# Patient Record
Sex: MALE | Race: OTHER | URBAN - METROPOLITAN AREA
[De-identification: names, ages, dates, MRNs, and addresses within clinical notes are randomized per-mention and may not be internally consistent; named-entity substitution may affect disease eponyms.]

---

## 2020-09-05 ENCOUNTER — INPATIENT (INPATIENT)
Facility: HOSPITAL | Age: 60
LOS: 1 YEAR days | Discharge: NURSING, REHAB OR LONG-TERM CARE | End: 2021-12-31
Attending: HOSPITALIST | Admitting: HOSPITALIST
Payer: MEDICAID

## 2020-09-05 VITALS
HEART RATE: 71 BPM | OXYGEN SATURATION: 99 % | DIASTOLIC BLOOD PRESSURE: 84 MMHG | SYSTOLIC BLOOD PRESSURE: 179 MMHG | RESPIRATION RATE: 18 BRPM | TEMPERATURE: 98 F | WEIGHT: 164.91 LBS

## 2020-09-05 DIAGNOSIS — F29 UNSPECIFIED PSYCHOSIS NOT DUE TO A SUBSTANCE OR KNOWN PHYSIOLOGICAL CONDITION: ICD-10-CM

## 2020-09-05 LAB
ANION GAP SERPL CALC-SCNC: 13 MMOL/L — SIGNIFICANT CHANGE UP (ref 7–14)
APAP SERPL-MCNC: <5 UG/ML — LOW (ref 10–30)
APPEARANCE UR: CLEAR — SIGNIFICANT CHANGE UP
BASOPHILS # BLD AUTO: 0.01 K/UL — SIGNIFICANT CHANGE UP (ref 0–0.2)
BASOPHILS NFR BLD AUTO: 0.2 % — SIGNIFICANT CHANGE UP (ref 0–1)
BILIRUB UR-MCNC: NEGATIVE — SIGNIFICANT CHANGE UP
BUN SERPL-MCNC: 21 MG/DL — HIGH (ref 10–20)
CALCIUM SERPL-MCNC: 9.7 MG/DL — SIGNIFICANT CHANGE UP (ref 8.5–10.1)
CHLORIDE SERPL-SCNC: 106 MMOL/L — SIGNIFICANT CHANGE UP (ref 98–110)
CO2 SERPL-SCNC: 24 MMOL/L — SIGNIFICANT CHANGE UP (ref 17–32)
COLOR SPEC: SIGNIFICANT CHANGE UP
CREAT SERPL-MCNC: 0.8 MG/DL — SIGNIFICANT CHANGE UP (ref 0.7–1.5)
DIFF PNL FLD: NEGATIVE — SIGNIFICANT CHANGE UP
EOSINOPHIL # BLD AUTO: 0.06 K/UL — SIGNIFICANT CHANGE UP (ref 0–0.7)
EOSINOPHIL NFR BLD AUTO: 1.4 % — SIGNIFICANT CHANGE UP (ref 0–8)
ETHANOL SERPL-MCNC: <10 MG/DL — SIGNIFICANT CHANGE UP
GLUCOSE SERPL-MCNC: 74 MG/DL — SIGNIFICANT CHANGE UP (ref 70–99)
GLUCOSE UR QL: SIGNIFICANT CHANGE UP
HCT VFR BLD CALC: 40.7 % — LOW (ref 42–52)
HGB BLD-MCNC: 12.9 G/DL — LOW (ref 14–18)
IMM GRANULOCYTES NFR BLD AUTO: 0.5 % — HIGH (ref 0.1–0.3)
KETONES UR-MCNC: NEGATIVE — SIGNIFICANT CHANGE UP
LEUKOCYTE ESTERASE UR-ACNC: NEGATIVE — SIGNIFICANT CHANGE UP
LYMPHOCYTES # BLD AUTO: 0.87 K/UL — LOW (ref 1.2–3.4)
LYMPHOCYTES # BLD AUTO: 20.8 % — SIGNIFICANT CHANGE UP (ref 20.5–51.1)
MCHC RBC-ENTMCNC: 30.6 PG — SIGNIFICANT CHANGE UP (ref 27–31)
MCHC RBC-ENTMCNC: 31.7 G/DL — LOW (ref 32–37)
MCV RBC AUTO: 96.4 FL — HIGH (ref 80–94)
MONOCYTES # BLD AUTO: 0.45 K/UL — SIGNIFICANT CHANGE UP (ref 0.1–0.6)
MONOCYTES NFR BLD AUTO: 10.7 % — HIGH (ref 1.7–9.3)
NEUTROPHILS # BLD AUTO: 2.78 K/UL — SIGNIFICANT CHANGE UP (ref 1.4–6.5)
NEUTROPHILS NFR BLD AUTO: 66.4 % — SIGNIFICANT CHANGE UP (ref 42.2–75.2)
NITRITE UR-MCNC: NEGATIVE — SIGNIFICANT CHANGE UP
NRBC # BLD: 0 /100 WBCS — SIGNIFICANT CHANGE UP (ref 0–0)
PH UR: 7.5 — SIGNIFICANT CHANGE UP (ref 5–8)
PLATELET # BLD AUTO: 204 K/UL — SIGNIFICANT CHANGE UP (ref 130–400)
POTASSIUM SERPL-MCNC: 4.3 MMOL/L — SIGNIFICANT CHANGE UP (ref 3.5–5)
POTASSIUM SERPL-SCNC: 4.3 MMOL/L — SIGNIFICANT CHANGE UP (ref 3.5–5)
PROT UR-MCNC: NEGATIVE — SIGNIFICANT CHANGE UP
RBC # BLD: 4.22 M/UL — LOW (ref 4.7–6.1)
RBC # FLD: 13.1 % — SIGNIFICANT CHANGE UP (ref 11.5–14.5)
SALICYLATES SERPL-MCNC: <0.3 MG/DL — LOW (ref 4–30)
SODIUM SERPL-SCNC: 143 MMOL/L — SIGNIFICANT CHANGE UP (ref 135–146)
SP GR SPEC: 1.01 — SIGNIFICANT CHANGE UP (ref 1.01–1.02)
UROBILINOGEN FLD QL: SIGNIFICANT CHANGE UP
WBC # BLD: 4.19 K/UL — LOW (ref 4.8–10.8)
WBC # FLD AUTO: 4.19 K/UL — LOW (ref 4.8–10.8)

## 2020-09-05 PROCEDURE — 93010 ELECTROCARDIOGRAM REPORT: CPT

## 2020-09-05 PROCEDURE — 99285 EMERGENCY DEPT VISIT HI MDM: CPT

## 2020-09-05 PROCEDURE — 70450 CT HEAD/BRAIN W/O DYE: CPT | Mod: 26

## 2020-09-05 RX ORDER — HALOPERIDOL DECANOATE 100 MG/ML
2 INJECTION INTRAMUSCULAR EVERY 12 HOURS
Refills: 0 | Status: DISCONTINUED | OUTPATIENT
Start: 2020-09-05 | End: 2020-09-07

## 2020-09-05 RX ORDER — SODIUM CHLORIDE 9 MG/ML
1000 INJECTION, SOLUTION INTRAVENOUS
Refills: 0 | Status: DISCONTINUED | OUTPATIENT
Start: 2020-09-05 | End: 2020-09-07

## 2020-09-05 RX ORDER — ENOXAPARIN SODIUM 100 MG/ML
40 INJECTION SUBCUTANEOUS DAILY
Refills: 0 | Status: COMPLETED | OUTPATIENT
Start: 2020-09-05 | End: 2021-08-04

## 2020-09-05 RX ORDER — CHLORHEXIDINE GLUCONATE 213 G/1000ML
1 SOLUTION TOPICAL
Refills: 0 | Status: COMPLETED | OUTPATIENT
Start: 2020-09-05 | End: 2021-08-04

## 2020-09-05 RX ORDER — HALOPERIDOL DECANOATE 100 MG/ML
5 INJECTION INTRAMUSCULAR ONCE
Refills: 0 | Status: COMPLETED | OUTPATIENT
Start: 2020-09-05 | End: 2020-09-05

## 2020-09-05 RX ADMIN — HALOPERIDOL DECANOATE 5 MILLIGRAM(S): 100 INJECTION INTRAMUSCULAR at 05:18

## 2020-09-05 RX ADMIN — SODIUM CHLORIDE 60 MILLILITER(S): 9 INJECTION, SOLUTION INTRAVENOUS at 23:06

## 2020-09-05 RX ADMIN — Medication 2 MILLIGRAM(S): at 05:18

## 2020-09-05 NOTE — ED BEHAVIORAL HEALTH ASSESSMENT NOTE - DIFFERENTIAL
Psychotic syndrome (schizophrenia, schizoaffective disorder, bipolar disorder), substance induced mood disorder, psychosis secondary to a medical condition, neurocognitive disorder

## 2020-09-05 NOTE — ED BEHAVIORAL HEALTH ASSESSMENT NOTE - COMMENTS ON SUICIDE RISK/PROTECTIVE FACTORS:
Risk factors include clouded sensorium of unknown etiology, unknown history (psychiatric or medical), concerning presentation (brought in due to disorganized behavior), at this time pt is at acutely elevated risk, however requires further assessment prior to disposition planning. 1:1 required due to acutely elevated risk.

## 2020-09-05 NOTE — ED PROVIDER NOTE - PROGRESS NOTE DETAILS
pt combative, agitated, screaming at staff, will give sedation, labs + psych consult. fs: patient less agitated I will continue to monitor at this time no signs of trauma unlikely infectious cause without fever Per psych - patient still too somnolent s/p haldol/ativan for evaluation. FAVIOLA: Psych saw patient - patient more awake but not providing enough history at this time for them. They are going to re-evaluate in 1 hour. FAVIOLA: Psych saw patient - patient more awake but not providing enough history at this time for them. They are going to re-evaluate in 1 hour. Patient likely still sedated from initial chemical sedation. Per night staff patient was belligerent, no focal deficits. However will obtain CT head while awaiting further psych eval given prolonged sedation. FAVIOLA: Case endorsed to Dr. Andrew pending CT head, re-eval, likely admission to medicine with psych on consult.

## 2020-09-05 NOTE — ED PROVIDER NOTE - UNABLE TO OBTAIN
Urgent need for Intervention I am unable to obtain a comprehensive history, review of systems, past medical history due to constraints imposed by the urgency of the patient's clinical condition and/or mental status.

## 2020-09-05 NOTE — ED BEHAVIORAL HEALTH ASSESSMENT NOTE - RISK ASSESSMENT
Unable to determine Suicide Risk Risk factors include clouded sensorium of unknown etiology, unknown history (psychiatric or medical), concerning presentation (brought in due to disorganized behavior), at this time pt is at acutely elevated risk, however requires further assessment prior to disposition planning. 1:1 required due to acutely elevated risk.

## 2020-09-05 NOTE — ED PROVIDER NOTE - CLINICAL SUMMARY MEDICAL DECISION MAKING FREE TEXT BOX
patient improved we obtained labs monitored with no signs of trauma, we have consulted anna I reviewed the Resident’s or Physician Assistant’s note (as assigned above), and agree with the findings and plan except as documented in my note. Patient signed out to me by Dr. Shields pending head CT and disposition. Patient presented for agitation and psychiatric evaluation. Labs, psych evaluation, head ct performed in ED. Given ativan and haldol for agitation. Patient sleepy after chemical sedation but awake, airway intact and neurologically intact, unable to be assessed adequately by psych. Admitted to medicine for further evaluation and treatment.

## 2020-09-05 NOTE — ED BEHAVIORAL HEALTH ASSESSMENT NOTE - PSYCHIATRIC ISSUES AND PLAN (INCLUDE STANDING AND PRN MEDICATION)
If agitation remains an issue, recommend against benzodiazapine treatment, would recommend haldol 2 mg PO Q12H PRN for agitation, escalating to IM if threat to self or others and refusing PO. Appeared significantly sedated with Haldol 5mg/Ativan 2mg IM when given in ED.

## 2020-09-05 NOTE — ED BEHAVIORAL HEALTH ASSESSMENT NOTE - HPI (INCLUDE ILLNESS QUALITY, SEVERITY, DURATION, TIMING, CONTEXT, MODIFYING FACTORS, ASSOCIATED SIGNS AND SYMPTOMS)
Mr. Chris is a 60-year-old, black, male, with unknown social demographics, medical history, or psychiatric history who was reportedly brought in by ambulance "seen walking around screaming at people "The devil will breathe fire on you""; psychiatry consulted for mental health evaluation.     Per the primary team (emergency medicine), telepsychiatry consulted in the AM at patient presentation, however pt was not cooperative with interview. Reportedly, became agitated and required sedation with haloperidol 5 mg IM and Ativan 2mg IM, which was administered around 5AM. Psychiatry consultation service attempted to do assessment around noon however found him too sedate to interview.     On interview with undersigned Mr. Chris was oriented to being in a hospital, however guessed he was in [place] (unclear where he was picked up by EMS), and would not provide his name to the interview. He was oriented to the year. Interview was significantly limited by lack of engagement (after learning that undersigned was attempting to perform psychiatric eval, turned on side and stopped speaking." He appeared somewhat disorganized, responding without apparent reason (for example when asked what was happening at the time that EMS arrived to bring him to the hospital he responded "I wish..I wish..I wish"    Patient did request to speak to medical physician stating he had difficulty walking.

## 2020-09-05 NOTE — ED BEHAVIORAL HEALTH ASSESSMENT NOTE - NS ED BHA PLAN ADMIT TO MEDI SURG ADMIT TO
Michelle Benson MD   You 1 minute ago (3:29 PM)                 It should be diagnostic     Michelle Benson MD FACS             New order for 3D mammogram diagnostic of Lt breast was put in [facility]. [facility]

## 2020-09-05 NOTE — H&P ADULT - NSHPPHYSICALEXAM_GEN_ALL_CORE
GENERAL: NAD, lying in bed comfortably, became agitated when asked further questions, lethargic, on constant observation  HEAD:  Atraumatic, Normocephalic  EYES: EOMI, PERRLA, conjunctiva and sclera clear  ENT: Moist mucous membranes  NECK: Supple, No JVD  CHEST/LUNG: Clear to auscultation bilaterally; No rales, rhonchi, wheezing, or rubs.  HEART: Regular rate and rhythm; No murmurs, rubs, or gallops  ABDOMEN: Bowel sounds present; Soft, Nontender, Nondistended.   EXTREMITIES:  2+ Peripheral Pulses, brisk capillary refill. No clubbing, cyanosis, or edema  NERVOUS SYSTEM:  Alert & Oriented *1, speech clear. No deficits   MSK: FROM all 4 extremities, full and equal strength GENERAL: NAD, lying in bed comfortably, became agitated when asked further questions, lethargic, on constant observation  HEAD:  Atraumatic, Normocephalic  EYES: EOMI, PERRLA, conjunctiva and sclera clear  ENT: Moist mucous membranes  NECK: Supple, No JVD  CHEST/LUNG: Clear to auscultation bilaterally; No rales, rhonchi, wheezing, or rubs.  HEART: Regular rate and rhythm; No murmurs, rubs, or gallops  ABDOMEN: Bowel sounds present; Soft, Nontender, Nondistended.   EXTREMITIES:  2+ Peripheral Pulses, brisk capillary refill. No clubbing, cyanosis, or edema  NERVOUS SYSTEM:  Alert & Oriented *1, speech clear. No deficits, no meningeal signs   MSK: FROM all 4 extremities, full and equal strength

## 2020-09-05 NOTE — ED PROVIDER NOTE - ATTENDING CONTRIBUTION TO CARE
I was present for and supervised the key and critical aspects of the procedures performed during the care of the patient. Patient presents for evaluation of agitation found wandering the streets he is unable to provide history at this time. On exam patient is agitated no signs of trauma, given haldol and ativan and continue to monitor I will consult psych

## 2020-09-05 NOTE — H&P ADULT - ASSESSMENT
60-year-old, black, male, with unknown social demographics, medical history, or psychiatric history who was reportedly brought in by ambulance "seen walking around screaming at people "The devil will breathe fire on you"      #AMS  -CT head non remarkable  -alcohol levels within normal limit  - consult  -f/u urine tox screen  -f/u UA  -f/u blood culture  -no signs of infection  -IVF  -constant observation  -psychiatry following  -2 mg PO Q12H PRN for agitation, escalating to IM if threat to self or others and refusing PO, avoid benzodiazepines  -f/u a1c, lipid profile  -f/u HIV, hepatitis, TSH, b12, folate  -neurology consult for AMS      Activity as tolerated   Diet regular for now  DVT PPX Lovenox   GI ppx not indicated   Dispo unknown,  consult   Code full 60-year-old, black, male, with unknown social demographics, medical history, or psychiatric history who was reportedly brought in by ambulance "seen walking around screaming at people "The devil will breathe fire on you"      #AMS, psychotic disorder vs drug induced vs other medical conditions  -CT head non remarkable  -alcohol levels within normal limit  - consult  -f/u urine tox screen  -f/u UA  -f/u blood culture  -no signs of infection  -IVF  -constant observation  -psychiatry following  -2 mg PO Q12H PRN for agitation, escalating to IM if threat to self or others and refusing PO, avoid benzodiazepines  -f/u a1c, lipid profile  -f/u HIV, hepatitis, TSH, b12, folate  -neurology consult for AMS      Activity as tolerated   Diet regular for now  DVT PPX Lovenox   GI ppx not indicated   Dispo unknown,  consult   Code full 60-year-old, black, male, with unknown social demographics, medical history, or psychiatric history who was reportedly brought in by ambulance "seen walking around screaming at people "The devil will breathe fire on you"      #AMS, psychotic disorder vs drug induced vs other medical conditions  -CT head non remarkable  -alcohol levels within normal limit  -no signs of infection  -IVF  -f/u a1c, lipid profile  -f/u HIV, hepatitis, TSH, b12, folate  -f/u urine tox screen  -f/u UA  -f/u blood culture  -constant observation  -psychiatry following  -2 mg PO Q12H PRN for agitation, escalating to IM if threat to self or others and refusing PO, avoid benzodiazepines  -neurology consult for AMS  - consult      Activity as tolerated   Diet regular for now  DVT PPX Lovenox   GI ppx not indicated   Dispo unknown,  consult   Code full 60-year-old, black, male, with unknown social demographics, medical history, or psychiatric history who was reportedly brought in by ambulance "seen walking around screaming at people "The devil will breathe fire on you"      #AMS, psychotic disorder vs drug induced vs other medical conditions including (infectious etiology)  -CT head non remarkable  -alcohol levels within normal limit  -WBC 4k, no fever, no headache, no focal neurologic deficits, no signs of meningitis,  -IVF  -f/u a1c, lipid profile  -f/u HIV, hepatitis, TSH, b12, folate  -f/u urine tox screen  -f/u UA  -f/u blood culture  -constant observation  -psychiatry following  -2 mg PO Q12H PRN for agitation, escalating to IM if threat to self or others and refusing PO, avoid benzodiazepines  -neurology consult for AMS  - consult  -consider LP if not improvement of symptoms to rule out viral encephalitis      Activity as tolerated   Diet regular for now  DVT PPX Lovenox   GI ppx not indicated   Dispo unknown,  consult   Code full

## 2020-09-05 NOTE — H&P ADULT - ATTENDING COMMENTS
HPI:  60-year-old, black, male, with unknown social demographics, medical history, or psychiatric history who was reportedly brought in by ambulance "seen walking around screaming at people "The devil will breathe fire on you" as per the ed notes  History obtained from the charts as the patient was not cooperative on my interview, when asked if he have any complains he said no and when asked questions on ROS, he became agitated.       In the ED was given haldol 5 and ativan 2 IV, evaluate  by psychiatry and admitted for to medicine as per their recommendation for workup for AMS, CT head non remarkable (05 Sep 2020 22:29)    REVIEW OF SYSTEMS:    CONSTITUTIONAL: No weakness, fevers or chills  EYES/ENT: No visual changes;  No vertigo or throat pain   NECK: No pain or stiffness  RESPIRATORY: No cough, wheezing, hemoptysis; No shortness of breath  CARDIOVASCULAR: No chest pain or palpitations  GASTROINTESTINAL: No abdominal or epigastric pain. No nausea, vomiting, or hematemesis; No diarrhea or constipation. No melena or hematochezia.  GENITOURINARY: No dysuria, frequency or hematuria  NEUROLOGICAL: No numbness or weakness  SKIN: No itching, rashes    Physical Exam:    General: WN/WD NAD  Neurology: A&Ox3, nonfocal, follows commands  Eyes: PERRLA/ EOMI  ENT/Neck: Neck supple, trachea midline, No JVD  Respiratory: CTA B/L, No wheezing, rales, rhonchi  CV: Normal rate regular rhythm, S1S2, no murmurs, rubs or gallops  Abdominal: Soft, NT, ND +BS,   Extremities: No edema, + peripheral pulses  Skin: No Rashes, Hematoma, Ecchymosis  Incisions: n/a  Tubes: n/a    A/p  Acute psychosis ?? r/o underlying infectious vs. metabolic vs. pyschiatric etiology  -admit to medicine  -1:1 observation   -agree w/ UDS, HIV, TSH, B12, HPI:  60-year-old, black, male, with unknown social demographics, medical history, or psychiatric history who was reportedly brought in by ambulance "seen walking around screaming at people "The devil will breathe fire on you" as per the ed notes  History obtained from the charts as the patient was not cooperative on my interview, when asked if he have any complains he said no and when asked questions on ROS, he became agitated.       In the ED was given haldol 5 and ativan 2 IV, evaluate  by psychiatry and admitted for to medicine as per their recommendation for workup for AMS, CT head non remarkable (05 Sep 2020 22:29)    REVIEW OF SYSTEMS: see cc/HPI , unable to get full ROS, only grunting answered to following  CONSTITUTIONAL: No weakness, fevers or chills  EYES/ENT: No visual changes;  No vertigo or throat pain   NECK: No pain or stiffness  RESPIRATORY: No cough, No shortness of breath  CARDIOVASCULAR: No chest pain   GASTROINTESTINAL: No abdominal  pain. No nausea, vomiting- ate dinner w/o incident -witnessed by  staff     Physical Exam:  General: WN/WD NAD  Neurology: A&Ox3, nonfocal, follows commands  Eyes: PERRLA/ EOMI  ENT/Neck: Neck supple, trachea midline, No JVD  Respiratory: CTA B/L, No wheezing, rales, rhonchi  CV: Normal rate regular rhythm, S1S2, no murmurs, rubs or gallops  Abdominal: Soft, NT, ND +BS,   Extremities: No edema, + peripheral pulses  Skin: No Rashes, Hematoma, Ecchymosis, bilateral distal LE chronic stasis changes.  Incisions: n/a  Tubes: n/a    A/p  Acute psychosis ?? r/o underlying infectious vs. metabolic vs. psychiatric etiology  -admit to medicine  -1:1 observation   -agree w/ UDS, HIV, TSH, B12,ESR, Hep panel  -Psychiatry appreciated - Haldol and Ativan PRN     eval - appears to be home less    DVT prophylaxis HPI:  60-year-old, black, male, with unknown social demographics, medical history, or psychiatric history who was reportedly brought in by ambulance "seen walking around screaming at people "The devil will breathe fire on you" as per the ed notes  History obtained from the charts as the patient was not cooperative on my interview, when asked if he have any complains he said no and when asked questions on ROS, he became agitated.       In the ED was given haldol 5 and ativan 2 IV, evaluate  by psychiatry and admitted for to medicine as per their recommendation for workup for AMS, CT head non remarkable (05 Sep 2020 22:29)    REVIEW OF SYSTEMS: see cc/HPI , unable to get full ROS, only grunting answered to following  CONSTITUTIONAL: No weakness, fevers or chills  EYES/ENT: No visual changes;  No vertigo or throat pain   NECK: No pain or stiffness  RESPIRATORY: No cough, No shortness of breath  CARDIOVASCULAR: No chest pain   GASTROINTESTINAL: No abdominal  pain. No nausea, vomiting- ate dinner w/o incident -witnessed by  staff     Physical Exam:  General: WN/WD NAD  Neurology: A&Ox3, nonfocal, follows commands  Eyes: PERRLA/ EOMI  ENT/Neck: Neck supple, trachea midline, No JVD  Respiratory: CTA B/L, No wheezing, rales, rhonchi  CV: Normal rate regular rhythm, S1S2, no murmurs, rubs or gallops  Abdominal: Soft, NT, ND +BS,   Extremities: No edema, + peripheral pulses  Skin: No Rashes, Hematoma, Ecchymosis, bilateral distal LE chronic stasis changes.  Incisions: n/a  Tubes: n/a    A/p  Acute psychosis ?? r/o underlying infectious vs. metabolic vs. psychiatric vs. neurological etiology  -admit to medicine  -1:1 observation   -agree w/ UDS, HIV, TSH, B12,ESR, Hep panel  -Psychiatry appreciated - Haldol and Ativan PRN  -Neurology eval   -May need LP if no clear etiology exists but patient agitated/aggressive earlier     eval - appears to be home less    DVT prophylaxis

## 2020-09-05 NOTE — H&P ADULT - NSHPLABSRESULTS_GEN_ALL_CORE
12.9   4.19  )-----------( 204      ( 05 Sep 2020 06:00 )             40.7       09-05    143  |  106  |  21<H>  ----------------------------<  74  4.3   |  24  |  0.8    Ca    9.7      05 Sep 2020 06:00                              CAPILLARY BLOOD GLUCOSE      POCT Blood Glucose.: 122 mg/dL (05 Sep 2020 04:17)

## 2020-09-05 NOTE — ED BEHAVIORAL HEALTH ASSESSMENT NOTE - SUMMARY
Mr. Chris is a 60-year-old, black, male, with unknown social demographics, medical history, or psychiatric history who was reportedly brought in by ambulance "seen walking around screaming at people "The devil will breathe fire on you""; psychiatry consulted for mental health evaluation.     Although patients presentation may represent psychiatric decompinsation, more information is needed regarding his medical. psychiatric, and social history, current medical status to make decision regarding psychiatric disposition. He appeared disoriented to place and did not know the date, had difficulty sustaining attention throughout the interview, thus, delirium due to a medical condition could also be contributing to presentation.    ***Recommend inpatient medical admission for workup of altered mental status including but not limited to thyroid panel, b12/folate. hepatitis panel, neuro imaging, neurology consult, social work consult, urine drug screen, urinalysis, COVID-19 test.   ***If agitation remains an issue, recommend against benzodiazapine treatment, would recommend haldol 2 mg PO Q12H PRN for agitation, escalating to IM if threat to self or others and refusing PO. Appeared significantly sedated with Haldol 5mg/Ativan 2mg IM when given in ED.   ***Psychiatry C/L team to follow

## 2020-09-05 NOTE — ED PROVIDER NOTE - PHYSICAL EXAMINATION
CONSTITUTIONAL: Flat affect, not answering questions, aggressive towards staff  SKIN: warm, dry  HEAD: Normocephalic; atraumatic.  EYES: PERRL, EOMI, no conjunctival erythema  ENT: No nasal discharge; airway clear.  NECK: Supple; non tender.  CARD: S1, S2 normal; no murmurs, gallops, or rubs. Regular rate and rhythm.   RESP: No wheezes, rales or rhonchi.  ABD: soft ntnd  EXT: Normal ROM.  No clubbing, cyanosis or edema.   LYMPH: No acute cervical adenopathy.   NEURO: Alert, oriented, no focal deficits  PSYCH: agitated, aggressive towards staff

## 2020-09-05 NOTE — ED ADULT TRIAGE NOTE - CHIEF COMPLAINT QUOTE
Pt was brought by EMS for med eval of AMS. Was found by the police wondering around on the street. Pt refuses answering any questions, denies any pain except pain in RLE, doesn't provide any details.

## 2020-09-05 NOTE — H&P ADULT - HISTORY OF PRESENT ILLNESS
60-year-old, black, male, with unknown social demographics, medical history, or psychiatric history who was reportedly brought in by ambulance "seen walking around screaming at people "The devil will breathe fire on you"      In the ED was given haldol 5 and ativan 2 IV, evaluate  by psychiatry and admitted for to medicine as per their recommendation for workup for AMS, CT head non remarkable 60-year-old, black, male, with unknown social demographics, medical history, or psychiatric history who was reportedly brought in by ambulance "seen walking around screaming at people "The devil will breathe fire on you" as per the ed notes  History obtained from the charts as the patient was not cooperative on my interview, when asked if he have any complains he said no and when asked questions on ROS, he became agitated.       In the ED was given haldol 5 and ativan 2 IV, evaluate  by psychiatry and admitted for to medicine as per their recommendation for workup for AMS, CT head non remarkable

## 2020-09-06 LAB
SARS-COV-2 IGG SERPL QL IA: NEGATIVE — SIGNIFICANT CHANGE UP
SARS-COV-2 IGM SERPL IA-ACNC: 0.78 INDEX — SIGNIFICANT CHANGE UP
SARS-COV-2 RNA SPEC QL NAA+PROBE: SIGNIFICANT CHANGE UP

## 2020-09-06 PROCEDURE — 99222 1ST HOSP IP/OBS MODERATE 55: CPT

## 2020-09-06 PROCEDURE — 99221 1ST HOSP IP/OBS SF/LOW 40: CPT

## 2020-09-06 RX ORDER — AMLODIPINE BESYLATE 2.5 MG/1
2.5 TABLET ORAL ONCE
Refills: 0 | Status: COMPLETED | OUTPATIENT
Start: 2020-09-06 | End: 2020-09-06

## 2020-09-06 RX ADMIN — SODIUM CHLORIDE 60 MILLILITER(S): 9 INJECTION, SOLUTION INTRAVENOUS at 04:20

## 2020-09-06 RX ADMIN — CHLORHEXIDINE GLUCONATE 1 APPLICATION(S): 213 SOLUTION TOPICAL at 05:43

## 2020-09-06 NOTE — PROGRESS NOTE BEHAVIORAL HEALTH - SUMMARY
61yo M with unknown history is admitted for workup of AMS. Based on the interview today, patient presents with periodic confusion and psychotic symptoms, including perceptual disturbances and paranoid thinking. Differential at this point would include acute psychosis, SIPD, as well as delirium, and neurocognitive impairment. It appears that patient refused lab work. UDS results are currently pending, and psychiatry will follow up with primary medical team in the morning.    Recommendations:  - If agitation remains an issue, recommend against benzodiazapine treatment, would recommend haldol 2 mg PO Q12H PRN for agitation, escalating to IM if threat to self or others and refusing PO. Appeared significantly sedated with Haldol 5mg/Ativan 2mg IM when given in ED  - Neuro recs appreciated.  - Will follow up tomorrow.

## 2020-09-06 NOTE — PROGRESS NOTE ADULT - SUBJECTIVE AND OBJECTIVE BOX
LENGTH OF HOSPITAL STAY: 1d      CHIEF COMPLAINT:   Patient is a 60y old  Male who presents with a chief complaint of mental health marco (05 Sep 2020 22:29)      HISTORY OF PRESENTING ILLNESS:    HPI:  60-year-old, black, male, with unknown social demographics, medical history, or psychiatric history who was reportedly brought in by ambulance "seen walking around screaming at people "The devil will breathe fire on you" as per the ed notes  History obtained from the charts as the patient was not cooperative on my interview, when asked if he have any complains he said no and when asked questions on ROS, he became agitated.       In the ED was given haldol 5 and ativan 2 IV, evaluate  by psychiatry and admitted for to medicine as per their recommendation for workup for AMS, CT head non remarkable (05 Sep 2020 22:29)    PAST MEDICAL & SURGICAL HISTORY  PAST MEDICAL & SURGICAL HISTORY:  No pertinent past medical history: unknown  No significant past surgical history: unknown      ALLERGIES:  No Known Allergies    MEDICATIONS:  STANDING MEDICATIONS  chlorhexidine 4% Liquid 1 Application(s) Topical <User Schedule>  enoxaparin Injectable 40 milliGRAM(s) SubCutaneous daily  lactated ringers. 1000 milliLiter(s) IV Continuous <Continuous>    PRN MEDICATIONS  haloperidol     Tablet 2 milliGRAM(s) Oral every 12 hours PRN    VITALS:   T(F): 97.5  HR: 73  BP: 175/84  RR: 19  SpO2: 100%    PHYSICAL EXAM:  General: No acute distress.  Alert, oriented, interactive, nonfocal.    HEENT: Pupils equal, reactive to light symmetrically.    PULM: Clear to auscultation bilaterally, no significant sputum production.    CVS: Regular rate and rhythm, no murmurs, rubs, or gallops.    Abdomen: Soft, nondistended, nontender.    Pelvis:    Extremities: No edema, nontender.    SKIN: Warm and well perfused, no rashes noted.    PSYCH:     NEURO:    LABS:                        12.9   4.19  )-----------( 204      ( 05 Sep 2020 06:00 )             40.7     09-05    143  |  106  |  21<H>  ----------------------------<  74  4.3   |  24  |  0.8    Ca    9.7      05 Sep 2020 06:00        Urinalysis Basic - ( 05 Sep 2020 22:26 )    Color: Light Yellow / Appearance: Clear / S.014 / pH: x  Gluc: x / Ketone: Negative  / Bili: Negative / Urobili: <2 mg/dL   Blood: x / Protein: Negative / Nitrite: Negative   Leuk Esterase: Negative / RBC: x / WBC x   Sq Epi: x / Non Sq Epi: x / Bacteria: x        Assessment/Plan:  60-year-old, black, male, with unknown social demographics, medical history, or psychiatric history who was reportedly brought in by ambulance "seen walking around screaming at people "The devil will breathe fire on you".      #AMS ~ Acute psychosis: Psych vs Neurologic vs Metabolic vs Infectious disorder   - WBC 4k, no fever, no headache, no focal neurologic deficits, no signs of meningitis, Alcohol level wnl.  - CT head(): Non-remarkable  - Constant observation; Haldol 2 mg PO Q12H PRN for agitation, escalating to IM if threat to self or others and refusing PO, avoid benzodiazepines  - Consider LP if not improvement of symptoms to rule out viral encephalitis  - f/u a1c, lipid profile, HIV, hepatitis, TSH, B12, folate  - f/u urine tox screen, UA, Bcx  - f/u Psychiatry, Neurology,  c/s        Activity as tolerated   Diet regular for now  DVT PPX Lovenox   GI ppx not indicated   Dispo unknown,  consult   Code full LENGTH OF HOSPITAL STAY: 1d      CHIEF COMPLAINT:   Patient is a 60y old  Male who presents with a chief complaint of mental health marco (05 Sep 2020 22:29)      HISTORY OF PRESENTING ILLNESS:    HPI:  60-year-old, black, male, with unknown social demographics, medical history, or psychiatric history who was reportedly brought in by ambulance "seen walking around screaming at people "The devil will breathe fire on you" as per the ed notes  History obtained from the charts as the patient was not cooperative on my interview, when asked if he have any complains he said no and when asked questions on ROS, he became agitated.       In the ED was given haldol 5 and ativan 2 IV, evaluate  by psychiatry and admitted for to medicine as per their recommendation for workup for AMS, CT head non remarkable (05 Sep 2020 22:29)    PAST MEDICAL & SURGICAL HISTORY  PAST MEDICAL & SURGICAL HISTORY:  No pertinent past medical history: unknown  No significant past surgical history: unknown      ALLERGIES:  No Known Allergies    MEDICATIONS:  STANDING MEDICATIONS  chlorhexidine 4% Liquid 1 Application(s) Topical <User Schedule>  enoxaparin Injectable 40 milliGRAM(s) SubCutaneous daily  lactated ringers. 1000 milliLiter(s) IV Continuous <Continuous>    PRN MEDICATIONS  haloperidol     Tablet 2 milliGRAM(s) Oral every 12 hours PRN    VITALS:   T(F): 97.5  HR: 73  BP: 175/84  RR: 19  SpO2: 100%    PHYSICAL EXAM:  General: No acute distress.  Alert, oriented, interactive.    HEENT: Pupils equal, reactive to light symmetrically.    PULM: Clear to auscultation bilaterally, no significant sputum production.    CVS: Regular rate and rhythm, no murmurs, rubs, or gallops.    Abdomen: Soft, nondistended, nontender.    Extremities: No edema, nontender.    SKIN: Warm and well perfused, no rashes noted.    PSYCH: Appeared calm during interview, not complaining.    LABS:                        12.9   4.19  )-----------( 204      ( 05 Sep 2020 06:00 )             40.7     09-05    143  |  106  |  21<H>  ----------------------------<  74  4.3   |  24  |  0.8    Ca    9.7      05 Sep 2020 06:00        Urinalysis Basic - ( 05 Sep 2020 22:26 )    Color: Light Yellow / Appearance: Clear / S.014 / pH: x  Gluc: x / Ketone: Negative  / Bili: Negative / Urobili: <2 mg/dL   Blood: x / Protein: Negative / Nitrite: Negative   Leuk Esterase: Negative / RBC: x / WBC x   Sq Epi: x / Non Sq Epi: x / Bacteria: x        Assessment/Plan:  60-year-old, black, male, with unknown social demographics, medical history, or psychiatric history who was reportedly brought in by ambulance "seen walking around screaming at people "The devil will breathe fire on you".      #AMS ~ Acute psychosis: Psych vs Neurologic vs Metabolic vs Infectious disorder   - WBC 4k, no fever, no headache, no focal neurologic deficits, no signs of meningitis, Alcohol level wnl.  - CT head(): Non-remarkable  - Constant observation; Haldol 2 mg PO Q12H PRN for agitation, escalating to IM if threat to self or others and refusing PO, avoid benzodiazepines  - Consider LP if not improvement of symptoms to rule out viral encephalitis  - f/u a1c, lipid profile, HIV, hepatitis, TSH, B12, folate  - f/u urine tox screen, UA, Bcx  - f/u Psychiatry, Neurology,  c/s        Activity as tolerated   Diet regular for now  DVT PPX Lovenox   GI ppx not indicated   Dispo unknown,  consult   Code full

## 2020-09-06 NOTE — PROGRESS NOTE BEHAVIORAL HEALTH - NSBHCHARTREVIEWIMAGING_PSY_A_CORE FT
< from: CT Head No Cont (09.05.20 @ 19:17) >  EXAM:  CT BRAIN  PROCEDURE DATE:  09/05/2020      INTERPRETATION:  Clinical History / Reason for exam: Altered mental status  The study was performed without IV contrast.  No comparison  The cisterns and ventricles are normal with no shift of the midline structures.  No hemorrhage, infarct or mass formation is seen.  There is partial opacification of the right sphenoid sinus.  The skull appears intact.  Impression  No acute process seen.    BAYLEE LAWS M.D., ATTENDING RADIOLOGIST  This document has been electronically signed. Sep  5 2020  7:30PM      < end of copied text >

## 2020-09-06 NOTE — PROGRESS NOTE BEHAVIORAL HEALTH - OTHER
did not assess diosrganized, content difficult to assess minimally cooperative. difficult to assess with mask. some paranoia observed.

## 2020-09-06 NOTE — CONSULT NOTE ADULT - ASSESSMENT
# Altered mental status is mostly Verbal altercation  -Less likely neurological problem, more of mood disorder vs acute delirium  -Recommend getting baseline mental status confirmation and social support.   -Check Vitamin B12, folate, follow up with drug screen. Check HIV  -CT head negative for intracranial pathology  -Recommend Psych Follow up # Altered mental status is mostly Verbal altercation  -Less likely neurological problem, more of mood disorder vs acute delirium  -Recommend getting baseline mental status confirmation and social support.   -Check Vitamin B12, folate, RPR, TSH and follow up with drug screen. Check HIV  -CT head negative for intracranial pathology  -Recommend getting REEG and MRI of the head.   -Recommend Psych Follow up

## 2020-09-06 NOTE — CONSULT NOTE ADULT - SUBJECTIVE AND OBJECTIVE BOX
Neurology Consult    Patient is a 60y old  Male who presents with a chief complaint of mental health eval (06 Sep 2020 08:28)      HPI:  60-year-old, black, male, with unknown social demographics, medical history, or psychiatric history who was reportedly brought in by ambulance "seen walking around screaming at people "The devil will breathe fire on you" as per the ed notes  History obtained from the charts as the patient was not cooperative on my interview, when asked if he have any complains he said no and when asked questions on ROS, he became agitated.       In the ED was given haldol 5 and ativan 2 IV, evaluate  by psychiatry and admitted for to medicine as per their recommendation for workup for AMS, CT head non remarkable (05 Sep 2020 22:29)    Currently patient was in bed, denied any complains. He is not sure why he is in the hospital otherwise he was comparatively     PAST MEDICAL & SURGICAL HISTORY:  No pertinent past medical history: unknown  No significant past surgical history: unknown      FAMILY HISTORY:  No pertinent family history in first degree relatives: unknown      Social History: (-) x 3    Allergies    No Known Allergies    Intolerances        MEDICATIONS  (STANDING):  chlorhexidine 4% Liquid 1 Application(s) Topical <User Schedule>  enoxaparin Injectable 40 milliGRAM(s) SubCutaneous daily  lactated ringers. 1000 milliLiter(s) (60 mL/Hr) IV Continuous <Continuous>    MEDICATIONS  (PRN):  haloperidol     Tablet 2 milliGRAM(s) Oral every 12 hours PRN agitaion      Review of systems:    Constitutional: as per HPI  Eyes: No eye pain or discharge  ENMT:  No difficulty hearing; No sinus or throat pain  Neck: No pain or stiffness  Respiratory: No cough, wheezing, chills or hemoptysis  Cardiovascular: No chest pain, palpitations, shortness of breath, dyspnea on exertion  Gastrointestinal: No abdominal pain, nausea, vomiting or hematemesis; No diarrhea or constipation.   Genitourinary: No dysuria, frequency, hematuria or incontinence  Neurological: As per HPI  Skin: No rashes or lesions   Endocrine: No heat or cold intolerance; No hair loss  Musculoskeletal: No joint pain or swelling  Psychiatric: No depression, anxiety, mood swings  Heme/Lymph: No easy bruising or bleeding gums    Vital Signs Last 24 Hrs  T(C): 36.4 (06 Sep 2020 06:28), Max: 36.6 (05 Sep 2020 15:45)  T(F): 97.5 (06 Sep 2020 06:28), Max: 97.9 (05 Sep 2020 15:45)  HR: 73 (06 Sep 2020 06:28) (68 - 80)  BP: 175/84 (06 Sep 2020 06:28) (145/70 - 175/84)  BP(mean): --  RR: 19 (06 Sep 2020 06:28) (18 - 20)  SpO2: 100% (06 Sep 2020 07:27) (99% - 100%)    Examination:  General:  Appearance is consistent with chronologic age.  No abnormal facies.  Gross skin survey within normal limits.    Cognitive/Language:  The patient is oriented to person, place, time and date.  Recent and remote memory intact.  Fund of knowledge is intact and normal.  Language with normal repetition, comprehension and naming.  Nondysarthric.    Eyes: intact VA, VFF.  EOMI w/o nystagmus, skew or reported double vision.  PERRL.  No ptosis/weakness of eyelid closure.    Face:  Facial sensation normal V1 - 3, no facial asymmetry.    Ears/Nose/Throat:  Hearing grossly intact b/l.  Palate elevates midline.  Tongue and uvula midline.   Motor examination:   Normal tone, bulk and range of motion.  No tenderness, twitching, tremors or involuntary movements.  Formal Muscle Strength Testing: (MRC grade R/L) 5/5 UE; 5/5 LE.  No observable drift.  Reflexes:   2+ b/l pectoralis, biceps, triceps, brachioradialis, patella and Achilles.  Plantar response downgoing b/l.  Jaw jerk, Greg, clonus absent.  Sensory examination:   Intact to light touch and pinprick, pain, temperature and proprioception and vibration in all extremities.  Cerebellum:   FTN/HKS intact with normal LATASHA in all limbs.  No dysmetria or dysdiadokinesia.  Gait narrow based and normal.    Respiratory:  no audible wheezing or inspiratory stridor.  no use of accessory muscles.   Cardiac: pulse palpable, no audible bruits  Abdomen: supple, no guarding, no TTP    Labs:   CBC Full  -  ( 05 Sep 2020 06:00 )  WBC Count : 4.19 K/uL  RBC Count : 4.22 M/uL  Hemoglobin : 12.9 g/dL  Hematocrit : 40.7 %  Platelet Count - Automated : 204 K/uL  Mean Cell Volume : 96.4 fL  Mean Cell Hemoglobin : 30.6 pg  Mean Cell Hemoglobin Concentration : 31.7 g/dL  Auto Neutrophil # : 2.78 K/uL  Auto Lymphocyte # : 0.87 K/uL  Auto Monocyte # : 0.45 K/uL  Auto Eosinophil # : 0.06 K/uL  Auto Basophil # : 0.01 K/uL  Auto Neutrophil % : 66.4 %  Auto Lymphocyte % : 20.8 %  Auto Monocyte % : 10.7 %  Auto Eosinophil % : 1.4 %  Auto Basophil % : 0.2 %        143  |  106  |  21<H>  ----------------------------<  74  4.3   |  24  |  0.8    Ca    9.7      05 Sep 2020 06:00          Urinalysis Basic - ( 05 Sep 2020 22:26 )    Color: Light Yellow / Appearance: Clear / S.014 / pH: x  Gluc: x / Ketone: Negative  / Bili: Negative / Urobili: <2 mg/dL   Blood: x / Protein: Negative / Nitrite: Negative   Leuk Esterase: Negative / RBC: x / WBC x   Sq Epi: x / Non Sq Epi: x / Bacteria: x          Neuroimaging:  NCHCT: CT Head No Cont:   EXAM:  CT BRAIN            PROCEDURE DATE:  2020            INTERPRETATION:  Clinical History / Reason for exam: Altered mental status  The study was performed without IV contrast.  No comparison  The cisterns and ventricles are normal with no shift of the midline structures.  No hemorrhage, infarct or mass formation is seen.  There is partial opacification of the right sphenoid sinus.  The skull appears intact.  Impression  No acute process seen.                BAYLEE LAWS M.D., ATTENDING RADIOLOGIST  This document has been electronically signed. Sep  5 2020  7:30PM             (20 @ 19:17)      20 @ 11:10 Neurology Consult    Patient is a 60y old  Male who presents with a chief complaint of mental health eval (06 Sep 2020 08:28)      HPI:  60-year-old, black, male, with unknown social demographics, medical history, or psychiatric history who was reportedly brought in by ambulance "seen walking around screaming at people "The devil will breathe fire on you" as per the ed notes  History obtained from the charts as the patient was not cooperative on my interview, when asked if he have any complains he said no and when asked questions on ROS, he became agitated.       In the ED was given haldol 5 and ativan 2 IV, evaluate  by psychiatry and admitted for to medicine as per their recommendation for workup for AMS, CT head non remarkable (05 Sep 2020 22:29)    Currently patient was in bed, denied any complains. He is not sure why he is in the hospital otherwise he was comparatively calmer.     PAST MEDICAL & SURGICAL HISTORY:  No pertinent past medical history: unknown  No significant past surgical history: unknown      FAMILY HISTORY:  No pertinent family history in first degree relatives: unknown      Social History: (-) x 3    Allergies    No Known Allergies    Intolerances        MEDICATIONS  (STANDING):  chlorhexidine 4% Liquid 1 Application(s) Topical <User Schedule>  enoxaparin Injectable 40 milliGRAM(s) SubCutaneous daily  lactated ringers. 1000 milliLiter(s) (60 mL/Hr) IV Continuous <Continuous>    MEDICATIONS  (PRN):  haloperidol     Tablet 2 milliGRAM(s) Oral every 12 hours PRN agitaion      Review of systems:    Constitutional: as per HPI  Eyes: No eye pain or discharge  ENMT:  No difficulty hearing; No sinus or throat pain  Neck: No pain or stiffness  Respiratory: No cough, wheezing, chills or hemoptysis  Cardiovascular: No chest pain, palpitations, shortness of breath, dyspnea on exertion  Gastrointestinal: No abdominal pain, nausea, vomiting or hematemesis; No diarrhea or constipation.   Genitourinary: No dysuria, frequency, hematuria or incontinence  Neurological: As per HPI  Skin: No rashes or lesions   Endocrine: No heat or cold intolerance; No hair loss  Musculoskeletal: No joint pain or swelling  Psychiatric: No depression, anxiety, mood swings  Heme/Lymph: No easy bruising or bleeding gums    Vital Signs Last 24 Hrs  T(C): 36.4 (06 Sep 2020 06:28), Max: 36.6 (05 Sep 2020 15:45)  T(F): 97.5 (06 Sep 2020 06:28), Max: 97.9 (05 Sep 2020 15:45)  HR: 73 (06 Sep 2020 06:28) (68 - 80)  BP: 175/84 (06 Sep 2020 06:28) (145/70 - 175/84)  BP(mean): --  RR: 19 (06 Sep 2020 06:28) (18 - 20)  SpO2: 100% (06 Sep 2020 07:27) (99% - 100%)    Examination:  General:  Appearance is consistent with chronologic age.  No abnormal facies. Chronic skin papules at the back.  Cognitive/Language:  The patient is oriented to person, place, time and date.  Eyes: intact VA, VFF.  EOMI w/o nystagmus, skew or reported double vision.  PERRL.  No ptosis/weakness of eyelid closure.    Face:  Facial sensation normal V1 - 3, no facial asymmetry.    Ears/Nose/Throat:  Hearing grossly intact b/l.  Motor examination:   Normal tone, bulk and range of motion.  Formal Muscle Strength Testing: (MRC grade R/L) 5/5 UE; 5/5 LE.  No observable drift  Respiratory:  no audible wheezing or inspiratory stridor.  no use of accessory muscles.   Cardiac: pulse palpable, no audible bruits  Abdomen: supple, no guarding, no TTP    Labs:   CBC Full  -  ( 05 Sep 2020 06:00 )  WBC Count : 4.19 K/uL  RBC Count : 4.22 M/uL  Hemoglobin : 12.9 g/dL  Hematocrit : 40.7 %  Platelet Count - Automated : 204 K/uL  Mean Cell Volume : 96.4 fL  Mean Cell Hemoglobin : 30.6 pg  Mean Cell Hemoglobin Concentration : 31.7 g/dL  Auto Neutrophil # : 2.78 K/uL  Auto Lymphocyte # : 0.87 K/uL  Auto Monocyte # : 0.45 K/uL  Auto Eosinophil # : 0.06 K/uL  Auto Basophil # : 0.01 K/uL  Auto Neutrophil % : 66.4 %  Auto Lymphocyte % : 20.8 %  Auto Monocyte % : 10.7 %  Auto Eosinophil % : 1.4 %  Auto Basophil % : 0.2 %        143  |  106  |  21<H>  ----------------------------<  74  4.3   |  24  |  0.8    Ca    9.7      05 Sep 2020 06:00          Urinalysis Basic - ( 05 Sep 2020 22:26 )    Color: Light Yellow / Appearance: Clear / S.014 / pH: x  Gluc: x / Ketone: Negative  / Bili: Negative / Urobili: <2 mg/dL   Blood: x / Protein: Negative / Nitrite: Negative   Leuk Esterase: Negative / RBC: x / WBC x   Sq Epi: x / Non Sq Epi: x / Bacteria: x          Neuroimaging:  NCHCT: CT Head No Cont:   EXAM:  CT BRAIN            PROCEDURE DATE:  2020                BAYLEE LAWS M.D., ATTENDING RADIOLOGIST  This document has been electronically signed. Sep  5 2020  7:30PM             (20 @ 19:17)      20 @ 11:10      < from: CT Head No Cont (20 @ 19:17) >  The study was performed without IV contrast.  No comparison  The cisterns and ventricles are normal with no shift of the midline structures.  No hemorrhage, infarct or mass formation is seen.  There is partial opacification of the right sphenoid sinus.  The skull appears intact.  Impression  No acute process seen.    < end of copied text >

## 2020-09-06 NOTE — PROGRESS NOTE BEHAVIORAL HEALTH - NSBHFUPINTERVALHXFT_PSY_A_CORE
Mr. Chris is a 61yo [place] male, with unknown social demographics, medical history, or psychiatric history who was reportedly brought in by ambulance "seen walking around screaming at people 'The devil will breathe fire on you.'" Patient was admitted for medical workup of AMS. Psychiatry was consulted for mental health evaluation.      Upon introduction to the patient, he responded, “I do not need a psychiatric doctor. I need a medical doctor.” With much encouragement by PCA on 1:1, patient begrudgingly agreed to interview. He is alert, oriented to location (hospital), but unaware of the date/time (2020 or 2021, wintertime). When asked about domicile type, he says he lives with people but cannot specify further. 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 As for reason for admission, he believes he was admitted for lower extremity swelling. 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 Patient continues to be irritable throughout interview, refusing to answer many questions and denying any psychiatric history. He states his mood is “okay,” denying any depressed mood, SI, or HI. He denies any AVH, or substance use as well.    Collateral was obtained from formerly [facility], who speaks same [place] dialect as patient. She reports that patient has been not making sense at times when he speaks to her. He also starts cursing at people who aren’t there at times as well. Nurse also got sign-out that patient was possibly having VH but could not elaborate.

## 2020-09-07 LAB
ALBUMIN SERPL ELPH-MCNC: 3.4 G/DL — LOW (ref 3.5–5.2)
ALP SERPL-CCNC: 58 U/L — SIGNIFICANT CHANGE UP (ref 30–115)
ALT FLD-CCNC: 16 U/L — SIGNIFICANT CHANGE UP (ref 0–41)
AMPHET UR-MCNC: NEGATIVE — SIGNIFICANT CHANGE UP
ANION GAP SERPL CALC-SCNC: 7 MMOL/L — SIGNIFICANT CHANGE UP (ref 7–14)
AST SERPL-CCNC: 24 U/L — SIGNIFICANT CHANGE UP (ref 0–41)
BARBITURATES UR SCN-MCNC: NEGATIVE — SIGNIFICANT CHANGE UP
BASOPHILS # BLD AUTO: 0.01 K/UL — SIGNIFICANT CHANGE UP (ref 0–0.2)
BASOPHILS NFR BLD AUTO: 0.3 % — SIGNIFICANT CHANGE UP (ref 0–1)
BENZODIAZ UR-MCNC: NEGATIVE — SIGNIFICANT CHANGE UP
BILIRUB SERPL-MCNC: 0.9 MG/DL — SIGNIFICANT CHANGE UP (ref 0.2–1.2)
BUN SERPL-MCNC: 7 MG/DL — LOW (ref 10–20)
CALCIUM SERPL-MCNC: 9.5 MG/DL — SIGNIFICANT CHANGE UP (ref 8.5–10.1)
CHLORIDE SERPL-SCNC: 105 MMOL/L — SIGNIFICANT CHANGE UP (ref 98–110)
CO2 SERPL-SCNC: 27 MMOL/L — SIGNIFICANT CHANGE UP (ref 17–32)
COCAINE METAB.OTHER UR-MCNC: NEGATIVE — SIGNIFICANT CHANGE UP
CREAT SERPL-MCNC: 0.7 MG/DL — SIGNIFICANT CHANGE UP (ref 0.7–1.5)
EOSINOPHIL # BLD AUTO: 0.09 K/UL — SIGNIFICANT CHANGE UP (ref 0–0.7)
EOSINOPHIL NFR BLD AUTO: 2.4 % — SIGNIFICANT CHANGE UP (ref 0–8)
GLUCOSE SERPL-MCNC: 84 MG/DL — SIGNIFICANT CHANGE UP (ref 70–99)
HCT VFR BLD CALC: 37.9 % — LOW (ref 42–52)
HGB BLD-MCNC: 12.4 G/DL — LOW (ref 14–18)
IMM GRANULOCYTES NFR BLD AUTO: 0.3 % — SIGNIFICANT CHANGE UP (ref 0.1–0.3)
LYMPHOCYTES # BLD AUTO: 0.75 K/UL — LOW (ref 1.2–3.4)
LYMPHOCYTES # BLD AUTO: 20 % — LOW (ref 20.5–51.1)
MAGNESIUM SERPL-MCNC: 1.8 MG/DL — SIGNIFICANT CHANGE UP (ref 1.8–2.4)
MCHC RBC-ENTMCNC: 30.5 PG — SIGNIFICANT CHANGE UP (ref 27–31)
MCHC RBC-ENTMCNC: 32.7 G/DL — SIGNIFICANT CHANGE UP (ref 32–37)
MCV RBC AUTO: 93.3 FL — SIGNIFICANT CHANGE UP (ref 80–94)
METHADONE UR-MCNC: NEGATIVE — SIGNIFICANT CHANGE UP
MONOCYTES # BLD AUTO: 0.4 K/UL — SIGNIFICANT CHANGE UP (ref 0.1–0.6)
MONOCYTES NFR BLD AUTO: 10.7 % — HIGH (ref 1.7–9.3)
NEUTROPHILS # BLD AUTO: 2.49 K/UL — SIGNIFICANT CHANGE UP (ref 1.4–6.5)
NEUTROPHILS NFR BLD AUTO: 66.3 % — SIGNIFICANT CHANGE UP (ref 42.2–75.2)
NRBC # BLD: 0 /100 WBCS — SIGNIFICANT CHANGE UP (ref 0–0)
OPIATES UR-MCNC: NEGATIVE — SIGNIFICANT CHANGE UP
PCP SPEC-MCNC: SIGNIFICANT CHANGE UP
PLATELET # BLD AUTO: 246 K/UL — SIGNIFICANT CHANGE UP (ref 130–400)
POTASSIUM SERPL-MCNC: 3.6 MMOL/L — SIGNIFICANT CHANGE UP (ref 3.5–5)
POTASSIUM SERPL-SCNC: 3.6 MMOL/L — SIGNIFICANT CHANGE UP (ref 3.5–5)
PROPOXYPHENE QUALITATIVE URINE RESULT: NEGATIVE — SIGNIFICANT CHANGE UP
PROT SERPL-MCNC: 6 G/DL — SIGNIFICANT CHANGE UP (ref 6–8)
RBC # BLD: 4.06 M/UL — LOW (ref 4.7–6.1)
RBC # FLD: 12.4 % — SIGNIFICANT CHANGE UP (ref 11.5–14.5)
SODIUM SERPL-SCNC: 139 MMOL/L — SIGNIFICANT CHANGE UP (ref 135–146)
WBC # BLD: 3.75 K/UL — LOW (ref 4.8–10.8)
WBC # FLD AUTO: 3.75 K/UL — LOW (ref 4.8–10.8)

## 2020-09-07 PROCEDURE — 99232 SBSQ HOSP IP/OBS MODERATE 35: CPT

## 2020-09-07 RX ORDER — HALOPERIDOL DECANOATE 100 MG/ML
0.5 INJECTION INTRAMUSCULAR EVERY 8 HOURS
Refills: 0 | Status: DISCONTINUED | OUTPATIENT
Start: 2020-09-07 | End: 2020-12-22

## 2020-09-07 RX ORDER — HALOPERIDOL DECANOATE 100 MG/ML
2 INJECTION INTRAMUSCULAR EVERY 12 HOURS
Refills: 0 | Status: DISCONTINUED | OUTPATIENT
Start: 2020-09-07 | End: 2020-09-11

## 2020-09-07 RX ADMIN — CHLORHEXIDINE GLUCONATE 1 APPLICATION(S): 213 SOLUTION TOPICAL at 05:05

## 2020-09-07 NOTE — PROGRESS NOTE ADULT - ASSESSMENT
60-year-old, black, male, with unknown social demographics, medical history, or psychiatric history who was reportedly brought in by ambulance "seen walking around screaming at people "The devil will breathe fire on you" admitted for altered mental status.      #AMS, psychotic disorder vs drug induced vs other medical conditions including (infectious etiology)  -CT head non remarkable  -alcohol levels within normal limit  - no fever, no headache, no focal neurologic deficits, no signs of meningitis,  -f/u a1c, lipid profile  -f/u HIV, hepatitis, TSH, b12, folate  -f/u blood culture  -constant observation  -IVF discontinued   - Urinalysis negative till date  -psychiatry following  -started on Haldol  2 mg PO Q12H standing for agitation and 0.5 mg prn; considered switching to second generation antipsychotics   -neurology following and recommend MRI and EEG;    However patient is a poor historian and unable to acknowledge if patient has any contraindications to MRI  -consider LP if not improvement of symptoms to rule out viral encephalitis      Activity as tolerated   Diet regular for now  DVT PPX Lovenox   GI ppx not indicated   Dispo unknown,  consult   Code full      #Progress Note Handoff  Pending (specify):  Clinical  improvement/ stability  Family discussion: Not available.   Disposition: /Unknown at this time.

## 2020-09-07 NOTE — PROGRESS NOTE ADULT - SUBJECTIVE AND OBJECTIVE BOX
PARVIZ TORRES  60y  Male      Patient is a 60y old  Male who presents with a altered mental status .       INTERVAL HPI/OVERNIGHT EVENTS: The patient was seen and examined at bedside.   Resting in bed. No distress. Remains confused.       ******************************* REVIEW OF SYSTEMS:**********************************************    All other review of systems negative    *********************** VITALS ******************************************    T(F): 98.3 (20 @ 04:58)  HR: 77 (20 04:58) (68 - 77)  BP: 134/63 (20 @ 04:58) (134/63 - 148/76)  RR: 18 (20 @ 04:58) (18 - 18)  SpO2: 95% (20 @ 19:30) (95% - 100%)    20 @ 07:01  -  20 @ 07:00  --------------------------------------------------------  IN: 0 mL / OUT: 1180 mL / NET: -1180 mL            20 @ 07:01  -  20 @ 07:00  --------------------------------------------------------  IN: 0 mL / OUT: 1180 mL / NET: -1180 mL        ******************************** PHYSICAL EXAM:**************************************************  GENERAL: NAD    PSYCH: no agitation, baseline mentation ??   HEENT:     NERVOUS SYSTEM:  Alert & Oriented X2  PULMONARY: SARAH, CTA    CARDIOVASCULAR: S1S2 RRR    GI: Soft, NT, ND; BS present.    EXTREMITIES:  2+ Peripheral Pulses, No clubbing, cyanosis, or edema    LYMPH: No lymphadenopathy noted    SKIN: No rashes or lesions      **************************** LABS *******************************************************                          12.4   3.75  )-----------( 246      ( 07 Sep 2020 07:55 )             37.9     -    139  |  105  |  7<L>  ----------------------------<  84  3.6   |  27  |  0.7    Ca    9.5      07 Sep 2020 07:55  Mg     1.8         TPro  6.0  /  Alb  3.4<L>  /  TBili  0.9  /  DBili  x   /  AST  24  /  ALT  16  /  AlkPhos  58        Urinalysis Basic - ( 05 Sep 2020 22:26 )    Color: Light Yellow / Appearance: Clear / S.014 / pH: x  Gluc: x / Ketone: Negative  / Bili: Negative / Urobili: <2 mg/dL   Blood: x / Protein: Negative / Nitrite: Negative   Leuk Esterase: Negative / RBC: x / WBC x   Sq Epi: x / Non Sq Epi: x / Bacteria: x        Lactate Trend        CAPILLARY BLOOD GLUCOSE              **************************Active Medications *******************************************  No Known Allergies      chlorhexidine 4% Liquid 1 Application(s) Topical <User Schedule>  enoxaparin Injectable 40 milliGRAM(s) SubCutaneous daily  haloperidol     Tablet 2 milliGRAM(s) Oral every 12 hours  haloperidol     Tablet 0.5 milliGRAM(s) Oral every 8 hours PRN      ***************************************************  RADIOLOGY & ADDITIONAL TESTS:    Imaging Personally Reviewed:  [ ] YES  [ ] NO    HEALTH ISSUES - PROBLEM Dx:  Psychosis, unspecified psychosis type

## 2020-09-07 NOTE — PROGRESS NOTE ADULT - SUBJECTIVE AND OBJECTIVE BOX
SUBJECTIVE:    Patient is a 60y old Male who presents with a chief complaint of mental health marco (06 Sep 2020 11:10)    Currently admitted to medicine with the primary diagnosis of Altered mental status     Today is hospital day 2d. This morning he is resting comfortably in bed and reports no new issues or overnight events.     INTERVAL EVENTS:     PAST MEDICAL & SURGICAL HISTORY  No pertinent past medical history: unknown  No significant past surgical history: unknown      ALLERGIES:  No Known Allergies    MEDICATIONS:  STANDING MEDICATIONS  chlorhexidine 4% Liquid 1 Application(s) Topical <User Schedule>  enoxaparin Injectable 40 milliGRAM(s) SubCutaneous daily  lactated ringers. 1000 milliLiter(s) IV Continuous <Continuous>    PRN MEDICATIONS  haloperidol     Tablet 2 milliGRAM(s) Oral every 12 hours PRN    VITALS:   T(F): 98.3  HR: 77  BP: 134/63  RR: 18  SpO2: 95%    LABS:                        12.4   3.75  )-----------( 246      ( 07 Sep 2020 07:55 )             37.9             Urinalysis Basic - ( 05 Sep 2020 22:26 )    Color: Light Yellow / Appearance: Clear / S.014 / pH: x  Gluc: x / Ketone: Negative  / Bili: Negative / Urobili: <2 mg/dL   Blood: x / Protein: Negative / Nitrite: Negative   Leuk Esterase: Negative / RBC: x / WBC x   Sq Epi: x / Non Sq Epi: x / Bacteria: x        RADIOLOGY:      PHYSICAL EXAM:  GEN: No acute distress  PULM/CHEST: Clear to auscultation bilaterally, no rales, rhonchi or wheezes   CVS: Regular rate and rhythm, S1-S2, no murmurs  ABD: Soft, non-tender, non-distended, +BS  EXT: No edema  NEURO: AAOx3    Miller Catheter: SUBJECTIVE:    Patient is a 60y old Male who presents with a chief complaint of mental health marco (06 Sep 2020 11:10)    Currently admitted to medicine with the primary diagnosis of Altered mental status     Today is hospital day 2d. This morning he is resting comfortably in bed and is still confused and uncooperative on examination     PAST MEDICAL & SURGICAL HISTORY  No pertinent past medical history: unknown  No significant past surgical history: unknown      ALLERGIES:  No Known Allergies    MEDICATIONS:  STANDING MEDICATIONS  chlorhexidine 4% Liquid 1 Application(s) Topical <User Schedule>  enoxaparin Injectable 40 milliGRAM(s) SubCutaneous daily  lactated ringers. 1000 milliLiter(s) IV Continuous <Continuous>    PRN MEDICATIONS  haloperidol     Tablet 2 milliGRAM(s) Oral every 12 hours PRN    VITALS:   T(F): 98.3  HR: 77  BP: 134/63  RR: 18  SpO2: 95%    LABS:                        12.4   3.75  )-----------( 246      ( 07 Sep 2020 07:55 )             37.9             Urinalysis Basic - ( 05 Sep 2020 22:26 )    Color: Light Yellow / Appearance: Clear / S.014 / pH: x  Gluc: x / Ketone: Negative  / Bili: Negative / Urobili: <2 mg/dL   Blood: x / Protein: Negative / Nitrite: Negative   Leuk Esterase: Negative / RBC: x / WBC x   Sq Epi: x / Non Sq Epi: x / Bacteria: x        RADIOLOGY:      Physical Exam:   Physical Exam: GENERAL: NAD, lying in bed comfortably, became agitated when asked further questions, lethargic, on constant observation  HEAD:  Atraumatic, Normocephalic  EYES: EOMI, PERRLA, conjunctiva and sclera clear  ENT: Moist mucous membranes  NECK: Supple, No JVD  CHEST/LUNG: Clear to auscultation bilaterally; No rales, rhonchi, wheezing, or rubs.  HEART: Regular rate and rhythm; No murmurs, rubs, or gallops  ABDOMEN: Bowel sounds present; Soft, Nontender, Nondistended.   EXTREMITIES:  2+ Peripheral Pulses, brisk capillary refill. No clubbing, cyanosis, or edema  NERVOUS SYSTEM:  Alert & Oriented *1, speech clear. No deficits, no meningeal signs  MSK: FROM all 4 extremities, full and equal strength SUBJECTIVE:    Patient is a 60y old Male who presents with a chief complaint of mental health marco (06 Sep 2020 11:10)    Currently admitted to medicine with the primary diagnosis of Altered mental status     Today is hospital day 2d. This morning he is resting comfortably in bed and is still confused and uncooperative on examination.    PAST MEDICAL & SURGICAL HISTORY  No pertinent past medical history: unknown  No significant past surgical history: unknown      ALLERGIES:  No Known Allergies    MEDICATIONS:  STANDING MEDICATIONS  chlorhexidine 4% Liquid 1 Application(s) Topical <User Schedule>  enoxaparin Injectable 40 milliGRAM(s) SubCutaneous daily  lactated ringers. 1000 milliLiter(s) IV Continuous <Continuous>    PRN MEDICATIONS  haloperidol     Tablet 2 milliGRAM(s) Oral every 12 hours PRN    VITALS:   T(F): 98.3  HR: 77  BP: 134/63  RR: 18  SpO2: 95%    LABS:                        12.4   3.75  )-----------( 246      ( 07 Sep 2020 07:55 )             37.9             Urinalysis Basic - ( 05 Sep 2020 22:26 )    Color: Light Yellow / Appearance: Clear / S.014 / pH: x  Gluc: x / Ketone: Negative  / Bili: Negative / Urobili: <2 mg/dL   Blood: x / Protein: Negative / Nitrite: Negative   Leuk Esterase: Negative / RBC: x / WBC x   Sq Epi: x / Non Sq Epi: x / Bacteria: x        RADIOLOGY:      Physical Exam:   Physical Exam: GENERAL: NAD, lying in bed comfortably, became agitated when asked further questions, lethargic, on constant observation  HEAD:  Atraumatic, Normocephalic  EYES: EOMI, PERRLA, conjunctiva and sclera clear  ENT: Moist mucous membranes  NECK: Supple, No JVD  CHEST/LUNG: Clear to auscultation bilaterally; No rales, rhonchi, wheezing, or rubs.  HEART: Regular rate and rhythm; No murmurs, rubs, or gallops  ABDOMEN: Bowel sounds present; Soft, Nontender, Nondistended.   EXTREMITIES:  2+ Peripheral Pulses, brisk capillary refill. No clubbing, cyanosis, or edema  NERVOUS SYSTEM:  Alert & Oriented *1, speech clear. No deficits, no meningeal signs  MSK: FROM all 4 extremities, full and equal strength

## 2020-09-07 NOTE — PROGRESS NOTE ADULT - ASSESSMENT
60-year-old, black, male, with unknown social demographics, medical history, or psychiatric history who was reportedly brought in by ambulance "seen walking around screaming at people "The devil will breathe fire on you"      #AMS, psychotic disorder vs drug induced vs other medical conditions including (infectious etiology)  -CT head non remarkable  -alcohol levels within normal limit  -WBC 4k, no fever, no headache, no focal neurologic deficits, no signs of meningitis,  -IVF  -f/u a1c, lipid profile  -f/u HIV, hepatitis, TSH, b12, folate  -f/u urine tox screen  -f/u UA  -f/u blood culture  -constant observation  -psychiatry following  -2 mg PO Q12H PRN for agitation, escalating to IM if threat to self or others and refusing PO, avoid benzodiazepines  -neurology consult for AMS  - consult  -consider LP if not improvement of symptoms to rule out viral encephalitis      Activity as tolerated   Diet regular for now  DVT PPX Lovenox   GI ppx not indicated   Dispo unknown,  consult   Code full 60-year-old, black, male, with unknown social demographics, medical history, or psychiatric history who was reportedly brought in by ambulance "seen walking around screaming at people "The devil will breathe fire on you" admitted for altered mental status.      #AMS, psychotic disorder vs drug induced vs other medical conditions including (infectious etiology)  -CT head non remarkable  -alcohol levels within normal limit  -WBC 4k, no fever, no headache, no focal neurologic deficits, no signs of meningitis,  -f/u a1c, lipid profile  -f/u HIV, hepatitis, TSH, b12, folate  -f/u blood culture  -constant observation  -IVF discontinued   - Urinalysis negative till date  -psychiatry following  -started on 2 mg PO Q12H standing for agitation and 0.5 mg prn; considered switching to second generation antipsychotics   -neurology following and recommend MRI and EEG; However patient is a poor historian and unable to acknowledge is patient has any contraindications to MRI1  -consider LP if not improvement of symptoms to rule out viral encephalitis      Activity as tolerated   Diet regular for now  DVT PPX Lovenox   GI ppx not indicated   Dispo unknown,  consult   Code full

## 2020-09-07 NOTE — CHART NOTE - NSCHARTNOTEFT_GEN_A_CORE
Chart was reviewed this morning, and case discussed with medical team. Given that patient had refused previously requested labs for delirium workup and behaviorally there are no acute concerns, I will not follow up with the patient today. Instead, I have recommended to the primary team that labs (TSH, HIV, Vit B12, Folate) be reordered and psychiatry CL team will follow up tomorrow.

## 2020-09-08 DIAGNOSIS — R41.9 UNSPECIFIED SYMPTOMS AND SIGNS INVOLVING COGNITIVE FUNCTIONS AND AWARENESS: ICD-10-CM

## 2020-09-08 PROCEDURE — 99232 SBSQ HOSP IP/OBS MODERATE 35: CPT

## 2020-09-08 PROCEDURE — 99232 SBSQ HOSP IP/OBS MODERATE 35: CPT | Mod: GC

## 2020-09-08 RX ORDER — POLYETHYLENE GLYCOL 3350 17 G/17G
17 POWDER, FOR SOLUTION ORAL
Refills: 0 | Status: ACTIVE | OUTPATIENT
Start: 2020-09-08 | End: 2021-08-07

## 2020-09-08 NOTE — PROGRESS NOTE ADULT - ASSESSMENT
60-year-old, black, male, with unknown social demographics, medical history, or psychiatric history who was reportedly brought in by ambulance "seen walking around screaming at people "The devil will breathe fire on you" admitted for altered mental status.      # AMS probably  behavioral / delusional  disorder      Doubt drug induced vs other medical conditions including (infectious etiology)  -CT head non remarkable  -alcohol levels within normal limit  - no fever, no headache, no focal neurologic deficits, no signs of meningitis,  -f/u HIV, hepatitis, TSH, b12, folate >> refusing  blood draw  by patient.   Refusing meds as well.   -constant observation  - Urinalysis negative till date  - F/U psychiatry   -started on Haldol  2 mg PO Q12H standing for agitation and 0.5 mg prn; considered switching to second generation antipsychotics   -neurology following and recommend MRI and EEG;    However patient is a poor historian and unable to acknowledge if patient has any contraindications to MRI      Activity as tolerated   Diet regular for now  DVT PPX Lovenox   GI ppx not indicated   Dispo unknown,  consult   Code full      #Progress Note Handoff  Pending (specify):  Psychiatry /   Family discussion: None available   Disposition: Unknown at this time   #Progress Note Handoff  Pending (specify):  Clinical  improvement/ stability  Family discussion: Not available.   Disposition: /Unknown at this time.

## 2020-09-08 NOTE — PROGRESS NOTE BEHAVIORAL HEALTH - NSBHFUPINTERVALHXFT_PSY_A_CORE
Chart reviewed, pt seen and examined at bedside. No acute overnight events or acute agitation as per medicine team. However, pt uncooperative, intermittently refusing blood draws, refused PO haldol. No PRN required.     Upon approach, pt laying calmly on bed. Pt pleasant, cooperative, intermittently smiling w/ CL team, however pt poor historian. Unable to provide prior psychiatric and medical hx. A&O to only name, place and situation, when asked about date states "I don't have a calender." Pt reports being domiciled in a shelter, states he was BIB police after he was found wandering on the streets, states "he lost his way" and when police thought he was "homeless they tied me up and brought me here." When asked if he's seen a psychiatrist pt states "I don't know maybe."  He states he moved to [place] but cannot recall specific timeline, thinks ?1972, but then states [place] was president at the time. Pt  states he was living in a shelter, but does not remember the address. States he was  to a women in [place] and has 2 children, but states that they still reside in [place], and has not seen them in a while. When asked about why he has been refusing blood word, pt expresses concerns that needles may be contaminated. Pt however denies AVH. No SI/HI endorsed.

## 2020-09-08 NOTE — PROGRESS NOTE ADULT - ASSESSMENT
60-year-old, black, male, with unknown social demographics, medical history, or psychiatric history who was reportedly brought in by ambulance "seen walking around screaming at people "The devil will breathe fire on you" admitted for altered mental status.      #AMS, psychotic disorder vs drug induced vs other medical conditions including (infectious etiology)  -CT head non remarkable  -alcohol levels within normal limit  -WBC 4k, no fever, no headache, no focal neurologic deficits, no signs of meningitis,  -f/u a1c, lipid profile  -f/u HIV, hepatitis, TSH, b12, folate  -f/u blood culture  -constant observation  -IVF discontinued   - Urinalysis negative till date  -psychiatry following  -started on 2 mg PO Q12H standing for agitation and 0.5 mg prn; considered switching to second generation antipsychotics   -neurology following and recommend MRI and EEG; However patient is a poor historian and unable to acknowledge is patient has any contraindications to MRI1  -consider LP if not improvement of symptoms to rule out viral encephalitis      Activity as tolerated   Diet regular for now  DVT PPX Lovenox   GI ppx not indicated   Dispo unknown,  consult   Code full 60-year-old, black, male, with unknown social demographics, medical history, or psychiatric history who was reportedly brought in by ambulance "seen walking around screaming at people "The devil will breathe fire on you" admitted for altered mental status.      #AMS, psychotic disorder vs drug induced vs other medical conditions including (infectious etiology)  -CT head non remarkable  -alcohol levels within normal limit  -WBC 4k, no fever, no headache, no focal neurologic deficits, no signs of meningitis,  -f/u a1c, lipid profile  -f/u HIV, hepatitis, TSH, b12, folate  -f/u blood culture  -constant observation  - Urinalysis negative till date  -started on 2 mg PO Q12H standing for agitation and 0.5 mg prn; considered switching to second generation antipsychotics   -neurology following and recommend MRI and EEG; However patient is a poor historian and unable to acknowledge is patient has any contraindications to MRI1  -consider LP if not improvement of symptoms to rule out viral encephalitis  - -psychiatry following- yesterday recommended to repeat lab work however patient refused any blood draws, vital checks and   - Following up with psychiatry to possibly have the patient transferred to inpatient [facility] psychiatry unit; waiting for a call back      Activity as tolerated   Diet regular for now  DVT PPX Lovenox   GI ppx not indicated   Dispo unknown,  consult   Code full 60-year-old, black, male, with unknown social demographics, medical history, or psychiatric history who was reportedly brought in by ambulance "seen walking around screaming at people "The devil will breathe fire on you" admitted for altered mental status.      #AMS, psychotic disorder vs drug induced vs other medical conditions including (infectious etiology)  -CT head non remarkable  -alcohol levels within normal limit  -WBC 4k, no fever, no headache, no focal neurologic deficits, no signs of meningitis,  -f/u a1c, lipid profile  -f/u HIV, hepatitis, TSH, b12, folate  -f/u blood culture  -constant observation  - Urinalysis negative till date  -started on 2 mg PO Q12H standing for agitation and 0.5 mg prn; considered switching to second generation antipsychotics   -neurology following and recommend MRI and EEG; However patient is a poor historian and unable to acknowledge is patient has any contraindications to MRI1  -consider LP if not improvement of symptoms to rule out viral encephalitis  - -psychiatry following- yesterday recommended to repeat lab work however patient refused any blood draws, vital checks and all medications  - Following up with psychiatry to possibly have the patient transferred to inpatient [facility] psychiatry unit; waiting for a call back      Activity as tolerated   Diet regular for now  DVT PPX Lovenox   GI ppx not indicated   Dispo unknown,  consult   Code full

## 2020-09-08 NOTE — PROGRESS NOTE ADULT - SUBJECTIVE AND OBJECTIVE BOX
SUBJECTIVE:    Patient is a 60y old Male who presents with a chief complaint of mental health marco (07 Sep 2020 12:45)    Currently admitted to medicine with the primary diagnosis of Altered mental status     Today is hospital day 3d. This morning he is resting comfortably in bed; still confused and agitated Patient denied lab draw and medications all of yesterday and today morning.        PAST MEDICAL & SURGICAL HISTORY  No pertinent past medical history: unknown  No significant past surgical history: unknown      ALLERGIES:  No Known Allergies    MEDICATIONS:  STANDING MEDICATIONS  chlorhexidine 4% Liquid 1 Application(s) Topical <User Schedule>  enoxaparin Injectable 40 milliGRAM(s) SubCutaneous daily  haloperidol     Tablet 2 milliGRAM(s) Oral every 12 hours    PRN MEDICATIONS  haloperidol     Tablet 0.5 milliGRAM(s) Oral every 8 hours PRN    VITALS:   T(F): 98.2  HR: 76  BP: 175/89  RR: 18  SpO2: 98%    LABS:                        12.4   3.75  )-----------( 246      ( 07 Sep 2020 07:55 )             37.9     09-07    139  |  105  |  7<L>  ----------------------------<  84  3.6   |  27  |  0.7    Ca    9.5      07 Sep 2020 07:55  Mg     1.8     09-07    TPro  6.0  /  Alb  3.4<L>  /  TBili  0.9  /  DBili  x   /  AST  24  /  ALT  16  /  AlkPhos  58  09-07                  RADIOLOGY:      Physical Exam: GENERAL: NAD, lying in bed comfortably, became agitated when asked further questions, lethargic, on constant observation  HEAD:  Atraumatic, Normocephalic  EYES: EOMI, PERRLA, conjunctiva and sclera clear  ENT: Moist mucous membranes  NECK: Supple, No JVD  CHEST/LUNG: Clear to auscultation bilaterally; No rales, rhonchi, wheezing, or rubs.  HEART: Regular rate and rhythm; No murmurs, rubs, or gallops  ABDOMEN: Bowel sounds present; Soft, Nontender, Nondistended.   EXTREMITIES:  2+ Peripheral Pulses, brisk capillary refill. No clubbing, cyanosis, or edema  NERVOUS SYSTEM:  Alert & Oriented *1, speech clear. No deficits, no meningeal signs  MSK: FROM all 4 extremities, full and equal strength	      Miller Catheter:

## 2020-09-08 NOTE — PROGRESS NOTE ADULT - SUBJECTIVE AND OBJECTIVE BOX
PARVIZ TORRES  60y  Male      Patient is a 60y old  Male who presents with an altered mental status.      INTERVAL HPI/OVERNIGHT EVENTS: The patient was seen and examined at bedside.  Resting in bed. Asking for a walking cane. Otherwise no complaints.       ******************************* REVIEW OF SYSTEMS:**********************************************    All other review of systems negative    *********************** VITALS ******************************************    T(F): 98.2 (09-07-20 @ 13:39)  HR: 76 (09-07-20 @ 20:46) (72 - 76)  BP: 175/89 (09-07-20 @ 20:46) (138/73 - 175/89)  RR: 18 (09-07-20 @ 19:35) (18 - 18)  SpO2: 98% (09-07-20 @ 19:35) (98% - 98%)    09-07-20 @ 07:01  -  09-08-20 @ 07:00  --------------------------------------------------------  IN: 0 mL / OUT: 250 mL / NET: -250 mL            09-07-20 @ 07:01  -  09-08-20 @ 07:00  --------------------------------------------------------  IN: 0 mL / OUT: 250 mL / NET: -250 mL        ******************************** PHYSICAL EXAM:**************************************************  GENERAL: NAD    PSYCH: no agitation, ?  baseline mentation  HEENT:     NERVOUS SYSTEM:  Alert & Oriented X2-3,    PULMONARY: SARAH, CTA    CARDIOVASCULAR: S1S2 RRR    GI: Soft, NT, ND; BS present.    EXTREMITIES:  2+ Peripheral Pulses, No clubbing, cyanosis, or edema    LYMPH: No lymphadenopathy noted    SKIN: No rashes or lesions      **************************** LABS *******************************************************                          12.4   3.75  )-----------( 246      ( 07 Sep 2020 07:55 )             37.9     09-07    139  |  105  |  7<L>  ----------------------------<  84  3.6   |  27  |  0.7    Ca    9.5      07 Sep 2020 07:55  Mg     1.8     09-07    TPro  6.0  /  Alb  3.4<L>  /  TBili  0.9  /  DBili  x   /  AST  24  /  ALT  16  /  AlkPhos  58  09-07          Lactate Trend        CAPILLARY BLOOD GLUCOSE              **************************Active Medications *******************************************  No Known Allergies      chlorhexidine 4% Liquid 1 Application(s) Topical <User Schedule>  enoxaparin Injectable 40 milliGRAM(s) SubCutaneous daily  haloperidol     Tablet 2 milliGRAM(s) Oral every 12 hours  haloperidol     Tablet 0.5 milliGRAM(s) Oral every 8 hours PRN  polyethylene glycol 3350 17 Gram(s) Oral two times a day PRN      ***************************************************  RADIOLOGY & ADDITIONAL TESTS:    Imaging Personally Reviewed:  [ ] YES  [ ] NO    HEALTH ISSUES - PROBLEM Dx:  Psychosis, unspecified psychosis type

## 2020-09-08 NOTE — PROGRESS NOTE BEHAVIORAL HEALTH - CASE SUMMARY
Mr Chris is a 60 year old man with no documented history of a psychiatric illness who was admitted for altered mental status. Psychiatry consult was called for a mental health evaluation. Patient appears to have some neurocognitive impairment rather than a primary psychiatric illness. he does not appear to be acutely psychotic, depressed or manic and does not have current  suicidal ideations, intent or plan. At this time, patient is not considered an imminent danger to himself or others and does not need inpatient psychiatric hospitalization. patient will benefit from possible a neurology consult to evaluate from Dementia  and a social work evaluation for possibly a higher level of care. If patient is not agitated, there is no acute indication for standing low dose haldol however , the low dose  haldol as needed for agitation can be continued for now.

## 2020-09-08 NOTE — PROGRESS NOTE BEHAVIORAL HEALTH - SUMMARY
Mr. Chris is a 60-year-old, black, male, with unknown social demographics, medical history, or psychiatric history who was reportedly admitted to medicine for workup of AMS.     Upon evaluation, pt continues to remain to be a poor historian, however calm, cooperative, no acute agitation or behavioral disturbances noted on exam or reported by primary medical team. Although pt endorses some paranoia about medical staff and blood draws, he does not appears acutely psychotic, he is linear and does not appear to be responding to internal stimuli or internally preoccupied. Pt presentation appears to be secondary to underlying neurocognitive impairment rather than a primary psychiatric illness. Pt does not meet criteria nor will he benefit from IPP admission as he is not a danger to self/others. At this time, we recommend to continue dementia/delirium workup. Additionally, would defer to  for possible placement and higher level of care. Mr. Chris is a 60-year-old, black, male, with unknown social demographics, medical history, or psychiatric history who was reportedly admitted to medicine for workup of AMS.     Upon evaluation, pt continues be a poor historian, however is calm, cooperative, no acute agitation or behavioral disturbances noted on exam or reported by primary medical team. Although pt endorses some paranoia about medical staff and blood draws, he does not appears acutely psychotic, he is linear, no disorganized behavior noted and does not appear to be responding to internal stimuli or internally preoccupied. Pt presentation appears to be secondary to underlying neurocognitive impairment rather than a primary psychiatric illness. Pt does not meet criteria nor will he benefit from IPP admission as he is not a danger to self/others. At this time, we recommend to continue dementia/delirium workup. Additionally, would defer to social work for possible placement and higher level of care. Standing haldol is not indicated at this time, may continue PRN haldol for acute agitation not amenable to verbal redirection.

## 2020-09-09 PROCEDURE — 99233 SBSQ HOSP IP/OBS HIGH 50: CPT

## 2020-09-09 NOTE — PROGRESS NOTE ADULT - SUBJECTIVE AND OBJECTIVE BOX
SUBJECTIVE:    Patient is a 60y old Male who presents with a chief complaint of mental health marco (08 Sep 2020 12:35)    Currently admitted to medicine with the primary diagnosis of Altered mental status     Today is hospital day 4d. This morning he is resting comfortably in bed, patient refusing morning labs, vitals and medications.     PAST MEDICAL & SURGICAL HISTORY  No pertinent past medical history: unknown  No significant past surgical history: unknown      ALLERGIES:  No Known Allergies    MEDICATIONS:  STANDING MEDICATIONS  chlorhexidine 4% Liquid 1 Application(s) Topical <User Schedule>  enoxaparin Injectable 40 milliGRAM(s) SubCutaneous daily  haloperidol     Tablet 2 milliGRAM(s) Oral every 12 hours    PRN MEDICATIONS  haloperidol     Tablet 0.5 milliGRAM(s) Oral every 8 hours PRN  polyethylene glycol 3350 17 Gram(s) Oral two times a day PRN    VITALS:   T(F): 97.6  HR: 69  BP: 140/84  RR: 18  SpO2: --        RADIOLOGY:      Physical Exam: GENERAL: NAD, lying in bed comfortably, became agitated when asked further questions, lethargic, on constant observation  HEAD:  Atraumatic, Normocephalic  EYES: EOMI, PERRLA, conjunctiva and sclera clear  ENT: Moist mucous membranes  NECK: Supple, No JVD  CHEST/LUNG: Clear to auscultation bilaterally; No rales, rhonchi, wheezing, or rubs.  HEART: Regular rate and rhythm; No murmurs, rubs, or gallops  ABDOMEN: Bowel sounds present; Soft, Nontender, Nondistended.   EXTREMITIES:  2+ Peripheral Pulses, brisk capillary refill. No clubbing, cyanosis, or edema  NERVOUS SYSTEM:  Alert & Oriented *1, speech clear. No deficits, no meningeal signs  MSK: FROM all 4 extremities, full and equal strength

## 2020-09-09 NOTE — PROGRESS NOTE ADULT - ASSESSMENT
60-year-old, black, male, with unknown social demographics, medical history, or psychiatric history who was reportedly brought in by ambulance "seen walking around screaming at people "The devil will breathe fire on you" admitted for altered mental status.      #AMS, psychotic disorder vs drug induced vs other medical conditions including (infectious etiology)  -CT head non remarkable  -alcohol levels within normal limit  -WBC 4k, no fever, no headache, no focal neurologic deficits, no signs of meningitis,  -f/u a1c, lipid profile  -f/u HIV, hepatitis, TSH, b12, folate  -f/u blood culture  -constant observation  - Urinalysis negative till date  -started on 2 mg PO Q12H standing for agitation and 0.5 mg prn; considered switching to second generation antipsychotics   -neurology following and recommend MRI and EEG; However patient is a poor historian and unable to acknowledge is patient has any contraindications to MRI1  -consider LP if not improvement of symptoms to rule out viral encephalitis  - -psychiatry following- yesterday recommended to repeat lab work however patient refused any blood draws, vital checks and all medications  - Following up with psychiatry to possibly have the patient transferred to inpatient [facility] psychiatry unit; waiting for a call back      Activity as tolerated   Diet regular for now  DVT PPX Lovenox   GI ppx not indicated   Dispo unknown,  consult   Code full 60-year-old, black, male, with unknown social demographics, medical history, or psychiatric history who was reportedly brought in by ambulance "seen walking around screaming at people "The devil will breathe fire on you" admitted for altered mental status.      #AMS, psychotic disorder vs drug induced vs other medical conditions including (infectious etiology)  -CT head non remarkable  -alcohol levels within normal limit  -WBC 4k, no fever, no headache, no focal neurologic deficits, no signs of meningitis,  -f/u a1c, lipid profile  -f/u HIV, hepatitis, TSH, b12, folate  -f/u blood culture  -constant observation  - Urinalysis negative till date  -started on 2 mg PO Q12H standing for agitation and 0.5 mg prn; considered switching to second generation antipsychotics   -neurology following and recommend MRI and EEG; However patient is a poor historian and unable to acknowledge is patient has any contraindications to MRI1  -consider LP if not improvement of symptoms to rule out viral encephalitis  -psychiatry following- yesterday recommended to repeat lab work however patient refused any blood draws, vital checks and all medications  - Following up with psychiatry to possibly have the patient transferred to inpatient [facility] psychiatry unit; waiting for a call back  - As of psych visit yesterday reports patient is not acutely psychotic, depressed or manic and does not have current suicidal ideations, intent or plan. At this time, patient is not considered an imminent danger to himself or others and does not need inpatient psychiatric hospitalization. patient will benefit from possible a neurology consult to evaluate from Dementia  and a social work evaluation for possibly a higher level of care. If patient is not agitated, there is no acute indication for standing low dose haldol however. Suggested patient does not need to be inpatient psych unit.    Activity as tolerated   Diet regular for now  DVT PPX Lovenox   GI ppx not indicated   Dispo unknown,  consult   Code full

## 2020-09-10 PROCEDURE — 99233 SBSQ HOSP IP/OBS HIGH 50: CPT

## 2020-09-10 NOTE — PHYSICAL THERAPY INITIAL EVALUATION ADULT - GENERAL OBSERVATIONS, REHAB EVAL
14:50-15:00. Pt declined pt despite max encouragement and pt education on importance of PT and OOB activity. Pt stated " leave me alone, get out of my room please". Dr. Reddy notified, attempted to talk to pt however pt declined. Will f/u with PT as appropriate.

## 2020-09-10 NOTE — PROGRESS NOTE ADULT - ASSESSMENT
PARVIZ TORRES 60y Male  MRN#: [id number]   CODE STATUS:________    SUBJECTIVE  Patient is a 60y old Male who presents with a chief complaint of mental health marco (09 Sep 2020 08:28)  Currently admitted to medicine with the primary diagnosis of Altered mental status  Today is hospital day 5d, and this morning he is resting comfortably and reports no overnight events. Patient is refusing labs and is screaming at staff. Has behavioral issues      OBJECTIVE  PAST MEDICAL & SURGICAL HISTORY  No pertinent past medical history: unknown  No significant past surgical history: unknown    ALLERGIES:  No Known Allergies    MEDICATIONS:  STANDING MEDICATIONS  chlorhexidine 4% Liquid 1 Application(s) Topical <User Schedule>  enoxaparin Injectable 40 milliGRAM(s) SubCutaneous daily  haloperidol     Tablet 2 milliGRAM(s) Oral every 12 hours    PRN MEDICATIONS  haloperidol     Tablet 0.5 milliGRAM(s) Oral every 8 hours PRN  polyethylene glycol 3350 17 Gram(s) Oral two times a day PRN      VITAL SIGNS: Last 24 Hours  T(C): 36.5 (09 Sep 2020 21:01), Max: 36.5 (09 Sep 2020 21:01)  T(F): 97.7 (09 Sep 2020 21:01), Max: 97.7 (09 Sep 2020 21:01)  HR: 70 (09 Sep 2020 21:01) (65 - 70)  BP: 151/67 (09 Sep 2020 21:01) (122/71 - 151/67)  BP(mean): --  RR: 18 (09 Sep 2020 21:01) (18 - 18)  SpO2: --    LABS:                        RADIOLOGY:  < from: EEG (09.09.20 @ 13:00) >  Impression  -  Abnormal due to the presence of:  bilateral frontal rhythmic delta activity c/w toxic-metabolic or structural pathology.    < end of copied text >    PHYSICAL EXAM:    GENERAL: NAD, lying in bed comfortably, became agitated when asked further questions, lethargic, on constant observation  HEENT:  Atraumatic, Normocephalic. EOMI,  PULMONARY: Clear to auscultation bilaterally; No wheeze  CARDIOVASCULAR: Regular rate and rhythm; No murmurs  GASTROINTESTINAL: Soft, Nontender, Nondistended  MUSCULOSKELETAL:   No clubbing, cyanosis, or edema  SKIN: No rashes or lesions      ASSESSMENT & PLAN  60-year-old, black, male, with unknown social demographics, medical history, or psychiatric history who was reportedly brought in by ambulance "seen walking around screaming at people "The devil will breathe fire on you" admitted for altered mental status.    #AMS, psychotic disorder vs drug induced vs other medical conditions including (infectious etiology)  -CT head non remarkable  -alcohol levels within normal limit  -WBC 4k, no fever, no headache, no focal neurologic deficits, no signs of meningitis,  -tried to send labs but patient refuses lab draws (a1c, lipid profile, HIV, hepatitis, TSH, b12, folate, blood culture)  -constant observation  -Urinalysis negative till date  -started on 2 mg PO Q12H standing for agitation and 0.5 mg prn; considered switching to second generation antipsychotics   -neurology following and recommend MRI and EEG; However patient is a poor historian and unable to acknowledge is patient has any contraindications to MRI1  -consider LP if not improvement of symptoms to rule out viral encephalitis  -psychiatry following- yesterday recommended to repeat lab work however patient refused any blood draws, vital checks and all medications  -Following up with psychiatry to possibly have the patient transferred to inpatient [facility] psychiatry unit- patient is not eligible for inpatient psych  - As of psych visit yesterday reports patient is not acutely psychotic, depressed or manic and does not have current suicidal ideations, intent or plan. At this time, patient is not considered an imminent danger to himself or others and does not need inpatient psychiatric hospitalization. patient will benefit from possible a neurology consult to evaluate from Dementia  and a social work evaluation for possibly a higher level of care. If patient is not agitated, there is no acute indication for standing low dose haldol however. Suggested patient does not need to be inpatient psych unit.  - EEG showed b/l frontal rhythmic activity c/w toxic-metabolic or structural pathology  - F/u neuro recs    Activity as tolerated   Diet regular for now  DVT PPX Lovenox   GI ppx not indicated   Dispo unknown,  consult   Code full

## 2020-09-11 PROCEDURE — 99233 SBSQ HOSP IP/OBS HIGH 50: CPT

## 2020-09-11 RX ORDER — HALOPERIDOL DECANOATE 100 MG/ML
5 INJECTION INTRAMUSCULAR ONCE
Refills: 0 | Status: COMPLETED | OUTPATIENT
Start: 2020-09-11 | End: 2020-09-11

## 2020-09-11 RX ORDER — DONEPEZIL HYDROCHLORIDE 10 MG/1
10 TABLET, FILM COATED ORAL AT BEDTIME
Refills: 0 | Status: ACTIVE | OUTPATIENT
Start: 2020-09-11 | End: 2021-08-10

## 2020-09-11 RX ADMIN — HALOPERIDOL DECANOATE 2 MILLIGRAM(S): 100 INJECTION INTRAMUSCULAR at 05:46

## 2020-09-11 RX ADMIN — HALOPERIDOL DECANOATE 5 MILLIGRAM(S): 100 INJECTION INTRAMUSCULAR at 00:53

## 2020-09-11 NOTE — DIETITIAN INITIAL EVALUATION ADULT. - OTHER INFO
Pt adm for mental health eval. AMS, psychotic disorder vs drug induced vs other medical conditions. Per psych eval, patient is not acutely psychotic, depressed or manic. Pending transfer to homeless shelter.

## 2020-09-11 NOTE — PROGRESS NOTE ADULT - ASSESSMENT
PARVIZ TORRES 60y Male  MRN#: [id number]   CODE STATUS:________    SUBJECTIVE  Patient is a 60y old Male who presents with a chief complaint of mental health marco (10 Sep 2020 08:59)  Currently admitted to medicine with the primary diagnosis of Altered mental status  Today is hospital day 6d, and this morning he is resting comfortably. Patient overnight was agitated and received one dose of haldol      OBJECTIVE  PAST MEDICAL & SURGICAL HISTORY  No pertinent past medical history: unknown  No significant past surgical history: unknown    ALLERGIES:  No Known Allergies    MEDICATIONS:  STANDING MEDICATIONS  chlorhexidine 4% Liquid 1 Application(s) Topical <User Schedule>  enoxaparin Injectable 40 milliGRAM(s) SubCutaneous daily  haloperidol     Tablet 2 milliGRAM(s) Oral every 12 hours    PRN MEDICATIONS  haloperidol     Tablet 0.5 milliGRAM(s) Oral every 8 hours PRN  polyethylene glycol 3350 17 Gram(s) Oral two times a day PRN      VITAL SIGNS: Last 24 Hours  T(C): 35.8 (11 Sep 2020 05:53), Max: 35.8 (11 Sep 2020 05:53)  T(F): 96.5 (11 Sep 2020 05:53), Max: 96.5 (11 Sep 2020 05:53)  HR: 74 (11 Sep 2020 06:03) (69 - 74)  BP: 136/74 (11 Sep 2020 06:03) (136/74 - 161/82)  BP(mean): --  RR: 18 (11 Sep 2020 06:03) (18 - 18)  SpO2: --    LABS:                        RADIOLOGY:    PHYSICAL EXAM:  GENERAL: NAD, lying in bed comfortably, became agitated when asked further questions, lethargic, on constant observation  HEENT:  Atraumatic, Normocephalic. EOMI,  PULMONARY: Clear to auscultation bilaterally; No wheeze  CARDIOVASCULAR: Regular rate and rhythm; No murmurs  GASTROINTESTINAL: Soft, Nontender, Nondistended  MUSCULOSKELETAL:   No clubbing, cyanosis, or edema  SKIN: No rashes or lesions      ASSESSMENT & PLAN  60-year-old, black, male, with unknown social demographics, medical history, or psychiatric history who was reportedly brought in by ambulance "seen walking around screaming at people "The devil will breathe fire on you" admitted for altered mental status.    #AMS, psychotic disorder vs drug induced vs other medical conditions including (infectious etiology)  -CT head non remarkable  -alcohol levels within normal limit  -WBC 4k, no fever, no headache, no focal neurologic deficits, no signs of meningitis,  -tried to send labs but patient refuses lab draws (a1c, lipid profile, HIV, hepatitis, TSH, b12, folate, blood culture)  -constant observation  -Urinalysis negative till date  -started on 2 mg PO Q12H standing for agitation and 0.5 mg prn; considered switching to second generation antipsychotics   -neurology following and recommend MRI and EEG; However patient is a poor historian and unable to acknowledge is patient has any contraindications to MRI1  -consider LP if not improvement of symptoms to rule out viral encephalitis  -psychiatry following- yesterday recommended to repeat lab work however patient refused any blood draws, vital checks and all medications  -Following up with psychiatry to possibly have the patient transferred to inpatient [facility] psychiatry unit- patient is not eligible for inpatient psych  - As of psych visit yesterday reports patient is not acutely psychotic, depressed or manic and does not have current suicidal ideations, intent or plan. At this time, patient is not considered an imminent danger to himself or others and does not need inpatient psychiatric hospitalization. patient will benefit from possible a neurology consult to evaluate from Dementia  and a social work evaluation for possibly a higher level of care. If patient is not agitated, there is no acute indication for standing low dose haldol however. Suggested patient does not need to be inpatient psych unit.  - EEG showed b/l frontal rhythmic activity c/w toxic-metabolic or structural pathology  - F/u neuro recs    Activity as tolerated   Diet regular for now  DVT PPX Lovenox   GI ppx not indicated   Dispo will go to homeless shelter in [place]--pending confirmation with CM  Code full

## 2020-09-11 NOTE — DIETITIAN INITIAL EVALUATION ADULT. - FACTORS AFF FOOD INTAKE
Observed pt eating 50% of lunch tray. Pt says he is eating ok. Documented po intake varies %. Declined to speak w/ RD further. Observed no chewing/swallowing issues. NKFA per EMR. No emergency contacts to call for nutrition hx. LBM 9/11 after pt c/o of constipation a few days ago.

## 2020-09-11 NOTE — PHYSICAL THERAPY INITIAL EVALUATION ADULT - IMPAIRMENTS FOUND, PT EVAL
Send letter to pt. Urine test negative for infection, glucose or blood. Start Macrobid and Pyridium as directed. Danae Dias PA-C   muscle strength/ROM/gait, locomotion, and balance/joint integrity and mobility/gross motor

## 2020-09-11 NOTE — DIETITIAN INITIAL EVALUATION ADULT. - PHYSICAL APPEARANCE
BMi 19.9 IBW: 75.5 kg No previous adm to assess weight hx. refusing skin assessment per RN. Unable to complete NFPE. No edema/well nourished

## 2020-09-11 NOTE — PHYSICAL THERAPY INITIAL EVALUATION ADULT - CRITERIA FOR SKILLED THERAPEUTIC INTERVENTIONS
impairments found/rehab potential/functional limitations in following categories/therapy frequency/predicted duration of therapy intervention/risk reduction/prevention

## 2020-09-11 NOTE — DIETITIAN INITIAL EVALUATION ADULT. - ENERGY NEEDS
calorie: 1728 - 1872 kcals/day (MSJ x 1.2 - 1.3 AF)  protein:  63 - 76 gms/day (1.0 - 1.2 gm/kg)  fluid: 1ml/kcal or per LIP

## 2020-09-12 PROCEDURE — 99232 SBSQ HOSP IP/OBS MODERATE 35: CPT

## 2020-09-12 NOTE — PROGRESS NOTE ADULT - ASSESSMENT
PARVIZ TORRES 60y Male  MRN#: [id number]   CODE STATUS:________    SUBJECTIVE  Patient is a 60y old Male who presents with a chief complaint of mental health marco (11 Sep 2020 10:49)  Currently admitted to medicine with the primary diagnosis of Altered mental status    Today is hospital day 7d, and this morning he is resting comfortably and reports no_ overnight events.       OBJECTIVE  PAST MEDICAL & SURGICAL HISTORY  No pertinent past medical history  unknown    No significant past surgical history  unknown      ALLERGIES:  No Known Allergies    MEDICATIONS:  STANDING MEDICATIONS  chlorhexidine 4% Liquid 1 Application(s) Topical <User Schedule>  donepezil 10 milliGRAM(s) Oral at bedtime  enoxaparin Injectable 40 milliGRAM(s) SubCutaneous daily    PRN MEDICATIONS  haloperidol     Tablet 0.5 milliGRAM(s) Oral every 8 hours PRN  polyethylene glycol 3350 17 Gram(s) Oral two times a day PRN      VITAL SIGNS: Last 24 Hours  T(C): 35.9 (11 Sep 2020 12:24), Max: 35.9 (11 Sep 2020 12:24)  T(F): 96.6 (11 Sep 2020 12:24), Max: 96.6 (11 Sep 2020 12:24)  HR: 65 (11 Sep 2020 12:24) (65 - 65)  BP: 132/74 (11 Sep 2020 12:24) (132/74 - 132/74)  BP(mean): --  RR: 18 (11 Sep 2020 12:24) (18 - 18)  SpO2: --    LABS:                        RADIOLOGY:    PHYSICAL EXAM:  GENERAL: NAD, lying in bed comfortably, became agitated when asked further questions, lethargic, on constant observation  HEENT:  Atraumatic, Normocephalic. EOMI,  PULMONARY: Clear to auscultation bilaterally; No wheeze  CARDIOVASCULAR: Regular rate and rhythm; No murmurs  GASTROINTESTINAL: Soft, Nontender, Nondistended  MUSCULOSKELETAL:   No clubbing, cyanosis, or edema  SKIN: No rashes or lesions    ASSESSMENT & PLAN  60-year-old, black, male, with unknown social demographics, medical history, or psychiatric history who was reportedly brought in by ambulance "seen walking around screaming at people "The devil will breathe fire on you" admitted for altered mental status.    #AMS, psychotic disorder vs drug induced vs other medical conditions including (infectious etiology)  -CT head non remarkable  -alcohol levels within normal limit  -WBC 4k, no fever, no headache, no focal neurologic deficits, no signs of meningitis,  -tried to send labs but patient refuses lab draws (a1c, lipid profile, HIV, hepatitis, TSH, b12, folate, blood culture)  -constant observation  -Urinalysis negative till date  -started on 2 mg PO Q12H standing for agitation and 0.5 mg prn; considered switching to second generation antipsychotics   -neurology following and recommend MRI and EEG; However patient is a poor historian and unable to acknowledge is patient has any contraindications to MRI1  -consider LP if not improvement of symptoms to rule out viral encephalitis  -psychiatry following- yesterday recommended to repeat lab work however patient refused any blood draws, vital checks and all medications  -Following up with psychiatry to possibly have the patient transferred to inpatient [facility] psychiatry unit- patient is not eligible for inpatient psych  - As of psych visit yesterday reports patient is not acutely psychotic, depressed or manic and does not have current suicidal ideations, intent or plan. At this time, patient is not considered an imminent danger to himself or others and does not need inpatient psychiatric hospitalization. patient will benefit from possible a neurology consult to evaluate from Dementia  and a social work evaluation for possibly a higher level of care. If patient is not agitated, there is no acute indication for standing low dose haldol however. Suggested patient does not need to be inpatient psych unit.  - EEG showed b/l frontal rhythmic activity c/w toxic-metabolic or structural pathology  - neuro recommended aricept 10 mg daily  - Patient refuses all meds and is uncooperative  - Unable to go to shelter as he is one person assist and fall risk per PT    Activity as tolerated   Diet regular for now  DVT PPX Lovenox   GI ppx not indicated   Dispo placement issue  Code full

## 2020-09-13 PROCEDURE — 73620 X-RAY EXAM OF FOOT: CPT | Mod: 26,50

## 2020-09-13 PROCEDURE — 99232 SBSQ HOSP IP/OBS MODERATE 35: CPT

## 2020-09-13 NOTE — PROGRESS NOTE ADULT - SUBJECTIVE AND OBJECTIVE BOX
S: continues to talk to himself.    All other pertinent ROS negative.      09-12-20 @ 07:01  -  09-13-20 @ 07:00  --------------------------------------------------------  IN: 0 mL / OUT: 650 mL / NET: -650 mL      Vital Signs Last 24 Hrs  T(C): 36.4 (13 Sep 2020 12:00), Max: 36.4 (13 Sep 2020 12:00)  T(F): 97.6 (13 Sep 2020 12:00), Max: 97.6 (13 Sep 2020 12:00)  HR: 78 (13 Sep 2020 12:00) (72 - 87)  BP: 176/98 (13 Sep 2020 12:00) (176/98 - 215/103)  BP(mean): --  RR: 18 (13 Sep 2020 12:00) (18 - 20)  SpO2: --  PHYSICAL EXAM:    Constitutional:continues to talk to himself. O x 1-2  HEENT: PERR, EOMI, Normal Hearing, MMM  Neck: Soft and supple, No LAD, No JVD  Respiratory: Breath sounds are clear bilaterally, No wheezing, rales or rhonchi  Cardiovascular: S1 and S2, regular rate and rhythm, no Murmurs, gallops or rubs  Gastrointestinal: Bowel Sounds present, soft, nontender, nondistended, no guarding, no rebound  Extremities: No peripheral edema. b/l foot stiffness and ttp.       MEDICATIONS:  MEDICATIONS  (STANDING):  chlorhexidine 4% Liquid 1 Application(s) Topical <User Schedule>  donepezil 10 milliGRAM(s) Oral at bedtime  enoxaparin Injectable 40 milliGRAM(s) SubCutaneous daily      LABS: All Labs Reviewed:                Blood Culture:     Radiology: reviewed

## 2020-09-13 NOTE — PROGRESS NOTE ADULT - ASSESSMENT
?71-year-old, black, male, with unknown social demographics, medical history, or psychiatric history who was reportedly brought in by ambulance "seen walking around screaming at people "The devil will breathe fire on you" admitted for altered mental status.      #AMS:   Upon detailed interview patient is Ox3.   Exhibits some paranoia  But not acutely psychotic. Denies SI/HI  Per Psych no need for IPP.     Probable diagnosis =   #DEMENTIA WITH BEHAVIORAL DISTURBANCES OR DEMENTIA WITH LEWY BODIES:  Patient states year of birth around 1949 but doesn't clearly remember. From appearance looks like he is in his 70s.   Reviewed EEG with Neuro.   Ok to start here Donepezil 10mg QD since outpatient f/u may be tricky and delayed with this patient.   Still will advise MAP Neuro f/u upon discharge.   Check with Neuro if we can also start anti-psychotics (IM Depot?) - since now his psychotic symptoms interfering with his care significantly (refusing all meds). But caution: Chances of NMS higher in DLB patients.     Apparently used to live in a [place] shelter.   Now walking not very stable. Needing Min assist.   May try for STR but patient has no insurance. Will d/w CM.    ruled out metabolic vs drug induced vs infectious etiologies  -CT head non remarkable  -alcohol levels within normal limit    -f/u HIV (), RPR, TSH, b12, folate -refused labs    OK to use haldol 0.5 mg prn for agitation.     -refused MRI.   EEG - b/l frontal rhythmic slowing. discussed with neuro.    #B/l Foot pain:  check b/l feet Xrays to r/o fracture    Activity as tolerated . PT eval   Diet regular for now  DVT PPX Lovenox   GI ppx not indicated   Code full .    Dispo: May need STR but no insurance. Homeless. CM,  consult .

## 2020-09-14 PROCEDURE — 99232 SBSQ HOSP IP/OBS MODERATE 35: CPT | Mod: GC

## 2020-09-14 PROCEDURE — 99232 SBSQ HOSP IP/OBS MODERATE 35: CPT

## 2020-09-14 RX ADMIN — CHLORHEXIDINE GLUCONATE 1 APPLICATION(S): 213 SOLUTION TOPICAL at 05:17

## 2020-09-14 NOTE — PROGRESS NOTE ADULT - SUBJECTIVE AND OBJECTIVE BOX
LENGTH OF HOSPITAL STAY: 9d      CHIEF COMPLAINT:   Patient is a 60y old  Male who presents with a chief complaint of mental health marco (13 Sep 2020 19:40)      OVER Past 24hrs:  The patient was seen and examined at bedside there were    HISTORY OF PRESENTING ILLNESS:    HPI:  60-year-old, black, male, with unknown social demographics, medical history, or psychiatric history who was reportedly brought in by ambulance "seen walking around screaming at people "The devil will breathe fire on you" as per the ed notes  History obtained from the charts as the patient was not cooperative on my interview, when asked if he have any complains he said no and when asked questions on ROS, he became agitated.       In the ED was given haldol 5 and ativan 2 IV, evaluate  by psychiatry and admitted for to medicine as per their recommendation for workup for AMS, CT head non remarkable (05 Sep 2020 22:29)    PAST MEDICAL & SURGICAL HISTORY  PAST MEDICAL & SURGICAL HISTORY:  No pertinent past medical history  unknown    No significant past surgical history  unknown          REVIEW OF SYSTEMS  CONSTITUTIONAL: No fever, weight loss, or fatigue  EYES: No eye pain, visual disturbances, or discharge  ENMT:  No difficulty hearing, tinnitus, vertigo; No sinus or throat pain  NECK: No pain or stiffness  RESPIRATORY: No cough, wheezing, chills or hemoptysis; No shortness of breath  CARDIOVASCULAR: No chest pain, palpitations, dizziness, or leg swelling  GASTROINTESTINAL: No abdominal or epigastric pain. No nausea, vomiting, or hematemesis; No diarrhea or constipation. No melena or hematochezia.  GENITOURINARY: No dysuria, frequency, hematuria, or incontinence  NEUROLOGICAL: No headaches, memory loss, loss of strength, numbness, or tremors  SKIN: No itching, burning, rashes, or lesions   LYMPH NODES: No enlarged glands  ENDOCRINE: No heat or cold intolerance; No hair loss  MUSCULOSKELETAL: No joint pain or swelling; No muscle, back, or extremity pain  PSYCHIATRIC: No depression, anxiety, mood swings, or difficulty sleeping  HEME/LYMPH: No easy bruising, or bleeding gums  ALLERY AND IMMUNOLOGIC: No hives or eczema    ALLERGIES:  No Known Allergies    MEDICATIONS:  STANDING MEDICATIONS  chlorhexidine 4% Liquid 1 Application(s) Topical <User Schedule>  donepezil 10 milliGRAM(s) Oral at bedtime  enoxaparin Injectable 40 milliGRAM(s) SubCutaneous daily    PRN MEDICATIONS  haloperidol     Tablet 0.5 milliGRAM(s) Oral every 8 hours PRN  polyethylene glycol 3350 17 Gram(s) Oral two times a day PRN    VITALS:   T(F): 97.6  HR: 78  BP: 137/97  RR: 18  SpO2: 98%    PHYSICAL EXAM:  General: No acute distress.  Alert, oriented, interactive, nonfocal.    HEENT: Pupils equal, reactive to light symmetrically.    PULM: Clear to auscultation bilaterally, no significant sputum production.    CVS: Regular rate and rhythm, no murmurs, rubs, or gallops.    GI: Soft, nondistended, nontender, no masses.    MSK: No edema, nontender.    SKIN: Warm and well perfused, no rashes noted.    PSYCH:     NEURO:    LABS:                        RADIOLOGY:

## 2020-09-14 NOTE — PROGRESS NOTE ADULT - ASSESSMENT
Impression:  Presented with Altered mental status is mostly Verbal altercation. Now patient pleasant and thinking more rationally.    -Can get EEG now that he is more calm.    -Check Vitamin B12, folate, RPR, TSH and follow up with drug screen. Check HIV      Alejo Ayala NP.   [id number]

## 2020-09-14 NOTE — PROGRESS NOTE ADULT - SUBJECTIVE AND OBJECTIVE BOX
Neurology Progress Note    Interval History:    60-year-old, black, male, with unknown social demographics, medical history, or psychiatric history who was reportedly brought in by ambulance "seen walking around screaming at people "The devil will breathe fire on you" as per the ed notes  History obtained from the charts as the patient was not cooperative on my interview, when asked if he have any complains he said no and when asked questions on ROS, he became agitated.       In the ED was given haldol 5 and ativan 2 IV, evaluate  by psychiatry and admitted for to medicine as per their recommendation for workup for AMS, CT head non remarkable (05 Sep 2020 22:29)        Vital Signs Last 24 Hrs  T(C): 36.1 (14 Sep 2020 12:25), Max: 36.5 (13 Sep 2020 20:30)  T(F): 97 (14 Sep 2020 12:25), Max: 97.7 (13 Sep 2020 20:30)  HR: 73 (14 Sep 2020 12:25) (71 - 78)  BP: 162/77 (14 Sep 2020 12:25) (137/97 - 162/77)  BP(mean): --  RR: 18 (14 Sep 2020 06:01) (16 - 18)  SpO2: 98% (14 Sep 2020 06:01) (98% - 98%)    Neurological Exam:   Mental status: Awake, alert and oriented  to self place and time.  Recent and remote memory intact.  Naming, repetition and comprehension intact.  Attention/concentration intact.  No dysarthria, no aphasia.  Fund of knowledge appropriate.    Cranial nerves: Pupils equally round and reactive to light, visual fields full, no nystagmus, extraocular muscles intact, V1 through V3 intact bilaterally and symmetric, face symmetric, hearing intact to finger rub, palate elevation symmetric, tongue was midline.  Motor:  MRC grading 5/5 b/l UE/LE.   strength 5/5.  Normal tone and bulk.  No abnormal movements.      MEDICATIONS  (STANDING):  chlorhexidine 4% Liquid 1 Application(s) Topical <User Schedule>  donepezil 10 milliGRAM(s) Oral at bedtime  enoxaparin Injectable 40 milliGRAM(s) SubCutaneous daily  Sensation: Intact to light touch, proprioception, and pinprick.   Coordination: No dysmetria on finger-to-nose and heel-to-shin.  No dysdiadokinesia.  Gait: deferred          Labs:

## 2020-09-15 PROCEDURE — 99232 SBSQ HOSP IP/OBS MODERATE 35: CPT

## 2020-09-15 RX ORDER — AMLODIPINE BESYLATE 2.5 MG/1
10 TABLET ORAL ONCE
Refills: 0 | Status: COMPLETED | OUTPATIENT
Start: 2020-09-15 | End: 2020-09-15

## 2020-09-15 RX ORDER — HYDRALAZINE HCL 50 MG
10 TABLET ORAL ONCE
Refills: 0 | Status: DISCONTINUED | OUTPATIENT
Start: 2020-09-15 | End: 2020-09-15

## 2020-09-15 NOTE — PROGRESS NOTE ADULT - ASSESSMENT
Neurocognitive disorder severe    Pt presentation appears to be secondary to underlying neurocognitive impairment rather than a primary psychiatric illness.  Pt does not meet criteria nor will he benefit from IPP admission as he is not a danger to self/others.   Additionally, would defer to social work for possible placement and higher level of care.   Standing haldol is not indicated at this time, may continue PRN haldol for acute agitation not amenable to verbal redirection.

## 2020-09-15 NOTE — PROGRESS NOTE ADULT - SUBJECTIVE AND OBJECTIVE BOX
PARVIZ TORRES  60y Male    CHIEF COMPLAINT:    Patient is a 60y old  Male who presents with a chief complaint of mental health marco (15 Sep 2020 10:29)      INTERVAL HPI/OVERNIGHT EVENTS:    Patient seen and examined. No acute events overnight. Currently Calm    ROS: All other systems are negative.    Vital Signs:    T(F): 97.4 (09-15-20 @ 06:00), Max: 98.8 (09-14-20 @ 16:35)  HR: 69 (09-15-20 @ 06:00) (69 - 91)  BP: 197/85 (09-15-20 @ 06:00) (148/76 - 197/85)  RR: 18 (09-15-20 @ 06:00) (16 - 18)    PHYSICAL EXAM:    GENERAL:  NAD  SKIN: No rashes or lesions  HEENT: Atraumatic. Normocephalic.   NECK: Supple.  PULMONARY: CTA B/L. No wheezing.   CVS: Normal S1, S2. Rate and Rhythm are regular. No murmurs.  ABDOMEN/GI: Soft, Nontender, Nondistended; BS present  MSK:  No edema B/L LE. No clubbing or cyanosis  NEUROLOGIC: moves all extremities  PSYCH: Alert & oriented x 2    LABS: pt refused    RADIOLOGY & ADDITIONAL TESTS:    Medications:  Standing  chlorhexidine 4% Liquid 1 Application(s) Topical <User Schedule>  donepezil 10 milliGRAM(s) Oral at bedtime  enoxaparin Injectable 40 milliGRAM(s) SubCutaneous daily    PRN Meds  haloperidol     Tablet 0.5 milliGRAM(s) Oral every 8 hours PRN  polyethylene glycol 3350 17 Gram(s) Oral two times a day PRN

## 2020-09-15 NOTE — PROGRESS NOTE ADULT - SUBJECTIVE AND OBJECTIVE BOX
Reviewed and referred to chart;      Summary (include case differential, formulation and patient response to therapy): Mr. Chris is a 60-year-old, black, male, with unknown social demographics, medical history, or psychiatric history who was reportedly admitted to medicine for workup of AMS.     Upon evaluation, pt continues be a poor historian, however is calm, cooperative, no acute agitation or behavioral disturbances noted on exam or reported by primary medical team. Although pt endorses some paranoia he does not appears acutely psychotic, he is linear, no disorganized behavior noted and does not appear to be responding to internal stimuli or internally preoccupied.

## 2020-09-15 NOTE — PROGRESS NOTE ADULT - ASSESSMENT
?71-year-old, black, male, with unknown social demographics, medical history, or psychiatric history who was reportedly brought in by ambulance "seen walking around screaming at people "The devil will breathe fire on you" admitted for altered mental status.    Suspected Neurocognitive disorder  Possible Lewy body dementia  patient was initially hospitalized at the [place] site after EMS found him wandering the streets  He was transferred to [facility] for placement  patient refusing labs  CTH neg, EEG with b/l frontal slowing  currently calm, cooperative and paranoid at times  cannot provide any contacts/family # to obtain further history  Discussed with Dr Santana: no need for IPP  no need for standing antipsychotic meds, can use Haldol PRN only if agitated    HTN  patient refusing meds  can start Amlodipine 5mg if BP remains elevated    #Progress Note Handoff  Pending (specify):   Placement, difficult dispo   Disposition:  unclear at this time    Diane Peace MD  s. [phone]

## 2020-09-16 PROCEDURE — 99232 SBSQ HOSP IP/OBS MODERATE 35: CPT

## 2020-09-16 RX ORDER — AMLODIPINE BESYLATE 2.5 MG/1
5 TABLET ORAL DAILY
Refills: 0 | Status: DISCONTINUED | OUTPATIENT
Start: 2020-09-16 | End: 2020-09-26

## 2020-09-16 RX ORDER — LEVETIRACETAM 250 MG/1
750 TABLET, FILM COATED ORAL
Refills: 0 | Status: DISCONTINUED | OUTPATIENT
Start: 2020-09-16 | End: 2021-06-28

## 2020-09-16 RX ORDER — DIVALPROEX SODIUM 500 MG/1
500 TABLET, DELAYED RELEASE ORAL
Refills: 0 | Status: COMPLETED | OUTPATIENT
Start: 2020-09-16 | End: 2021-08-15

## 2020-09-16 RX ORDER — OLANZAPINE 15 MG/1
5 TABLET, FILM COATED ORAL DAILY
Refills: 0 | Status: ACTIVE | OUTPATIENT
Start: 2020-09-16 | End: 2021-08-15

## 2020-09-16 NOTE — PROGRESS NOTE ADULT - ASSESSMENT
60M no PMHx here with altered mental status, presumed due to dementia.    #Altered mental status, presumed due to dementia  cth neg  reeg noted  no collateral history, pt uncooperative with interview  PT  donepezil 10  f/u neuro, psych  #Elevated bp, suspect chronic HTN  start norvasc 5  #DVT ppx  lovenox    #Progress Note Handoff:  Pending (specify):  Consults_________, Tests________, Test Results_______, Other___PT______  Family discussion:d/w pt at bedside re: treatment plan, primary dx  Disposition: Home___/SNF___/Other________/Unknown at this time_____x___

## 2020-09-16 NOTE — PROGRESS NOTE ADULT - SUBJECTIVE AND OBJECTIVE BOX
INTERVAL HPI/OVERNIGHT EVENTS:    SUBJECTIVE: Patient seen and examined at bedside.     no cp, sob, abd pain, fever  no abd pain, nausea, vomiting, diarrhea    OBJECTIVE:    VITAL SIGNS:  Vital Signs Last 24 Hrs  T(C): 36.2 (15 Sep 2020 21:11), Max: 36.4 (15 Sep 2020 14:17)  T(F): 97.2 (15 Sep 2020 21:11), Max: 97.5 (15 Sep 2020 14:17)  HR: 71 (15 Sep 2020 21:11) (69 - 71)  BP: 174/89 (15 Sep 2020 21:11) (161/89 - 174/89)  BP(mean): --  RR: 18 (15 Sep 2020 21:11) (18 - 18)  SpO2: --      PHYSICAL EXAM:    General: NAD  HEENT: NC/AT; PERRL, clear conjunctiva  Neck: supple  Respiratory: CTA b/l  Cardiovascular: +S1/S2; RRR  Abdomen: soft, NT/ND; +BS x4  Extremities: WWP, 2+ peripheral pulses b/l; no LE edema  Skin: normal color and turgor; no rash  Neurological:    MEDICATIONS:  MEDICATIONS  (STANDING):  chlorhexidine 4% Liquid 1 Application(s) Topical <User Schedule>  donepezil 10 milliGRAM(s) Oral at bedtime  enoxaparin Injectable 40 milliGRAM(s) SubCutaneous daily    MEDICATIONS  (PRN):  haloperidol     Tablet 0.5 milliGRAM(s) Oral every 8 hours PRN agitation  polyethylene glycol 3350 17 Gram(s) Oral two times a day PRN Constipation      ALLERGIES:  Allergies    No Known Allergies    Intolerances        LABS:              Creatinine Trend: 0.7<--, 0.8<--        hs Troponin:              CSF:                      EKG:   MICROBIOLOGY:    IMAGING:      Labs, imaging, EKG personally reviewed    RADIOLOGY & ADDITIONAL TESTS: Reviewed.

## 2020-09-17 ENCOUNTER — TRANSCRIPTION ENCOUNTER (OUTPATIENT)
Age: 60
End: 2020-09-17

## 2020-09-17 PROCEDURE — 99232 SBSQ HOSP IP/OBS MODERATE 35: CPT

## 2020-09-17 RX ORDER — LEVETIRACETAM 250 MG/1
1 TABLET, FILM COATED ORAL
Qty: 0 | Refills: 0 | DISCHARGE
Start: 2020-09-17

## 2020-09-17 RX ORDER — HALOPERIDOL DECANOATE 100 MG/ML
5 INJECTION INTRAMUSCULAR ONCE
Refills: 0 | Status: DISCONTINUED | OUTPATIENT
Start: 2020-09-17 | End: 2020-09-17

## 2020-09-17 RX ORDER — OLANZAPINE 15 MG/1
1.5 TABLET, FILM COATED ORAL
Refills: 0 | DISCHARGE
Start: 2020-09-17

## 2020-09-17 RX ORDER — DIVALPROEX SODIUM 500 MG/1
0.5 TABLET, DELAYED RELEASE ORAL
Refills: 0 | DISCHARGE
Start: 2020-09-17

## 2020-09-17 RX ORDER — HALOPERIDOL DECANOATE 100 MG/ML
5 INJECTION INTRAMUSCULAR ONCE
Refills: 0 | Status: COMPLETED | OUTPATIENT
Start: 2020-09-17 | End: 2020-09-17

## 2020-09-17 RX ORDER — AMLODIPINE BESYLATE 2.5 MG/1
1 TABLET ORAL
Qty: 0 | Refills: 0 | DISCHARGE
Start: 2020-09-17

## 2020-09-17 RX ADMIN — HALOPERIDOL DECANOATE 5 MILLIGRAM(S): 100 INJECTION INTRAMUSCULAR at 14:40

## 2020-09-17 NOTE — PROGRESS NOTE ADULT - SUBJECTIVE AND OBJECTIVE BOX
Reviewed and referred to recent notes, discussed with hospitalist.    we learnt that patient has h/o schizophrenia and recently on zypresxa and was in [facility]. he is observed to be actively psychotic, talking to self , agitated, paranoid towards staff this morning. he refusalt to provide any personal informations appears to be due to paranoia more than cognitve impairment.     alert oriented to self, agitated, paranoid with active hallucination. not cooperative for assessment.

## 2020-09-17 NOTE — DISCHARGE NOTE NURSING/CASE MANAGEMENT/SOCIAL WORK - PATIENT PORTAL LINK FT
You can access the FollowMyHealth Patient Portal offered by [facility] by registering at the following website: [email or web address]. By joining [facility]’s FollowMyHealth portal, you will also be able to view your health information using other applications (apps) compatible with our system.

## 2020-09-17 NOTE — CHART NOTE - NSCHARTNOTEFT_GEN_A_CORE
Patient is now very agitated, accusatory (wants food but not ours), irrational and screaming loudly. In addition refuses all interventions such as vital signs. Except for possible risk of falling no apparent danger to others. Haldol ordered prn (but oral) to be given (attempted)

## 2020-09-17 NOTE — PROGRESS NOTE ADULT - ASSESSMENT
60M PMHx seizure disorder, schizophrenia, HTN here with altered mental status, suspected acute psychosis.    #Altered mental status, suspect acute psychosis in setting of schizophrenia  pt having conversations with imaginary people + +  d/w psych  plan to d/c to [facility] neg  reeg noted  no collateral history, pt uncooperative with interview  PT  donepezil 10  f/u neuro, psych  #Elevated bp, suspect chronic HTN  start norvasc 5  #DVT ppx  lovenox    #Progress Note Handoff:  Pending (specify):  Consults_________, Tests________, Test Results_______, Other___PT______  Family discussion:d/w pt at bedside re: treatment plan, primary dx  Disposition: Home___/SNF___/Other________/Unknown at this time_____x___       60M PMHx seizure disorder, schizophrenia, HTN here with altered mental status, suspected acute psychosis.    #Altered mental status, suspect acute psychosis in setting of schizophrenia  pt having conversations with imaginary people +VH +AH  d/w psych  plan to d/c to ipp  collateral hx obtain via old medical records, placed in chart  zyprexa 5  #HTN  norvasc 5  #Seizure disorder  depakote 500 bid  keppra 750 bid  #DVT ppx  lovenox

## 2020-09-17 NOTE — DISCHARGE NOTE PROVIDER - CARE PROVIDER_API CALL
Lloyd Santana  PSYCHIATRY  [address] [place] [id number]  Phone: [phone]  Fax: [phone]  Follow Up Time:    Lloyd Santana)  Psychiatry  [address] [place] [address]  Phone: [phone]  Fax: [phone]  Follow Up Time:     Shyam Amaro)  Neurology  [address] [place] [address]  Phone: [phone]  Fax: [phone]  Follow Up Time: 1-3 days

## 2020-09-17 NOTE — DISCHARGE NOTE PROVIDER - NSDCCPCAREPLAN_GEN_ALL_CORE_FT
PRINCIPAL DISCHARGE DIAGNOSIS  Diagnosis: Altered mental status  Assessment and Plan of Treatment: PLEASE FOLLOW UP WITH YOUR PRIMARY CARE DOCTOR, PSYCHIATRIST IN ONE WEEK.

## 2020-09-17 NOTE — DISCHARGE NOTE PROVIDER - HOSPITAL COURSE
60M no PMHx here with altered mental status. He was found shouting "the devil will breathe fire on you" to bystanders, brought into ed. Seen by neuro, underwent cth which was unremarkable. Had reeg which demonstrated BL frontal delta activity c/w metabolic encephalopathy. He was seen by psych; found to have h/o schizophrenia; appeared to be in acute psychosis. Notably, he has h/o seizure medications, but refusing medications currently. Decision made to transfer to [facility] for further management.    41 minutes spent on discharge planning. 60 year old Male with no PMHx here with altered mental status. Patient was admitted after shouting "the devil will breathe fire on you" to bystanders. Patient was brought into ed. Seen by neuro, underwent CT head which was unremarkable. Had routine EEG which demonstrated Bilateral frontal delta activity c/w metabolic encephalopathy. He was seen by psych; found to have h/o schizophrenia; appeared to be in acute psychosis. Notably, he has h/o seizure medications, but refusing medications. Patient is on Divalproex 500mg bid, Keppra 1000 bid, olanzapine 5 mg at HS; Zyprexa PRN. With current meds Patient's mood had been calm and he is not a danger to self or others. Patient to be discharged to [facility].

## 2020-09-17 NOTE — CHART NOTE - NSCHARTNOTEFT_GEN_A_CORE
Registered Dietitian Follow-Up (limited reassessment)    Chart reviewed, as per MD pt w/ chronic schizophrenia with acute exacerbation. Discharge planning, ? transfer pt to ipp for further stabilization.  Current diet: Regular, PO intake varies mostly % trays. As per MD pt is actively psychotic and agitated. Labs/med noted. No new weights. GI - last documenter BM 9/14. No skin breakdown.    No active nutrition diagnosis identified at this time - RD will continue to monitor.     Please continue regular diet.     Pt is not at risk, will reassess in 7 days.

## 2020-09-17 NOTE — DISCHARGE NOTE PROVIDER - PROVIDER TOKENS
PROVIDER:[TOKEN:[[id number]]] PROVIDER:[TOKEN:[[id number]]],PROVIDER:[TOKEN:[[id number]],FOLLOWUP:[1-3 days]]

## 2020-09-17 NOTE — DISCHARGE NOTE PROVIDER - NSDCMRMEDTOKEN_GEN_ALL_CORE_FT
amLODIPine 5 mg oral tablet: 1 tab(s) orally once a day  divalproex sodium 500 mg oral delayed release tablet: 1 tab(s) orally 2 times a day  levETIRAcetam 750 mg oral tablet: 1 tab(s) orally 2 times a day  OLANZapine 5 mg oral tablet: 1 tab(s) orally once a day   amLODIPine 5 mg oral tablet: 1 tab(s) orally once a day  cloNIDine 0.1 mg oral tablet: 1 tab(s) orally every 8 hours, As needed, htn  divalproex sodium 500 mg oral delayed release tablet: 1 tab(s) orally 2 times a day  levETIRAcetam 750 mg oral tablet: 1 tab(s) orally 2 times a day  OLANZapine 5 mg oral tablet: 1 tab(s) orally once a day

## 2020-09-17 NOTE — PROGRESS NOTE ADULT - SUBJECTIVE AND OBJECTIVE BOX
INTERVAL HPI/OVERNIGHT EVENTS:    SUBJECTIVE: Patient seen and examined at bedside.     unable to obtain    OBJECTIVE:    VITAL SIGNS:  Vital Signs Last 24 Hrs  T(C): 35.9 (17 Sep 2020 05:20), Max: 35.9 (17 Sep 2020 05:20)  T(F): 96.7 (17 Sep 2020 05:20), Max: 96.7 (17 Sep 2020 05:20)  HR: 73 (17 Sep 2020 05:20) (73 - 91)  BP: 189/86 (17 Sep 2020 05:20) (144/89 - 189/86)  BP(mean): --  RR: 18 (17 Sep 2020 05:20) (18 - 18)  SpO2: --      PHYSICAL EXAM:    General: pt agitated, refused exam  HEENT:   Neck:   Respiratory:   Cardiovascular:   Abdomen:   Extremities:  Skin:  Neurological:    MEDICATIONS:  MEDICATIONS  (STANDING):  amLODIPine   Tablet 5 milliGRAM(s) Oral daily  chlorhexidine 4% Liquid 1 Application(s) Topical <User Schedule>  diVALproex  milliGRAM(s) Oral two times a day  enoxaparin Injectable 40 milliGRAM(s) SubCutaneous daily  levETIRAcetam 750 milliGRAM(s) Oral two times a day  OLANZapine 5 milliGRAM(s) Oral daily    MEDICATIONS  (PRN):  haloperidol     Tablet 0.5 milliGRAM(s) Oral every 8 hours PRN agitation  polyethylene glycol 3350 17 Gram(s) Oral two times a day PRN Constipation      ALLERGIES:  Allergies    No Known Allergies    Intolerances        LABS:              Creatinine Trend: 0.7<--, 0.8<--        hs Troponin:              CSF:                      EKG:   MICROBIOLOGY:    IMAGING:      Labs, imaging, EKG personally reviewed    RADIOLOGY & ADDITIONAL TESTS: Reviewed.

## 2020-09-17 NOTE — CHART NOTE - NSCHARTNOTEFT_GEN_A_CORE
Pt was seen and evaluated. Chart reviewed.      Mr. Chris is a 60-year-old male, presumably was living in a shelter, w  medical history of seizure d/o, and unknown psychiatric history who was reportedly admitted to medicine for workup of AMS.    Pt is AAO to self, place but not time. Pt was minimally cooperative with interview, allowing the writer to only ask him "3 questions." Pt was unable to provide information as to what brought him to the hospital, stating the police took him to the hospital. Unable to provide past psychiatric history, stating "go look it up." Pt denied A/V hallucinations, stating he only heard my voice. Pt denied suicidal/homicidal ideation, intent or plan. Pt did not look internally preoccupied. However, overall is a poor historian. Did not know much about his medical history. At this time, pt stated that he already answered the questions and did not want to engage in the interview any longer.     Impression:  Pt presentation appears to be secondary to underlying neurocognitive impairment rather than a primary psychiatric illness.    Would recommend to check thyroid panel, HIV, Vit B12, Folate, RPR.   Continue Zyprexa 5 mg qd  For agitation can give Haldol 5 mg q6h prn, Ativan 1 mg q6h prn PO or IM if pt refuses PO and is a danger to self/others/property. Please repeat EKG to check that Qtc <500ms.

## 2020-09-17 NOTE — PROGRESS NOTE ADULT - ASSESSMENT
chronic schizophrenia with acute exacerbation    transfer pt to ipp for further stabilization.   zyprexa 5 mg po daily.

## 2020-09-17 NOTE — CHART NOTE - NSCHARTNOTEFT_GEN_A_CORE
Patient refused oral Haldol (told it was to help him calm down) stating "I'm already calm". In fact he is now laying down in bed and no longer screaming

## 2020-09-18 PROCEDURE — 99232 SBSQ HOSP IP/OBS MODERATE 35: CPT

## 2020-09-18 RX ADMIN — AMLODIPINE BESYLATE 5 MILLIGRAM(S): 2.5 TABLET ORAL at 08:41

## 2020-09-18 RX ADMIN — LEVETIRACETAM 750 MILLIGRAM(S): 250 TABLET, FILM COATED ORAL at 08:41

## 2020-09-18 RX ADMIN — DIVALPROEX SODIUM 500 MILLIGRAM(S): 500 TABLET, DELAYED RELEASE ORAL at 08:41

## 2020-09-18 RX ADMIN — OLANZAPINE 5 MILLIGRAM(S): 15 TABLET, FILM COATED ORAL at 12:00

## 2020-09-18 NOTE — PROGRESS NOTE ADULT - ASSESSMENT
60M PMHx seizure disorder, schizophrenia, HTN here with altered mental status, suspected acute psychosis.    #Altered mental status, toxic metabolic encephalopathy; unclear etiology  in setting of h/o schizophrenia  currently a+o x3, but appears confused at times  refusing blood draws, medications  f/u psych  zyprexa 5  #HTN  norvasc 5  #Seizure disorder  depakote 500 bid  keppra 750 bid  #DVT ppx  lovenox    #Progress Note Handoff:  Pending (specify):  Consults_________, Tests________, Test Results_______, Other___f/u psych______  Family discussion:d/w pt at bedside re: treatment plan, primary dx  Disposition: Home___/SNF___/Other________/Unknown at this time____x____

## 2020-09-18 NOTE — PROGRESS NOTE ADULT - SUBJECTIVE AND OBJECTIVE BOX
INTERVAL HPI/OVERNIGHT EVENTS:    SUBJECTIVE: Patient seen and examined at bedside.     no cp, sob, abd pain, fever  no sob, orthopnea, pnd, cough    OBJECTIVE:    VITAL SIGNS:  Vital Signs Last 24 Hrs  T(C): 36.2 (18 Sep 2020 05:07), Max: 36.8 (17 Sep 2020 14:09)  T(F): 97.2 (18 Sep 2020 05:07), Max: 98.3 (17 Sep 2020 14:09)  HR: 69 (18 Sep 2020 05:07) (69 - 93)  BP: 178/87 (18 Sep 2020 05:07) (136/79 - 178/87)  BP(mean): --  RR: 18 (18 Sep 2020 05:07) (18 - 18)  SpO2: --      PHYSICAL EXAM:    General: NAD  HEENT: NC/AT; PERRL, clear conjunctiva  Neck: supple  Respiratory: CTA b/l  Cardiovascular: +S1/S2; RRR  Abdomen: soft, NT/ND; +BS x4  Extremities: WWP, 2+ peripheral pulses b/l; no LE edema  Skin: normal color and turgor; no rash  Neurological:    MEDICATIONS:  MEDICATIONS  (STANDING):  amLODIPine   Tablet 5 milliGRAM(s) Oral daily  chlorhexidine 4% Liquid 1 Application(s) Topical <User Schedule>  diVALproex  milliGRAM(s) Oral two times a day  enoxaparin Injectable 40 milliGRAM(s) SubCutaneous daily  levETIRAcetam 750 milliGRAM(s) Oral two times a day  OLANZapine 5 milliGRAM(s) Oral daily    MEDICATIONS  (PRN):  haloperidol     Tablet 0.5 milliGRAM(s) Oral every 8 hours PRN agitation  polyethylene glycol 3350 17 Gram(s) Oral two times a day PRN Constipation      ALLERGIES:  Allergies    No Known Allergies    Intolerances        LABS:              Creatinine Trend: 0.7<--, 0.8<--        hs Troponin:              CSF:                      EKG:   MICROBIOLOGY:    IMAGING:      Labs, imaging, EKG personally reviewed    RADIOLOGY & ADDITIONAL TESTS: Reviewed.

## 2020-09-19 PROCEDURE — 99232 SBSQ HOSP IP/OBS MODERATE 35: CPT

## 2020-09-19 RX ORDER — HALOPERIDOL DECANOATE 100 MG/ML
5 INJECTION INTRAMUSCULAR ONCE
Refills: 0 | Status: COMPLETED | OUTPATIENT
Start: 2020-09-19 | End: 2020-09-19

## 2020-09-19 RX ADMIN — AMLODIPINE BESYLATE 5 MILLIGRAM(S): 2.5 TABLET ORAL at 05:21

## 2020-09-19 RX ADMIN — DIVALPROEX SODIUM 500 MILLIGRAM(S): 500 TABLET, DELAYED RELEASE ORAL at 05:21

## 2020-09-19 RX ADMIN — HALOPERIDOL DECANOATE 0.5 MILLIGRAM(S): 100 INJECTION INTRAMUSCULAR at 02:02

## 2020-09-19 RX ADMIN — OLANZAPINE 5 MILLIGRAM(S): 15 TABLET, FILM COATED ORAL at 13:05

## 2020-09-19 RX ADMIN — LEVETIRACETAM 750 MILLIGRAM(S): 250 TABLET, FILM COATED ORAL at 05:21

## 2020-09-19 NOTE — CHART NOTE - NSCHARTNOTEFT_GEN_A_CORE
Psychiatry Chart-Event Note    Current provider attempted to follow up on patient today. He reports "I'm not talking to you. Get out of here. I will only talk to Milton." He is mumbling and talking to himself. He screams at provider to get out of the room and not to dare ask him any foolish questions.    Unable to assess patient as he refused to engage/cooperate for an interview.

## 2020-09-19 NOTE — PROGRESS NOTE ADULT - ASSESSMENT
60M PMHx seizure disorder, schizophrenia, HTN here with altered mental status, suspected acute psychosis.    #Altered mental status, toxic metabolic encephalopathy, suspect psychosis  in setting of h/o schizophrenia  +AH, VH; having conversations alone  calm but refusing blood draws, medications  f/u psych  zyprexa 5  #HTN  norvasc 5  #Seizure disorder  depakote 500 bid  keppra 750 bid  #DVT ppx  lovenox    #Progress Note Handoff:  Pending (specify):  Consults_________, Tests________, Test Results_______, Other___f/u psych______  Family discussion:d/w pt at bedside re: treatment plan, primary dx  Disposition: Home___/SNF___/Other________/Unknown at this time____x____

## 2020-09-20 PROCEDURE — 99232 SBSQ HOSP IP/OBS MODERATE 35: CPT

## 2020-09-20 RX ORDER — HALOPERIDOL DECANOATE 100 MG/ML
5 INJECTION INTRAMUSCULAR ONCE
Refills: 0 | Status: COMPLETED | OUTPATIENT
Start: 2020-09-20 | End: 2020-09-20

## 2020-09-20 RX ADMIN — HALOPERIDOL DECANOATE 5 MILLIGRAM(S): 100 INJECTION INTRAMUSCULAR at 04:00

## 2020-09-20 NOTE — PROGRESS NOTE ADULT - SUBJECTIVE AND OBJECTIVE BOX
INTERVAL HPI/OVERNIGHT EVENTS:    SUBJECTIVE: Patient seen and examined at bedside.     unable to obtain ros    OBJECTIVE:    VITAL SIGNS:  Vital Signs Last 24 Hrs  T(C): 35.9 (20 Sep 2020 05:34), Max: 35.9 (20 Sep 2020 05:34)  T(F): 96.7 (20 Sep 2020 05:34), Max: 96.7 (20 Sep 2020 05:34)  HR: 69 (20 Sep 2020 05:34) (69 - 70)  BP: 178/89 (20 Sep 2020 05:34) (160/97 - 178/89)  BP(mean): --  RR: 18 (20 Sep 2020 05:34) (18 - 18)  SpO2: --      PHYSICAL EXAM:    General: pt refused exam  Neurological:    MEDICATIONS:  MEDICATIONS  (STANDING):  amLODIPine   Tablet 5 milliGRAM(s) Oral daily  chlorhexidine 4% Liquid 1 Application(s) Topical <User Schedule>  diVALproex  milliGRAM(s) Oral two times a day  enoxaparin Injectable 40 milliGRAM(s) SubCutaneous daily  haloperidol    Injectable 5 milliGRAM(s) IntraMuscular once  levETIRAcetam 750 milliGRAM(s) Oral two times a day  OLANZapine 5 milliGRAM(s) Oral daily    MEDICATIONS  (PRN):  haloperidol     Tablet 0.5 milliGRAM(s) Oral every 8 hours PRN agitation  polyethylene glycol 3350 17 Gram(s) Oral two times a day PRN Constipation      ALLERGIES:  Allergies    No Known Allergies    Intolerances        LABS:              Creatinine Trend: 0.7<--, 0.8<--        hs Troponin:              CSF:                      EKG:   MICROBIOLOGY:    IMAGING:      Labs, imaging, EKG personally reviewed    RADIOLOGY & ADDITIONAL TESTS: Reviewed.

## 2020-09-21 PROCEDURE — 99232 SBSQ HOSP IP/OBS MODERATE 35: CPT

## 2020-09-21 NOTE — PROGRESS NOTE ADULT - SUBJECTIVE AND OBJECTIVE BOX
INTERVAL HPI/OVERNIGHT EVENTS:    SUBJECTIVE: Patient seen and examined at bedside.     unable to obtain ros    OBJECTIVE:    VITAL SIGNS:  Vital Signs Last 24 Hrs  T(C): --  T(F): --  HR: --  BP: --  BP(mean): --  RR: --  SpO2: --      PHYSICAL EXAM:    General: pt refused exam  Neurological:    MEDICATIONS:  MEDICATIONS  (STANDING):  amLODIPine   Tablet 5 milliGRAM(s) Oral daily  chlorhexidine 4% Liquid 1 Application(s) Topical <User Schedule>  diVALproex  milliGRAM(s) Oral two times a day  enoxaparin Injectable 40 milliGRAM(s) SubCutaneous daily  haloperidol    Injectable 5 milliGRAM(s) IntraMuscular once  levETIRAcetam 750 milliGRAM(s) Oral two times a day  OLANZapine 5 milliGRAM(s) Oral daily    MEDICATIONS  (PRN):  haloperidol     Tablet 0.5 milliGRAM(s) Oral every 8 hours PRN agitation  polyethylene glycol 3350 17 Gram(s) Oral two times a day PRN Constipation      ALLERGIES:  Allergies    No Known Allergies    Intolerances        LABS:              Creatinine Trend: 0.7<--, 0.8<--        hs Troponin:              CSF:                      EKG:   MICROBIOLOGY:    IMAGING:      Labs, imaging, EKG personally reviewed    RADIOLOGY & ADDITIONAL TESTS: Reviewed.

## 2020-09-22 PROCEDURE — 99232 SBSQ HOSP IP/OBS MODERATE 35: CPT

## 2020-09-22 RX ORDER — AMLODIPINE BESYLATE 2.5 MG/1
5 TABLET ORAL ONCE
Refills: 0 | Status: COMPLETED | OUTPATIENT
Start: 2020-09-22 | End: 2020-09-22

## 2020-09-22 RX ADMIN — HALOPERIDOL DECANOATE 0.5 MILLIGRAM(S): 100 INJECTION INTRAMUSCULAR at 08:31

## 2020-09-22 NOTE — PROGRESS NOTE ADULT - ASSESSMENT
60M PMHx seizure disorder, schizophrenia, HTN here with altered mental status, suspected acute psychosis.    #Altered mental status, toxic metabolic encephalopathy, suspect psychosis  in setting of h/o schizophrenia  a+o x2 today  +, ; having conversations alone  calm but refusing blood draws, medications  f/u psych  zyprexa 5  #HTN  norvasc 5  #Seizure disorder  depakote 500 bid  keppra 750 bid  #DVT ppx  lovenox    #Progress Note Handoff:  Pending (specify):  Consults_________, Tests________, Test Results_______, Other___f/u psych______  Family discussion:d/w pt at bedside re: treatment plan, primary dx  Disposition: Home___/SNF___/Other________/Unknown at this time____x____

## 2020-09-22 NOTE — PROGRESS NOTE ADULT - SUBJECTIVE AND OBJECTIVE BOX
INTERVAL HPI/OVERNIGHT EVENTS:    SUBJECTIVE: Patient seen and examined at bedside.     no cp, sob, abd pain, fever  no syncope, lightheadedness, ha, dizziness    OBJECTIVE:    VITAL SIGNS:  Vital Signs Last 24 Hrs  T(C): 36.5 (21 Sep 2020 13:54), Max: 36.5 (21 Sep 2020 13:54)  T(F): 97.7 (21 Sep 2020 13:54), Max: 97.7 (21 Sep 2020 13:54)  HR: 77 (21 Sep 2020 13:54) (77 - 77)  BP: 145/68 (21 Sep 2020 13:54) (145/68 - 145/68)  BP(mean): --  RR: --  SpO2: --      PHYSICAL EXAM:    General: NAD  HEENT: NC/AT; PERRL, clear conjunctiva  Neck: supple  Respiratory: CTA b/l  Cardiovascular: +S1/S2; RRR  Abdomen: soft, NT/ND; +BS x4  Extremities: WWP, 2+ peripheral pulses b/l; no LE edema  Skin: normal color and turgor; no rash  Neurological:    MEDICATIONS:  MEDICATIONS  (STANDING):  amLODIPine   Tablet 5 milliGRAM(s) Oral daily  chlorhexidine 4% Liquid 1 Application(s) Topical <User Schedule>  diVALproex  milliGRAM(s) Oral two times a day  enoxaparin Injectable 40 milliGRAM(s) SubCutaneous daily  levETIRAcetam 750 milliGRAM(s) Oral two times a day  OLANZapine 5 milliGRAM(s) Oral daily    MEDICATIONS  (PRN):  haloperidol     Tablet 0.5 milliGRAM(s) Oral every 8 hours PRN agitation  polyethylene glycol 3350 17 Gram(s) Oral two times a day PRN Constipation      ALLERGIES:  Allergies    No Known Allergies    Intolerances        LABS:              Creatinine Trend: 0.7<--, 0.8<--        hs Troponin:              CSF:                      EKG:   MICROBIOLOGY:    IMAGING:      Labs, imaging, EKG personally reviewed    RADIOLOGY & ADDITIONAL TESTS: Reviewed.

## 2020-09-23 PROCEDURE — 99232 SBSQ HOSP IP/OBS MODERATE 35: CPT

## 2020-09-23 NOTE — PROGRESS NOTE ADULT - ASSESSMENT
60M PMHx seizure disorder, schizophrenia, HTN here with altered mental status, suspected acute psychosis.    #Altered mental status, toxic metabolic encephalopathy, suspect psychosis  in setting of h/o schizophrenia  a+o x2   +AH, VH; having conversations alone  calm but refusing blood draws, medications  f/u psych  zyprexa 5  #HTN  norvasc 5  #Seizure disorder  depakote 500 bid  keppra 750 bid  #DVT ppx  lovenox    #Progress Note Handoff  Pending (specify):  placement  Family discussion:  plan of care was discussed with patient  in details.  all questions were answered.  seems to understand, and in agreement  Disposition:  unknown

## 2020-09-23 NOTE — PROGRESS NOTE ADULT - SUBJECTIVE AND OBJECTIVE BOX
CC.  mental health marco  HPI.  Patient appears to be agitated at time    unable to obtain ROS/HX    Vital Signs Last 24 Hrs  T(C): 36 (22 Sep 2020 13:29), Max: 36 (22 Sep 2020 13:29)  T(F): 96.8 (22 Sep 2020 13:29), Max: 96.8 (22 Sep 2020 13:29)  HR: 78 (22 Sep 2020 15:57) (77 - 78)  BP: 171/90 (22 Sep 2020 15:57) (150/102 - 171/90)  BP(mean): --  RR: 18 (22 Sep 2020 13:29) (18 - 18)  SpO2: --         PHYSICAL EXAM:    General: NAD   Neck: supple  Respiratory: CTA b/l  Cardiovascular: +S1/S2; RRR  Abdomen: soft, NT/ND; +BS x4  Extremities: Moving all extremities.  No edema  Skin: normal color and turgor; no rash  Neurological: alert moving all extremities    Home Medications:  amLODIPine 5 mg oral tablet: 1 tab(s) orally once a day (17 Sep 2020 10:00)  divalproex sodium 500 mg oral delayed release tablet: 1 tab(s) orally 2 times a day (17 Sep 2020 10:00)  levETIRAcetam 750 mg oral tablet: 1 tab(s) orally 2 times a day (17 Sep 2020 10:00)  OLANZapine 5 mg oral tablet: 1 tab(s) orally once a day (17 Sep 2020 10:00)

## 2020-09-23 NOTE — CHART NOTE - NSCHARTNOTEFT_GEN_A_CORE
Registered Dietitian Follow-Up     Patient Profile Reviewed                           Yes [x]   No []     Nutrition History Previously Obtained        Yes []  No [x]       Pertinent Subjective Information: Attempted to obtain nutrition hx from pt, but pt declined to speak with RD at this time. Declined nutrition focused physical assessment. No family at bedside. No family contact information available at this time. NKFA per chart.     Pertinent Medical Interventions: AMS, toxic metabolic encephalopathy, suspect psychosis noted. Noted refusing blood draws, medications.      Diet order: Regular diet.     Anthropometrics:  - Ht. 177.8 cm.  - Wt. 62.5 kg. (9/14) vs. 63 kg (9/6); no significant changes observed at this time. No new wt at this time.  - BMI 19.8 (using latest wt 62.5 kg)  - IBW 75.4 kg.     Pertinent Lab Data: 9/7: RBC-4.06, H/H-12.4/37.9, BUN-7; no new nutritionally pertinent lab data at this time     Pertinent Meds: enoxaparin, amlodipine, miralax     Physical Findings:  - Appearance: confused at this time  - GI function: last BM noted 9/14. LIP notified. Unable to clarify with pt at this time if they had a more recent BM. Receiving miralax at this time.  - Tubes: no feeding tubes noted  - Oral/Mouth cavity: no chewing/swallowing difficulty reported.  - Skin: noted WDL     Nutrition Requirements  Weight Used: 63 kg per 9/11 RD assessment     Estimated Energy Needs    Continue [x]  Adjust []  1728 - 1872 kcals/day (MSJ x 1.2 - 1.3 AF)        Estimated Protein Needs    Continue [x]  Adjust []  63 - 76 gms/day (1.0 - 1.2 gm/kg)        Estimated Fluid Needs        Continue [x]  Adjust []  1ml/kcal or per LIP     Nutrient Intake: Per RN, pt with variable po intake following previous RD assessment, with po intake ranging from 15% to 100%. Average po intake is 50%. Appears to be r/t non-compliance with meals at times. No preferences reported at this time.     Nutrition Diagnostic #1  Problem: Inadequate oral intake  Etiology: suspected to be r/t AMS  Statement: pt consuming average 50% of meals at this time     Nutrition Intervention: Meals & Snacks, Medical Food Supplements, Coordination of Care      Goal/Expected Outcome: Pt to demonstrate tolerance to diet order, with at least 75% po intake maintained over next 3 days.     Indicator/Monitoring: Energy intake, glucose profile, renal profile, nutrition focused physical findings, body composition.    Recommendation: Continue providing regular diet as tolerated. Order Ensure Enlive q24hrs. Review with LIP on unit.

## 2020-09-24 PROCEDURE — 99232 SBSQ HOSP IP/OBS MODERATE 35: CPT

## 2020-09-24 NOTE — PROGRESS NOTE ADULT - SUBJECTIVE AND OBJECTIVE BOX
CC.  mental health marco  HPI.  Patient appears to be clam, and cooperative today  Offers no new complaints    unable to obtain ROS/HX         Vital Signs Last 24 Hrs  T(C): 35.6 (23 Sep 2020 14:24), Max: 35.6 (23 Sep 2020 14:24)  T(F): 96 (23 Sep 2020 14:24), Max: 96 (23 Sep 2020 14:24)  HR: 93 (23 Sep 2020 14:24) (93 - 93)  BP: 134/75 (23 Sep 2020 14:24) (134/75 - 134/75)  BP(mean): --  RR: 18 (23 Sep 2020 14:24) (18 - 18)  SpO2: --    PHYSICAL EXAM:    General: NAD   Neck: supple  Respiratory: CTA b/l  Cardiovascular: +S1/S2; RRR  Abdomen: soft, NT/ND; +BS x4  Extremities: Moving all extremities.  No edema  Skin: normal color and turgor; no rash  Neurological: alert moving all extremities    Home Medications:  amLODIPine 5 mg oral tablet: 1 tab(s) orally once a day (17 Sep 2020 10:00)  divalproex sodium 500 mg oral delayed release tablet: 1 tab(s) orally 2 times a day (17 Sep 2020 10:00)  levETIRAcetam 750 mg oral tablet: 1 tab(s) orally 2 times a day (17 Sep 2020 10:00)  OLANZapine 5 mg oral tablet: 1 tab(s) orally once a day (17 Sep 2020 10:00)

## 2020-09-25 PROCEDURE — 99232 SBSQ HOSP IP/OBS MODERATE 35: CPT

## 2020-09-25 RX ORDER — HALOPERIDOL DECANOATE 100 MG/ML
5 INJECTION INTRAMUSCULAR ONCE
Refills: 0 | Status: DISCONTINUED | OUTPATIENT
Start: 2020-09-25 | End: 2020-09-25

## 2020-09-25 RX ORDER — HALOPERIDOL DECANOATE 100 MG/ML
5 INJECTION INTRAMUSCULAR ONCE
Refills: 0 | Status: COMPLETED | OUTPATIENT
Start: 2020-09-25 | End: 2020-09-25

## 2020-09-25 RX ADMIN — HALOPERIDOL DECANOATE 5 MILLIGRAM(S): 100 INJECTION INTRAMUSCULAR at 02:42

## 2020-09-25 NOTE — PROGRESS NOTE ADULT - SUBJECTIVE AND OBJECTIVE BOX
CC.  mental health marco  HPI.  Patient appears to be clam eating breakfast  Offers no new complaints    unable to obtain ROS/HX       Vital Signs Last 24 Hrs  T(C): 35.8 (24 Sep 2020 21:00), Max: 35.9 (24 Sep 2020 14:29)  T(F): 96.5 (24 Sep 2020 21:00), Max: 96.6 (24 Sep 2020 14:29)  HR: 69 (24 Sep 2020 21:00) (69 - 70)  BP: 138/72 (24 Sep 2020 21:00) (138/72 - 161/85)  BP(mean): --  RR: 18 (24 Sep 2020 21:00) (18 - 18)  SpO2: --      PHYSICAL EXAM:    General: NAD   Neck: supple  Respiratory: CTA b/l  Cardiovascular: +S1/S2; RRR  Abdomen: soft, NT/ND; +BS x4  Extremities: Moving all extremities.  No edema  Skin: normal color and turgor; no rash  Neurological: alert moving all extremities    Home Medications:  amLODIPine 5 mg oral tablet: 1 tab(s) orally once a day (17 Sep 2020 10:00)  divalproex sodium 500 mg oral delayed release tablet: 1 tab(s) orally 2 times a day (17 Sep 2020 10:00)  levETIRAcetam 750 mg oral tablet: 1 tab(s) orally 2 times a day (17 Sep 2020 10:00)  OLANZapine 5 mg oral tablet: 1 tab(s) orally once a day (17 Sep 2020 10:00)

## 2020-09-26 PROCEDURE — 99233 SBSQ HOSP IP/OBS HIGH 50: CPT

## 2020-09-26 RX ORDER — AMLODIPINE BESYLATE 2.5 MG/1
10 TABLET ORAL DAILY
Refills: 0 | Status: COMPLETED | OUTPATIENT
Start: 2020-09-27 | End: 2021-08-26

## 2020-09-26 RX ORDER — AMLODIPINE BESYLATE 2.5 MG/1
2.5 TABLET ORAL ONCE
Refills: 0 | Status: COMPLETED | OUTPATIENT
Start: 2020-09-26 | End: 2020-09-26

## 2020-09-26 RX ADMIN — AMLODIPINE BESYLATE 2.5 MILLIGRAM(S): 2.5 TABLET ORAL at 14:05

## 2020-09-26 RX ADMIN — AMLODIPINE BESYLATE 5 MILLIGRAM(S): 2.5 TABLET ORAL at 10:03

## 2020-09-26 NOTE — PROGRESS NOTE ADULT - SUBJECTIVE AND OBJECTIVE BOX
-CC.  mental health marco  HPI.  Patient appears to be clam eating breakfast  Offers no new complaints    unable to obtain ROS/HX    Vital Signs Last 24 Hrs  T(C): 36.3 (26 Sep 2020 05:33), Max: 36.5 (25 Sep 2020 13:47)  T(F): 97.4 (26 Sep 2020 05:33), Max: 97.7 (25 Sep 2020 13:47)  HR: 68 (26 Sep 2020 05:33) (68 - 79)  BP: 200/88 (26 Sep 2020 05:33) (168/75 - 200/88)  BP(mean): --  RR: 18 (26 Sep 2020 05:33) (18 - 18)  SpO2: -          PHYSICAL EXAM:    General: NAD   Neck: supple  Respiratory: CTA b/l  Cardiovascular: +S1/S2; RRR  Abdomen: soft, NT/ND; +BS x4  Extremities: Moving all extremities.  No edema  Skin: normal color and turgor; no rash  Neurological: alert moving all extremities    Home Medications:  amLODIPine 5 mg oral tablet: 1 tab(s) orally once a day (17 Sep 2020 10:00)  divalproex sodium 500 mg oral delayed release tablet: 1 tab(s) orally 2 times a day (17 Sep 2020 10:00)  levETIRAcetam 750 mg oral tablet: 1 tab(s) orally 2 times a day (17 Sep 2020 10:00)  OLANZapine 5 mg oral tablet: 1 tab(s) orally once a day (17 Sep 2020 10:00)

## 2020-09-26 NOTE — CHART NOTE - NSCHARTNOTEFT_GEN_A_CORE
Registered Dietitian Follow-Up     Patient Profile Reviewed                           Yes [x]   No []     Nutrition History Previously Obtained        Yes []  No [x]       Pertinent Subjective Information: Attempted to obtain nutrition hx from pt, but pt declined to speak with RD at this time. Declined nutrition focused physical assessment. No family at bedside. No family contact information available at this time. NKFA per chart.     Pertinent Medical Interventions: Admitted for mental healthy eval. AMS, toxic metabolic encephalopathy, suspect psychosis noted. Reportedly continues to refuse blood draws, medications.      Diet order: Regular diet.     Anthropometrics:  - Ht. 177.8 cm.  - Wt. 62.5 kg. (9/14) vs. 63 kg (9/6); no significant changes observed at this time. No new wt at this time.  - BMI 19.8 (using latest wt 62.5 kg)  - IBW 75.4 kg.     Pertinent Lab Data: 9/7: RBC-4.06, H/H-12.4/37.9, BUN-7; no new nutritionally pertinent lab data at this time     Pertinent Meds: enoxaparin, miralax     Physical Findings:  - Appearance: confused at this time  - GI function: last BM noted 9/14. LIP notified. Unable to clarify with pt at this time if they had a more recent BM. Receiving miralax at this time.  - Tubes: no feeding tubes noted  - Oral/Mouth cavity: no chewing/swallowing difficulty reported.  - Skin: noted WDL     Nutrition Requirements  Weight Used: 63 kg per 9/11 RD assessment     Estimated Energy Needs    Continue [x]  Adjust []  1728 - 1872 kcals/day (MSJ x 1.2 - 1.3 AF)        Estimated Protein Needs    Continue [x]  Adjust []  63 - 76 gms/day (1.0 - 1.2 gm/kg)        Estimated Fluid Needs        Continue [x]  Adjust []  1ml/kcal or per LIP     Nutrient Intake: Per RN, pt with variable po intake improved at this time, consuming % of most meals at this time. Given h/o poor po intake & po non-compliance, will maintain pt at risk to monitor.     Previous Nutrition Diagnosis: Inadequate oral intake     Nutrition Intervention: Meals & Snacks, Medical Food Supplements     Goal/Expected Outcome: Pt to demonstrate tolerance to diet order, with at least 75% po intake maintained over next 3 days.     Indicator/Monitoring: Energy intake, glucose profile, renal profile, nutrition focused physical findings, body composition.    Recommendation: Continue current diet order as tolerated. If po intake declines (<75% of meals consumed), activate pending diet order awaiting verification by attending, which adds Ensure Enlive q24hrs. Monitor for BM - no noted BM since 9/14. Review with LIP on unit.

## 2020-09-27 PROCEDURE — 99232 SBSQ HOSP IP/OBS MODERATE 35: CPT

## 2020-09-27 RX ADMIN — AMLODIPINE BESYLATE 10 MILLIGRAM(S): 2.5 TABLET ORAL at 09:31

## 2020-09-27 RX ADMIN — OLANZAPINE 5 MILLIGRAM(S): 15 TABLET, FILM COATED ORAL at 09:31

## 2020-09-27 NOTE — PROGRESS NOTE ADULT - SUBJECTIVE AND OBJECTIVE BOX
CC.  mental health marco  HPI.  Patient appears to be clam eating breakfast  Offers no new complaints    unable to obtain ROS/HX      Vital Signs Last 24 Hrs  T(C): 35.7 (27 Sep 2020 06:37), Max: 36.4 (26 Sep 2020 20:48)  T(F): 96.3 (27 Sep 2020 06:37), Max: 97.6 (26 Sep 2020 20:48)  HR: 74 (27 Sep 2020 06:37) (61 - 77)  BP: 134/97 (27 Sep 2020 06:37) (134/97 - 181/84)  BP(mean): --  RR: 18 (27 Sep 2020 06:37) (18 - 18)  SpO2: --      PHYSICAL EXAM:    General: NAD   Neck: supple  Respiratory: CTA b/l  Cardiovascular: +S1/S2; RRR  Abdomen: soft, NT/ND; +BS x4  Extremities: Moving all extremities.  No edema  Skin: normal color and turgor; no rash  Neurological: alert moving all extremities       MEDICATIONS  (STANDING):  amLODIPine   Tablet 10 milliGRAM(s) Oral daily  chlorhexidine 4% Liquid 1 Application(s) Topical <User Schedule>  diVALproex  milliGRAM(s) Oral two times a day  enoxaparin Injectable 40 milliGRAM(s) SubCutaneous daily  levETIRAcetam 750 milliGRAM(s) Oral two times a day  OLANZapine 5 milliGRAM(s) Oral daily    MEDICATIONS  (PRN):  haloperidol     Tablet 0.5 milliGRAM(s) Oral every 8 hours PRN agitation  polyethylene glycol 3350 17 Gram(s) Oral two times a day PRN Constipation      Imaging Personally Reviewed:     [x ] YES  [ ] No medical contraindication for discharge    Consultant(s) Notes Reviewed:  [x ] YES  [ ] NO    Care Discussed with Consultants/Other Providers [x ] YES  [ ] NO

## 2020-09-27 NOTE — PROGRESS NOTE ADULT - ASSESSMENT
60M PMHx seizure disorder, schizophrenia, HTN here with altered mental status, suspected acute psychosis.    #Altered mental status, toxic metabolic encephalopathy, suspect psychosis  in setting of h/o schizophrenia  a+o x2   +AH, VH; having conversations alone  calm but refusing blood draws, medications  f/u psych  zyprexa 5  #HTN  BP elevated  Will increase the norvasc dose   -monitor BP  #Seizure disorder  depakote 500 bid  keppra 750 bid  #DVT ppx  lovenox    #Progress Note Handoff  Pending (specify):  placement  Family discussion:  plan of care was discussed with patient  in details.  all questions were answered.  seems to understand, and in agreement  Disposition:  unknown

## 2020-09-27 NOTE — CONSULT NOTE ADULT - SUBJECTIVE AND OBJECTIVE BOX
Summary (include case differential, formulation and patient response to therapy): Mr. Chris is a 60-year-old, black, male, with unknown social demographics, medical history, or psychiatric history who was reportedly admitted to medicine for workup of AMS.     Upon evaluation, pt continues be a poor historian, however is calm, cooperative, no acute agitation or behavioral disturbances noted on exam ,  as per staff , pt  eloped  form the unit and found  in car park.      Patient  was lying in bed , with poor eye contact.  initially , pt  was reluctant  to  talk ,  but with encouragement , patient  engaged in conversation . 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    During interview , he kept saying that  he  cant remember things  and has memory problem  for many years.  He states that  he  never  , and  has no children.  He mentioned the name of his friend  " Lisseth Gonzales"  He   states that  he was living in a shelter in [place]  for at least  2 years and  has some  ?remote  history of psych treatment.  He  has  some  intact  remote memory  of 1972 when he came to [place].  His recent and immediate  memory  is grossly impaired .  He  was able to recall  3 president  "[place] "  he states that  name of the hospital is  [facility].  He  denies  a/v hallucinations.   no overt delusions.  states  that  somebody   disturbed his spirit last night .  He states that  he is [employer] and does not believe in wudu  .  He is alert  AOx2.  memory and cognition  grossly impaired.   he describes his mood " oK : , affetc blunted.     Assessment and Plan:   · Assessment	  Neurocognitive disorder severe    Pt presentation appears to be secondary to underlying neurocognitive impairment rather than a primary psychiatric illness.  Pt does not meet criteria nor will he benefit from IPP admission as he is not a danger to self/others.   Additionally, would defer to social work for possible placement and higher level of care.   Standing haldol is not indicated at this time, may continue PRN haldol for acute agitation not amenable to verbal redirection.  continue  Olanzapine 5 mg po  daily and  Depakote 500 mg po BID for  mood lability

## 2020-09-28 PROCEDURE — 99232 SBSQ HOSP IP/OBS MODERATE 35: CPT

## 2020-09-28 RX ORDER — HALOPERIDOL DECANOATE 100 MG/ML
5 INJECTION INTRAMUSCULAR ONCE
Refills: 0 | Status: COMPLETED | OUTPATIENT
Start: 2020-09-28 | End: 2020-09-28

## 2020-09-28 RX ADMIN — HALOPERIDOL DECANOATE 5 MILLIGRAM(S): 100 INJECTION INTRAMUSCULAR at 06:00

## 2020-09-28 NOTE — PROGRESS NOTE ADULT - SUBJECTIVE AND OBJECTIVE BOX
CC.  mental health [facility]co  HPI.  Patient appears to be clam.  received haldol overnight for agitation  Offers no new complaints    unable to obtain ROS/HX      Vital Signs Last 24 Hrs  T(C): 36.1 (27 Sep 2020 21:09), Max: 36.1 (27 Sep 2020 21:09)  T(F): 97 (27 Sep 2020 21:09), Max: 97 (27 Sep 2020 21:09)  HR: 73 (27 Sep 2020 21:09) (67 - 73)  BP: 138/74 (27 Sep 2020 21:09) (118/68 - 138/74)  BP(mean): --  RR: 16 (27 Sep 2020 21:09) (16 - 16)  SpO2: --      PHYSICAL EXAM:    General: NAD   Neck: supple  Respiratory: CTA b/l  Cardiovascular: +S1/S2; RRR  Abdomen: soft, NT/ND; +BS x4  Extremities: Moving all extremities.  No edema  Skin: normal color and turgor; no rash  Neurological: alert moving all extremities       MEDICATIONS  (STANDING):  amLODIPine   Tablet 10 milliGRAM(s) Oral daily  chlorhexidine 4% Liquid 1 Application(s) Topical <User Schedule>  diVALproex  milliGRAM(s) Oral two times a day  enoxaparin Injectable 40 milliGRAM(s) SubCutaneous daily  levETIRAcetam 750 milliGRAM(s) Oral two times a day  OLANZapine 5 milliGRAM(s) Oral daily    MEDICATIONS  (PRN):  haloperidol     Tablet 0.5 milliGRAM(s) Oral every 8 hours PRN agitation  polyethylene glycol 3350 17 Gram(s) Oral two times a day PRN Constipation      Imaging Personally Reviewed:     [x ] YES  [ ] No medical contraindication for discharge    Consultant(s) Notes Reviewed:  [x ] YES  [ ] NO    Care Discussed with Consultants/Other Providers [x ] YES  [ ] NO

## 2020-09-28 NOTE — PROGRESS NOTE ADULT - ASSESSMENT
60M PMHx seizure disorder, schizophrenia, HTN here with altered mental status, suspected acute psychosis.    #Altered mental status, toxic metabolic encephalopathy, suspect psychosis  in setting of h/o schizophrenia  a+o x2   +AH, VH; having conversations alone  calm but refusing blood draws, medications  sitter consult  f/u psych  zyprexa 5  #HTN  BP stable  continue with current norvasc dose   -monitor BP  #Seizure disorder  depakote 500 bid  keppra 750 bid  #DVT ppx  lovenox    #Progress Note Handoff  Pending (specify):  placement  Family discussion:  plan of care was discussed with patient  in details.  all questions were answered.  seems to understand, and in agreement  Disposition:  unknown

## 2020-09-29 PROCEDURE — 99232 SBSQ HOSP IP/OBS MODERATE 35: CPT

## 2020-09-29 NOTE — PHYSICAL THERAPY INITIAL EVALUATION ADULT - DISCHARGE DISPOSITION, PT EVAL
no skilled PT needs/Patient performed transfers and gait with and without rolling walker with supervision by PT.  Does not require skilled PT interventions at this time. Rolling walker at RN station, for use with staff as needed. SIRISHA Barragan and  Eboni serna. Please re-consult PT if needed / if change in status.
rehabilitation facility

## 2020-09-29 NOTE — PHYSICAL THERAPY INITIAL EVALUATION ADULT - GENERAL OBSERVATIONS, REHAB EVAL
13:05 - 13:30. PT re-consulted by Dr. Pedersen. Chart reviewed. Patient available to be seen for physical therapy, confirmed with nurse. Patient encountered already seated at edge of bed, PCA at bedside. Denies pain at rest. Agreeable for PT now.

## 2020-09-29 NOTE — PHYSICAL THERAPY INITIAL EVALUATION ADULT - LEVEL OF INDEPENDENCE: STAND/SIT, REHAB EVAL
Did not occur this session. Patient left ambulating in hallway with rolling walker and PCA, as requested, SIRISHA Barragan aware
contact guard

## 2020-09-29 NOTE — PROGRESS NOTE BEHAVIORAL HEALTH - NSBHFUPINTERVALHXFT_PSY_A_CORE
Patient is a 60-year-old, black, male, with unknown social demographics, medical history, or psychiatric history who was reportedly admitted to medicine for workup of AMS. Previously seen by psychiatry consult team for mental health evaluation and diagnosed with Neurocognitive disorder. Psychiatry reconsulted as pt has been irritable and has intermittent agitation.        Today on evaluation, he is calm and cooperative in answering questions. He is alert and is oriented to person and place but not to time or situation. He reports that he does forget somethings but because of his age. When asked why he does not want to take medications, he replies (in reference to his seizure medications), "I used to have a problem but I do not any more." He denies feeling irritable. He also denies any problems with his mood. he denies SI, HI, anxiety. He denies experiencing AH,VH, paranoid thoughts. He denies any acute concerns.             As per staff report, patient was agitated and walked out of his room earlier, and he could not be deescalated. he eventually calmed down but security had been called. As per notes, pt also responding to internal stimuli and talking to himself.

## 2020-09-29 NOTE — PHYSICAL THERAPY INITIAL EVALUATION ADULT - BED MOBILITY LIMITATIONS, REHAB EVAL
Did not occur this session. Patient encountered already seated at edge of bed
decreased ability to use arms for pushing/pulling/decreased ability to use legs for bridging/pushing/impaired ability to control trunk for mobility

## 2020-09-29 NOTE — PHYSICAL THERAPY INITIAL EVALUATION ADULT - MANUAL MUSCLE TESTING RESULTS, REHAB EVAL
B UE's grossly WFL. B LE's grossly WFL except for B ankle limited DF with complaints of pain
based upon clinical judgement, appears to be approx 4 to 4+/5 throughout

## 2020-09-29 NOTE — PROGRESS NOTE BEHAVIORAL HEALTH - SUMMARY
Patient is a 60-year-old, black, male, with unknown social demographics, medical history, or psychiatric history who was reportedly admitted to medicine for workup of AMS. Previously seen by psychiatry consult team for mental health evaluation and diagnosed with Neurocognitive disorder. Psychiatry reconsulted as pt has been irritable and has intermittent agitation.          Currently on evaluation pt is calm and cooperative. His behavioral changes appear to be secondary to neurocognitive disorder than a primary psychotic disorder. He does not meet criteria for IPP admission.           Will recommend to continue Zyprexa 5 mg QHS for AH, mood lability s/t neurocognitive disorder. Considering his age and possibility of LBD, will recommend switching from Haldol to Zyprexa 5 mg IM for agitation, aggression not responding verbal and behavioral interventions. Typical antipsychotics are more likely to produce EPS in the elderly and with neurocognitive disorder.

## 2020-09-29 NOTE — PROGRESS NOTE ADULT - SUBJECTIVE AND OBJECTIVE BOX
CC.  mental health marco  HPI.  Patient appears to be clam.     Offers no new complaints    unable to obtain ROS/HX      Vital Signs Last 24 Hrs  T(C): 36.1 (28 Sep 2020 13:00), Max: 36.1 (28 Sep 2020 13:00)  T(F): 96.9 (28 Sep 2020 13:00), Max: 96.9 (28 Sep 2020 13:00)  HR: 78 (28 Sep 2020 13:00) (78 - 78)  BP: 194/89 (28 Sep 2020 13:00) (194/89 - 194/89)  BP(mean): --  RR: 16 (28 Sep 2020 13:00) (16 - 16)  SpO2: --    PHYSICAL EXAM:    General: NAD   Neck: supple  Respiratory: CTA b/l  Cardiovascular: +S1/S2; RRR  Abdomen: soft, NT/ND; +BS x4  Extremities: Moving all extremities.  No edema  Skin: normal color and turgor; no rash  Neurological: alert moving all extremities       MEDICATIONS  (STANDING):  amLODIPine   Tablet 10 milliGRAM(s) Oral daily  chlorhexidine 4% Liquid 1 Application(s) Topical <User Schedule>  diVALproex  milliGRAM(s) Oral two times a day  enoxaparin Injectable 40 milliGRAM(s) SubCutaneous daily  levETIRAcetam 750 milliGRAM(s) Oral two times a day  OLANZapine 5 milliGRAM(s) Oral daily    MEDICATIONS  (PRN):  haloperidol     Tablet 0.5 milliGRAM(s) Oral every 8 hours PRN agitation  polyethylene glycol 3350 17 Gram(s) Oral two times a day PRN Constipation      Imaging Personally Reviewed:     [x ] YES  [ ] No medical contraindication for discharge    Consultant(s) Notes Reviewed:  [x ] YES  [ ] NO    Care Discussed with Consultants/Other Providers [x ] YES  [ ] NO

## 2020-09-30 PROCEDURE — 99232 SBSQ HOSP IP/OBS MODERATE 35: CPT

## 2020-09-30 NOTE — CHART NOTE - NSCHARTNOTEFT_GEN_A_CORE
Registered Dietitian Follow-Up     Patient Profile Reviewed                           Yes [x]   No []     Nutrition History Previously Obtained        Yes []  No [x]       Pertinent Subjective Information:     Pertinent Medical Interventions:  Altered mental status, toxic metabolic encephalopathy, suspect psychosis  pending placement      Diet order: Regular      Anthropometrics:  - Ht. 177.8 cm.  - Wt. 62.5 kg. (9/14) vs. 63 kg (9/6); no significant changes observed at this time. No new wt at this time.  - BMI 19.8 (using latest wt 62.5 kg)  - IBW 75.4 kg.     Pertinent Lab Data: No pertinent labs since (9/7)      Pertinent Meds: lovenox, miralax      Physical Findings:  - Appearance: Confused and disoriented. No edema noted per RN flow sheets  - GI function: LBM (9/14)  - Tubes: N/A  - Oral/Mouth cavity: No chewing/swallowing difficulties reported by staff   - Skin: WDL      Nutrition Requirements  Weight Used:  63 kg per 9/11 RD assessment     Estimated Energy Needs    Continue [x]  Adjust []  1728 - 1872 kcals/day (MSJ x 1.2 - 1.3 AF)        Estimated Protein Needs    Continue [x]  Adjust []  63 - 76 gms/day (1.0 - 1.2 gm/kg)        Estimated Fluid Needs        Continue [x]  Adjust []  1ml/kcal or per LIP     Nutrient Intake: consistently >75% since last RD f/u         [] Previous Nutrition Diagnosis: Inadequate oral intake            [] Ongoing          [x] Resolved       Nutrition Intervention: meals and snacks      Goal/Expected Outcome: Pt to continue to meet greater than 75% of estimated needs within 7 days      Indicator/Monitoring: energy intake, body comp, NFPF     Recommendations:   1. Continue current diet order as tolerated   2. Activate pending diet order to supplement pt with Ensure Enlive once daily Registered Dietitian Follow-Up     Patient Profile Reviewed                           Yes [x]   No []     Nutrition History Previously Obtained        Yes []  No [x]       Pertinent Subjective Information: Unable to obtain nutrition hx as f/u assessment completed remotely, pt has no emergency contacts listed in EMR. Per RN flow sheets, pt has consistently eating >75% of his meals x 4 days.      Pertinent Medical Interventions:  Altered mental status, toxic metabolic encephalopathy, suspect psychosis  Pt has a 1:1   pending placement      Diet order: Regular      Anthropometrics:  - Ht. 177.8 cm.  - Wt. 62.5 kg. (9/14) vs. 63 kg (9/6); no significant changes observed at this time. No new wt at this time.  - BMI 19.8 (using latest wt 62.5 kg)  - IBW 75.4 kg.     Pertinent Lab Data: No pertinent labs since (9/7)      Pertinent Meds: lovenox, miralax      Physical Findings:  - Appearance: Confused and disoriented. No edema noted per RN flow sheets  - GI function: LBM (9/14) PCA and 1:1 sit report no BM today and are unsure of pt's last BM   - Tubes: N/A  - Oral/Mouth cavity: No chewing/swallowing difficulties reported by staff   - Skin: WDL      Nutrition Requirements  Weight Used:  63 kg per 9/11 RD assessment     Estimated Energy Needs    Continue [x]  Adjust []  1728 - 1872 kcals/day (MSJ x 1.2 - 1.3 AF)        Estimated Protein Needs    Continue [x]  Adjust []  63 - 76 gms/day (1.0 - 1.2 gm/kg)        Estimated Fluid Needs        Continue [x]  Adjust []  1ml/kcal or per LIP     Nutrient Intake: consistently >75% since last RD f/u         [] Previous Nutrition Diagnosis: Inadequate oral intake            [] Ongoing          [x] Resolved       Nutrition Intervention: meals and snacks, medical food supplements      Goal/Expected Outcome: Pt to continue to meet greater than 75% of estimated needs within 7 days      Indicator/Monitoring: energy intake, body comp, NFPF     Recommendations:   1. Continue current diet order as tolerated   2. Activate pending diet order to supplement pt with Ensure Enlive once daily  3. Consider more aggressive bowel regimen if pt does not have BM in 3 days Registered Dietitian Follow-Up     Patient Profile Reviewed                           Yes [x]   No []     Nutrition History Previously Obtained        Yes []  No [x]       Pertinent Subjective Information: Unable to obtain nutrition hx as f/u assessment completed remotely, pt has no emergency contacts listed in EMR. Per RN flow sheets, pt has consistently eating >75% of his meals x 4 days.      Pertinent Medical Interventions:  Altered mental status, toxic metabolic encephalopathy, suspect psychosis  Pt has a 1:1   pending placement      Diet order: Regular      Anthropometrics:  - Ht. 177.8 cm.  - Wt. 62.5 kg. (9/14) vs. 63 kg (9/6); no significant changes observed at this time. No new wt at this time.  - BMI 19.8 (using latest wt 62.5 kg)  - IBW 75.4 kg.     Pertinent Lab Data: No pertinent labs since (9/7)      Pertinent Meds: lovenox, miralax      Physical Findings:  - Appearance: Confused and disoriented. No edema noted per RN flow sheets  - GI function: LBM (9/14) PCA and 1:1 sit report no BM today and are unsure of pt's last BM. LIP made aware   - Tubes: N/A  - Oral/Mouth cavity: No chewing/swallowing difficulties reported by staff   - Skin: WDL      Nutrition Requirements  Weight Used:  63 kg per 9/11 RD assessment     Estimated Energy Needs    Continue [x]  Adjust []  1728 - 1872 kcals/day (MSJ x 1.2 - 1.3 AF)        Estimated Protein Needs    Continue [x]  Adjust []  63 - 76 gms/day (1.0 - 1.2 gm/kg)        Estimated Fluid Needs        Continue [x]  Adjust []  1ml/kcal or per LIP     Nutrient Intake: consistently >75% since last RD f/u         [] Previous Nutrition Diagnosis: Inadequate oral intake            [] Ongoing          [x] Resolved       Nutrition Intervention: meals and snacks, medical food supplements      Goal/Expected Outcome: Pt to continue to meet greater than 75% of estimated needs within 7 days      Indicator/Monitoring: energy intake, body comp, NFPF     Recommendations:   1. Continue current diet order as tolerated   2. Activate pending diet order to supplement pt with Ensure Enlive once daily  3. Consider more aggressive bowel regimen if pt does not have BM in 3 days  Recommendations discussed with PA x [phone]

## 2020-09-30 NOTE — PROGRESS NOTE ADULT - SUBJECTIVE AND OBJECTIVE BOX
INTERVAL HPI/OVERNIGHT EVENTS:    SUBJECTIVE: Patient seen and examined at bedside.     no cp, sob, abd pain, fever  no ha, syncope, dizziness, lightheadedness    OBJECTIVE:    VITAL SIGNS:  Vital Signs Last 24 Hrs  T(C): 36.1 (30 Sep 2020 05:49), Max: 36.3 (29 Sep 2020 21:26)  T(F): 96.9 (30 Sep 2020 05:49), Max: 97.3 (29 Sep 2020 21:26)  HR: 77 (30 Sep 2020 05:49) (69 - 77)  BP: 144/93 (30 Sep 2020 05:49) (144/93 - 183/91)  BP(mean): --  RR: 16 (30 Sep 2020 05:49) (16 - 16)  SpO2: --      PHYSICAL EXAM:    General: NAD  HEENT: NC/AT; PERRL, clear conjunctiva  Neck: supple  Respiratory: CTA b/l  Cardiovascular: +S1/S2; RRR  Abdomen: soft, NT/ND; +BS x4  Extremities: WWP, 2+ peripheral pulses b/l; no LE edema  Skin: normal color and turgor; no rash  Neurological:    MEDICATIONS:  MEDICATIONS  (STANDING):  amLODIPine   Tablet 10 milliGRAM(s) Oral daily  chlorhexidine 4% Liquid 1 Application(s) Topical <User Schedule>  diVALproex  milliGRAM(s) Oral two times a day  enoxaparin Injectable 40 milliGRAM(s) SubCutaneous daily  levETIRAcetam 750 milliGRAM(s) Oral two times a day  OLANZapine 5 milliGRAM(s) Oral daily    MEDICATIONS  (PRN):  haloperidol     Tablet 0.5 milliGRAM(s) Oral every 8 hours PRN agitation  polyethylene glycol 3350 17 Gram(s) Oral two times a day PRN Constipation      ALLERGIES:  Allergies    No Known Allergies    Intolerances        LABS:              Creatinine Trend: 0.7<--, 0.8<--        hs Troponin:              CSF:                      EKG:   MICROBIOLOGY:    IMAGING:      Labs, imaging, EKG personally reviewed    RADIOLOGY & ADDITIONAL TESTS: Reviewed.

## 2020-10-01 PROCEDURE — 99232 SBSQ HOSP IP/OBS MODERATE 35: CPT

## 2020-10-01 NOTE — PROGRESS NOTE ADULT - SUBJECTIVE AND OBJECTIVE BOX
INTERVAL HPI/OVERNIGHT EVENTS:    SUBJECTIVE: Patient seen and examined at bedside.     no cp, sob, abd pain, fever  no sob, orthopnea, pnd, cough    OBJECTIVE:    VITAL SIGNS:  Vital Signs Last 24 Hrs  T(C): 35.9 (01 Oct 2020 06:00), Max: 35.9 (30 Sep 2020 14:16)  T(F): 96.7 (01 Oct 2020 06:00), Max: 96.7 (30 Sep 2020 14:16)  HR: 71 (01 Oct 2020 06:00) (71 - 83)  BP: 158/103 (01 Oct 2020 06:00) (137/87 - 182/86)  BP(mean): --  RR: 18 (01 Oct 2020 06:00) (16 - 18)  SpO2: --      PHYSICAL EXAM:    General: NAD  HEENT: NC/AT; PERRL, clear conjunctiva  Neck: supple  Respiratory: CTA b/l  Cardiovascular: +S1/S2; RRR  Abdomen: soft, NT/ND; +BS x4  Extremities: WWP, 2+ peripheral pulses b/l; no LE edema  Skin: normal color and turgor; no rash  Neurological:    MEDICATIONS:  MEDICATIONS  (STANDING):  amLODIPine   Tablet 10 milliGRAM(s) Oral daily  chlorhexidine 4% Liquid 1 Application(s) Topical <User Schedule>  diVALproex  milliGRAM(s) Oral two times a day  enoxaparin Injectable 40 milliGRAM(s) SubCutaneous daily  levETIRAcetam 750 milliGRAM(s) Oral two times a day  OLANZapine 5 milliGRAM(s) Oral daily    MEDICATIONS  (PRN):  haloperidol     Tablet 0.5 milliGRAM(s) Oral every 8 hours PRN agitation  polyethylene glycol 3350 17 Gram(s) Oral two times a day PRN Constipation      ALLERGIES:  Allergies    No Known Allergies    Intolerances        LABS:              Creatinine Trend: 0.7<--, 0.8<--        hs Troponin:              CSF:                      EKG:   MICROBIOLOGY:    IMAGING:      Labs, imaging, EKG personally reviewed    RADIOLOGY & ADDITIONAL TESTS: Reviewed.

## 2020-10-01 NOTE — PROGRESS NOTE ADULT - ASSESSMENT
60M PMHx seizure disorder, schizophrenia, HTN here with altered mental status, suspected acute psychosis.    #Altered mental status, toxic metabolic encephalopathy, suspect psychosis  in setting of h/o schizophrenia  calm but refusing blood draws, medications  f/u psych  zyprexa 5  #HTN  norvasc 5  #Seizure disorder  depakote 500 bid  keppra 750 bid  #DVT ppx  lovenox    #Progress Note Handoff:  Pending (specify):  Consults_________, Tests________, Test Results_______, Other___f/u psych______  Family discussion:d/w pt at bedside re: treatment plan, primary dx  Disposition: Home___/SNF___/Other________/Unknown at this time____x____

## 2020-10-02 PROCEDURE — 99231 SBSQ HOSP IP/OBS SF/LOW 25: CPT

## 2020-10-02 NOTE — PROGRESS NOTE ADULT - SUBJECTIVE AND OBJECTIVE BOX
INTERVAL HPI/OVERNIGHT EVENTS:    SUBJECTIVE: Patient seen and examined at bedside.     no cp, sob, abd pain, fever  no syncope, lightheadedness, dizziness, ha    OBJECTIVE:    VITAL SIGNS:  Vital Signs Last 24 Hrs  T(C): 35.8 (02 Oct 2020 05:24), Max: 35.8 (02 Oct 2020 05:24)  T(F): 96.5 (02 Oct 2020 05:24), Max: 96.5 (02 Oct 2020 05:24)  HR: 63 (02 Oct 2020 05:24) (63 - 72)  BP: 181/97 (02 Oct 2020 05:24) (137/82 - 181/97)  BP(mean): --  RR: 18 (02 Oct 2020 05:24) (16 - 18)  SpO2: --      PHYSICAL EXAM:    General: NAD  HEENT: NC/AT; PERRL, clear conjunctiva  Neck: supple  Respiratory: CTA b/l  Cardiovascular: +S1/S2; RRR  Abdomen: soft, NT/ND; +BS x4  Extremities: WWP, 2+ peripheral pulses b/l; no LE edema  Skin: normal color and turgor; no rash  Neurological:    MEDICATIONS:  MEDICATIONS  (STANDING):  amLODIPine   Tablet 10 milliGRAM(s) Oral daily  chlorhexidine 4% Liquid 1 Application(s) Topical <User Schedule>  diVALproex  milliGRAM(s) Oral two times a day  enoxaparin Injectable 40 milliGRAM(s) SubCutaneous daily  levETIRAcetam 750 milliGRAM(s) Oral two times a day  OLANZapine 5 milliGRAM(s) Oral daily    MEDICATIONS  (PRN):  haloperidol     Tablet 0.5 milliGRAM(s) Oral every 8 hours PRN agitation  polyethylene glycol 3350 17 Gram(s) Oral two times a day PRN Constipation      ALLERGIES:  Allergies    No Known Allergies    Intolerances        LABS:              Creatinine Trend: 0.7<--, 0.8<--        hs Troponin:              CSF:                      EKG:   MICROBIOLOGY:    IMAGING:      Labs, imaging, EKG personally reviewed    RADIOLOGY & ADDITIONAL TESTS: Reviewed.

## 2020-10-03 PROCEDURE — 99231 SBSQ HOSP IP/OBS SF/LOW 25: CPT

## 2020-10-03 NOTE — PROGRESS NOTE ADULT - SUBJECTIVE AND OBJECTIVE BOX
INTERVAL HPI/OVERNIGHT EVENTS:    SUBJECTIVE: Patient seen and examined at bedside.     no cp, sob, abd pain, fever  no ha, syncope, lightheadedness, dizziness    OBJECTIVE:    VITAL SIGNS:  Vital Signs Last 24 Hrs  T(C): 36.2 (02 Oct 2020 20:22), Max: 36.2 (02 Oct 2020 20:22)  T(F): 97.1 (02 Oct 2020 20:22), Max: 97.1 (02 Oct 2020 20:22)  HR: 82 (02 Oct 2020 20:22) (82 - 82)  BP: 139/85 (02 Oct 2020 20:22) (139/85 - 139/85)  BP(mean): --  RR: --  SpO2: --      PHYSICAL EXAM:    General: NAD  HEENT: NC/AT; PERRL, clear conjunctiva  Neck: supple  Respiratory: CTA b/l  Cardiovascular: +S1/S2; RRR  Abdomen: soft, NT/ND; +BS x4  Extremities: WWP, 2+ peripheral pulses b/l; no LE edema  Skin: normal color and turgor; no rash  Neurological:    MEDICATIONS:  MEDICATIONS  (STANDING):  amLODIPine   Tablet 10 milliGRAM(s) Oral daily  chlorhexidine 4% Liquid 1 Application(s) Topical <User Schedule>  diVALproex  milliGRAM(s) Oral two times a day  enoxaparin Injectable 40 milliGRAM(s) SubCutaneous daily  levETIRAcetam 750 milliGRAM(s) Oral two times a day  OLANZapine 5 milliGRAM(s) Oral daily    MEDICATIONS  (PRN):  haloperidol     Tablet 0.5 milliGRAM(s) Oral every 8 hours PRN agitation  polyethylene glycol 3350 17 Gram(s) Oral two times a day PRN Constipation      ALLERGIES:  Allergies    No Known Allergies    Intolerances        LABS:              Creatinine Trend: 0.7<--, 0.8<--        hs Troponin:              CSF:                      EKG:   MICROBIOLOGY:    IMAGING:      Labs, imaging, EKG personally reviewed    RADIOLOGY & ADDITIONAL TESTS: Reviewed.

## 2020-10-04 PROCEDURE — 99231 SBSQ HOSP IP/OBS SF/LOW 25: CPT

## 2020-10-04 RX ORDER — GABAPENTIN 400 MG/1
100 CAPSULE ORAL THREE TIMES A DAY
Refills: 0 | Status: COMPLETED | OUTPATIENT
Start: 2020-10-04 | End: 2021-09-02

## 2020-10-04 RX ORDER — GABAPENTIN 400 MG/1
300 CAPSULE ORAL THREE TIMES A DAY
Refills: 0 | Status: DISCONTINUED | OUTPATIENT
Start: 2020-10-04 | End: 2020-10-04

## 2020-10-04 NOTE — PROGRESS NOTE ADULT - SUBJECTIVE AND OBJECTIVE BOX
INTERVAL HPI/OVERNIGHT EVENTS:    SUBJECTIVE: Patient seen and examined at bedside.     no cp, sob, abd pain, fever  no syncope, ha, lightheadedness, dizziness    OBJECTIVE:    VITAL SIGNS:  Vital Signs Last 24 Hrs  T(C): 36.2 (04 Oct 2020 05:12), Max: 36.4 (03 Oct 2020 13:34)  T(F): 97.2 (04 Oct 2020 05:12), Max: 97.5 (03 Oct 2020 13:34)  HR: 71 (04 Oct 2020 06:41) (69 - 78)  BP: 167/80 (04 Oct 2020 06:41) (156/89 - 197/91)  BP(mean): --  RR: 18 (04 Oct 2020 06:41) (18 - 18)  SpO2: --      PHYSICAL EXAM:    General: NAD  HEENT: NC/AT; PERRL, clear conjunctiva  Neck: supple  Respiratory: CTA b/l  Cardiovascular: +S1/S2; RRR  Abdomen: soft, NT/ND; +BS x4  Extremities: WWP, 2+ peripheral pulses b/l; no LE edema  Skin: normal color and turgor; no rash  Neurological:    MEDICATIONS:  MEDICATIONS  (STANDING):  amLODIPine   Tablet 10 milliGRAM(s) Oral daily  chlorhexidine 4% Liquid 1 Application(s) Topical <User Schedule>  diVALproex  milliGRAM(s) Oral two times a day  enoxaparin Injectable 40 milliGRAM(s) SubCutaneous daily  gabapentin 300 milliGRAM(s) Oral three times a day  levETIRAcetam 750 milliGRAM(s) Oral two times a day  OLANZapine 5 milliGRAM(s) Oral daily    MEDICATIONS  (PRN):  haloperidol     Tablet 0.5 milliGRAM(s) Oral every 8 hours PRN agitation  polyethylene glycol 3350 17 Gram(s) Oral two times a day PRN Constipation      ALLERGIES:  Allergies    No Known Allergies    Intolerances        LABS:              Creatinine Trend: 0.7<--, 0.8<--        hs Troponin:              CSF:                      EKG:   MICROBIOLOGY:    IMAGING:      Labs, imaging, EKG personally reviewed    RADIOLOGY & ADDITIONAL TESTS: Reviewed.

## 2020-10-05 PROCEDURE — 99232 SBSQ HOSP IP/OBS MODERATE 35: CPT

## 2020-10-05 RX ORDER — SENNA PLUS 8.6 MG/1
2 TABLET ORAL AT BEDTIME
Refills: 0 | Status: COMPLETED | OUTPATIENT
Start: 2020-10-05 | End: 2021-09-03

## 2020-10-05 NOTE — PROGRESS NOTE ADULT - SUBJECTIVE AND OBJECTIVE BOX
INTERVAL HPI/OVERNIGHT EVENTS:    SUBJECTIVE: Patient seen and examined at bedside.     no cp, sob, abd pain, fever  no syncope, lightheadedness, dizziness, ha  OBJECTIVE:    VITAL SIGNS:  Vital Signs Last 24 Hrs  T(C): 36.3 (05 Oct 2020 05:41), Max: 36.3 (05 Oct 2020 05:41)  T(F): 97.3 (05 Oct 2020 05:41), Max: 97.3 (05 Oct 2020 05:41)  HR: 77 (05 Oct 2020 05:41) (77 - 82)  BP: 137/71 (05 Oct 2020 05:41) (137/71 - 146/79)  BP(mean): --  RR: 18 (05 Oct 2020 05:41) (18 - 18)  SpO2: --      PHYSICAL EXAM:    General: NAD  HEENT: NC/AT; PERRL, clear conjunctiva  Neck: supple  Respiratory: CTA b/l  Cardiovascular: +S1/S2; RRR  Abdomen: soft, NT/ND; +BS x4  Extremities: WWP, 2+ peripheral pulses b/l; no LE edema  Skin: normal color and turgor; no rash  Neurological:    MEDICATIONS:  MEDICATIONS  (STANDING):  amLODIPine   Tablet 10 milliGRAM(s) Oral daily  chlorhexidine 4% Liquid 1 Application(s) Topical <User Schedule>  diVALproex  milliGRAM(s) Oral two times a day  enoxaparin Injectable 40 milliGRAM(s) SubCutaneous daily  gabapentin 100 milliGRAM(s) Oral three times a day  levETIRAcetam 750 milliGRAM(s) Oral two times a day  OLANZapine 5 milliGRAM(s) Oral daily  senna 2 Tablet(s) Oral at bedtime    MEDICATIONS  (PRN):  haloperidol     Tablet 0.5 milliGRAM(s) Oral every 8 hours PRN agitation  polyethylene glycol 3350 17 Gram(s) Oral two times a day PRN Constipation      ALLERGIES:  Allergies    No Known Allergies    Intolerances        LABS:              Creatinine Trend: 0.7<--        hs Troponin:              CSF:                      EKG:   MICROBIOLOGY:    IMAGING:      Labs, imaging, EKG personally reviewed    RADIOLOGY & ADDITIONAL TESTS: Reviewed.

## 2020-10-06 PROCEDURE — 99232 SBSQ HOSP IP/OBS MODERATE 35: CPT

## 2020-10-06 NOTE — PROGRESS NOTE ADULT - SUBJECTIVE AND OBJECTIVE BOX
INTERVAL HPI/OVERNIGHT EVENTS:    SUBJECTIVE: Patient seen and examined at bedside.     no cp, sob, abd pain, fever  no ha, syncope, lightheadedness, dizziness    OBJECTIVE:    VITAL SIGNS:  Vital Signs Last 24 Hrs  T(C): 36.3 (06 Oct 2020 06:50), Max: 36.3 (06 Oct 2020 06:50)  T(F): 97.4 (06 Oct 2020 06:50), Max: 97.4 (06 Oct 2020 06:50)  HR: 73 (06 Oct 2020 06:50) (67 - 85)  BP: 176/84 (06 Oct 2020 06:50) (136/93 - 176/84)  BP(mean): --  RR: 18 (06 Oct 2020 06:50) (18 - 18)  SpO2: 100% (05 Oct 2020 20:20) (100% - 100%)      PHYSICAL EXAM:    General: NAD  HEENT: NC/AT; PERRL, clear conjunctiva  Neck: supple  Respiratory: CTA b/l  Cardiovascular: +S1/S2; RRR  Abdomen: soft, NT/ND; +BS x4  Extremities: WWP, 2+ peripheral pulses b/l; no LE edema  Skin: normal color and turgor; no rash  Neurological:    MEDICATIONS:  MEDICATIONS  (STANDING):  amLODIPine   Tablet 10 milliGRAM(s) Oral daily  chlorhexidine 4% Liquid 1 Application(s) Topical <User Schedule>  diVALproex  milliGRAM(s) Oral two times a day  enoxaparin Injectable 40 milliGRAM(s) SubCutaneous daily  gabapentin 100 milliGRAM(s) Oral three times a day  levETIRAcetam 750 milliGRAM(s) Oral two times a day  OLANZapine 5 milliGRAM(s) Oral daily  senna 2 Tablet(s) Oral at bedtime    MEDICATIONS  (PRN):  haloperidol     Tablet 0.5 milliGRAM(s) Oral every 8 hours PRN agitation  polyethylene glycol 3350 17 Gram(s) Oral two times a day PRN Constipation      ALLERGIES:  Allergies    No Known Allergies    Intolerances        LABS:              Creatinine Trend: 0.7<--        hs Troponin:              CSF:                      EKG:   MICROBIOLOGY:    IMAGING:      Labs, imaging, EKG personally reviewed    RADIOLOGY & ADDITIONAL TESTS: Reviewed.

## 2020-10-06 NOTE — CHART NOTE - NSCHARTNOTEFT_GEN_A_CORE
Registered Dietitian Limited Follow-Up Registered Dietitian Limited Follow-Up    Admitted with mental health eval. Altered mental status, toxic metabolic encephalopathy, suspect psychosis/dementia in setting of h/o schizophrenia per progress notes. Noted calm but refusing blood draws, medications. HTN - ongoing management. Receives Regular diet + Ensure Enlive q24hrs. Consumes % of most meals following previous RD assessment, indicating a good appetite. Latest wt 62.5 kg (9/14) vs. 63 kg (9/6) - no new wt at this time. No significant wt changes suspected at this time. Meds reviewed. No new lab data at this time. No N/V/D/C reported at this time. No chewing/swallowing difficulty reported. Skin: No pressure ulcers noted. Appears to be tolerating current nutrition regimen - no new nutrition intervention at this time. Remains not at nutrition risk, will review in 7 days.

## 2020-10-07 PROCEDURE — 99231 SBSQ HOSP IP/OBS SF/LOW 25: CPT

## 2020-10-07 NOTE — PROGRESS NOTE ADULT - ASSESSMENT
60M PMHx seizure disorder, schizophrenia, HTN here with altered mental status, suspected acute psychosis.    #Altered mental status, toxic metabolic encephalopathy, suspect psychosis/dementia  in setting of h/o schizophrenia  calm but refusing blood draws, medications  f/u psych  zyprexa 5    #HTN  continue with current medications and monitor BP    #Seizure disorder  depakote 500 bid  keppra 750 bid    #DVT ppx  lovenox    #Progress Note Handoff  Pending (specify):  placement  Family discussion:  plan of care was discussed with patient in details.  all questions were answered.  seems to understand, and in agreement  Disposition:  unknown

## 2020-10-07 NOTE — PROGRESS NOTE ADULT - SUBJECTIVE AND OBJECTIVE BOX
CC.  mental health evaluation    HPI.  Patient Appears to be comfortable,  Offers no complaints    Constitutional: No fever, fatigue or weight loss.  Skin: No rash.  Eyes: No recent vision problems or eye pain.  ENT: No congestion, ear pain, or sore throat.  Endocrine: No thyroid problems.  Cardiovascular: No chest pain or palpation.  Respiratory: No cough, shortness of breath, congestion, or wheezing.  Gastrointestinal: No abdominal pain, nausea, vomiting, or diarrhea.  Genitourinary: No dysuria.  Musculoskeletal: No joint swelling.  Neurologic: No headache.      Vital Signs Last 24 Hrs  T(C): 36.4 (10-07-20 @ 05:59), Max: 36.4 (10-07-20 @ 05:59)  T(F): 97.5 (10-07-20 @ 05:59), Max: 97.5 (10-07-20 @ 05:59)  HR: 67 (10-07-20 @ 05:59) (63 - 67)  BP: 164/87 (10-07-20 @ 05:59) (115/76 - 164/87)  BP(mean): --  RR: 18 (10-07-20 @ 05:59) (18 - 18)  SpO2: --        PHYSICAL EXAM-  GENERAL: NAD, chronic ill appearing male  HEAD:  Atraumatic, Normocephalic  EYES: EOMI, PERRLA, conjunctiva and sclera clear  NECK: Supple, No JVD, Normal thyroid  NERVOUS SYSTEM:  Alert & Oriented X1 moving all extremities  CHEST/LUNG: Clear to percussion bilaterally; No rales, rhonchi, wheezing, or rubs  HEART: Regular rate and rhythm; No murmurs, rubs, or gallops  ABDOMEN: Soft, Nontender, Nondistended; Bowel sounds present  EXTREMITIES:  No clubbing, cyanosis, or edema  SKIN: No rashes or lesions                                    MEDICATIONS  (STANDING):  amLODIPine   Tablet 10 milliGRAM(s) Oral daily  chlorhexidine 4% Liquid 1 Application(s) Topical <User Schedule>  diVALproex  milliGRAM(s) Oral two times a day  enoxaparin Injectable 40 milliGRAM(s) SubCutaneous daily  gabapentin 100 milliGRAM(s) Oral three times a day  levETIRAcetam 750 milliGRAM(s) Oral two times a day  OLANZapine 5 milliGRAM(s) Oral daily  senna 2 Tablet(s) Oral at bedtime    MEDICATIONS  (PRN):  haloperidol     Tablet 0.5 milliGRAM(s) Oral every 8 hours PRN agitation  polyethylene glycol 3350 17 Gram(s) Oral two times a day PRN Constipation          Imaging Personally Reviewed:     [x ] YES  [ ] NO    Consultant(s) Notes Reviewed:  [x ] YES  [ ] NO    Care Discussed with Consultants/Other Providers [x ] YES  [ ] NO

## 2020-10-08 PROCEDURE — 99231 SBSQ HOSP IP/OBS SF/LOW 25: CPT

## 2020-10-08 NOTE — PROGRESS NOTE ADULT - SUBJECTIVE AND OBJECTIVE BOX
CC.  mental health evaluation    HPI.  Patient Appears to be comfortable,  Offers no complaints    Constitutional: No fever, fatigue or weight loss.  Skin: No rash.  Eyes: No recent vision problems or eye pain.  ENT: No congestion, ear pain, or sore throat.  Endocrine: No thyroid problems.  Cardiovascular: No chest pain or palpation.  Respiratory: No cough, shortness of breath, congestion, or wheezing.  Gastrointestinal: No abdominal pain, nausea, vomiting, or diarrhea.  Genitourinary: No dysuria.  Musculoskeletal: No joint swelling.  Neurologic: No headache.           Vital Signs Last 24 Hrs  T(C): 35.7 (07 Oct 2020 13:40), Max: 35.7 (07 Oct 2020 13:40)  T(F): 96.3 (07 Oct 2020 13:40), Max: 96.3 (07 Oct 2020 13:40)  HR: 69 (07 Oct 2020 13:40) (69 - 69)  BP: 168/81 (07 Oct 2020 13:40) (168/81 - 168/81)  BP(mean): --  RR: 18 (07 Oct 2020 13:40) (18 - 18)  SpO2: --    PHYSICAL EXAM-  GENERAL: NAD, chronic ill appearing male  HEAD:  Atraumatic, Normocephalic  EYES: EOMI, PERRLA, conjunctiva and sclera clear  NECK: Supple, No JVD, Normal thyroid  NERVOUS SYSTEM:  Alert & Oriented X1 moving all extremities  CHEST/LUNG: Clear to percussion bilaterally; No rales, rhonchi, wheezing, or rubs  HEART: Regular rate and rhythm; No murmurs, rubs, or gallops  ABDOMEN: Soft, Nontender, Nondistended; Bowel sounds present  EXTREMITIES:  No clubbing, cyanosis, or edema  SKIN: No rashes or lesions                                    MEDICATIONS  (STANDING):  amLODIPine   Tablet 10 milliGRAM(s) Oral daily  chlorhexidine 4% Liquid 1 Application(s) Topical <User Schedule>  diVALproex  milliGRAM(s) Oral two times a day  enoxaparin Injectable 40 milliGRAM(s) SubCutaneous daily  gabapentin 100 milliGRAM(s) Oral three times a day  levETIRAcetam 750 milliGRAM(s) Oral two times a day  OLANZapine 5 milliGRAM(s) Oral daily  senna 2 Tablet(s) Oral at bedtime    MEDICATIONS  (PRN):  haloperidol     Tablet 0.5 milliGRAM(s) Oral every 8 hours PRN agitation  polyethylene glycol 3350 17 Gram(s) Oral two times a day PRN Constipation          Imaging Personally Reviewed:     [x ] YES  [ ] NO    Consultant(s) Notes Reviewed:  [x ] YES  [ ] NO    Care Discussed with Consultants/Other Providers [x ] YES  [ ] NO

## 2020-10-09 PROCEDURE — 99231 SBSQ HOSP IP/OBS SF/LOW 25: CPT

## 2020-10-09 NOTE — PROGRESS NOTE ADULT - SUBJECTIVE AND OBJECTIVE BOX
CC.  mental health evaluation    HPI.  Patient Appears to be comfortable,  Offers no complaints    Constitutional: No fever, fatigue or weight loss.  Skin: No rash.  Eyes: No recent vision problems or eye pain.  ENT: No congestion, ear pain, or sore throat.  Endocrine: No thyroid problems.  Cardiovascular: No chest pain or palpation.  Respiratory: No cough, shortness of breath, congestion, or wheezing.  Gastrointestinal: No abdominal pain, nausea, vomiting, or diarrhea.  Genitourinary: No dysuria.  Musculoskeletal: No joint swelling.  Neurologic: No headache.           Vital Signs Last 24 Hrs  T(C): --  T(F): --  HR: --  BP: --  BP(mean): --  RR: --  SpO2: --    PHYSICAL EXAM-  GENERAL: NAD, chronic ill appearing male  HEAD:  Atraumatic, Normocephalic  EYES: EOMI, PERRLA, conjunctiva and sclera clear  NECK: Supple, No JVD, Normal thyroid  NERVOUS SYSTEM:  Alert & Oriented X1 moving all extremities  CHEST/LUNG: Clear to percussion bilaterally; No rales, rhonchi, wheezing, or rubs  HEART: Regular rate and rhythm; No murmurs, rubs, or gallops  ABDOMEN: Soft, Nontender, Nondistended; Bowel sounds present  EXTREMITIES:  No clubbing, cyanosis, or edema  SKIN: No rashes or lesions                                    MEDICATIONS  (STANDING):  amLODIPine   Tablet 10 milliGRAM(s) Oral daily  chlorhexidine 4% Liquid 1 Application(s) Topical <User Schedule>  diVALproex  milliGRAM(s) Oral two times a day  enoxaparin Injectable 40 milliGRAM(s) SubCutaneous daily  gabapentin 100 milliGRAM(s) Oral three times a day  levETIRAcetam 750 milliGRAM(s) Oral two times a day  OLANZapine 5 milliGRAM(s) Oral daily  senna 2 Tablet(s) Oral at bedtime    MEDICATIONS  (PRN):  haloperidol     Tablet 0.5 milliGRAM(s) Oral every 8 hours PRN agitation  polyethylene glycol 3350 17 Gram(s) Oral two times a day PRN Constipation          Imaging Personally Reviewed:     [x ] YES  [ ] NO    Consultant(s) Notes Reviewed:  [x ] YES  [ ] NO    Care Discussed with Consultants/Other Providers [x ] YES  [ ] NO

## 2020-10-10 PROCEDURE — 99231 SBSQ HOSP IP/OBS SF/LOW 25: CPT

## 2020-10-10 NOTE — PROGRESS NOTE ADULT - SUBJECTIVE AND OBJECTIVE BOX
CC.  mental health evaluation    HPI.  Patient Appears to be comfortable,  Offers no complaints    Constitutional: No fever, fatigue or weight loss.  Skin: No rash.  Eyes: No recent vision problems or eye pain.  ENT: No congestion, ear pain, or sore throat.  Endocrine: No thyroid problems.  Cardiovascular: No chest pain or palpation.  Respiratory: No cough, shortness of breath, congestion, or wheezing.  Gastrointestinal: No abdominal pain, nausea, vomiting, or diarrhea.  Genitourinary: No dysuria.  Musculoskeletal: No joint swelling.  Neurologic: No headache.           Vital Signs Last 24 Hrs  T(C): 35.9 (09 Oct 2020 20:58), Max: 35.9 (09 Oct 2020 20:58)  T(F): 96.6 (09 Oct 2020 20:58), Max: 96.6 (09 Oct 2020 20:58)  HR: 64 (09 Oct 2020 20:58) (64 - 64)  BP: 140/74 (09 Oct 2020 20:58) (140/74 - 140/74)  BP(mean): --  RR: 18 (09 Oct 2020 20:58) (18 - 18)  SpO2: --    PHYSICAL EXAM-  GENERAL: NAD, chronic ill appearing male  HEAD:  Atraumatic, Normocephalic  EYES: EOMI, PERRLA, conjunctiva and sclera clear  NECK: Supple, No JVD, Normal thyroid  NERVOUS SYSTEM:  Alert & Oriented X1 moving all extremities  CHEST/LUNG: Clear to percussion bilaterally; No rales, rhonchi, wheezing, or rubs  HEART: Regular rate and rhythm; No murmurs, rubs, or gallops  ABDOMEN: Soft, Nontender, Nondistended; Bowel sounds present  EXTREMITIES:  No clubbing, cyanosis, or edema  SKIN: No rashes or lesions                                    MEDICATIONS  (STANDING):  amLODIPine   Tablet 10 milliGRAM(s) Oral daily  chlorhexidine 4% Liquid 1 Application(s) Topical <User Schedule>  diVALproex  milliGRAM(s) Oral two times a day  enoxaparin Injectable 40 milliGRAM(s) SubCutaneous daily  gabapentin 100 milliGRAM(s) Oral three times a day  levETIRAcetam 750 milliGRAM(s) Oral two times a day  OLANZapine 5 milliGRAM(s) Oral daily  senna 2 Tablet(s) Oral at bedtime    MEDICATIONS  (PRN):  haloperidol     Tablet 0.5 milliGRAM(s) Oral every 8 hours PRN agitation  polyethylene glycol 3350 17 Gram(s) Oral two times a day PRN Constipation          Imaging Personally Reviewed:     [x ] YES  [ ] NO    Consultant(s) Notes Reviewed:  [x ] YES  [ ] NO    Care Discussed with Consultants/Other Providers [x ] YES  [ ] NO

## 2020-10-11 PROCEDURE — 99231 SBSQ HOSP IP/OBS SF/LOW 25: CPT

## 2020-10-11 NOTE — PROGRESS NOTE ADULT - SUBJECTIVE AND OBJECTIVE BOX
CC.  mental health evaluation    HPI.  Patient Appears to be comfortable,  Offers no complaints    Constitutional: No fever, fatigue or weight loss.  Skin: No rash.  Eyes: No recent vision problems or eye pain.  ENT: No congestion, ear pain, or sore throat.  Endocrine: No thyroid problems.  Cardiovascular: No chest pain or palpation.  Respiratory: No cough, shortness of breath, congestion, or wheezing.  Gastrointestinal: No abdominal pain, nausea, vomiting, or diarrhea.  Genitourinary: No dysuria.  Musculoskeletal: No joint swelling.  Neurologic: No headache.           Vital Signs Last 24 Hrs  T(C): 36.3 (11 Oct 2020 06:55), Max: 36.3 (11 Oct 2020 06:55)  T(F): 97.3 (11 Oct 2020 06:55), Max: 97.3 (11 Oct 2020 06:55)  HR: 70 (11 Oct 2020 06:55) (67 - 70)  BP: 164/99 (11 Oct 2020 06:55) (164/99 - 186/87)  BP(mean): --  RR: 18 (11 Oct 2020 06:55) (18 - 18)  SpO2: --    PHYSICAL EXAM-  GENERAL: NAD, chronic ill appearing male  HEAD:  Atraumatic, Normocephalic  EYES: EOMI, PERRLA, conjunctiva and sclera clear  NECK: Supple, No JVD, Normal thyroid  NERVOUS SYSTEM:  Alert & Oriented X1 moving all extremities  CHEST/LUNG: Clear to percussion bilaterally; No rales, rhonchi, wheezing, or rubs  HEART: Regular rate and rhythm; No murmurs, rubs, or gallops  ABDOMEN: Soft, Nontender, Nondistended; Bowel sounds present  EXTREMITIES:  No clubbing, cyanosis, or edema  SKIN: No rashes or lesions                                    MEDICATIONS  (STANDING):  amLODIPine   Tablet 10 milliGRAM(s) Oral daily  chlorhexidine 4% Liquid 1 Application(s) Topical <User Schedule>  diVALproex  milliGRAM(s) Oral two times a day  enoxaparin Injectable 40 milliGRAM(s) SubCutaneous daily  gabapentin 100 milliGRAM(s) Oral three times a day  levETIRAcetam 750 milliGRAM(s) Oral two times a day  OLANZapine 5 milliGRAM(s) Oral daily  senna 2 Tablet(s) Oral at bedtime    MEDICATIONS  (PRN):  haloperidol     Tablet 0.5 milliGRAM(s) Oral every 8 hours PRN agitation  polyethylene glycol 3350 17 Gram(s) Oral two times a day PRN Constipation          Imaging Personally Reviewed:     [x ] YES  [ ] NO    Consultant(s) Notes Reviewed:  [x ] YES  [ ] NO    Care Discussed with Consultants/Other Providers [x ] YES  [ ] NO

## 2020-10-12 PROCEDURE — 99231 SBSQ HOSP IP/OBS SF/LOW 25: CPT

## 2020-10-12 NOTE — PROGRESS NOTE ADULT - SUBJECTIVE AND OBJECTIVE BOX
CC.  mental health evaluation    HPI.  Patient Appears to be comfortable,  Offers no complaints    Constitutional: No fever, fatigue or weight loss.  Skin: No rash.  Eyes: No recent vision problems or eye pain.  ENT: No congestion, ear pain, or sore throat.  Endocrine: No thyroid problems.  Cardiovascular: No chest pain or palpation.  Respiratory: No cough, shortness of breath, congestion, or wheezing.  Gastrointestinal: No abdominal pain, nausea, vomiting, or diarrhea.  Genitourinary: No dysuria.  Musculoskeletal: No joint swelling.  Neurologic: No headache.           Vital Signs Last 24 Hrs  T(C): 35.9 (12 Oct 2020 05:20), Max: 36.2 (11 Oct 2020 14:00)  T(F): 96.7 (12 Oct 2020 05:20), Max: 97.1 (11 Oct 2020 14:00)  HR: 69 (12 Oct 2020 05:20) (69 - 73)  BP: 168/74 (12 Oct 2020 05:20) (131/73 - 168/74)  BP(mean): --  RR: 18 (12 Oct 2020 05:20) (18 - 18)  SpO2: --    PHYSICAL EXAM-  GENERAL: NAD, chronic ill appearing male  HEAD:  Atraumatic, Normocephalic  EYES: EOMI, PERRLA, conjunctiva and sclera clear  NECK: Supple, No JVD, Normal thyroid  NERVOUS SYSTEM:  Alert & Oriented X1 moving all extremities  CHEST/LUNG: Clear to percussion bilaterally; No rales, rhonchi, wheezing, or rubs  HEART: Regular rate and rhythm; No murmurs, rubs, or gallops  ABDOMEN: Soft, Nontender, Nondistended; Bowel sounds present  EXTREMITIES:  No clubbing, cyanosis, or edema  SKIN: No rashes or lesions                                    MEDICATIONS  (STANDING):  amLODIPine   Tablet 10 milliGRAM(s) Oral daily  chlorhexidine 4% Liquid 1 Application(s) Topical <User Schedule>  diVALproex  milliGRAM(s) Oral two times a day  enoxaparin Injectable 40 milliGRAM(s) SubCutaneous daily  gabapentin 100 milliGRAM(s) Oral three times a day  levETIRAcetam 750 milliGRAM(s) Oral two times a day  OLANZapine 5 milliGRAM(s) Oral daily  senna 2 Tablet(s) Oral at bedtime    MEDICATIONS  (PRN):  haloperidol     Tablet 0.5 milliGRAM(s) Oral every 8 hours PRN agitation  polyethylene glycol 3350 17 Gram(s) Oral two times a day PRN Constipation          Imaging Personally Reviewed:     [x ] YES  [ ] NO    Consultant(s) Notes Reviewed:  [x ] YES  [ ] NO    Care Discussed with Consultants/Other Providers [x ] YES  [ ] NO

## 2020-10-13 PROCEDURE — 99231 SBSQ HOSP IP/OBS SF/LOW 25: CPT

## 2020-10-13 NOTE — PROGRESS NOTE ADULT - SUBJECTIVE AND OBJECTIVE BOX
----- Message from Mihaela Yanez sent at 4/19/2019 10:29 AM CDT -----  Regarding: Follow Up Appt   placed a Service To Gastroenterology order but this patient is already established with . Please contact patient to be scheduled for a follow-up appointment.    Thanks  [facility] Team  [facility]   Patient has been scheduled.      Routed to [facility], (KIP): please contact pt and arrange a date/time for an office visit with either Dr. Evans or Bertha SCHULZ.    Dr. Evans: next available, Monday 4/29/19, [facility], 3:00pm  Bertha: next available, Monday 4/22/19, [facility], 4:00pm    Next available openings for either provider will be in May.   CC.  mental health evaluation    HPI.  Patient Appears to be comfortable,  Offers no complaints    Constitutional: No fever, fatigue or weight loss.  Skin: No rash.  Eyes: No recent vision problems or eye pain.  ENT: No congestion, ear pain, or sore throat.  Endocrine: No thyroid problems.  Cardiovascular: No chest pain or palpation.  Respiratory: No cough, shortness of breath, congestion, or wheezing.  Gastrointestinal: No abdominal pain, nausea, vomiting, or diarrhea.  Genitourinary: No dysuria.  Musculoskeletal: No joint swelling.  Neurologic: No headache.           Vital Signs Last 24 Hrs  T(C): 36.3 (13 Oct 2020 05:14), Max: 36.3 (13 Oct 2020 05:14)  T(F): 97.3 (13 Oct 2020 05:14), Max: 97.3 (13 Oct 2020 05:14)  HR: 68 (13 Oct 2020 05:14) (68 - 86)  BP: 166/88 (13 Oct 2020 05:14) (160/81 - 166/88)  BP(mean): --  RR: 18 (13 Oct 2020 05:14) (18 - 18)  SpO2: --    PHYSICAL EXAM-  GENERAL: NAD, chronic ill appearing male  HEAD:  Atraumatic, Normocephalic  EYES: EOMI, PERRLA, conjunctiva and sclera clear  NECK: Supple, No JVD, Normal thyroid  NERVOUS SYSTEM:  Alert & Oriented X1 moving all extremities  CHEST/LUNG: Clear to percussion bilaterally; No rales, rhonchi, wheezing, or rubs  HEART: Regular rate and rhythm; No murmurs, rubs, or gallops  ABDOMEN: Soft, Nontender, Nondistended; Bowel sounds present  EXTREMITIES:  No clubbing, cyanosis, or edema  SKIN: No rashes or lesions                                    MEDICATIONS  (STANDING):  amLODIPine   Tablet 10 milliGRAM(s) Oral daily  chlorhexidine 4% Liquid 1 Application(s) Topical <User Schedule>  diVALproex  milliGRAM(s) Oral two times a day  enoxaparin Injectable 40 milliGRAM(s) SubCutaneous daily  gabapentin 100 milliGRAM(s) Oral three times a day  levETIRAcetam 750 milliGRAM(s) Oral two times a day  OLANZapine 5 milliGRAM(s) Oral daily  senna 2 Tablet(s) Oral at bedtime    MEDICATIONS  (PRN):  haloperidol     Tablet 0.5 milliGRAM(s) Oral every 8 hours PRN agitation  polyethylene glycol 3350 17 Gram(s) Oral two times a day PRN Constipation          Imaging Personally Reviewed:     [x ] YES  [ ] NO    Consultant(s) Notes Reviewed:  [x ] YES  [ ] NO    Care Discussed with Consultants/Other Providers [x ] YES  [ ] NO                          78

## 2020-10-13 NOTE — CHART NOTE - NSCHARTNOTEFT_GEN_A_CORE
Registered Dietitian Follow-Up (limited note)     Pt was admitted for mental health eval. Altered mental status, toxic metabolic encephalopathy, suspect psychosis/dementia in setting of h/o schizophrenia per progress notes. Pt is 1:1 sit for safety. Currently on Regular diet + Ensure Enlive q24hrs. Consumes mostly 100% of meals. No new weights, latest wt 62.5 kg (9/14) vs. 63 kg (9/6). No significant weight changes expected at this time however weights should be documented weekly. Meds reviewed. No new lab data at this time. No N/V/D/C reported at this time (noted pt is on bowel regimen).  Skin is intact. Pt appears to be tolerating current nutrition regimen - no new nutrition intervention at this time.    Remains not at nutrition risk, will review in 7 days.

## 2020-10-14 PROCEDURE — 99232 SBSQ HOSP IP/OBS MODERATE 35: CPT

## 2020-10-14 RX ADMIN — LEVETIRACETAM 750 MILLIGRAM(S): 250 TABLET, FILM COATED ORAL at 18:19

## 2020-10-14 RX ADMIN — OLANZAPINE 5 MILLIGRAM(S): 15 TABLET, FILM COATED ORAL at 12:33

## 2020-10-14 RX ADMIN — DIVALPROEX SODIUM 500 MILLIGRAM(S): 500 TABLET, DELAYED RELEASE ORAL at 18:19

## 2020-10-14 RX ADMIN — AMLODIPINE BESYLATE 10 MILLIGRAM(S): 2.5 TABLET ORAL at 05:58

## 2020-10-14 RX ADMIN — DIVALPROEX SODIUM 500 MILLIGRAM(S): 500 TABLET, DELAYED RELEASE ORAL at 05:58

## 2020-10-14 RX ADMIN — LEVETIRACETAM 750 MILLIGRAM(S): 250 TABLET, FILM COATED ORAL at 05:58

## 2020-10-14 RX ADMIN — GABAPENTIN 100 MILLIGRAM(S): 400 CAPSULE ORAL at 18:09

## 2020-10-14 NOTE — PROGRESS NOTE ADULT - SUBJECTIVE AND OBJECTIVE BOX
INTERVAL HPI/OVERNIGHT EVENTS:    SUBJECTIVE: Patient seen and examined at bedside.     no cp, sob, abd pain, fever  no syncope, ha, dizziness, lightheadedness    OBJECTIVE:    VITAL SIGNS:  Vital Signs Last 24 Hrs  T(C): 35.8 (13 Oct 2020 21:13), Max: 35.8 (13 Oct 2020 21:13)  T(F): 96.5 (13 Oct 2020 21:13), Max: 96.5 (13 Oct 2020 21:13)  HR: 67 (14 Oct 2020 05:58) (66 - 67)  BP: 170/90 (14 Oct 2020 05:58) (156/75 - 170/90)  BP(mean): --  RR: 18 (14 Oct 2020 05:58) (18 - 18)  SpO2: --      PHYSICAL EXAM:    General: NAD  HEENT: NC/AT; PERRL, clear conjunctiva  Neck: supple  Respiratory: CTA b/l  Cardiovascular: +S1/S2; RRR  Abdomen: soft, NT/ND; +BS x4  Extremities: WWP, 2+ peripheral pulses b/l; no LE edema  Skin: normal color and turgor; no rash  Neurological:    MEDICATIONS:  MEDICATIONS  (STANDING):  amLODIPine   Tablet 10 milliGRAM(s) Oral daily  chlorhexidine 4% Liquid 1 Application(s) Topical <User Schedule>  diVALproex  milliGRAM(s) Oral two times a day  enoxaparin Injectable 40 milliGRAM(s) SubCutaneous daily  gabapentin 100 milliGRAM(s) Oral three times a day  levETIRAcetam 750 milliGRAM(s) Oral two times a day  OLANZapine 5 milliGRAM(s) Oral daily  senna 2 Tablet(s) Oral at bedtime    MEDICATIONS  (PRN):  cloNIDine 0.1 milliGRAM(s) Oral every 8 hours PRN htn  haloperidol     Tablet 0.5 milliGRAM(s) Oral every 8 hours PRN agitation  polyethylene glycol 3350 17 Gram(s) Oral two times a day PRN Constipation      ALLERGIES:  Allergies    No Known Allergies    Intolerances        LABS:              Creatinine Trend:         hs Troponin:              CSF:                      EKG:   MICROBIOLOGY:    IMAGING:      Labs, imaging, EKG personally reviewed    RADIOLOGY & ADDITIONAL TESTS: Reviewed.

## 2020-10-15 PROCEDURE — 99232 SBSQ HOSP IP/OBS MODERATE 35: CPT

## 2020-10-15 RX ORDER — HALOPERIDOL DECANOATE 100 MG/ML
0.5 INJECTION INTRAMUSCULAR ONCE
Refills: 0 | Status: COMPLETED | OUTPATIENT
Start: 2020-10-15 | End: 2020-11-23

## 2020-10-15 NOTE — PROGRESS NOTE ADULT - SUBJECTIVE AND OBJECTIVE BOX
INTERVAL HPI/OVERNIGHT EVENTS:    SUBJECTIVE: Patient seen and examined at bedside.     no cp, sob, abd pain, fever  no syncope, ha, lightheadedness, dizziness    OBJECTIVE:    VITAL SIGNS:  Vital Signs Last 24 Hrs  T(C): 35.6 (14 Oct 2020 21:14), Max: 35.6 (14 Oct 2020 21:14)  T(F): 96.1 (14 Oct 2020 21:14), Max: 96.1 (14 Oct 2020 21:14)  HR: 72 (14 Oct 2020 21:14) (72 - 72)  BP: 138/74 (14 Oct 2020 21:14) (138/74 - 138/74)  BP(mean): --  RR: 18 (14 Oct 2020 21:14) (18 - 18)  SpO2: --      PHYSICAL EXAM:    General: NAD  HEENT: NC/AT; PERRL, clear conjunctiva  Neck: supple  Respiratory: CTA b/l  Cardiovascular: +S1/S2; RRR  Abdomen: soft, NT/ND; +BS x4  Extremities: WWP, 2+ peripheral pulses b/l; no LE edema  Skin: normal color and turgor; no rash  Neurological:    MEDICATIONS:  MEDICATIONS  (STANDING):  amLODIPine   Tablet 10 milliGRAM(s) Oral daily  chlorhexidine 4% Liquid 1 Application(s) Topical <User Schedule>  diVALproex  milliGRAM(s) Oral two times a day  enoxaparin Injectable 40 milliGRAM(s) SubCutaneous daily  gabapentin 100 milliGRAM(s) Oral three times a day  levETIRAcetam 750 milliGRAM(s) Oral two times a day  OLANZapine 5 milliGRAM(s) Oral daily  senna 2 Tablet(s) Oral at bedtime    MEDICATIONS  (PRN):  cloNIDine 0.1 milliGRAM(s) Oral every 8 hours PRN htn  haloperidol     Tablet 0.5 milliGRAM(s) Oral every 8 hours PRN agitation  polyethylene glycol 3350 17 Gram(s) Oral two times a day PRN Constipation      ALLERGIES:  Allergies    No Known Allergies    Intolerances        LABS:              Creatinine Trend:         hs Troponin:              CSF:                      EKG:   MICROBIOLOGY:    IMAGING:      Labs, imaging, EKG personally reviewed    RADIOLOGY & ADDITIONAL TESTS: Reviewed.

## 2020-10-16 PROCEDURE — 99232 SBSQ HOSP IP/OBS MODERATE 35: CPT

## 2020-10-16 RX ADMIN — OLANZAPINE 5 MILLIGRAM(S): 15 TABLET, FILM COATED ORAL at 11:39

## 2020-10-16 NOTE — PROGRESS NOTE ADULT - SUBJECTIVE AND OBJECTIVE BOX
INTERVAL HPI/OVERNIGHT EVENTS:    SUBJECTIVE: Patient seen and examined at bedside.     no cp, sob, abd pain, fever  no ha, syncope, lightheadedness, dizziness  OBJECTIVE:    VITAL SIGNS:  Vital Signs Last 24 Hrs  T(C): 36 (15 Oct 2020 21:17), Max: 36.3 (15 Oct 2020 13:45)  T(F): 96.8 (15 Oct 2020 21:17), Max: 97.3 (15 Oct 2020 13:45)  HR: 67 (15 Oct 2020 21:17) (67 - 76)  BP: 166/87 (15 Oct 2020 21:17) (126/68 - 166/87)  BP(mean): --  RR: 17 (15 Oct 2020 21:17) (16 - 17)  SpO2: --      PHYSICAL EXAM:    General: NAD  HEENT: NC/AT; PERRL, clear conjunctiva  Neck: supple  Respiratory: CTA b/l  Cardiovascular: +S1/S2; RRR  Abdomen: soft, NT/ND; +BS x4  Extremities: WWP, 2+ peripheral pulses b/l; no LE edema  Skin: normal color and turgor; no rash  Neurological:    MEDICATIONS:  MEDICATIONS  (STANDING):  amLODIPine   Tablet 10 milliGRAM(s) Oral daily  chlorhexidine 4% Liquid 1 Application(s) Topical <User Schedule>  diVALproex  milliGRAM(s) Oral two times a day  enoxaparin Injectable 40 milliGRAM(s) SubCutaneous daily  gabapentin 100 milliGRAM(s) Oral three times a day  levETIRAcetam 750 milliGRAM(s) Oral two times a day  OLANZapine 5 milliGRAM(s) Oral daily  senna 2 Tablet(s) Oral at bedtime    MEDICATIONS  (PRN):  cloNIDine 0.1 milliGRAM(s) Oral every 8 hours PRN htn  haloperidol     Tablet 0.5 milliGRAM(s) Oral every 8 hours PRN agitation  haloperidol    Injectable 0.5 milliGRAM(s) IntraMuscular once PRN agitation  polyethylene glycol 3350 17 Gram(s) Oral two times a day PRN Constipation      ALLERGIES:  Allergies    No Known Allergies    Intolerances        LABS:              Creatinine Trend:         hs Troponin:              CSF:                      EKG:   MICROBIOLOGY:    IMAGING:      Labs, imaging, EKG personally reviewed    RADIOLOGY & ADDITIONAL TESTS: Reviewed.

## 2020-10-17 PROCEDURE — 99232 SBSQ HOSP IP/OBS MODERATE 35: CPT

## 2020-10-17 NOTE — PROGRESS NOTE ADULT - SUBJECTIVE AND OBJECTIVE BOX
INTERVAL HPI/OVERNIGHT EVENTS:    SUBJECTIVE: Patient seen and examined at bedside.     no cp, sob, abd pain, fever  no syncope, ha, lightheadedness, dizziness    OBJECTIVE:    VITAL SIGNS:  Vital Signs Last 24 Hrs  T(C): 36.7 (16 Oct 2020 21:00), Max: 36.7 (16 Oct 2020 21:00)  T(F): 98.1 (16 Oct 2020 21:00), Max: 98.1 (16 Oct 2020 21:00)  HR: 71 (16 Oct 2020 21:00) (71 - 83)  BP: 142/82 (16 Oct 2020 21:00) (142/82 - 152/83)  BP(mean): --  RR: 16 (16 Oct 2020 21:00) (16 - 16)  SpO2: --      PHYSICAL EXAM:    General: NAD  HEENT: NC/AT; PERRL, clear conjunctiva  Neck: supple  Respiratory: CTA b/l  Cardiovascular: +S1/S2; RRR  Abdomen: soft, NT/ND; +BS x4  Extremities: WWP, 2+ peripheral pulses b/l; no LE edema  Skin: normal color and turgor; no rash  Neurological:    MEDICATIONS:  MEDICATIONS  (STANDING):  amLODIPine   Tablet 10 milliGRAM(s) Oral daily  chlorhexidine 4% Liquid 1 Application(s) Topical <User Schedule>  diVALproex  milliGRAM(s) Oral two times a day  enoxaparin Injectable 40 milliGRAM(s) SubCutaneous daily  gabapentin 100 milliGRAM(s) Oral three times a day  levETIRAcetam 750 milliGRAM(s) Oral two times a day  OLANZapine 5 milliGRAM(s) Oral daily  senna 2 Tablet(s) Oral at bedtime    MEDICATIONS  (PRN):  cloNIDine 0.1 milliGRAM(s) Oral every 8 hours PRN htn  haloperidol     Tablet 0.5 milliGRAM(s) Oral every 8 hours PRN agitation  haloperidol    Injectable 0.5 milliGRAM(s) IntraMuscular once PRN agitation  polyethylene glycol 3350 17 Gram(s) Oral two times a day PRN Constipation      ALLERGIES:  Allergies    No Known Allergies    Intolerances        LABS:              Creatinine Trend:         hs Troponin:              CSF:                      EKG:   MICROBIOLOGY:    IMAGING:      Labs, imaging, EKG personally reviewed    RADIOLOGY & ADDITIONAL TESTS: Reviewed.

## 2020-10-18 PROCEDURE — 99232 SBSQ HOSP IP/OBS MODERATE 35: CPT

## 2020-10-18 NOTE — PROGRESS NOTE ADULT - SUBJECTIVE AND OBJECTIVE BOX
INTERVAL HPI/OVERNIGHT EVENTS:    SUBJECTIVE: Patient seen and examined at bedside.     no cp, sob, abd pain, fever  no ha, dizziness, lightheadedness, syncope    OBJECTIVE:    VITAL SIGNS:  Vital Signs Last 24 Hrs  T(C): --  T(F): --  HR: --  BP: --  BP(mean): --  RR: --  SpO2: --      PHYSICAL EXAM:    General: NAD  HEENT: NC/AT; PERRL, clear conjunctiva  Neck: supple  Respiratory: CTA b/l  Cardiovascular: +S1/S2; RRR  Abdomen: soft, NT/ND; +BS x4  Extremities: WWP, 2+ peripheral pulses b/l; no LE edema  Skin: normal color and turgor; no rash  Neurological:    MEDICATIONS:  MEDICATIONS  (STANDING):  amLODIPine   Tablet 10 milliGRAM(s) Oral daily  chlorhexidine 4% Liquid 1 Application(s) Topical <User Schedule>  diVALproex  milliGRAM(s) Oral two times a day  enoxaparin Injectable 40 milliGRAM(s) SubCutaneous daily  gabapentin 100 milliGRAM(s) Oral three times a day  levETIRAcetam 750 milliGRAM(s) Oral two times a day  OLANZapine 5 milliGRAM(s) Oral daily  senna 2 Tablet(s) Oral at bedtime    MEDICATIONS  (PRN):  cloNIDine 0.1 milliGRAM(s) Oral every 8 hours PRN htn  haloperidol     Tablet 0.5 milliGRAM(s) Oral every 8 hours PRN agitation  haloperidol    Injectable 0.5 milliGRAM(s) IntraMuscular once PRN agitation  polyethylene glycol 3350 17 Gram(s) Oral two times a day PRN Constipation      ALLERGIES:  Allergies    No Known Allergies    Intolerances        LABS:              Creatinine Trend:         hs Troponin:              CSF:                      EKG:   MICROBIOLOGY:    IMAGING:      Labs, imaging, EKG personally reviewed    RADIOLOGY & ADDITIONAL TESTS: Reviewed.

## 2020-10-19 PROCEDURE — 99232 SBSQ HOSP IP/OBS MODERATE 35: CPT

## 2020-10-19 NOTE — PROGRESS NOTE ADULT - SUBJECTIVE AND OBJECTIVE BOX
INTERVAL HPI/OVERNIGHT EVENTS:    SUBJECTIVE: Patient seen and examined at bedside.     no cp, sob, abd pain, fever  no ha, syncope, lightheadedness, dizziness    OBJECTIVE:    VITAL SIGNS:  Vital Signs Last 24 Hrs  T(C): --  T(F): --  HR: 73 (18 Oct 2020 14:11) (73 - 73)  BP: 153/66 (18 Oct 2020 14:11) (153/66 - 153/66)  BP(mean): --  RR: 18 (18 Oct 2020 14:11) (18 - 18)  SpO2: --      PHYSICAL EXAM:    General: NAD  HEENT: NC/AT; PERRL, clear conjunctiva  Neck: supple  Respiratory: CTA b/l  Cardiovascular: +S1/S2; RRR  Abdomen: soft, NT/ND; +BS x4  Extremities: WWP, 2+ peripheral pulses b/l; no LE edema  Skin: normal color and turgor; no rash  Neurological:    MEDICATIONS:  MEDICATIONS  (STANDING):  amLODIPine   Tablet 10 milliGRAM(s) Oral daily  chlorhexidine 4% Liquid 1 Application(s) Topical <User Schedule>  diVALproex  milliGRAM(s) Oral two times a day  enoxaparin Injectable 40 milliGRAM(s) SubCutaneous daily  gabapentin 100 milliGRAM(s) Oral three times a day  levETIRAcetam 750 milliGRAM(s) Oral two times a day  OLANZapine 5 milliGRAM(s) Oral daily  senna 2 Tablet(s) Oral at bedtime    MEDICATIONS  (PRN):  cloNIDine 0.1 milliGRAM(s) Oral every 8 hours PRN htn  haloperidol     Tablet 0.5 milliGRAM(s) Oral every 8 hours PRN agitation  haloperidol    Injectable 0.5 milliGRAM(s) IntraMuscular once PRN agitation  polyethylene glycol 3350 17 Gram(s) Oral two times a day PRN Constipation      ALLERGIES:  Allergies    No Known Allergies    Intolerances        LABS:              Creatinine Trend:         hs Troponin:              CSF:                      EKG:   MICROBIOLOGY:    IMAGING:      Labs, imaging, EKG personally reviewed    RADIOLOGY & ADDITIONAL TESTS: Reviewed.

## 2020-10-20 PROCEDURE — 99232 SBSQ HOSP IP/OBS MODERATE 35: CPT

## 2020-10-20 NOTE — PROGRESS NOTE ADULT - SUBJECTIVE AND OBJECTIVE BOX
INTERVAL HPI/OVERNIGHT EVENTS:    SUBJECTIVE: Patient seen and examined at bedside.     no cp, sob, abd pain, fever  no ha, lightheadedness, dizziness, ha  OBJECTIVE:    VITAL SIGNS:  Vital Signs Last 24 Hrs  T(C): 36 (19 Oct 2020 21:08), Max: 36.6 (19 Oct 2020 13:46)  T(F): 96.8 (19 Oct 2020 21:08), Max: 97.8 (19 Oct 2020 13:46)  HR: 76 (19 Oct 2020 21:08) (76 - 79)  BP: 157/78 (19 Oct 2020 21:08) (157/78 - 170/78)  BP(mean): --  RR: 18 (19 Oct 2020 21:08) (18 - 18)  SpO2: --      PHYSICAL EXAM:    General: NAD  HEENT: NC/AT; PERRL, clear conjunctiva  Neck: supple  Respiratory: CTA b/l  Cardiovascular: +S1/S2; RRR  Abdomen: soft, NT/ND; +BS x4  Extremities: WWP, 2+ peripheral pulses b/l; no LE edema  Skin: normal color and turgor; no rash  Neurological:    MEDICATIONS:  MEDICATIONS  (STANDING):  amLODIPine   Tablet 10 milliGRAM(s) Oral daily  chlorhexidine 4% Liquid 1 Application(s) Topical <User Schedule>  diVALproex  milliGRAM(s) Oral two times a day  enoxaparin Injectable 40 milliGRAM(s) SubCutaneous daily  gabapentin 100 milliGRAM(s) Oral three times a day  levETIRAcetam 750 milliGRAM(s) Oral two times a day  OLANZapine 5 milliGRAM(s) Oral daily  senna 2 Tablet(s) Oral at bedtime    MEDICATIONS  (PRN):  cloNIDine 0.1 milliGRAM(s) Oral every 8 hours PRN htn  haloperidol     Tablet 0.5 milliGRAM(s) Oral every 8 hours PRN agitation  haloperidol    Injectable 0.5 milliGRAM(s) IntraMuscular once PRN agitation  polyethylene glycol 3350 17 Gram(s) Oral two times a day PRN Constipation      ALLERGIES:  Allergies    No Known Allergies    Intolerances        LABS:              Creatinine Trend:         hs Troponin:              CSF:                      EKG:   MICROBIOLOGY:    IMAGING:      Labs, imaging, EKG personally reviewed    RADIOLOGY & ADDITIONAL TESTS: Reviewed.

## 2020-10-20 NOTE — CHART NOTE - NSCHARTNOTEFT_GEN_A_CORE
Limited reassessment    Meds reviewed  No new labs since 10/7    No newly recorded wts    No edema noted; Skin is WDL (10/20)    Diet order: regular + Ensure Enlive q24h    Pt continues to eat well with a good appetite. Po intake recorded at % since previously assessed. No noted GI s/s. Unsure last BM. Per EMR, last BM recorded on 10/14 + pt has a bowel regimen ordered. Of note, pt continues to refuse meds as well as blood draws; he is noted to be calm. Psych is following. No further nutrition interventions at this time. Pt remains not at risk, RD to f/u within the next 7 days.

## 2020-10-21 PROCEDURE — 99231 SBSQ HOSP IP/OBS SF/LOW 25: CPT

## 2020-10-21 NOTE — PROGRESS NOTE ADULT - SUBJECTIVE AND OBJECTIVE BOX
CC.  Mental health evaluation  HPI.  patient reports that he feels better.  Offers no new complaints      Constitutional: No fever, fatigue or weight loss.  Skin: No rash.  Eyes: No recent vision problems or eye pain.  ENT: No congestion, ear pain, or sore throat.  Endocrine: No thyroid problems.  Cardiovascular: No chest pain or palpation.  Respiratory: No cough, shortness of breath, congestion, or wheezing.  Gastrointestinal: No abdominal pain, nausea, vomiting, or diarrhea.  Genitourinary: No dysuria.  Musculoskeletal: No joint swelling.  Neurologic: No headache.   Vital Signs Last 24 Hrs  T(C): 36 (21 Oct 2020 14:01), Max: 36 (20 Oct 2020 22:03)  T(F): 96.8 (21 Oct 2020 14:01), Max: 96.8 (20 Oct 2020 22:03)  HR: 70 (21 Oct 2020 14:01) (69 - 70)  BP: 173/97 (21 Oct 2020 14:01) (173/97 - 175/84)  BP(mean): --  RR: 16 (21 Oct 2020 14:01) (16 - 17)  SpO2: --        PHYSICAL EXAM:    General: NAD  HEENT: NC/AT; PERRL, clear conjunctiva  Neck: supple  Respiratory: CTA b/l  Cardiovascular: +S1/S2; RRR  Abdomen: soft, NT/ND; +BS x4  Extremities: WWP, 2+ peripheral pulses b/l; no LE edema  Skin: normal color and turgor; no rash  Neurological:    MEDICATIONS:  MEDICATIONS  (STANDING):  amLODIPine   Tablet 10 milliGRAM(s) Oral daily  chlorhexidine 4% Liquid 1 Application(s) Topical <User Schedule>  diVALproex  milliGRAM(s) Oral two times a day  enoxaparin Injectable 40 milliGRAM(s) SubCutaneous daily  gabapentin 100 milliGRAM(s) Oral three times a day  levETIRAcetam 750 milliGRAM(s) Oral two times a day  OLANZapine 5 milliGRAM(s) Oral daily  senna 2 Tablet(s) Oral at bedtime    MEDICATIONS  (PRN):  cloNIDine 0.1 milliGRAM(s) Oral every 8 hours PRN htn  haloperidol     Tablet 0.5 milliGRAM(s) Oral every 8 hours PRN agitation  haloperidol    Injectable 0.5 milliGRAM(s) IntraMuscular once PRN agitation  polyethylene glycol 3350 17 Gram(s) Oral two times a day PRN Constipation      Imaging Personally Reviewed:     [x ] YES  [ ] NO    Consultant(s) Notes Reviewed:  [x ] YES  [ ] NO    Care Discussed with Consultants/Other Providers [x ] YES  [ ] No medical contraindication for discharge

## 2020-10-21 NOTE — PROGRESS NOTE ADULT - ASSESSMENT
60M PMHx seizure disorder, schizophrenia, HTN here with altered mental status, suspected acute psychosis.    #Altered mental status, toxic metabolic encephalopathy, suspect psychosis  in setting of h/o schizophrenia  calm but refusing blood draws, medications  f/u psych  zyprexa 5    #HTN  continue with norvasc    #Seizure disorder  depakote 500 bid  keppra 750 bid    #DVT ppx  lovenox      #Progress Note Handoff  Pending (specify):  placement  Family discussion:  plan of care was discussed with patient in details.  all questions were answered.  seems to understand, and in agreement  Disposition:  unknown

## 2020-10-22 PROCEDURE — 99231 SBSQ HOSP IP/OBS SF/LOW 25: CPT

## 2020-10-22 NOTE — PROGRESS NOTE ADULT - SUBJECTIVE AND OBJECTIVE BOX
CC.  Mental health evaluation  HPI.  patient reports that he feels better.  Offers no new complaints      Constitutional: No fever, fatigue or weight loss.  Skin: No rash.  Eyes: No recent vision problems or eye pain.  ENT: No congestion, ear pain, or sore throat.  Endocrine: No thyroid problems.  Cardiovascular: No chest pain or palpation.  Respiratory: No cough, shortness of breath, congestion, or wheezing.  Gastrointestinal: No abdominal pain, nausea, vomiting, or diarrhea.  Genitourinary: No dysuria.  Musculoskeletal: No joint swelling.  Neurologic: No headache.    Vital Signs Last 24 Hrs  T(C): 36 (22 Oct 2020 05:50), Max: 36 (21 Oct 2020 14:01)  T(F): 96.8 (22 Oct 2020 05:50), Max: 96.8 (21 Oct 2020 14:01)  HR: 68 (22 Oct 2020 05:50) (66 - 70)  BP: 169/96 (22 Oct 2020 05:50) (169/96 - 178/92)  BP(mean): --  RR: 16 (22 Oct 2020 05:50) (16 - 16)  SpO2: --        PHYSICAL EXAM:    General: NAD  HEENT: NC/AT; PERRL, clear conjunctiva  Neck: supple  Respiratory: CTA b/l  Cardiovascular: +S1/S2; RRR  Abdomen: soft, NT/ND; +BS x4  Extremities: WWP, 2+ peripheral pulses b/l; no LE edema  Skin: normal color and turgor; no rash  Neurological:    MEDICATIONS:  MEDICATIONS  (STANDING):  amLODIPine   Tablet 10 milliGRAM(s) Oral daily  chlorhexidine 4% Liquid 1 Application(s) Topical <User Schedule>  diVALproex  milliGRAM(s) Oral two times a day  enoxaparin Injectable 40 milliGRAM(s) SubCutaneous daily  gabapentin 100 milliGRAM(s) Oral three times a day  levETIRAcetam 750 milliGRAM(s) Oral two times a day  OLANZapine 5 milliGRAM(s) Oral daily  senna 2 Tablet(s) Oral at bedtime    MEDICATIONS  (PRN):  cloNIDine 0.1 milliGRAM(s) Oral every 8 hours PRN htn  haloperidol     Tablet 0.5 milliGRAM(s) Oral every 8 hours PRN agitation  haloperidol    Injectable 0.5 milliGRAM(s) IntraMuscular once PRN agitation  polyethylene glycol 3350 17 Gram(s) Oral two times a day PRN Constipation      Imaging Personally Reviewed:     [x ] YES  [ ] NO    Consultant(s) Notes Reviewed:  [x ] YES  [ ] NO    Care Discussed with Consultants/Other Providers [x ] YES  [ ] No medical contraindication for discharge

## 2020-10-23 PROCEDURE — 99231 SBSQ HOSP IP/OBS SF/LOW 25: CPT

## 2020-10-23 NOTE — PROGRESS NOTE ADULT - SUBJECTIVE AND OBJECTIVE BOX
CC.  Mental health evaluation  HPI.  patient reports that he feels better.  Offers no new complaints      Constitutional: No fever, fatigue or weight loss.  Skin: No rash.  Eyes: No recent vision problems or eye pain.  ENT: No congestion, ear pain, or sore throat.  Endocrine: No thyroid problems.  Cardiovascular: No chest pain or palpation.  Respiratory: No cough, shortness of breath, congestion, or wheezing.  Gastrointestinal: No abdominal pain, nausea, vomiting, or diarrhea.  Genitourinary: No dysuria.  Musculoskeletal: No joint swelling.  Neurologic: No headache.    Vital Signs Last 24 Hrs  T(C): 36.1 (22 Oct 2020 22:00), Max: 36.1 (22 Oct 2020 22:00)  T(F): 97 (22 Oct 2020 22:00), Max: 97 (22 Oct 2020 22:00)  HR: 74 (22 Oct 2020 22:00) (73 - 74)  BP: 178/92 (22 Oct 2020 22:00) (173/83 - 178/92)  BP(mean): --  RR: 16 (22 Oct 2020 22:00) (16 - 16)  SpO2: --        PHYSICAL EXAM:    General: NAD  HEENT: NC/AT; PERRL, clear conjunctiva  Neck: supple  Respiratory: CTA b/l  Cardiovascular: +S1/S2; RRR  Abdomen: soft, NT/ND; +BS x4  Extremities: WWP, 2+ peripheral pulses b/l; no LE edema  Skin: normal color and turgor; no rash  Neurological:    MEDICATIONS:  MEDICATIONS  (STANDING):  amLODIPine   Tablet 10 milliGRAM(s) Oral daily  chlorhexidine 4% Liquid 1 Application(s) Topical <User Schedule>  diVALproex  milliGRAM(s) Oral two times a day  enoxaparin Injectable 40 milliGRAM(s) SubCutaneous daily  gabapentin 100 milliGRAM(s) Oral three times a day  levETIRAcetam 750 milliGRAM(s) Oral two times a day  OLANZapine 5 milliGRAM(s) Oral daily  senna 2 Tablet(s) Oral at bedtime    MEDICATIONS  (PRN):  cloNIDine 0.1 milliGRAM(s) Oral every 8 hours PRN htn  haloperidol     Tablet 0.5 milliGRAM(s) Oral every 8 hours PRN agitation  haloperidol    Injectable 0.5 milliGRAM(s) IntraMuscular once PRN agitation  polyethylene glycol 3350 17 Gram(s) Oral two times a day PRN Constipation      Imaging Personally Reviewed:     [x ] YES  [ ] NO    Consultant(s) Notes Reviewed:  [x ] YES  [ ] NO    Care Discussed with Consultants/Other Providers [x ] YES  [ ] No medical contraindication for discharge

## 2020-10-24 PROCEDURE — 99231 SBSQ HOSP IP/OBS SF/LOW 25: CPT

## 2020-10-24 NOTE — PROGRESS NOTE ADULT - SUBJECTIVE AND OBJECTIVE BOX
CC.  Mental health evaluation  HPI.  patient reports that he feels better.  Offers no new complaints      Constitutional: No fever, fatigue or weight loss.  Skin: No rash.  Eyes: No recent vision problems or eye pain.  ENT: No congestion, ear pain, or sore throat.  Endocrine: No thyroid problems.  Cardiovascular: No chest pain or palpation.  Respiratory: No cough, shortness of breath, congestion, or wheezing.  Gastrointestinal: No abdominal pain, nausea, vomiting, or diarrhea.  Genitourinary: No dysuria.  Musculoskeletal: No joint swelling.  Neurologic: No headache.    Vital Signs Last 24 Hrs  T(C): 35.8 (23 Oct 2020 20:32), Max: 35.8 (23 Oct 2020 20:32)  T(F): 96.5 (23 Oct 2020 20:32), Max: 96.5 (23 Oct 2020 20:32)  HR: 67 (23 Oct 2020 20:32) (67 - 67)  BP: 184/85 (23 Oct 2020 20:32) (184/85 - 184/85)  BP(mean): --  RR: 16 (23 Oct 2020 20:32) (16 - 16)  SpO2: --    PHYSICAL EXAM:    General: NAD  HEENT: NC/AT; PERRL, clear conjunctiva  Neck: supple  Respiratory: CTA b/l  Cardiovascular: +S1/S2; RRR  Abdomen: soft, NT/ND; +BS x4  Extremities: WWP, 2+ peripheral pulses b/l; no LE edema  Skin: normal color and turgor; no rash  Neurological:    MEDICATIONS:  MEDICATIONS  (STANDING):  amLODIPine   Tablet 10 milliGRAM(s) Oral daily  chlorhexidine 4% Liquid 1 Application(s) Topical <User Schedule>  diVALproex  milliGRAM(s) Oral two times a day  enoxaparin Injectable 40 milliGRAM(s) SubCutaneous daily  gabapentin 100 milliGRAM(s) Oral three times a day  levETIRAcetam 750 milliGRAM(s) Oral two times a day  OLANZapine 5 milliGRAM(s) Oral daily  senna 2 Tablet(s) Oral at bedtime    MEDICATIONS  (PRN):  cloNIDine 0.1 milliGRAM(s) Oral every 8 hours PRN htn  haloperidol     Tablet 0.5 milliGRAM(s) Oral every 8 hours PRN agitation  haloperidol    Injectable 0.5 milliGRAM(s) IntraMuscular once PRN agitation  polyethylene glycol 3350 17 Gram(s) Oral two times a day PRN Constipation      Imaging Personally Reviewed:     [x ] YES  [ ] NO    Consultant(s) Notes Reviewed:  [x ] YES  [ ] NO    Care Discussed with Consultants/Other Providers [x ] YES  [ ] No medical contraindication for discharge

## 2020-10-25 PROCEDURE — 99231 SBSQ HOSP IP/OBS SF/LOW 25: CPT

## 2020-10-25 NOTE — PROGRESS NOTE ADULT - SUBJECTIVE AND OBJECTIVE BOX
CC.  Mental health evaluation  HPI.  patient reports that he feels better.  Offers no new complaints      Constitutional: No fever, fatigue or weight loss.  Skin: No rash.  Eyes: No recent vision problems or eye pain.  ENT: No congestion, ear pain, or sore throat.  Endocrine: No thyroid problems.  Cardiovascular: No chest pain or palpation.  Respiratory: No cough, shortness of breath, congestion, or wheezing.  Gastrointestinal: No abdominal pain, nausea, vomiting, or diarrhea.  Genitourinary: No dysuria.  Musculoskeletal: No joint swelling.  Neurologic: No headache.    Vital Signs Last 24 Hrs  T(C): --  T(F): --  HR: 67 (24 Oct 2020 14:26) (67 - 67)  BP: 171/83 (24 Oct 2020 14:26) (171/83 - 171/83)  BP(mean): --  RR: 16 (24 Oct 2020 14:26) (16 - 16)  SpO2: --    PHYSICAL EXAM:    General: NAD  HEENT: NC/AT; PERRL, clear conjunctiva  Neck: supple  Respiratory: CTA b/l  Cardiovascular: +S1/S2; RRR  Abdomen: soft, NT/ND; +BS x4  Extremities: WWP, 2+ peripheral pulses b/l; no LE edema  Skin: normal color and turgor; no rash  Neurological:    MEDICATIONS:  MEDICATIONS  (STANDING):  amLODIPine   Tablet 10 milliGRAM(s) Oral daily  chlorhexidine 4% Liquid 1 Application(s) Topical <User Schedule>  diVALproex  milliGRAM(s) Oral two times a day  enoxaparin Injectable 40 milliGRAM(s) SubCutaneous daily  gabapentin 100 milliGRAM(s) Oral three times a day  levETIRAcetam 750 milliGRAM(s) Oral two times a day  OLANZapine 5 milliGRAM(s) Oral daily  senna 2 Tablet(s) Oral at bedtime    MEDICATIONS  (PRN):  cloNIDine 0.1 milliGRAM(s) Oral every 8 hours PRN htn  haloperidol     Tablet 0.5 milliGRAM(s) Oral every 8 hours PRN agitation  haloperidol    Injectable 0.5 milliGRAM(s) IntraMuscular once PRN agitation  polyethylene glycol 3350 17 Gram(s) Oral two times a day PRN Constipation      Imaging Personally Reviewed:     [x ] YES  [ ] NO    Consultant(s) Notes Reviewed:  [x ] YES  [ ] NO    Care Discussed with Consultants/Other Providers [x ] YES  [ ] No medical contraindication for discharge

## 2020-10-26 PROCEDURE — 99231 SBSQ HOSP IP/OBS SF/LOW 25: CPT

## 2020-10-26 NOTE — PROGRESS NOTE ADULT - SUBJECTIVE AND OBJECTIVE BOX
CC.  Mental health evaluation  HPI.  patient reports that he feels better.  Offers no new complaints      Constitutional: No fever, fatigue or weight loss.  Skin: No rash.  Eyes: No recent vision problems or eye pain.  ENT: No congestion, ear pain, or sore throat.  Endocrine: No thyroid problems.  Cardiovascular: No chest pain or palpation.  Respiratory: No cough, shortness of breath, congestion, or wheezing.  Gastrointestinal: No abdominal pain, nausea, vomiting, or diarrhea.  Genitourinary: No dysuria.  Musculoskeletal: No joint swelling.  Neurologic: No headache.    Vital Signs Last 24 Hrs  T(C): 36.4 (25 Oct 2020 13:50), Max: 36.4 (25 Oct 2020 13:50)  T(F): 97.5 (25 Oct 2020 13:50), Max: 97.5 (25 Oct 2020 13:50)  HR: 68 (25 Oct 2020 13:50) (68 - 68)  BP: 141/65 (25 Oct 2020 13:50) (141/65 - 141/65)  BP(mean): --  RR: 18 (25 Oct 2020 13:50) (18 - 18)  SpO2: --    PHYSICAL EXAM:    General: NAD  HEENT: NC/AT; PERRL, clear conjunctiva  Neck: supple  Respiratory: CTA b/l  Cardiovascular: +S1/S2; RRR  Abdomen: soft, NT/ND; +BS x4  Extremities: WWP, 2+ peripheral pulses b/l; no LE edema  Skin: normal color and turgor; no rash  Neurological:    MEDICATIONS:  MEDICATIONS  (STANDING):  amLODIPine   Tablet 10 milliGRAM(s) Oral daily  chlorhexidine 4% Liquid 1 Application(s) Topical <User Schedule>  diVALproex  milliGRAM(s) Oral two times a day  enoxaparin Injectable 40 milliGRAM(s) SubCutaneous daily  gabapentin 100 milliGRAM(s) Oral three times a day  levETIRAcetam 750 milliGRAM(s) Oral two times a day  OLANZapine 5 milliGRAM(s) Oral daily  senna 2 Tablet(s) Oral at bedtime    MEDICATIONS  (PRN):  cloNIDine 0.1 milliGRAM(s) Oral every 8 hours PRN htn  haloperidol     Tablet 0.5 milliGRAM(s) Oral every 8 hours PRN agitation  haloperidol    Injectable 0.5 milliGRAM(s) IntraMuscular once PRN agitation  polyethylene glycol 3350 17 Gram(s) Oral two times a day PRN Constipation      Imaging Personally Reviewed:     [x ] YES  [ ] NO    Consultant(s) Notes Reviewed:  [x ] YES  [ ] NO    Care Discussed with Consultants/Other Providers [x ] YES  [ ] No medical contraindication for discharge

## 2020-10-27 PROCEDURE — 99231 SBSQ HOSP IP/OBS SF/LOW 25: CPT

## 2020-10-27 RX ADMIN — ENOXAPARIN SODIUM 40 MILLIGRAM(S): 100 INJECTION SUBCUTANEOUS at 11:07

## 2020-10-27 RX ADMIN — OLANZAPINE 5 MILLIGRAM(S): 15 TABLET, FILM COATED ORAL at 11:07

## 2020-10-27 NOTE — CHART NOTE - NSCHARTNOTEFT_GEN_A_CORE
Limited reassessment    Meds reviewed  No new labs since 9/7--- pt noted to continually refuse meds and blood draws.    No edema noted; skin is WDL (10/26)    No newly recorded wts.    Diet order: Regular + Ensure Enlive q24h.     Pt consistently consuming >75% of meals on average. Sometimes refuses meals. Po intake ranges from 0-100% throughout LOS per EMR. Last recorded BM on 10/20 per EMR, bowel regimen ordered (senna + miralax) however pt noted to refuse meds as mentioned above.     Pt is pending placement. Followed by psych.     There are no further nutrition interventions at this time, pt remains not at risk. RD to f/u within the next 7 days.

## 2020-10-27 NOTE — PROGRESS NOTE ADULT - SUBJECTIVE AND OBJECTIVE BOX
CC.  Mental health evaluation  HPI.  patient reports that he feels better.  Offers no new complaints      Constitutional: No fever, fatigue or weight loss.  Skin: No rash.  Eyes: No recent vision problems or eye pain.  ENT: No congestion, ear pain, or sore throat.  Endocrine: No thyroid problems.  Cardiovascular: No chest pain or palpation.  Respiratory: No cough, shortness of breath, congestion, or wheezing.  Gastrointestinal: No abdominal pain, nausea, vomiting, or diarrhea.  Genitourinary: No dysuria.  Musculoskeletal: No joint swelling.  Neurologic: No headache.    Vital Signs Last 24 Hrs  T(C): 36.7 (26 Oct 2020 21:09), Max: 36.7 (26 Oct 2020 21:09)  T(F): 98.1 (26 Oct 2020 21:09), Max: 98.1 (26 Oct 2020 21:09)  HR: 70 (26 Oct 2020 21:09) (70 - 72)  BP: 155/92 (26 Oct 2020 21:09) (155/92 - 172/83)  BP(mean): --  RR: 18 (26 Oct 2020 21:09) (18 - 18)  SpO2: --  PHYSICAL EXAM:    General: NAD  HEENT: NC/AT; PERRL, clear conjunctiva  Neck: supple  Respiratory: CTA b/l  Cardiovascular: +S1/S2; RRR  Abdomen: soft, NT/ND; +BS x4  Extremities: WWP, 2+ peripheral pulses b/l; no LE edema  Skin: normal color and turgor; no rash  Neurological:    MEDICATIONS:  MEDICATIONS  (STANDING):  amLODIPine   Tablet 10 milliGRAM(s) Oral daily  chlorhexidine 4% Liquid 1 Application(s) Topical <User Schedule>  diVALproex  milliGRAM(s) Oral two times a day  enoxaparin Injectable 40 milliGRAM(s) SubCutaneous daily  gabapentin 100 milliGRAM(s) Oral three times a day  levETIRAcetam 750 milliGRAM(s) Oral two times a day  OLANZapine 5 milliGRAM(s) Oral daily  senna 2 Tablet(s) Oral at bedtime    MEDICATIONS  (PRN):  cloNIDine 0.1 milliGRAM(s) Oral every 8 hours PRN htn  haloperidol     Tablet 0.5 milliGRAM(s) Oral every 8 hours PRN agitation  haloperidol    Injectable 0.5 milliGRAM(s) IntraMuscular once PRN agitation  polyethylene glycol 3350 17 Gram(s) Oral two times a day PRN Constipation      Imaging Personally Reviewed:     [x ] YES  [ ] NO    Consultant(s) Notes Reviewed:  [x ] YES  [ ] NO    Care Discussed with Consultants/Other Providers [x ] YES  [ ] No medical contraindication for discharge

## 2020-10-28 PROCEDURE — 99232 SBSQ HOSP IP/OBS MODERATE 35: CPT

## 2020-10-28 RX ADMIN — GABAPENTIN 100 MILLIGRAM(S): 400 CAPSULE ORAL at 13:05

## 2020-10-28 NOTE — PROGRESS NOTE ADULT - SUBJECTIVE AND OBJECTIVE BOX
INTERVAL HPI/OVERNIGHT EVENTS:    SUBJECTIVE: Patient seen and examined at bedside.     no cp, sob, abd pain, fever  no syncope, ha, lightheadedness, dizziness    OBJECTIVE:    VITAL SIGNS:  Vital Signs Last 24 Hrs  T(C): 36.2 (27 Oct 2020 21:44), Max: 36.3 (27 Oct 2020 13:43)  T(F): 97.2 (27 Oct 2020 21:44), Max: 97.3 (27 Oct 2020 13:43)  HR: 67 (27 Oct 2020 21:44) (67 - 75)  BP: 166/90 (27 Oct 2020 21:44) (166/90 - 179/79)  BP(mean): --  RR: 18 (27 Oct 2020 21:44) (18 - 18)  SpO2: --      PHYSICAL EXAM:    General: NAD  HEENT: NC/AT; PERRL, clear conjunctiva  Neck: supple  Respiratory: CTA b/l  Cardiovascular: +S1/S2; RRR  Abdomen: soft, NT/ND; +BS x4  Extremities: WWP, 2+ peripheral pulses b/l; no LE edema  Skin: normal color and turgor; no rash  Neurological:    MEDICATIONS:  MEDICATIONS  (STANDING):  amLODIPine   Tablet 10 milliGRAM(s) Oral daily  chlorhexidine 4% Liquid 1 Application(s) Topical <User Schedule>  diVALproex  milliGRAM(s) Oral two times a day  enoxaparin Injectable 40 milliGRAM(s) SubCutaneous daily  gabapentin 100 milliGRAM(s) Oral three times a day  levETIRAcetam 750 milliGRAM(s) Oral two times a day  OLANZapine 5 milliGRAM(s) Oral daily  senna 2 Tablet(s) Oral at bedtime    MEDICATIONS  (PRN):  cloNIDine 0.1 milliGRAM(s) Oral every 8 hours PRN htn  haloperidol     Tablet 0.5 milliGRAM(s) Oral every 8 hours PRN agitation  haloperidol    Injectable 0.5 milliGRAM(s) IntraMuscular once PRN agitation  polyethylene glycol 3350 17 Gram(s) Oral two times a day PRN Constipation      ALLERGIES:  Allergies    No Known Allergies    Intolerances        LABS:              Creatinine Trend:         hs Troponin:              CSF:                      EKG:   MICROBIOLOGY:    IMAGING:      Labs, imaging, EKG personally reviewed    RADIOLOGY & ADDITIONAL TESTS: Reviewed.

## 2020-10-29 PROCEDURE — 99232 SBSQ HOSP IP/OBS MODERATE 35: CPT

## 2020-10-29 NOTE — PROGRESS NOTE ADULT - SUBJECTIVE AND OBJECTIVE BOX
INTERVAL HPI/OVERNIGHT EVENTS:    SUBJECTIVE: Patient seen and examined at bedside.     no cp, sob, abd pain, fever  no syncope, ha, lightheadedness, dizziness    OBJECTIVE:    VITAL SIGNS:  Vital Signs Last 24 Hrs  T(C): 36.5 (28 Oct 2020 13:08), Max: 36.5 (28 Oct 2020 13:08)  T(F): 97.7 (28 Oct 2020 13:08), Max: 97.7 (28 Oct 2020 13:08)  HR: 69 (28 Oct 2020 13:08) (69 - 69)  BP: 153/94 (28 Oct 2020 13:08) (153/94 - 153/94)  BP(mean): --  RR: 18 (28 Oct 2020 13:08) (18 - 18)  SpO2: --      PHYSICAL EXAM:    General: NAD  HEENT: NC/AT; PERRL, clear conjunctiva  Neck: supple  Respiratory: CTA b/l  Cardiovascular: +S1/S2; RRR  Abdomen: soft, NT/ND; +BS x4  Extremities: WWP, 2+ peripheral pulses b/l; no LE edema  Skin: normal color and turgor; no rash  Neurological:    MEDICATIONS:  MEDICATIONS  (STANDING):  amLODIPine   Tablet 10 milliGRAM(s) Oral daily  chlorhexidine 4% Liquid 1 Application(s) Topical <User Schedule>  diVALproex  milliGRAM(s) Oral two times a day  enoxaparin Injectable 40 milliGRAM(s) SubCutaneous daily  gabapentin 100 milliGRAM(s) Oral three times a day  levETIRAcetam 750 milliGRAM(s) Oral two times a day  OLANZapine 5 milliGRAM(s) Oral daily  senna 2 Tablet(s) Oral at bedtime    MEDICATIONS  (PRN):  cloNIDine 0.1 milliGRAM(s) Oral every 8 hours PRN htn  haloperidol     Tablet 0.5 milliGRAM(s) Oral every 8 hours PRN agitation  haloperidol    Injectable 0.5 milliGRAM(s) IntraMuscular once PRN agitation  polyethylene glycol 3350 17 Gram(s) Oral two times a day PRN Constipation      ALLERGIES:  Allergies    No Known Allergies    Intolerances        LABS:              Creatinine Trend:         hs Troponin:              CSF:                      EKG:   MICROBIOLOGY:    IMAGING:      Labs, imaging, EKG personally reviewed    RADIOLOGY & ADDITIONAL TESTS: Reviewed.

## 2020-10-29 NOTE — PROGRESS NOTE ADULT - ASSESSMENT
60M PMHx seizure disorder, schizophrenia, HTN here with altered mental status.    #Altered mental status, toxic metabolic encephalopathy  possible h/o schizophrenia, suspect psychosis vs. neurocognitive disorder  calm but refusing blood draws, medications  f/u psych  zyprexa 5  #HTN  norvasc 5  #Seizure disorder  depakote 500 bid  keppra 750 bid  #DVT ppx  lovenox    #Progress Note Handoff:  Pending (specify):  Consults_________, Tests________, Test Results_______, Other___discharge planning______  Family discussion:d/w pt at bedside re: treatment plan, primary dx  Disposition: Home___/SNF___/Other________/Unknown at this time____x____

## 2020-10-30 PROCEDURE — 99232 SBSQ HOSP IP/OBS MODERATE 35: CPT

## 2020-10-30 NOTE — PROGRESS NOTE ADULT - SUBJECTIVE AND OBJECTIVE BOX
INTERVAL HPI/OVERNIGHT EVENTS:    SUBJECTIVE: Patient seen and examined at bedside.     no cp, sob, abd pain, fever  no ha, dizziness, syncope, lightheadedness    OBJECTIVE:    VITAL SIGNS:  Vital Signs Last 24 Hrs  T(C): 35.8 (29 Oct 2020 14:16), Max: 35.8 (29 Oct 2020 14:16)  T(F): 96.5 (29 Oct 2020 14:16), Max: 96.5 (29 Oct 2020 14:16)  HR: 69 (29 Oct 2020 14:16) (69 - 69)  BP: 149/83 (29 Oct 2020 14:16) (149/83 - 149/83)  BP(mean): --  RR: 18 (29 Oct 2020 14:16) (18 - 18)  SpO2: --      PHYSICAL EXAM:    General: NAD  HEENT: NC/AT; PERRL, clear conjunctiva  Neck: supple  Respiratory: CTA b/l  Cardiovascular: +S1/S2; RRR  Abdomen: soft, NT/ND; +BS x4  Extremities: WWP, 2+ peripheral pulses b/l; no LE edema  Skin: normal color and turgor; no rash  Neurological:    MEDICATIONS:  MEDICATIONS  (STANDING):  amLODIPine   Tablet 10 milliGRAM(s) Oral daily  chlorhexidine 4% Liquid 1 Application(s) Topical <User Schedule>  diVALproex  milliGRAM(s) Oral two times a day  enoxaparin Injectable 40 milliGRAM(s) SubCutaneous daily  gabapentin 100 milliGRAM(s) Oral three times a day  levETIRAcetam 750 milliGRAM(s) Oral two times a day  OLANZapine 5 milliGRAM(s) Oral daily  senna 2 Tablet(s) Oral at bedtime    MEDICATIONS  (PRN):  cloNIDine 0.1 milliGRAM(s) Oral every 8 hours PRN htn  haloperidol     Tablet 0.5 milliGRAM(s) Oral every 8 hours PRN agitation  haloperidol    Injectable 0.5 milliGRAM(s) IntraMuscular once PRN agitation  polyethylene glycol 3350 17 Gram(s) Oral two times a day PRN Constipation      ALLERGIES:  Allergies    No Known Allergies    Intolerances        LABS:              Creatinine Trend:         hs Troponin:              CSF:                      EKG:   MICROBIOLOGY:    IMAGING:      Labs, imaging, EKG personally reviewed    RADIOLOGY & ADDITIONAL TESTS: Reviewed.

## 2020-10-31 PROCEDURE — 99232 SBSQ HOSP IP/OBS MODERATE 35: CPT

## 2020-10-31 NOTE — PROGRESS NOTE ADULT - SUBJECTIVE AND OBJECTIVE BOX
INTERVAL HPI/OVERNIGHT EVENTS:    SUBJECTIVE: Patient seen and examined at bedside.     no cp, sob, abd pain, fever  no ha, syncope, lightheadedness, dizziness    OBJECTIVE:    VITAL SIGNS:  Vital Signs Last 24 Hrs  T(C): 36.3 (30 Oct 2020 14:21), Max: 36.3 (30 Oct 2020 14:21)  T(F): 97.4 (30 Oct 2020 14:21), Max: 97.4 (30 Oct 2020 14:21)  HR: 72 (30 Oct 2020 14:21) (72 - 72)  BP: 139/71 (30 Oct 2020 14:21) (139/71 - 139/71)  BP(mean): --  RR: 16 (30 Oct 2020 14:21) (16 - 16)  SpO2: --      PHYSICAL EXAM:    General: NAD  HEENT: NC/AT; PERRL, clear conjunctiva  Neck: supple  Respiratory: CTA b/l  Cardiovascular: +S1/S2; RRR  Abdomen: soft, NT/ND; +BS x4  Extremities: WWP, 2+ peripheral pulses b/l; no LE edema  Skin: normal color and turgor; no rash  Neurological:    MEDICATIONS:  MEDICATIONS  (STANDING):  amLODIPine   Tablet 10 milliGRAM(s) Oral daily  chlorhexidine 4% Liquid 1 Application(s) Topical <User Schedule>  diVALproex  milliGRAM(s) Oral two times a day  enoxaparin Injectable 40 milliGRAM(s) SubCutaneous daily  gabapentin 100 milliGRAM(s) Oral three times a day  levETIRAcetam 750 milliGRAM(s) Oral two times a day  OLANZapine 5 milliGRAM(s) Oral daily  senna 2 Tablet(s) Oral at bedtime    MEDICATIONS  (PRN):  cloNIDine 0.1 milliGRAM(s) Oral every 8 hours PRN htn  haloperidol     Tablet 0.5 milliGRAM(s) Oral every 8 hours PRN agitation  haloperidol    Injectable 0.5 milliGRAM(s) IntraMuscular once PRN agitation  polyethylene glycol 3350 17 Gram(s) Oral two times a day PRN Constipation      ALLERGIES:  Allergies    No Known Allergies    Intolerances        LABS:              Creatinine Trend:         hs Troponin:              CSF:                      EKG:   MICROBIOLOGY:    IMAGING:      Labs, imaging, EKG personally reviewed    RADIOLOGY & ADDITIONAL TESTS: Reviewed.

## 2020-11-01 PROCEDURE — 99232 SBSQ HOSP IP/OBS MODERATE 35: CPT

## 2020-11-01 RX ADMIN — Medication 0.1 MILLIGRAM(S): at 20:23

## 2020-11-01 NOTE — PROGRESS NOTE ADULT - SUBJECTIVE AND OBJECTIVE BOX
INTERVAL HPI/OVERNIGHT EVENTS:    SUBJECTIVE: Patient seen and examined at bedside.     no cp, sob, abd pain, fever  no ha, syncope, lightheadedness, dizziness    OBJECTIVE:    VITAL SIGNS:  Vital Signs Last 24 Hrs  T(C): 36.3 (31 Oct 2020 14:26), Max: 36.3 (31 Oct 2020 14:26)  T(F): 97.3 (31 Oct 2020 14:26), Max: 97.3 (31 Oct 2020 14:26)  HR: 64 (31 Oct 2020 14:26) (64 - 64)  BP: 179/87 (31 Oct 2020 14:26) (179/87 - 179/87)  BP(mean): --  RR: 17 (31 Oct 2020 14:26) (17 - 17)  SpO2: --      PHYSICAL EXAM:    General: NAD  HEENT: NC/AT; PERRL, clear conjunctiva  Neck: supple  Respiratory: CTA b/l  Cardiovascular: +S1/S2; RRR  Abdomen: soft, NT/ND; +BS x4  Extremities: WWP, 2+ peripheral pulses b/l; no LE edema  Skin: normal color and turgor; no rash  Neurological:    MEDICATIONS:  MEDICATIONS  (STANDING):  amLODIPine   Tablet 10 milliGRAM(s) Oral daily  chlorhexidine 4% Liquid 1 Application(s) Topical <User Schedule>  diVALproex  milliGRAM(s) Oral two times a day  enoxaparin Injectable 40 milliGRAM(s) SubCutaneous daily  gabapentin 100 milliGRAM(s) Oral three times a day  levETIRAcetam 750 milliGRAM(s) Oral two times a day  OLANZapine 5 milliGRAM(s) Oral daily  senna 2 Tablet(s) Oral at bedtime    MEDICATIONS  (PRN):  cloNIDine 0.1 milliGRAM(s) Oral every 8 hours PRN htn  haloperidol     Tablet 0.5 milliGRAM(s) Oral every 8 hours PRN agitation  haloperidol    Injectable 0.5 milliGRAM(s) IntraMuscular once PRN agitation  polyethylene glycol 3350 17 Gram(s) Oral two times a day PRN Constipation      ALLERGIES:  Allergies    No Known Allergies    Intolerances        LABS:              Creatinine Trend:         hs Troponin:              CSF:                      EKG:   MICROBIOLOGY:    IMAGING:      Labs, imaging, EKG personally reviewed    RADIOLOGY & ADDITIONAL TESTS: Reviewed.

## 2020-11-02 PROCEDURE — 99232 SBSQ HOSP IP/OBS MODERATE 35: CPT

## 2020-11-02 NOTE — PROGRESS NOTE ADULT - SUBJECTIVE AND OBJECTIVE BOX
INTERVAL HPI/OVERNIGHT EVENTS:    SUBJECTIVE: Patient seen and examined at bedside.     no cp, sob, abd pain, fever  no ha, dizziness, lightheadedness, syncope    OBJECTIVE:    VITAL SIGNS:  Vital Signs Last 24 Hrs  T(C): 36.1 (01 Nov 2020 22:36), Max: 36.1 (01 Nov 2020 22:36)  T(F): 97 (01 Nov 2020 22:36), Max: 97 (01 Nov 2020 22:36)  HR: 67 (01 Nov 2020 22:36) (66 - 67)  BP: 193/102 (01 Nov 2020 22:36) (190/93 - 193/102)  BP(mean): --  RR: 17 (01 Nov 2020 22:36) (17 - 17)  SpO2: --      PHYSICAL EXAM:    General: NAD  HEENT: NC/AT; PERRL, clear conjunctiva  Neck: supple  Respiratory: CTA b/l  Cardiovascular: +S1/S2; RRR  Abdomen: soft, NT/ND; +BS x4  Extremities: WWP, 2+ peripheral pulses b/l; no LE edema  Skin: normal color and turgor; no rash  Neurological:    MEDICATIONS:  MEDICATIONS  (STANDING):  amLODIPine   Tablet 10 milliGRAM(s) Oral daily  chlorhexidine 4% Liquid 1 Application(s) Topical <User Schedule>  diVALproex  milliGRAM(s) Oral two times a day  enoxaparin Injectable 40 milliGRAM(s) SubCutaneous daily  gabapentin 100 milliGRAM(s) Oral three times a day  levETIRAcetam 750 milliGRAM(s) Oral two times a day  OLANZapine 5 milliGRAM(s) Oral daily  senna 2 Tablet(s) Oral at bedtime    MEDICATIONS  (PRN):  cloNIDine 0.1 milliGRAM(s) Oral every 8 hours PRN htn  haloperidol     Tablet 0.5 milliGRAM(s) Oral every 8 hours PRN agitation  haloperidol    Injectable 0.5 milliGRAM(s) IntraMuscular once PRN agitation  polyethylene glycol 3350 17 Gram(s) Oral two times a day PRN Constipation      ALLERGIES:  Allergies    No Known Allergies    Intolerances        LABS:              Creatinine Trend:         hs Troponin:              CSF:                      EKG:   MICROBIOLOGY:    IMAGING:      Labs, imaging, EKG personally reviewed    RADIOLOGY & ADDITIONAL TESTS: Reviewed.

## 2020-11-03 PROCEDURE — 99232 SBSQ HOSP IP/OBS MODERATE 35: CPT

## 2020-11-03 NOTE — PROGRESS NOTE ADULT - SUBJECTIVE AND OBJECTIVE BOX
INTERVAL HPI/OVERNIGHT EVENTS:    SUBJECTIVE: Patient seen and examined at bedside.     no cp, sob, abd pain, fever  no ha, syncope, lightheadedness, dizziness    OBJECTIVE:    VITAL SIGNS:  Vital Signs Last 24 Hrs  T(C): 36.3 (02 Nov 2020 13:28), Max: 36.4 (02 Nov 2020 11:50)  T(F): 97.4 (02 Nov 2020 13:28), Max: 97.5 (02 Nov 2020 11:50)  HR: 80 (02 Nov 2020 13:28) (72 - 80)  BP: 146/86 (02 Nov 2020 13:28) (146/86 - 183/88)  BP(mean): --  RR: 17 (02 Nov 2020 13:28) (17 - 17)  SpO2: --      PHYSICAL EXAM:    General: NAD  HEENT: NC/AT; PERRL, clear conjunctiva  Neck: supple  Respiratory: CTA b/l  Cardiovascular: +S1/S2; RRR  Abdomen: soft, NT/ND; +BS x4  Extremities: WWP, 2+ peripheral pulses b/l; no LE edema  Skin: normal color and turgor; no rash  Neurological:    MEDICATIONS:  MEDICATIONS  (STANDING):  amLODIPine   Tablet 10 milliGRAM(s) Oral daily  chlorhexidine 4% Liquid 1 Application(s) Topical <User Schedule>  diVALproex  milliGRAM(s) Oral two times a day  enoxaparin Injectable 40 milliGRAM(s) SubCutaneous daily  gabapentin 100 milliGRAM(s) Oral three times a day  levETIRAcetam 750 milliGRAM(s) Oral two times a day  OLANZapine 5 milliGRAM(s) Oral daily  senna 2 Tablet(s) Oral at bedtime    MEDICATIONS  (PRN):  cloNIDine 0.1 milliGRAM(s) Oral every 8 hours PRN htn  haloperidol     Tablet 0.5 milliGRAM(s) Oral every 8 hours PRN agitation  haloperidol    Injectable 0.5 milliGRAM(s) IntraMuscular once PRN agitation  polyethylene glycol 3350 17 Gram(s) Oral two times a day PRN Constipation      ALLERGIES:  Allergies    No Known Allergies    Intolerances        LABS:              Creatinine Trend:         hs Troponin:              CSF:                      EKG:   MICROBIOLOGY:    IMAGING:      Labs, imaging, EKG personally reviewed    RADIOLOGY & ADDITIONAL TESTS: Reviewed.

## 2020-11-03 NOTE — CHART NOTE - NSCHARTNOTEFT_GEN_A_CORE
Registered Dietitian Follow-Up (limited note)     Pt was admitted for mental health eval. Altered mental status, toxic metabolic encephalopathy, possible h/o schizophrenia, suspect psychosis vs. neurocognitive disorder. Currently on Regular diet + Ensure Enlive q24hrs. Consumes mostly 100% of meals. 1:1 sit for safety. No new weights. No significant weight changes expected at this time however weights should be documented weekly. Meds reviewed. No new labs. GI - ? last BM documented 10/20  (noted pt is on bowel regimen). Skin is intact.    No new nutrition dx at this time. Please continue current diet and PO supplement. Document BMs and weekly weights.    Pt remains not at risk, will reassess in 7 days.

## 2020-11-04 PROCEDURE — 99232 SBSQ HOSP IP/OBS MODERATE 35: CPT

## 2020-11-04 NOTE — PROGRESS NOTE ADULT - ASSESSMENT
60M PMHx seizure disorder, schizophrenia, HTN here with altered mental status.    #Altered mental status, toxic metabolic encephalopathy  possible h/o schizophrenia, suspect psychosis vs. neurocognitive disorder  calm but refusing blood draws, medications  f/u psych  zyprexa 5    #tinea pedis  -Will give topical antifungal    #HTN  norvasc 5    #Seizure disorder  depakote 500 bid  keppra 750 bid    #DVT ppx  lovenox    #Progress Note Handoff:  Pending (specify):  Consults_________, Tests________, Test Results_______, Other___discharge planning______  Family discussion:d/w pt at bedside re: treatment plan, primary dx  Disposition: Home___/SNF___/Other________/Unknown at this time____x____ 60M PMHx seizure disorder, schizophrenia, HTN here with altered mental status.    #Altered mental status, toxic metabolic encephalopathy  possible h/o schizophrenia, suspect psychosis vs. neurocognitive disorder  calm but refusing blood draws, medications  psych consult appreciated  zyprexa 5    #tinea pedis  -Will give topical antifungal    #HTN  norvasc 5    #Seizure disorder  depakote 500 bid  keppra 750 bid    #DVT ppx  lovenox    -DC pending case mgmt/SW; pt is placement issue.    #Progress Note Handoff:  Pending (specify):  Consults_________, Tests________, Test Results_______, Other___discharge planning______  Family discussion:d/w pt at bedside re: treatment plan, primary dx  Disposition: Home___/SNF___/Other________/Unknown at this time____x____ 60M PMHx seizure disorder, schizophrenia, HTN here with altered mental status.    #Altered mental status, toxic metabolic encephalopathy  possible h/o schizophrenia, suspect psychosis vs. neurocognitive disorder  calm but refusing blood draws, medications  psych consult appreciated  zyprexa    #tinea pedis  -Will give topical antifungal    #HTN  norvasc 10  Will add enalapril    #Seizure disorder  depakote 500 bid  keppra 750 bid    #DVT ppx  lovenox    -DC pending case mgmt/SW; pt is placement issue.    #Progress Note Handoff:  Pending (specify):  Consults_________, Tests________, Test Results_______, Other___discharge planning______  Family discussion:d/w pt at bedside re: treatment plan, primary dx  Disposition: Home___/SNF___/Other________/Unknown at this time____x____

## 2020-11-04 NOTE — PROGRESS NOTE ADULT - SUBJECTIVE AND OBJECTIVE BOX
60M PMHx seizure disorder, schizophrenia, HTN here with altered mental status.    11/4:  Pt seen at bedside, complains of itchy dry feet.        REVIEW OF SYSTEMS:  As above      MEDICATIONS  (STANDING):  amLODIPine   Tablet 10 milliGRAM(s) Oral daily  chlorhexidine 4% Liquid 1 Application(s) Topical <User Schedule>  diVALproex  milliGRAM(s) Oral two times a day  enoxaparin Injectable 40 milliGRAM(s) SubCutaneous daily  gabapentin 100 milliGRAM(s) Oral three times a day  levETIRAcetam 750 milliGRAM(s) Oral two times a day  OLANZapine 5 milliGRAM(s) Oral daily  senna 2 Tablet(s) Oral at bedtime    MEDICATIONS  (PRN):  cloNIDine 0.1 milliGRAM(s) Oral every 8 hours PRN htn  haloperidol     Tablet 0.5 milliGRAM(s) Oral every 8 hours PRN agitation  haloperidol    Injectable 0.5 milliGRAM(s) IntraMuscular once PRN agitation  polyethylene glycol 3350 17 Gram(s) Oral two times a day PRN Constipation      Allergies  No Known Allergies        FAMILY HISTORY:  No pertinent family history in first degree relatives  unknown        Vital Signs Last 24 Hrs  T(C): 35.7 (04 Nov 2020 14:38), Max: 35.9 (04 Nov 2020 05:30)  T(F): 96.2 (04 Nov 2020 14:38), Max: 96.6 (04 Nov 2020 05:30)  HR: 67 (04 Nov 2020 14:38) (67 - 70)  BP: 171/81 (04 Nov 2020 14:38) (156/101 - 171/81)  RR: 18 (04 Nov 2020 14:38) (18 - 18)    PHYSICAL EXAM:    GENERAL: NAD, well-groomed, well-developed  HEAD:  Atraumatic, Normocephalic  EYES: EOMI, PERRLA, conjunctiva and sclera clear  ENMT: No tonsillar erythema, exudates, or enlargement; Moist mucous membranes, Good dentition, No lesions  NECK: Supple, No JVD, Normal thyroid  CHEST/LUNG: Clear to percussion bilaterally; No rales, rhonchi, wheezing, or rubs  HEART: Regular rate and rhythm; No murmurs, rubs, or gallops  ABDOMEN: Soft, Nontender, Nondistended; Bowel sounds present  EXTREMITIES:  2+ Peripheral Pulses, No clubbing, cyanosis, or edema  SKIN: Dry scaly erythematous skin b/l soles of feet and toes.

## 2020-11-05 PROCEDURE — 99232 SBSQ HOSP IP/OBS MODERATE 35: CPT

## 2020-11-05 RX ADMIN — HALOPERIDOL DECANOATE 0.5 MILLIGRAM(S): 100 INJECTION INTRAMUSCULAR at 08:43

## 2020-11-05 NOTE — PROGRESS NOTE ADULT - ASSESSMENT
60M PMHx seizure disorder, schizophrenia, HTN here with altered mental status.    #Altered mental status, toxic metabolic encephalopathy  possible h/o schizophrenia, suspect psychosis vs. neurocognitive disorder  calm but refusing blood draws, medications  psych consult appreciated  zyprexa    #tinea pedis  -Will give topical antifungal    #HTN  norvasc 10  added enalapril    #Seizure disorder  depakote 500 bid  keppra 750 bid    #DVT ppx  lovenox    -DC pending case mgmt/SW; pt is placement issue.    #Progress Note Handoff:  Pending (specify):  Consults_________, Tests________, Test Results_______, Other___discharge planning______  Family discussion:d/w pt at bedside re: treatment plan, primary dx  Disposition: Home___/SNF___/Other________/Unknown at this time____x____

## 2020-11-05 NOTE — PROGRESS NOTE ADULT - SUBJECTIVE AND OBJECTIVE BOX
60M PMHx seizure disorder, schizophrenia, HTN here with altered mental status.    11/4:  Pt seen at bedside, complains of itchy dry feet.  11/5:  Pt seen at bedside, offered no new complaints.    HPI:  60-year-old, black, male, with unknown social demographics, medical history, or psychiatric history who was reportedly brought in by ambulance "seen walking around screaming at people "The devil will breathe fire on you" as per the ed notes  History obtained from the charts as the patient was not cooperative on my interview, when asked if he have any complains he said no and when asked questions on ROS, he became agitated.       In the ED was given haldol 5 and ativan 2 IV, evaluate  by psychiatry and admitted for to medicine as per their recommendation for workup for AMS, CT head non remarkable (05 Sep 2020 22:29)      PAST MEDICAL & SURGICAL HISTORY:  No pertinent past medical history  unknown    No significant past surgical history  unknown        REVIEW OF SYSTEMS:    CONSTITUTIONAL: No fever, weight loss, or fatigue  EYES: No eye pain, visual disturbances, or discharge  ENMT:  No difficulty hearing, tinnitus, vertigo; No sinus or throat pain  NECK: No pain or stiffness  BREASTS: No pain, masses, or nipple discharge  RESPIRATORY: No cough, wheezing, chills or hemoptysis; No shortness of breath  CARDIOVASCULAR: No chest pain, palpitations, dizziness, or leg swelling  GASTROINTESTINAL: No abdominal or epigastric pain. No nausea, vomiting, or hematemesis; No diarrhea or constipation. No melena or hematochezia.  GENITOURINARY: No dysuria, frequency, hematuria, or incontinence  NEUROLOGICAL: No headaches, memory loss, loss of strength, numbness, or tremors  SKIN: No itching, burning, rashes, or lesions   LYMPH NODES: No enlarged glands  ENDOCRINE: No heat or cold intolerance; No hair loss  MUSCULOSKELETAL: No joint pain or swelling; No muscle, back, or extremity pain  PSYCHIATRIC: No depression, anxiety, mood swings, or difficulty sleeping  HEME/LYMPH: No easy bruising, or bleeding gums  ALLERGY AND IMMUNOLOGIC: No hives or eczema      MEDICATIONS  (STANDING):  amLODIPine   Tablet 10 milliGRAM(s) Oral daily  chlorhexidine 4% Liquid 1 Application(s) Topical <User Schedule>  clotrimazole 1% Cream 1 Application(s) Topical two times a day  diVALproex  milliGRAM(s) Oral two times a day  enalapril 5 milliGRAM(s) Oral daily  enoxaparin Injectable 40 milliGRAM(s) SubCutaneous daily  gabapentin 100 milliGRAM(s) Oral three times a day  levETIRAcetam 750 milliGRAM(s) Oral two times a day  OLANZapine 5 milliGRAM(s) Oral daily  senna 2 Tablet(s) Oral at bedtime    MEDICATIONS  (PRN):  cloNIDine 0.1 milliGRAM(s) Oral every 8 hours PRN htn  haloperidol     Tablet 0.5 milliGRAM(s) Oral every 8 hours PRN agitation  haloperidol    Injectable 0.5 milliGRAM(s) IntraMuscular once PRN agitation  polyethylene glycol 3350 17 Gram(s) Oral two times a day PRN Constipation      Allergies    No Known Allergies    Intolerances        SOCIAL HISTORY:    FAMILY HISTORY:  No pertinent family history in first degree relatives  unknown        Vital Signs Last 24 Hrs  T(C): 35.7 (04 Nov 2020 14:38), Max: 35.7 (04 Nov 2020 14:38)  T(F): 96.2 (04 Nov 2020 14:38), Max: 96.2 (04 Nov 2020 14:38)  HR: 67 (04 Nov 2020 14:38) (67 - 67)  BP: 171/81 (04 Nov 2020 14:38) (171/81 - 171/81)  BP(mean): --  RR: 18 (04 Nov 2020 14:38) (18 - 18)  SpO2: --    PHYSICAL EXAM:    GENERAL: NAD, well-groomed, well-developed  HEAD:  Atraumatic, Normocephalic  EYES: EOMI, PERRLA, conjunctiva and sclera clear  ENMT: No tonsillar erythema, exudates, or enlargement; Moist mucous membranes, Good dentition, No lesions  NECK: Supple, No JVD, Normal thyroid  CHEST/LUNG: Clear to percussion bilaterally; No rales, rhonchi, wheezing, or rubs  HEART: Regular rate and rhythm; No murmurs, rubs, or gallops  ABDOMEN: Soft, Nontender, Nondistended; Bowel sounds present  EXTREMITIES:  2+ Peripheral Pulses, No clubbing, cyanosis, or edema  SKIN: Dry scaly erythematous skin b/l soles of feet and toes.

## 2020-11-06 PROCEDURE — 99232 SBSQ HOSP IP/OBS MODERATE 35: CPT

## 2020-11-06 RX ADMIN — Medication 0.1 MILLIGRAM(S): at 09:10

## 2020-11-06 NOTE — PROGRESS NOTE ADULT - ASSESSMENT
60M PMHx seizure disorder, schizophrenia, HTN here with altered mental status.    #Altered mental status, toxic metabolic encephalopathy  possible h/o schizophrenia, suspect psychosis vs. neurocognitive disorder  calm but refusing blood draws, medications  psych consult appreciated  zyprexa    #tinea pedis  -Will give topical antifungal    #HTN  norvasc 10  added enalapril, increased dose to 10 mg    #Seizure disorder  depakote 500 bid  keppra 750 bid    #DVT ppx  lovenox    -DC pending case mgmt/SW; pt is placement issue.    #Progress Note Handoff:  Pending (specify):  Consults_________, Tests________, Test Results_______, Other___discharge planning______  Family discussion:d/w pt at bedside re: treatment plan, primary dx  Disposition: Home___/SNF___/Other________/Unknown at this time____x____

## 2020-11-06 NOTE — PROGRESS NOTE ADULT - SUBJECTIVE AND OBJECTIVE BOX
60M PMHx seizure disorder, schizophrenia, HTN here with altered mental status.    Today:  Pt seen at bedside, offered no new complaints.          REVIEW OF SYSTEMS:  No new complaints though not the most reliable historian.      MEDICATIONS  (STANDING):  amLODIPine   Tablet 10 milliGRAM(s) Oral daily  chlorhexidine 4% Liquid 1 Application(s) Topical <User Schedule>  clotrimazole 1% Cream 1 Application(s) Topical two times a day  diVALproex  milliGRAM(s) Oral two times a day  enalapril 10 milliGRAM(s) Oral daily  enoxaparin Injectable 40 milliGRAM(s) SubCutaneous daily  gabapentin 100 milliGRAM(s) Oral three times a day  levETIRAcetam 750 milliGRAM(s) Oral two times a day  OLANZapine 5 milliGRAM(s) Oral daily  senna 2 Tablet(s) Oral at bedtime    MEDICATIONS  (PRN):  cloNIDine 0.1 milliGRAM(s) Oral every 8 hours PRN htn  haloperidol     Tablet 0.5 milliGRAM(s) Oral every 8 hours PRN agitation  haloperidol    Injectable 0.5 milliGRAM(s) IntraMuscular once PRN agitation  polyethylene glycol 3350 17 Gram(s) Oral two times a day PRN Constipation      Allergies  No Known Allergies      FAMILY HISTORY:  No pertinent family history in first degree relatives  unknown        Vital Signs Last 24 Hrs  T(C): 36 (06 Nov 2020 09:03), Max: 36 (06 Nov 2020 09:03)  T(F): 96.8 (06 Nov 2020 09:03), Max: 96.8 (06 Nov 2020 09:03)  HR: 93 (06 Nov 2020 09:03) (93 - 93)  BP: 162/84 (06 Nov 2020 09:03) (162/84 - 162/84)  RR: 18 (06 Nov 2020 09:03) (18 - 18)    PHYSICAL EXAM:  GENERAL: NAD, well-groomed, well-developed  HEAD:  Atraumatic, Normocephalic  EYES: EOMI, PERRLA, conjunctiva and sclera clear  ENMT: No tonsillar erythema, exudates, or enlargement; Moist mucous membranes, Good dentition, No lesions  NECK: Supple, No JVD, Normal thyroid  CHEST/LUNG: Clear to percussion bilaterally; No rales, rhonchi, wheezing, or rubs  HEART: Regular rate and rhythm; No murmurs, rubs, or gallops  ABDOMEN: Soft, Nontender, Nondistended; Bowel sounds present  EXTREMITIES:  2+ Peripheral Pulses, No clubbing, cyanosis, or edema  SKIN: Dry scaly erythematous skin b/l soles of feet and toes.

## 2020-11-07 PROCEDURE — 99232 SBSQ HOSP IP/OBS MODERATE 35: CPT

## 2020-11-07 NOTE — PROGRESS NOTE ADULT - SUBJECTIVE AND OBJECTIVE BOX
60M PMHx seizure disorder, schizophrenia, HTN here with altered mental status.    Today:  Pt seen at bedside, offered no new complaints.        REVIEW OF SYSTEMS:  Not a reliable historian.      MEDICATIONS  (STANDING):  amLODIPine   Tablet 10 milliGRAM(s) Oral daily  chlorhexidine 4% Liquid 1 Application(s) Topical <User Schedule>  clotrimazole 1% Cream 1 Application(s) Topical two times a day  diVALproex  milliGRAM(s) Oral two times a day  enalapril 10 milliGRAM(s) Oral daily  enoxaparin Injectable 40 milliGRAM(s) SubCutaneous daily  gabapentin 100 milliGRAM(s) Oral three times a day  levETIRAcetam 750 milliGRAM(s) Oral two times a day  OLANZapine 5 milliGRAM(s) Oral daily  senna 2 Tablet(s) Oral at bedtime    MEDICATIONS  (PRN):  cloNIDine 0.1 milliGRAM(s) Oral every 8 hours PRN htn  haloperidol     Tablet 0.5 milliGRAM(s) Oral every 8 hours PRN agitation  haloperidol    Injectable 0.5 milliGRAM(s) IntraMuscular once PRN agitation  polyethylene glycol 3350 17 Gram(s) Oral two times a day PRN Constipation      Allergies  No Known Allergies        FAMILY HISTORY:  No pertinent family history in first degree relatives  unknown        Vital Signs Last 24 Hrs  T(C): 35.6 (07 Nov 2020 14:07), Max: 35.6 (07 Nov 2020 14:07)  T(F): 96.1 (07 Nov 2020 14:07), Max: 96.1 (07 Nov 2020 14:07)  HR: 72 (07 Nov 2020 14:07) (72 - 72)  BP: 166/77 (07 Nov 2020 14:07) (166/77 - 166/77)    PHYSICAL EXAM:  GENERAL: NAD, well-groomed, well-developed  HEAD:  Atraumatic, Normocephalic  EYES: EOMI, PERRLA, conjunctiva and sclera clear  ENMT: No tonsillar erythema, exudates, or enlargement; Moist mucous membranes, Good dentition, No lesions  NECK: Supple, No JVD, Normal thyroid  CHEST/LUNG: Clear to percussion bilaterally; No rales, rhonchi, wheezing, or rubs  HEART: Regular rate and rhythm; No murmurs, rubs, or gallops  ABDOMEN: Soft, Nontender, Nondistended; Bowel sounds present  EXTREMITIES:  2+ Peripheral Pulses, No clubbing, cyanosis, or edema  SKIN: Dry scaly erythematous skin b/l soles of feet and toes.

## 2020-11-08 PROCEDURE — 99232 SBSQ HOSP IP/OBS MODERATE 35: CPT

## 2020-11-08 NOTE — PROGRESS NOTE ADULT - ASSESSMENT
60M PMHx seizure disorder, schizophrenia, HTN here with altered mental status.    #Altered mental status, toxic metabolic encephalopathy  possible h/o schizophrenia, suspect psychosis vs. neurocognitive disorder  calm but refusing blood draws, medications  psych consult appreciated  zyprexa    #tinea pedis  -Will give topical antifungal    #HTN  norvasc 10  added enalapril, increased dose to 10 mg  Apparently patient has been refusing meds at times.    #Seizure disorder  depakote 500 bid  keppra 750 bid    #DVT ppx  lovenox    -DC pending case mgmt/SW; pt is placement issue.    #Progress Note Handoff:  Pending (specify):  Consults_________, Tests________, Test Results_______, Other___discharge planning______  Family discussion:d/w pt at bedside re: treatment plan, primary dx  Disposition: Home___/SNF___/Other________/Unknown at this time____x____

## 2020-11-08 NOTE — PROGRESS NOTE ADULT - SUBJECTIVE AND OBJECTIVE BOX
60M PMHx seizure disorder, schizophrenia, HTN here with altered mental status.    Today:  Pt seen at bedside, offered no new complaints.          REVIEW OF SYSTEMS:  Not a reliable historian      MEDICATIONS  (STANDING):  amLODIPine   Tablet 10 milliGRAM(s) Oral daily  chlorhexidine 4% Liquid 1 Application(s) Topical <User Schedule>  clotrimazole 1% Cream 1 Application(s) Topical two times a day  diVALproex  milliGRAM(s) Oral two times a day  enalapril 10 milliGRAM(s) Oral daily  enoxaparin Injectable 40 milliGRAM(s) SubCutaneous daily  gabapentin 100 milliGRAM(s) Oral three times a day  levETIRAcetam 750 milliGRAM(s) Oral two times a day  OLANZapine 5 milliGRAM(s) Oral daily  senna 2 Tablet(s) Oral at bedtime    MEDICATIONS  (PRN):  cloNIDine 0.1 milliGRAM(s) Oral every 8 hours PRN htn  haloperidol     Tablet 0.5 milliGRAM(s) Oral every 8 hours PRN agitation  haloperidol    Injectable 0.5 milliGRAM(s) IntraMuscular once PRN agitation  polyethylene glycol 3350 17 Gram(s) Oral two times a day PRN Constipation      Allergies  No Known Allergies      FAMILY HISTORY:  No pertinent family history in first degree relatives  unknown        Vital Signs Last 24 Hrs  T(C): 36.3 (08 Nov 2020 14:08), Max: 36.3 (08 Nov 2020 14:08)  T(F): 97.4 (08 Nov 2020 14:08), Max: 97.4 (08 Nov 2020 14:08)  HR: 64 (08 Nov 2020 14:08) (64 - 64)  BP: 190/84 (08 Nov 2020 14:08) (190/84 - 190/84)  RR: 18 (08 Nov 2020 14:08) (18 - 18)    PHYSICAL EXAM:  GENERAL: NAD, well-groomed, well-developed  HEAD:  Atraumatic, Normocephalic  EYES: EOMI, PERRLA, conjunctiva and sclera clear  ENMT: No tonsillar erythema, exudates, or enlargement; Moist mucous membranes, Good dentition, No lesions  NECK: Supple, No JVD, Normal thyroid  CHEST/LUNG: Clear to percussion bilaterally; No rales, rhonchi, wheezing, or rubs  HEART: Regular rate and rhythm; No murmurs, rubs, or gallops  ABDOMEN: Soft, Nontender, Nondistended; Bowel sounds present  EXTREMITIES:  2+ Peripheral Pulses, No clubbing, cyanosis, or edema  SKIN: Dry scaly erythematous skin b/l soles of feet and toes.

## 2020-11-09 PROCEDURE — 99232 SBSQ HOSP IP/OBS MODERATE 35: CPT

## 2020-11-09 NOTE — PROGRESS NOTE ADULT - SUBJECTIVE AND OBJECTIVE BOX
60M PMHx seizure disorder, schizophrenia, HTN here with altered mental status.    Today:  Pt seen at bedside, offered no new complaints.        REVIEW OF SYSTEMS:  Not a reliable historian.      MEDICATIONS  (STANDING):  amLODIPine   Tablet 10 milliGRAM(s) Oral daily  chlorhexidine 4% Liquid 1 Application(s) Topical <User Schedule>  clotrimazole 1% Cream 1 Application(s) Topical two times a day  diVALproex  milliGRAM(s) Oral two times a day  enalapril 10 milliGRAM(s) Oral daily  enoxaparin Injectable 40 milliGRAM(s) SubCutaneous daily  gabapentin 100 milliGRAM(s) Oral three times a day  levETIRAcetam 750 milliGRAM(s) Oral two times a day  OLANZapine 5 milliGRAM(s) Oral daily  senna 2 Tablet(s) Oral at bedtime    MEDICATIONS  (PRN):  cloNIDine 0.1 milliGRAM(s) Oral every 8 hours PRN htn  haloperidol     Tablet 0.5 milliGRAM(s) Oral every 8 hours PRN agitation  haloperidol    Injectable 0.5 milliGRAM(s) IntraMuscular once PRN agitation  polyethylene glycol 3350 17 Gram(s) Oral two times a day PRN Constipation      Allergies  No Known Allergies        FAMILY HISTORY:  No pertinent family history in first degree relatives  unknown        Vital Signs Last 24 Hrs  T(C): 35.7 (09 Nov 2020 14:20), Max: 35.7 (09 Nov 2020 14:20)  T(F): 96.2 (09 Nov 2020 14:20), Max: 96.2 (09 Nov 2020 14:20)  HR: 73 (09 Nov 2020 14:20) (73 - 73)  BP: 179/98 (09 Nov 2020 14:20) (179/98 - 179/98)    PHYSICAL EXAM:  GENERAL: NAD, well-groomed, well-developed  HEAD:  Atraumatic, Normocephalic  EYES: EOMI, PERRLA, conjunctiva and sclera clear  ENMT: No tonsillar erythema, exudates, or enlargement; Moist mucous membranes, Good dentition, No lesions  NECK: Supple, No JVD, Normal thyroid  CHEST/LUNG: Clear to percussion bilaterally; No rales, rhonchi, wheezing, or rubs  HEART: Regular rate and rhythm; No murmurs, rubs, or gallops  ABDOMEN: Soft, Nontender, Nondistended; Bowel sounds present  EXTREMITIES:  2+ Peripheral Pulses, No clubbing, cyanosis, or edema  SKIN: Dry scaly erythematous skin b/l soles of feet and toes

## 2020-11-10 PROCEDURE — 99232 SBSQ HOSP IP/OBS MODERATE 35: CPT

## 2020-11-10 RX ADMIN — LEVETIRACETAM 750 MILLIGRAM(S): 250 TABLET, FILM COATED ORAL at 18:17

## 2020-11-10 RX ADMIN — ENOXAPARIN SODIUM 40 MILLIGRAM(S): 100 INJECTION SUBCUTANEOUS at 11:36

## 2020-11-10 RX ADMIN — Medication 1 APPLICATION(S): at 18:17

## 2020-11-10 RX ADMIN — OLANZAPINE 5 MILLIGRAM(S): 15 TABLET, FILM COATED ORAL at 11:36

## 2020-11-10 RX ADMIN — DIVALPROEX SODIUM 500 MILLIGRAM(S): 500 TABLET, DELAYED RELEASE ORAL at 18:17

## 2020-11-10 NOTE — CHART NOTE - NSCHARTNOTEFT_GEN_A_CORE
RD limited note    Spoke to RN; pt. is confused and disoriented. RN reports pt is not experiencing any loss of appetite. Patients PO intake >75% of meals. Observed breakfast tray ~100% complete in the am. Pt. on DASH/TLC- Ensure clear 1/perday. Pt. drinks 100% of the ensure clear. No nausea/ vomiting per pt. No constipation/ diarrhea per pt. LBM: 11/10 per RN. Skin intact/ no edema noted per RN flow sheets. Labs and medications reviewed.  No new weights recorded; will continue to monitor pt.'s weights. Altered mental status, toxic metabolic encephalopathy possible h/o schizophrenia, suspect psychosis vs. neurocognitive disorder; calm but refusing blood draws, medications per attending note. Psych following. No RD intervention at this time. Will f/u with pt. in 7 days.

## 2020-11-10 NOTE — PROGRESS NOTE ADULT - ASSESSMENT
60M PMHx seizure disorder, schizophrenia, HTN here with altered mental status.    #Altered mental status, toxic metabolic encephalopathy  possible h/o schizophrenia, suspect psychosis vs. neurocognitive disorder  calm but refusing blood draws, medications  psych consult appreciated  zyprexa    #tinea pedis  -Will give topical antifungal    #HTN  norvasc 10  added enalapril, increased dose to 10 mg  Apparently patient has been refusing meds and vitals at times.    #Seizure disorder  depakote 500 bid  keppra 750 bid    #DVT ppx  lovenox    -DC pending case mgmt/SW; pt is placement issue, citizenship.    #Progress Note Handoff:  Pending (specify):  Consults_________, Tests________, Test Results_______, Other___discharge planning______  Family discussion:d/w pt at bedside re: treatment plan, primary dx  Disposition: Home___/SNF___/Other________/Unknown at this time____x____

## 2020-11-10 NOTE — PROGRESS NOTE ADULT - SUBJECTIVE AND OBJECTIVE BOX
60M PMHx seizure disorder, schizophrenia, HTN here with altered mental status.    Today:  Pt seen at bedside, offered no new complaints.            REVIEW OF SYSTEMS:  Not a reliable historian.      MEDICATIONS  (STANDING):  amLODIPine   Tablet 10 milliGRAM(s) Oral daily  chlorhexidine 4% Liquid 1 Application(s) Topical <User Schedule>  clotrimazole 1% Cream 1 Application(s) Topical two times a day  diVALproex  milliGRAM(s) Oral two times a day  enalapril 10 milliGRAM(s) Oral daily  enoxaparin Injectable 40 milliGRAM(s) SubCutaneous daily  gabapentin 100 milliGRAM(s) Oral three times a day  levETIRAcetam 750 milliGRAM(s) Oral two times a day  OLANZapine 5 milliGRAM(s) Oral daily  senna 2 Tablet(s) Oral at bedtime    MEDICATIONS  (PRN):  cloNIDine 0.1 milliGRAM(s) Oral every 8 hours PRN htn  haloperidol     Tablet 0.5 milliGRAM(s) Oral every 8 hours PRN agitation  haloperidol    Injectable 0.5 milliGRAM(s) IntraMuscular once PRN agitation  polyethylene glycol 3350 17 Gram(s) Oral two times a day PRN Constipation      Allergies  No Known Allergies        FAMILY HISTORY:  No pertinent family history in first degree relatives  unknown        Vital Signs Last 24 Hrs  T(C): --  T(F): --  HR: --  BP: --  BP(mean): --  RR: --  SpO2: --    PHYSICAL EXAM:  GENERAL: NAD, well-groomed, well-developed  HEAD:  Atraumatic, Normocephalic  EYES: EOMI, PERRLA, conjunctiva and sclera clear  ENMT: No tonsillar erythema, exudates, or enlargement; Moist mucous membranes, Good dentition, No lesions  NECK: Supple, No JVD, Normal thyroid  CHEST/LUNG: Clear to percussion bilaterally; No rales, rhonchi, wheezing, or rubs  HEART: Regular rate and rhythm; No murmurs, rubs, or gallops  ABDOMEN: Soft, Nontender, Nondistended; Bowel sounds present  EXTREMITIES:  2+ Peripheral Pulses, No clubbing, cyanosis, or edema  SKIN: Dry scaly erythematous skin b/l soles of feet and toes

## 2020-11-11 PROCEDURE — 99232 SBSQ HOSP IP/OBS MODERATE 35: CPT

## 2020-11-11 NOTE — PROGRESS NOTE ADULT - SUBJECTIVE AND OBJECTIVE BOX
INTERVAL HPI/OVERNIGHT EVENTS:    SUBJECTIVE: Patient seen and examined at bedside.     no cp, sob, abd pain, fever  no ha, dizziness, lightheadedness, syncope    OBJECTIVE:    VITAL SIGNS:  Vital Signs Last 24 Hrs  T(C): 35.8 (10 Nov 2020 21:18), Max: 35.8 (10 Nov 2020 21:18)  T(F): 96.5 (10 Nov 2020 21:18), Max: 96.5 (10 Nov 2020 21:18)  HR: 63 (10 Nov 2020 21:18) (63 - 63)  BP: 169/77 (10 Nov 2020 21:18) (169/77 - 169/77)  BP(mean): --  RR: 16 (10 Nov 2020 21:18) (16 - 16)  SpO2: --      PHYSICAL EXAM:    General: NAD  HEENT: NC/AT; PERRL, clear conjunctiva  Neck: supple  Respiratory: CTA b/l  Cardiovascular: +S1/S2; RRR  Abdomen: soft, NT/ND; +BS x4  Extremities: WWP, 2+ peripheral pulses b/l; no LE edema  Skin: normal color and turgor; no rash  Neurological:    MEDICATIONS:  MEDICATIONS  (STANDING):  amLODIPine   Tablet 10 milliGRAM(s) Oral daily  chlorhexidine 4% Liquid 1 Application(s) Topical <User Schedule>  clotrimazole 1% Cream 1 Application(s) Topical two times a day  diVALproex  milliGRAM(s) Oral two times a day  enalapril 10 milliGRAM(s) Oral daily  enoxaparin Injectable 40 milliGRAM(s) SubCutaneous daily  gabapentin 100 milliGRAM(s) Oral three times a day  levETIRAcetam 750 milliGRAM(s) Oral two times a day  OLANZapine 5 milliGRAM(s) Oral daily  senna 2 Tablet(s) Oral at bedtime    MEDICATIONS  (PRN):  cloNIDine 0.1 milliGRAM(s) Oral every 8 hours PRN htn  haloperidol     Tablet 0.5 milliGRAM(s) Oral every 8 hours PRN agitation  haloperidol    Injectable 0.5 milliGRAM(s) IntraMuscular once PRN agitation  polyethylene glycol 3350 17 Gram(s) Oral two times a day PRN Constipation      ALLERGIES:  Allergies    No Known Allergies    Intolerances        LABS:              Creatinine Trend:         hs Troponin:              CSF:                      EKG:   MICROBIOLOGY:    IMAGING:      Labs, imaging, EKG personally reviewed    RADIOLOGY & ADDITIONAL TESTS: Reviewed.

## 2020-11-12 PROCEDURE — 99232 SBSQ HOSP IP/OBS MODERATE 35: CPT

## 2020-11-12 NOTE — PROGRESS NOTE ADULT - SUBJECTIVE AND OBJECTIVE BOX
INTERVAL HPI/OVERNIGHT EVENTS:    SUBJECTIVE: Patient seen and examined at bedside.     no cp, sob, abd pain, fever  no ha, dizziness, lightheadedness, syncope    OBJECTIVE:    VITAL SIGNS:  Vital Signs Last 24 Hrs  T(C): 35.4 (11 Nov 2020 21:06), Max: 35.4 (11 Nov 2020 21:06)  T(F): 95.8 (11 Nov 2020 21:06), Max: 95.8 (11 Nov 2020 21:06)  HR: 73 (11 Nov 2020 21:06) (73 - 73)  BP: 168/87 (11 Nov 2020 21:06) (168/87 - 168/87)  BP(mean): --  RR: 16 (11 Nov 2020 21:06) (16 - 16)  SpO2: --      PHYSICAL EXAM:    General: NAD  HEENT: NC/AT; PERRL, clear conjunctiva  Neck: supple  Respiratory: CTA b/l  Cardiovascular: +S1/S2; RRR  Abdomen: soft, NT/ND; +BS x4  Extremities: WWP, 2+ peripheral pulses b/l; no LE edema  Skin: normal color and turgor; no rash  Neurological:    MEDICATIONS:  MEDICATIONS  (STANDING):  amLODIPine   Tablet 10 milliGRAM(s) Oral daily  chlorhexidine 4% Liquid 1 Application(s) Topical <User Schedule>  clotrimazole 1% Cream 1 Application(s) Topical two times a day  diVALproex  milliGRAM(s) Oral two times a day  enalapril 10 milliGRAM(s) Oral daily  enoxaparin Injectable 40 milliGRAM(s) SubCutaneous daily  gabapentin 100 milliGRAM(s) Oral three times a day  levETIRAcetam 750 milliGRAM(s) Oral two times a day  OLANZapine 5 milliGRAM(s) Oral daily  senna 2 Tablet(s) Oral at bedtime    MEDICATIONS  (PRN):  cloNIDine 0.1 milliGRAM(s) Oral every 8 hours PRN htn  haloperidol     Tablet 0.5 milliGRAM(s) Oral every 8 hours PRN agitation  haloperidol    Injectable 0.5 milliGRAM(s) IntraMuscular once PRN agitation  polyethylene glycol 3350 17 Gram(s) Oral two times a day PRN Constipation      ALLERGIES:  Allergies    No Known Allergies    Intolerances        LABS:              Creatinine Trend:         hs Troponin:              CSF:                      EKG:   MICROBIOLOGY:    IMAGING:      Labs, imaging, EKG personally reviewed    RADIOLOGY & ADDITIONAL TESTS: Reviewed.

## 2020-11-13 PROCEDURE — 99232 SBSQ HOSP IP/OBS MODERATE 35: CPT

## 2020-11-13 NOTE — PROGRESS NOTE ADULT - SUBJECTIVE AND OBJECTIVE BOX
INTERVAL HPI/OVERNIGHT EVENTS:    SUBJECTIVE: Patient seen and examined at bedside.     no cp, sob, abd pain, fever  no ha, syncope, lightheadedness, dizziness    OBJECTIVE:    VITAL SIGNS:  Vital Signs Last 24 Hrs  T(C): 36.6 (12 Nov 2020 21:44), Max: 36.7 (12 Nov 2020 13:59)  T(F): 97.9 (12 Nov 2020 21:44), Max: 98.1 (12 Nov 2020 13:59)  HR: 70 (12 Nov 2020 21:44) (68 - 70)  BP: 170/85 (12 Nov 2020 21:44) (170/85 - 180/90)  BP(mean): --  RR: 16 (12 Nov 2020 21:44) (16 - 18)  SpO2: --      PHYSICAL EXAM:    General: NAD  HEENT: NC/AT; PERRL, clear conjunctiva  Neck: supple  Respiratory: CTA b/l  Cardiovascular: +S1/S2; RRR  Abdomen: soft, NT/ND; +BS x4  Extremities: WWP, 2+ peripheral pulses b/l; no LE edema  Skin: normal color and turgor; no rash  Neurological:    MEDICATIONS:  MEDICATIONS  (STANDING):  amLODIPine   Tablet 10 milliGRAM(s) Oral daily  chlorhexidine 4% Liquid 1 Application(s) Topical <User Schedule>  clotrimazole 1% Cream 1 Application(s) Topical two times a day  diVALproex  milliGRAM(s) Oral two times a day  enalapril 10 milliGRAM(s) Oral daily  enoxaparin Injectable 40 milliGRAM(s) SubCutaneous daily  gabapentin 100 milliGRAM(s) Oral three times a day  levETIRAcetam 750 milliGRAM(s) Oral two times a day  OLANZapine 5 milliGRAM(s) Oral daily  senna 2 Tablet(s) Oral at bedtime    MEDICATIONS  (PRN):  cloNIDine 0.1 milliGRAM(s) Oral every 8 hours PRN htn  haloperidol     Tablet 0.5 milliGRAM(s) Oral every 8 hours PRN agitation  haloperidol    Injectable 0.5 milliGRAM(s) IntraMuscular once PRN agitation  polyethylene glycol 3350 17 Gram(s) Oral two times a day PRN Constipation      ALLERGIES:  Allergies    No Known Allergies    Intolerances        LABS:              Creatinine Trend:         hs Troponin:              CSF:                      EKG:   MICROBIOLOGY:    IMAGING:      Labs, imaging, EKG personally reviewed    RADIOLOGY & ADDITIONAL TESTS: Reviewed.

## 2020-11-14 PROCEDURE — 99232 SBSQ HOSP IP/OBS MODERATE 35: CPT

## 2020-11-14 NOTE — PROGRESS NOTE ADULT - SUBJECTIVE AND OBJECTIVE BOX
INTERVAL HPI/OVERNIGHT EVENTS:    SUBJECTIVE: Patient seen and examined at bedside.     no cp, sob, abd pain, fever  no ha, syncope, lightheadedness, dizziness    OBJECTIVE:    VITAL SIGNS:  Vital Signs Last 24 Hrs  T(C): --  T(F): --  HR: --  BP: --  BP(mean): --  RR: --  SpO2: --      PHYSICAL EXAM:    General: NAD  HEENT: NC/AT; PERRL, clear conjunctiva  Neck: supple  Respiratory: CTA b/l  Cardiovascular: +S1/S2; RRR  Abdomen: soft, NT/ND; +BS x4  Extremities: WWP, 2+ peripheral pulses b/l; no LE edema  Skin: normal color and turgor; no rash  Neurological:    MEDICATIONS:  MEDICATIONS  (STANDING):  amLODIPine   Tablet 10 milliGRAM(s) Oral daily  chlorhexidine 4% Liquid 1 Application(s) Topical <User Schedule>  clotrimazole 1% Cream 1 Application(s) Topical two times a day  diVALproex  milliGRAM(s) Oral two times a day  enalapril 10 milliGRAM(s) Oral daily  enoxaparin Injectable 40 milliGRAM(s) SubCutaneous daily  gabapentin 100 milliGRAM(s) Oral three times a day  levETIRAcetam 750 milliGRAM(s) Oral two times a day  OLANZapine 5 milliGRAM(s) Oral daily  senna 2 Tablet(s) Oral at bedtime    MEDICATIONS  (PRN):  cloNIDine 0.1 milliGRAM(s) Oral every 8 hours PRN htn  haloperidol     Tablet 0.5 milliGRAM(s) Oral every 8 hours PRN agitation  haloperidol    Injectable 0.5 milliGRAM(s) IntraMuscular once PRN agitation  polyethylene glycol 3350 17 Gram(s) Oral two times a day PRN Constipation      ALLERGIES:  Allergies    No Known Allergies    Intolerances        LABS:              Creatinine Trend:         hs Troponin:              CSF:                      EKG:   MICROBIOLOGY:    IMAGING:      Labs, imaging, EKG personally reviewed    RADIOLOGY & ADDITIONAL TESTS: Reviewed.

## 2020-11-15 PROCEDURE — 99232 SBSQ HOSP IP/OBS MODERATE 35: CPT

## 2020-11-15 RX ORDER — AMLODIPINE BESYLATE 2.5 MG/1
10 TABLET ORAL ONCE
Refills: 0 | Status: COMPLETED | OUTPATIENT
Start: 2020-11-15 | End: 2020-11-15

## 2020-11-15 RX ADMIN — Medication 10 MILLIGRAM(S): at 15:48

## 2020-11-15 RX ADMIN — AMLODIPINE BESYLATE 10 MILLIGRAM(S): 2.5 TABLET ORAL at 15:48

## 2020-11-16 PROCEDURE — 99232 SBSQ HOSP IP/OBS MODERATE 35: CPT

## 2020-11-16 NOTE — PROGRESS NOTE ADULT - SUBJECTIVE AND OBJECTIVE BOX
INTERVAL HPI/OVERNIGHT EVENTS:    SUBJECTIVE: Patient seen and examined at bedside.     no cp, sob, abd pain, fever  no ha, syncope, lightheadedness, dizziness    OBJECTIVE:    VITAL SIGNS:  Vital Signs Last 24 Hrs  T(C): 36.3 (15 Nov 2020 14:40), Max: 36.3 (15 Nov 2020 14:40)  T(F): 97.4 (15 Nov 2020 14:40), Max: 97.4 (15 Nov 2020 14:40)  HR: 68 (15 Nov 2020 22:36) (68 - 75)  BP: 146/73 (15 Nov 2020 22:36) (146/73 - 191/86)  BP(mean): --  RR: 18 (15 Nov 2020 22:36) (18 - 18)  SpO2: --      PHYSICAL EXAM:    General: NAD  HEENT: NC/AT; PERRL, clear conjunctiva  Neck: supple  Respiratory: CTA b/l  Cardiovascular: +S1/S2; RRR  Abdomen: soft, NT/ND; +BS x4  Extremities: WWP, 2+ peripheral pulses b/l; no LE edema  Skin: normal color and turgor; no rash  Neurological:    MEDICATIONS:  MEDICATIONS  (STANDING):  amLODIPine   Tablet 10 milliGRAM(s) Oral daily  chlorhexidine 4% Liquid 1 Application(s) Topical <User Schedule>  clotrimazole 1% Cream 1 Application(s) Topical two times a day  diVALproex  milliGRAM(s) Oral two times a day  enalapril 10 milliGRAM(s) Oral daily  enoxaparin Injectable 40 milliGRAM(s) SubCutaneous daily  gabapentin 100 milliGRAM(s) Oral three times a day  levETIRAcetam 750 milliGRAM(s) Oral two times a day  OLANZapine 5 milliGRAM(s) Oral daily  senna 2 Tablet(s) Oral at bedtime    MEDICATIONS  (PRN):  cloNIDine 0.1 milliGRAM(s) Oral every 8 hours PRN htn  haloperidol     Tablet 0.5 milliGRAM(s) Oral every 8 hours PRN agitation  haloperidol    Injectable 0.5 milliGRAM(s) IntraMuscular once PRN agitation  polyethylene glycol 3350 17 Gram(s) Oral two times a day PRN Constipation      ALLERGIES:  Allergies    No Known Allergies    Intolerances        LABS:              Creatinine Trend:         hs Troponin:              CSF:                      EKG:   MICROBIOLOGY:    IMAGING:      Labs, imaging, EKG personally reviewed    RADIOLOGY & ADDITIONAL TESTS: Reviewed.

## 2020-11-17 PROCEDURE — 99232 SBSQ HOSP IP/OBS MODERATE 35: CPT

## 2020-11-17 RX ORDER — AMLODIPINE BESYLATE 2.5 MG/1
10 TABLET ORAL ONCE
Refills: 0 | Status: COMPLETED | OUTPATIENT
Start: 2020-11-17 | End: 2020-11-17

## 2020-11-17 NOTE — PROGRESS NOTE ADULT - SUBJECTIVE AND OBJECTIVE BOX
INTERVAL HPI/OVERNIGHT EVENTS:    SUBJECTIVE: Patient seen and examined at bedside.     no cp, sob, abd pain, fever  no ha, syncope, lightheadedness, dizziness    OBJECTIVE:    VITAL SIGNS:  Vital Signs Last 24 Hrs  T(C): 36.2 (16 Nov 2020 20:40), Max: 36.4 (16 Nov 2020 14:00)  T(F): 97.1 (16 Nov 2020 20:40), Max: 97.6 (16 Nov 2020 14:00)  HR: 84 (16 Nov 2020 20:40) (73 - 84)  BP: 148/77 (16 Nov 2020 20:40) (143/78 - 148/77)  BP(mean): --  RR: 16 (16 Nov 2020 20:40) (16 - 18)  SpO2: --      PHYSICAL EXAM:    General: NAD  HEENT: NC/AT; PERRL, clear conjunctiva  Neck: supple  Respiratory: CTA b/l  Cardiovascular: +S1/S2; RRR  Abdomen: soft, NT/ND; +BS x4  Extremities: WWP, 2+ peripheral pulses b/l; no LE edema  Skin: normal color and turgor; no rash  Neurological:    MEDICATIONS:  MEDICATIONS  (STANDING):  amLODIPine   Tablet 10 milliGRAM(s) Oral daily  chlorhexidine 4% Liquid 1 Application(s) Topical <User Schedule>  clotrimazole 1% Cream 1 Application(s) Topical two times a day  diVALproex  milliGRAM(s) Oral two times a day  enalapril 10 milliGRAM(s) Oral daily  enoxaparin Injectable 40 milliGRAM(s) SubCutaneous daily  gabapentin 100 milliGRAM(s) Oral three times a day  levETIRAcetam 750 milliGRAM(s) Oral two times a day  OLANZapine 5 milliGRAM(s) Oral daily  senna 2 Tablet(s) Oral at bedtime    MEDICATIONS  (PRN):  cloNIDine 0.1 milliGRAM(s) Oral every 8 hours PRN htn  haloperidol     Tablet 0.5 milliGRAM(s) Oral every 8 hours PRN agitation  haloperidol    Injectable 0.5 milliGRAM(s) IntraMuscular once PRN agitation  polyethylene glycol 3350 17 Gram(s) Oral two times a day PRN Constipation      ALLERGIES:  Allergies    No Known Allergies    Intolerances        LABS:              Creatinine Trend:         hs Troponin:              CSF:                      EKG:   MICROBIOLOGY:    IMAGING:      Labs, imaging, EKG personally reviewed    RADIOLOGY & ADDITIONAL TESTS: Reviewed.

## 2020-11-17 NOTE — CHART NOTE - NSCHARTNOTEFT_GEN_A_CORE
Limited reassessment    Meds/labs reviewed    No edema noted  Skin is WDL (11/17)  No new wts recorded    Diet order: DASH/TLC, Ensure Clear q24h    Pt continues to eat well with a good appetite unless he refuses a meal. Pt intake consistently recorded as 100% since previously assessed + meal refusals are noted. No noted GI s/s at this time. Unsure last BM. Per EMR, BMs not consistently recorded.   Psych following; pt with possible hx of schizophrenia, suspect psychosis vs neurocognitive disorder. Pt noted to be calm but refuses meds/blood draws.    There are no further nutrition interventions at this time. Pt remains not at risk. RD to f/u within the next 7 days.

## 2020-11-18 PROCEDURE — 99232 SBSQ HOSP IP/OBS MODERATE 35: CPT

## 2020-11-18 RX ADMIN — GABAPENTIN 100 MILLIGRAM(S): 400 CAPSULE ORAL at 15:43

## 2020-11-18 RX ADMIN — OLANZAPINE 5 MILLIGRAM(S): 15 TABLET, FILM COATED ORAL at 15:44

## 2020-11-18 RX ADMIN — Medication 10 MILLIGRAM(S): at 15:44

## 2020-11-18 RX ADMIN — AMLODIPINE BESYLATE 10 MILLIGRAM(S): 2.5 TABLET ORAL at 15:44

## 2020-11-18 NOTE — PROGRESS NOTE ADULT - SUBJECTIVE AND OBJECTIVE BOX
60M PMHx seizure disorder, schizophrenia, HTN here with altered mental status.    Today:  Pt seen at bedside, offered no new complaints.            REVIEW OF SYSTEMS:  Not a reliable historian.      MEDICATIONS  (STANDING):  amLODIPine   Tablet 10 milliGRAM(s) Oral daily  chlorhexidine 4% Liquid 1 Application(s) Topical <User Schedule>  clotrimazole 1% Cream 1 Application(s) Topical two times a day  diVALproex  milliGRAM(s) Oral two times a day  enalapril 10 milliGRAM(s) Oral daily  enoxaparin Injectable 40 milliGRAM(s) SubCutaneous daily  gabapentin 100 milliGRAM(s) Oral three times a day  levETIRAcetam 750 milliGRAM(s) Oral two times a day  OLANZapine 5 milliGRAM(s) Oral daily  senna 2 Tablet(s) Oral at bedtime    MEDICATIONS  (PRN):  cloNIDine 0.1 milliGRAM(s) Oral every 8 hours PRN htn  haloperidol     Tablet 0.5 milliGRAM(s) Oral every 8 hours PRN agitation  haloperidol    Injectable 0.5 milliGRAM(s) IntraMuscular once PRN agitation  polyethylene glycol 3350 17 Gram(s) Oral two times a day PRN Constipation      Allergies  No Known Allergies        FAMILY HISTORY:  No pertinent family history in first degree relatives  unknown        Vital Signs Last 24 Hrs  T(C): 36.3 (18 Nov 2020 13:39), Max: 36.6 (17 Nov 2020 20:27)  T(F): 97.4 (18 Nov 2020 13:39), Max: 97.8 (17 Nov 2020 20:27)  HR: 80 (18 Nov 2020 14:10) (76 - 91)  BP: 174/99 (18 Nov 2020 14:10) (174/99 - 181/86)  RR: 18 (18 Nov 2020 14:10) (16 - 20)      PHYSICAL EXAM:  GENERAL: NAD, well-groomed, well-developed  HEAD:  Atraumatic, Normocephalic  EYES: EOMI, PERRLA, conjunctiva and sclera clear  ENMT: No tonsillar erythema, exudates, or enlargement; Moist mucous membranes, Good dentition, No lesions  NECK: Supple, No JVD, Normal thyroid  CHEST/LUNG: Clear to percussion bilaterally; No rales, rhonchi, wheezing, or rubs  HEART: Regular rate and rhythm; No murmurs, rubs, or gallops  ABDOMEN: Soft, Nontender, Nondistended; Bowel sounds present  EXTREMITIES:  2+ Peripheral Pulses, No clubbing, cyanosis, or edema  SKIN: Dry scaly erythematous skin b/l soles of feet and toes

## 2020-11-19 PROCEDURE — 99232 SBSQ HOSP IP/OBS MODERATE 35: CPT

## 2020-11-19 NOTE — PROGRESS NOTE ADULT - SUBJECTIVE AND OBJECTIVE BOX
60M PMHx seizure disorder, schizophrenia, HTN here with altered mental status.    Today:  Pt seen at bedside, offered no new complaints.            REVIEW OF SYSTEMS:  Not a reliable historian.      MEDICATIONS  (STANDING):  amLODIPine   Tablet 10 milliGRAM(s) Oral daily  chlorhexidine 4% Liquid 1 Application(s) Topical <User Schedule>  clotrimazole 1% Cream 1 Application(s) Topical two times a day  diVALproex  milliGRAM(s) Oral two times a day  enalapril 10 milliGRAM(s) Oral daily  enoxaparin Injectable 40 milliGRAM(s) SubCutaneous daily  gabapentin 100 milliGRAM(s) Oral three times a day  levETIRAcetam 750 milliGRAM(s) Oral two times a day  OLANZapine 5 milliGRAM(s) Oral daily  senna 2 Tablet(s) Oral at bedtime    MEDICATIONS  (PRN):  cloNIDine 0.1 milliGRAM(s) Oral every 8 hours PRN htn  haloperidol     Tablet 0.5 milliGRAM(s) Oral every 8 hours PRN agitation  haloperidol    Injectable 0.5 milliGRAM(s) IntraMuscular once PRN agitation  polyethylene glycol 3350 17 Gram(s) Oral two times a day PRN Constipation      Allergies  No Known Allergies        FAMILY HISTORY:  No pertinent family history in first degree relatives  unknown        Vital Signs Last 24 Hrs  T(C): 36.3 (18 Nov 2020 13:39), Max: 36.3 (18 Nov 2020 13:39)  T(F): 97.4 (18 Nov 2020 13:39), Max: 97.4 (18 Nov 2020 13:39)  HR: 80 (18 Nov 2020 14:10) (80 - 91)  BP: 174/99 (18 Nov 2020 14:10) (174/99 - 175/107)  RR: 18 (18 Nov 2020 14:10) (18 - 20)    PHYSICAL EXAM:  GENERAL: NAD, well-groomed, well-developed  HEAD:  Atraumatic, Normocephalic  EYES: EOMI, PERRLA, conjunctiva and sclera clear  ENMT: No tonsillar erythema, exudates, or enlargement; Moist mucous membranes, Good dentition, No lesions  NECK: Supple, No JVD, Normal thyroid  CHEST/LUNG: Clear to percussion bilaterally; No rales, rhonchi, wheezing, or rubs  HEART: Regular rate and rhythm; No murmurs, rubs, or gallops  ABDOMEN: Soft, Nontender, Nondistended; Bowel sounds present  EXTREMITIES:  2+ Peripheral Pulses, No clubbing, cyanosis, or edema  SKIN: Dry scaly erythematous skin b/l soles of feet and toes

## 2020-11-20 PROCEDURE — 99232 SBSQ HOSP IP/OBS MODERATE 35: CPT

## 2020-11-21 PROCEDURE — 99232 SBSQ HOSP IP/OBS MODERATE 35: CPT

## 2020-11-22 PROCEDURE — 99232 SBSQ HOSP IP/OBS MODERATE 35: CPT

## 2020-11-22 RX ADMIN — Medication 10 MILLIGRAM(S): at 22:11

## 2020-11-22 NOTE — PROGRESS NOTE ADULT - SUBJECTIVE AND OBJECTIVE BOX
60M PMHx seizure disorder, schizophrenia, HTN here with altered mental status.    Today:  Pt seen at bedside, offered no new complaints.  -Pt very cooperative today, seen at bedside watching [employer] videos on youtube and in a very good mood.        REVIEW OF SYSTEMS:  Not a reliable historian.      MEDICATIONS  (STANDING):  amLODIPine   Tablet 10 milliGRAM(s) Oral daily  chlorhexidine 4% Liquid 1 Application(s) Topical <User Schedule>  clotrimazole 1% Cream 1 Application(s) Topical two times a day  diVALproex  milliGRAM(s) Oral two times a day  enalapril 10 milliGRAM(s) Oral daily  enoxaparin Injectable 40 milliGRAM(s) SubCutaneous daily  gabapentin 100 milliGRAM(s) Oral three times a day  levETIRAcetam 750 milliGRAM(s) Oral two times a day  OLANZapine 5 milliGRAM(s) Oral daily  senna 2 Tablet(s) Oral at bedtime    MEDICATIONS  (PRN):  cloNIDine 0.1 milliGRAM(s) Oral every 8 hours PRN htn  haloperidol     Tablet 0.5 milliGRAM(s) Oral every 8 hours PRN agitation  haloperidol    Injectable 0.5 milliGRAM(s) IntraMuscular once PRN agitation  polyethylene glycol 3350 17 Gram(s) Oral two times a day PRN Constipation      Allergies  No Known Allergies        FAMILY HISTORY:  No pertinent family history in first degree relatives  unknown        Vital Signs Last 24 Hrs  T(C): 36.6 (22 Nov 2020 14:19), Max: 36.6 (22 Nov 2020 14:19)  T(F): 97.8 (22 Nov 2020 14:19), Max: 97.8 (22 Nov 2020 14:19)  HR: 82 (22 Nov 2020 14:19) (68 - 82)  BP: 163/92 (22 Nov 2020 14:19) (163/92 - 181/82)  RR: 18 (22 Nov 2020 14:19) (18 - 18)    PHYSICAL EXAM:  GENERAL: NAD, well-groomed, well-developed  HEAD:  Atraumatic, Normocephalic  EYES: EOMI, PERRLA, conjunctiva and sclera clear  ENMT: No tonsillar erythema, exudates, or enlargement; Moist mucous membranes, Good dentition, No lesions  NECK: Supple, No JVD, Normal thyroid  CHEST/LUNG: Clear to percussion bilaterally; No rales, rhonchi, wheezing, or rubs  HEART: Regular rate and rhythm; No murmurs, rubs, or gallops  ABDOMEN: Soft, Nontender, Nondistended; Bowel sounds present  EXTREMITIES:  2+ Peripheral Pulses, No clubbing, cyanosis, or edema  SKIN: no rashes or lesions

## 2020-11-22 NOTE — PROGRESS NOTE ADULT - ASSESSMENT
60M PMHx seizure disorder, schizophrenia, HTN here with altered mental status.    #Altered mental status, toxic metabolic encephalopathy  possible h/o schizophrenia, suspect psychosis vs. neurocognitive disorder  calm but refusing blood draws, medications  psych consult appreciated  zyprexa    #tinea pedis  -Resolved s/p clotrimazole cream    #HTN  norvasc 10  added enalapril, increased dose to 10 mg  Apparently patient has been refusing meds and vitals at times.    #Seizure disorder  depakote 500 bid  keppra 750 bid    #DVT ppx  lovenox    -DC pending case mgmt/SW; pt is placement issue, citizenship.    #Progress Note Handoff:  Pending (specify):  Consults_________, Tests________, Test Results_______, Other___discharge planning______  Family discussion:d/w pt at bedside re: treatment plan, primary dx  Disposition: Home___/SNF___/Other________/Unknown at this time____x____

## 2020-11-23 PROCEDURE — 99232 SBSQ HOSP IP/OBS MODERATE 35: CPT

## 2020-11-23 RX ADMIN — HALOPERIDOL DECANOATE 0.5 MILLIGRAM(S): 100 INJECTION INTRAMUSCULAR at 01:44

## 2020-11-23 NOTE — PROGRESS NOTE ADULT - SUBJECTIVE AND OBJECTIVE BOX
60M PMHx seizure disorder, schizophrenia, HTN here with altered mental status.    Today:  Pt seen at bedside, offered no new complaints.          REVIEW OF SYSTEMS:  Not a reliable historian.      MEDICATIONS  (STANDING):  amLODIPine   Tablet 10 milliGRAM(s) Oral daily  chlorhexidine 4% Liquid 1 Application(s) Topical <User Schedule>  diVALproex  milliGRAM(s) Oral two times a day  enalapril 10 milliGRAM(s) Oral daily  enoxaparin Injectable 40 milliGRAM(s) SubCutaneous daily  gabapentin 100 milliGRAM(s) Oral three times a day  levETIRAcetam 750 milliGRAM(s) Oral two times a day  OLANZapine 5 milliGRAM(s) Oral daily  senna 2 Tablet(s) Oral at bedtime    MEDICATIONS  (PRN):  cloNIDine 0.1 milliGRAM(s) Oral every 8 hours PRN htn  haloperidol     Tablet 0.5 milliGRAM(s) Oral every 8 hours PRN agitation  polyethylene glycol 3350 17 Gram(s) Oral two times a day PRN Constipation      Allergies  No Known Allergies        FAMILY HISTORY:  No pertinent family history in first degree relatives  unknown        Vital Signs Last 24 Hrs  T(C): 36.6 (22 Nov 2020 14:19), Max: 36.6 (22 Nov 2020 14:19)  T(F): 97.8 (22 Nov 2020 14:19), Max: 97.8 (22 Nov 2020 14:19)  HR: 69 (22 Nov 2020 22:41) (69 - 82)  BP: 188/115 (22 Nov 2020 22:41) (163/92 - 188/115)  RR: 18 (22 Nov 2020 14:19) (18 - 18)      PHYSICAL EXAM:  GENERAL: NAD, well-groomed, well-developed  HEAD:  Atraumatic, Normocephalic  EYES: EOMI, PERRLA, conjunctiva and sclera clear  ENMT: No tonsillar erythema, exudates, or enlargement; Moist mucous membranes, Good dentition, No lesions  NECK: Supple, No JVD, Normal thyroid  CHEST/LUNG: Clear to percussion bilaterally; No rales, rhonchi, wheezing, or rubs  HEART: Regular rate and rhythm; No murmurs, rubs, or gallops  ABDOMEN: Soft, Nontender, Nondistended; Bowel sounds present  EXTREMITIES:  2+ Peripheral Pulses, No clubbing, cyanosis, or edema  SKIN: no rashes or lesions

## 2020-11-24 PROCEDURE — 99232 SBSQ HOSP IP/OBS MODERATE 35: CPT

## 2020-11-24 NOTE — PROGRESS NOTE ADULT - SUBJECTIVE AND OBJECTIVE BOX
60M PMHx seizure disorder, schizophrenia, HTN here with altered mental status.    Today:  Pt seen at bedside, offered no new complaints.          REVIEW OF SYSTEMS:  Not a reliable historian.    MEDICATIONS  (STANDING):  amLODIPine   Tablet 10 milliGRAM(s) Oral daily  chlorhexidine 4% Liquid 1 Application(s) Topical <User Schedule>  diVALproex  milliGRAM(s) Oral two times a day  enalapril 10 milliGRAM(s) Oral daily  enoxaparin Injectable 40 milliGRAM(s) SubCutaneous daily  gabapentin 100 milliGRAM(s) Oral three times a day  levETIRAcetam 750 milliGRAM(s) Oral two times a day  OLANZapine 5 milliGRAM(s) Oral daily  senna 2 Tablet(s) Oral at bedtime    MEDICATIONS  (PRN):  cloNIDine 0.1 milliGRAM(s) Oral every 8 hours PRN htn  haloperidol     Tablet 0.5 milliGRAM(s) Oral every 8 hours PRN agitation  polyethylene glycol 3350 17 Gram(s) Oral two times a day PRN Constipation      Allergies  No Known Allergies        FAMILY HISTORY:  No pertinent family history in first degree relatives  unknown        Vital Signs Last 24 Hrs  T(C): --  T(F): --  HR: --  BP: --  BP(mean): --  RR: --  SpO2: --    PHYSICAL EXAM:    GENERAL: NAD, well-groomed, well-developed  HEAD:  Atraumatic, Normocephalic  EYES: EOMI, PERRLA, conjunctiva and sclera clear  NECK: Supple, No JVD, Normal thyroid  NERVOUS SYSTEM:  Awake, alert, non-focal though can not cooperate with full exam  CHEST/LUNG: Clear to percussion bilaterally; No rales, rhonchi, wheezing, or rubs  HEART: Regular rate and rhythm; No murmurs, rubs, or gallops  ABDOMEN: Soft, Nontender, Nondistended; Bowel sounds present  EXTREMITIES:  2+ Peripheral Pulses, No clubbing, cyanosis, or edema  SKIN: No rashes or lesions

## 2020-11-24 NOTE — CHART NOTE - NSCHARTNOTEFT_GEN_A_CORE
Limited reassessment    Meds reviewed  No new labs recorded since previously assessed.     No edema noted; skin is WDL (11/23)    No new wts recorded since previously assessed.    Diet order: DASH/TLC, Ensure Clear q24h    Pt remains admitted pending placement issues/citizenship. Pt noted to refuse meds and labs at times. Per nursing flow sheet for nutrition, pt refuses food at times as well. When not refusing meals, pt eats well. Since previous assessment, po intake varies %, the average intake is >50% since previously assessed. Pt continues to receive supplements as ordered. No nausea/abdominal pain noted. No noted GI s/s per EMR documentation. BMs not consistently recorded.    There are no further nutrition interventions at this time. Pt remains not at risk. RD to f/u within the next 7 days.

## 2020-11-25 PROCEDURE — 99232 SBSQ HOSP IP/OBS MODERATE 35: CPT

## 2020-11-25 RX ORDER — PETROLATUM,WHITE
1 JELLY (GRAM) TOPICAL DAILY
Refills: 0 | Status: COMPLETED | OUTPATIENT
Start: 2020-11-25 | End: 2021-10-24

## 2020-11-25 NOTE — PROGRESS NOTE ADULT - SUBJECTIVE AND OBJECTIVE BOX
INTERVAL HPI/OVERNIGHT EVENTS:    SUBJECTIVE: Patient seen and examined at bedside.     no cp, sob, abd pain, fever  no ha, lightheadedness, dizziness, syncope  OBJECTIVE:    VITAL SIGNS:  Vital Signs Last 24 Hrs  T(C): --  T(F): --  HR: --  BP: --  BP(mean): --  RR: --  SpO2: --      PHYSICAL EXAM:    General: NAD  HEENT: NC/AT; PERRL, clear conjunctiva  Neck: supple  Respiratory: CTA b/l  Cardiovascular: +S1/S2; RRR  Abdomen: soft, NT/ND; +BS x4  Extremities: WWP, 2+ peripheral pulses b/l; no LE edema  Skin: normal color and turgor; no rash  Neurological:    MEDICATIONS:  MEDICATIONS  (STANDING):  amLODIPine   Tablet 10 milliGRAM(s) Oral daily  AQUAPHOR (petrolatum Ointment) 1 Application(s) Topical daily  chlorhexidine 4% Liquid 1 Application(s) Topical <User Schedule>  diVALproex  milliGRAM(s) Oral two times a day  enalapril 10 milliGRAM(s) Oral daily  enoxaparin Injectable 40 milliGRAM(s) SubCutaneous daily  gabapentin 100 milliGRAM(s) Oral three times a day  levETIRAcetam 750 milliGRAM(s) Oral two times a day  OLANZapine 5 milliGRAM(s) Oral daily  senna 2 Tablet(s) Oral at bedtime    MEDICATIONS  (PRN):  cloNIDine 0.1 milliGRAM(s) Oral every 8 hours PRN htn  haloperidol     Tablet 0.5 milliGRAM(s) Oral every 8 hours PRN agitation  polyethylene glycol 3350 17 Gram(s) Oral two times a day PRN Constipation      ALLERGIES:  Allergies    No Known Allergies    Intolerances        LABS:              Creatinine Trend:         hs Troponin:              CSF:                      EKG:   MICROBIOLOGY:    IMAGING:      Labs, imaging, EKG personally reviewed    RADIOLOGY & ADDITIONAL TESTS: Reviewed.

## 2020-11-26 PROCEDURE — 99232 SBSQ HOSP IP/OBS MODERATE 35: CPT

## 2020-11-26 RX ORDER — OLANZAPINE 15 MG/1
5 TABLET, FILM COATED ORAL ONCE
Refills: 0 | Status: COMPLETED | OUTPATIENT
Start: 2020-11-26 | End: 2020-11-26

## 2020-11-26 NOTE — PROGRESS NOTE ADULT - SUBJECTIVE AND OBJECTIVE BOX
INTERVAL HPI/OVERNIGHT EVENTS:    SUBJECTIVE: Patient seen and examined at bedside.     no cp, sob, abd pain, fever  no ha, syncope, lightheadedness, dizziness    OBJECTIVE:    VITAL SIGNS:  Vital Signs Last 24 Hrs  T(C): 36.1 (25 Nov 2020 13:54), Max: 36.1 (25 Nov 2020 13:54)  T(F): 97 (25 Nov 2020 13:54), Max: 97 (25 Nov 2020 13:54)  HR: 93 (25 Nov 2020 13:54) (93 - 93)  BP: 160/83 (25 Nov 2020 13:54) (160/83 - 160/83)  BP(mean): --  RR: 16 (25 Nov 2020 13:54) (16 - 16)  SpO2: --      PHYSICAL EXAM:    General: NAD  HEENT: NC/AT; PERRL, clear conjunctiva  Neck: supple  Respiratory: CTA b/l  Cardiovascular: +S1/S2; RRR  Abdomen: soft, NT/ND; +BS x4  Extremities: WWP, 2+ peripheral pulses b/l; no LE edema  Skin: normal color and turgor; no rash  Neurological:    MEDICATIONS:  MEDICATIONS  (STANDING):  amLODIPine   Tablet 10 milliGRAM(s) Oral daily  AQUAPHOR (petrolatum Ointment) 1 Application(s) Topical daily  chlorhexidine 4% Liquid 1 Application(s) Topical <User Schedule>  diVALproex  milliGRAM(s) Oral two times a day  enalapril 10 milliGRAM(s) Oral daily  enoxaparin Injectable 40 milliGRAM(s) SubCutaneous daily  gabapentin 100 milliGRAM(s) Oral three times a day  levETIRAcetam 750 milliGRAM(s) Oral two times a day  OLANZapine 5 milliGRAM(s) Oral daily  senna 2 Tablet(s) Oral at bedtime    MEDICATIONS  (PRN):  cloNIDine 0.1 milliGRAM(s) Oral every 8 hours PRN htn  haloperidol     Tablet 0.5 milliGRAM(s) Oral every 8 hours PRN agitation  polyethylene glycol 3350 17 Gram(s) Oral two times a day PRN Constipation      ALLERGIES:  Allergies    No Known Allergies    Intolerances        LABS:              Creatinine Trend:         hs Troponin:              CSF:                      EKG:   MICROBIOLOGY:    IMAGING:      Labs, imaging, EKG personally reviewed    RADIOLOGY & ADDITIONAL TESTS: Reviewed.

## 2020-11-26 NOTE — CHART NOTE - NSCHARTNOTEFT_GEN_A_CORE
Spoke to RN. Patient has become agitated and not able to be redirected. Refusing oral meds. Based on chart review, will order dose of Zyprexa IM

## 2020-11-27 PROCEDURE — 99232 SBSQ HOSP IP/OBS MODERATE 35: CPT

## 2020-11-27 RX ORDER — OLANZAPINE 15 MG/1
5 TABLET, FILM COATED ORAL ONCE
Refills: 0 | Status: COMPLETED | OUTPATIENT
Start: 2020-11-27 | End: 2020-11-27

## 2020-11-27 NOTE — PROGRESS NOTE ADULT - SUBJECTIVE AND OBJECTIVE BOX
INTERVAL HPI/OVERNIGHT EVENTS:    SUBJECTIVE: Patient seen and examined at bedside.     no cp, sob, abd pain, fever  no ha, syncope, lightheadedness, dizziness    OBJECTIVE:    VITAL SIGNS:  Vital Signs Last 24 Hrs  T(C): 36.5 (26 Nov 2020 14:11), Max: 36.5 (26 Nov 2020 14:11)  T(F): 97.7 (26 Nov 2020 14:11), Max: 97.7 (26 Nov 2020 14:11)  HR: 91 (26 Nov 2020 14:11) (91 - 91)  BP: 166/91 (26 Nov 2020 14:11) (166/91 - 166/91)  BP(mean): --  RR: 16 (26 Nov 2020 14:11) (16 - 16)  SpO2: --      PHYSICAL EXAM:    General: NAD  HEENT: NC/AT; PERRL, clear conjunctiva  Neck: supple  Respiratory: CTA b/l  Cardiovascular: +S1/S2; RRR  Abdomen: soft, NT/ND; +BS x4  Extremities: WWP, 2+ peripheral pulses b/l; no LE edema  Skin: normal color and turgor; no rash  Neurological:    MEDICATIONS:  MEDICATIONS  (STANDING):  amLODIPine   Tablet 10 milliGRAM(s) Oral daily  AQUAPHOR (petrolatum Ointment) 1 Application(s) Topical daily  chlorhexidine 4% Liquid 1 Application(s) Topical <User Schedule>  diVALproex  milliGRAM(s) Oral two times a day  enalapril 10 milliGRAM(s) Oral daily  enoxaparin Injectable 40 milliGRAM(s) SubCutaneous daily  gabapentin 100 milliGRAM(s) Oral three times a day  levETIRAcetam 750 milliGRAM(s) Oral two times a day  OLANZapine 5 milliGRAM(s) Oral daily  OLANZapine Injectable 5 milliGRAM(s) IntraMuscular once  senna 2 Tablet(s) Oral at bedtime    MEDICATIONS  (PRN):  cloNIDine 0.1 milliGRAM(s) Oral every 8 hours PRN htn  haloperidol     Tablet 0.5 milliGRAM(s) Oral every 8 hours PRN agitation  polyethylene glycol 3350 17 Gram(s) Oral two times a day PRN Constipation      ALLERGIES:  Allergies    No Known Allergies    Intolerances        LABS:              Creatinine Trend:         hs Troponin:              CSF:                      EKG:   MICROBIOLOGY:    IMAGING:      Labs, imaging, EKG personally reviewed    RADIOLOGY & ADDITIONAL TESTS: Reviewed.

## 2020-11-27 NOTE — PROGRESS NOTE ADULT - ASSESSMENT
60M PMHx seizure disorder, schizophrenia, HTN here with altered mental status.    #Altered mental status, toxic metabolic encephalopathy  possible h/o schizophrenia, suspect psychosis vs. neurocognitive disorder  calm but refusing blood draws, medications  f/u psych  zyprexa 5  #HTN  norvasc 5  #Seizure disorder  depakote 500 bid  keppra 750 bid  #DVT ppx  lovenox    #Progress Note Handoff:  Pending (specify):  Consults_________, Tests________, Test Results_______, Other___discharge planning______  Family discussion:d/w pt at bedside re: treatment plan, primary dx  Disposition: Home___/SNF___/Other________/Unknown at this time____x____   normal...

## 2020-11-28 PROCEDURE — 99232 SBSQ HOSP IP/OBS MODERATE 35: CPT

## 2020-11-28 NOTE — PROGRESS NOTE ADULT - SUBJECTIVE AND OBJECTIVE BOX
INTERVAL HPI/OVERNIGHT EVENTS:    SUBJECTIVE: Patient seen and examined at bedside.     no cp, sob, abd pain, fever  no ha, syncope, lightheadedness, dizziness    OBJECTIVE:    VITAL SIGNS:  Vital Signs Last 24 Hrs  T(C): 36.8 (27 Nov 2020 13:55), Max: 36.8 (27 Nov 2020 13:55)  T(F): 98.2 (27 Nov 2020 13:55), Max: 98.2 (27 Nov 2020 13:55)  HR: 76 (27 Nov 2020 13:55) (76 - 76)  BP: 142/81 (27 Nov 2020 13:55) (142/81 - 142/81)  BP(mean): --  RR: 16 (27 Nov 2020 13:55) (16 - 16)  SpO2: --      PHYSICAL EXAM:    General: NAD  HEENT: NC/AT; PERRL, clear conjunctiva  Neck: supple  Respiratory: CTA b/l  Cardiovascular: +S1/S2; RRR  Abdomen: soft, NT/ND; +BS x4  Extremities: WWP, 2+ peripheral pulses b/l; no LE edema  Skin: normal color and turgor; no rash  Neurological:    MEDICATIONS:  MEDICATIONS  (STANDING):  amLODIPine   Tablet 10 milliGRAM(s) Oral daily  AQUAPHOR (petrolatum Ointment) 1 Application(s) Topical daily  chlorhexidine 4% Liquid 1 Application(s) Topical <User Schedule>  diVALproex  milliGRAM(s) Oral two times a day  enalapril 10 milliGRAM(s) Oral daily  enoxaparin Injectable 40 milliGRAM(s) SubCutaneous daily  gabapentin 100 milliGRAM(s) Oral three times a day  levETIRAcetam 750 milliGRAM(s) Oral two times a day  OLANZapine 5 milliGRAM(s) Oral daily  senna 2 Tablet(s) Oral at bedtime    MEDICATIONS  (PRN):  cloNIDine 0.1 milliGRAM(s) Oral every 8 hours PRN htn  haloperidol     Tablet 0.5 milliGRAM(s) Oral every 8 hours PRN agitation  polyethylene glycol 3350 17 Gram(s) Oral two times a day PRN Constipation      ALLERGIES:  Allergies    No Known Allergies    Intolerances        LABS:              Creatinine Trend:         hs Troponin:              CSF:                      EKG:   MICROBIOLOGY:    IMAGING:      Labs, imaging, EKG personally reviewed    RADIOLOGY & ADDITIONAL TESTS: Reviewed.

## 2020-11-29 PROCEDURE — 99232 SBSQ HOSP IP/OBS MODERATE 35: CPT

## 2020-11-29 NOTE — PROGRESS NOTE ADULT - SUBJECTIVE AND OBJECTIVE BOX
INTERVAL HPI/OVERNIGHT EVENTS:    SUBJECTIVE: Patient seen and examined at bedside.     no cp, sob, abd pain, fever  no ha, syncope, lightheadedness, dizziness    OBJECTIVE:    VITAL SIGNS:  Vital Signs Last 24 Hrs  T(C): 36.1 (28 Nov 2020 13:40), Max: 36.1 (28 Nov 2020 13:40)  T(F): 97 (28 Nov 2020 13:40), Max: 97 (28 Nov 2020 13:40)  HR: 81 (28 Nov 2020 13:40) (81 - 81)  BP: 155/92 (28 Nov 2020 13:40) (155/92 - 155/92)  BP(mean): --  RR: 18 (28 Nov 2020 13:40) (18 - 18)  SpO2: --      PHYSICAL EXAM:    General: NAD  HEENT: NC/AT; PERRL, clear conjunctiva  Neck: supple  Respiratory: CTA b/l  Cardiovascular: +S1/S2; RRR  Abdomen: soft, NT/ND; +BS x4  Extremities: WWP, 2+ peripheral pulses b/l; no LE edema  Skin: normal color and turgor; no rash  Neurological:    MEDICATIONS:  MEDICATIONS  (STANDING):  amLODIPine   Tablet 10 milliGRAM(s) Oral daily  AQUAPHOR (petrolatum Ointment) 1 Application(s) Topical daily  chlorhexidine 4% Liquid 1 Application(s) Topical <User Schedule>  diVALproex  milliGRAM(s) Oral two times a day  enalapril 10 milliGRAM(s) Oral daily  enoxaparin Injectable 40 milliGRAM(s) SubCutaneous daily  gabapentin 100 milliGRAM(s) Oral three times a day  levETIRAcetam 750 milliGRAM(s) Oral two times a day  OLANZapine 5 milliGRAM(s) Oral daily  senna 2 Tablet(s) Oral at bedtime    MEDICATIONS  (PRN):  cloNIDine 0.1 milliGRAM(s) Oral every 8 hours PRN htn  haloperidol     Tablet 0.5 milliGRAM(s) Oral every 8 hours PRN agitation  polyethylene glycol 3350 17 Gram(s) Oral two times a day PRN Constipation      ALLERGIES:  Allergies    No Known Allergies    Intolerances        LABS:              Creatinine Trend:         hs Troponin:              CSF:                      EKG:   MICROBIOLOGY:    IMAGING:      Labs, imaging, EKG personally reviewed    RADIOLOGY & ADDITIONAL TESTS: Reviewed.

## 2020-11-30 PROCEDURE — 99232 SBSQ HOSP IP/OBS MODERATE 35: CPT

## 2020-11-30 NOTE — PROGRESS NOTE ADULT - SUBJECTIVE AND OBJECTIVE BOX
INTERVAL HPI/OVERNIGHT EVENTS:    SUBJECTIVE: Patient seen and examined at bedside.     no cp, sob, abd pain, fever  no ha, syncope, lightheadedness, dizziness  OBJECTIVE:    VITAL SIGNS:  Vital Signs Last 24 Hrs  T(C): --  T(F): --  HR: --  BP: --  BP(mean): --  RR: --  SpO2: --      PHYSICAL EXAM:    General: NAD  HEENT: NC/AT; PERRL, clear conjunctiva  Neck: supple  Respiratory: CTA b/l  Cardiovascular: +S1/S2; RRR  Abdomen: soft, NT/ND; +BS x4  Extremities: WWP, 2+ peripheral pulses b/l; no LE edema  Skin: normal color and turgor; no rash  Neurological:    MEDICATIONS:  MEDICATIONS  (STANDING):  amLODIPine   Tablet 10 milliGRAM(s) Oral daily  AQUAPHOR (petrolatum Ointment) 1 Application(s) Topical daily  chlorhexidine 4% Liquid 1 Application(s) Topical <User Schedule>  diVALproex  milliGRAM(s) Oral two times a day  enalapril 10 milliGRAM(s) Oral daily  enoxaparin Injectable 40 milliGRAM(s) SubCutaneous daily  gabapentin 100 milliGRAM(s) Oral three times a day  levETIRAcetam 750 milliGRAM(s) Oral two times a day  OLANZapine 5 milliGRAM(s) Oral daily  senna 2 Tablet(s) Oral at bedtime    MEDICATIONS  (PRN):  cloNIDine 0.1 milliGRAM(s) Oral every 8 hours PRN htn  haloperidol     Tablet 0.5 milliGRAM(s) Oral every 8 hours PRN agitation  polyethylene glycol 3350 17 Gram(s) Oral two times a day PRN Constipation      ALLERGIES:  Allergies    No Known Allergies    Intolerances        LABS:              Creatinine Trend:         hs Troponin:              CSF:                      EKG:   MICROBIOLOGY:    IMAGING:      Labs, imaging, EKG personally reviewed    RADIOLOGY & ADDITIONAL TESTS: Reviewed.

## 2020-12-01 DIAGNOSIS — F20.9 SCHIZOPHRENIA, UNSPECIFIED: ICD-10-CM

## 2020-12-01 PROCEDURE — 99232 SBSQ HOSP IP/OBS MODERATE 35: CPT

## 2020-12-01 RX ORDER — HALOPERIDOL DECANOATE 100 MG/ML
5 INJECTION INTRAMUSCULAR ONCE
Refills: 0 | Status: COMPLETED | OUTPATIENT
Start: 2020-12-01 | End: 2020-12-01

## 2020-12-01 RX ORDER — HALOPERIDOL DECANOATE 100 MG/ML
5 INJECTION INTRAMUSCULAR ONCE
Refills: 0 | Status: DISCONTINUED | OUTPATIENT
Start: 2020-12-01 | End: 2020-12-01

## 2020-12-01 RX ADMIN — HALOPERIDOL DECANOATE 5 MILLIGRAM(S): 100 INJECTION INTRAMUSCULAR at 09:26

## 2020-12-01 NOTE — PROGRESS NOTE ADULT - SUBJECTIVE AND OBJECTIVE BOX
INTERVAL HPI/OVERNIGHT EVENTS:    SUBJECTIVE: Patient seen and examined at bedside.     unable to obtain ros    OBJECTIVE:    VITAL SIGNS:  Vital Signs Last 24 Hrs  T(C): --  T(F): --  HR: --  BP: --  BP(mean): --  RR: --  SpO2: --      PHYSICAL EXAM:    General: Nad, somnolent  HEENT: NC/AT; PERRL, clear conjunctiva  Neck: supple  Respiratory: CTA b/l  Cardiovascular: +S1/S2; RRR  Abdomen: soft, NT/ND; +BS x4  Extremities: WWP, 2+ peripheral pulses b/l; no LE edema  Skin: normal color and turgor; no rash  Neurological:    MEDICATIONS:  MEDICATIONS  (STANDING):  amLODIPine   Tablet 10 milliGRAM(s) Oral daily  AQUAPHOR (petrolatum Ointment) 1 Application(s) Topical daily  chlorhexidine 4% Liquid 1 Application(s) Topical <User Schedule>  diVALproex  milliGRAM(s) Oral two times a day  enalapril 10 milliGRAM(s) Oral daily  enoxaparin Injectable 40 milliGRAM(s) SubCutaneous daily  gabapentin 100 milliGRAM(s) Oral three times a day  levETIRAcetam 750 milliGRAM(s) Oral two times a day  OLANZapine 5 milliGRAM(s) Oral daily  senna 2 Tablet(s) Oral at bedtime    MEDICATIONS  (PRN):  cloNIDine 0.1 milliGRAM(s) Oral every 8 hours PRN htn  haloperidol     Tablet 0.5 milliGRAM(s) Oral every 8 hours PRN agitation  polyethylene glycol 3350 17 Gram(s) Oral two times a day PRN Constipation      ALLERGIES:  Allergies    No Known Allergies    Intolerances        LABS:              Creatinine Trend:         hs Troponin:              CSF:                      EKG:   MICROBIOLOGY:    IMAGING:      Labs, imaging, EKG personally reviewed    RADIOLOGY & ADDITIONAL TESTS: Reviewed.

## 2020-12-01 NOTE — PROGRESS NOTE ADULT - ASSESSMENT
60M PMHx seizure disorder, schizophrenia, HTN here with altered mental status.    #Altered mental status, toxic metabolic encephalopathy  possible h/o schizophrenia, suspect psychosis vs. neurocognitive disorder  worsening agitation today, combative towards staff  requiring haldol im today  f/u psych  zyprexa 5  #HTN  norvasc 5  #Seizure disorder  depakote 500 bid  keppra 750 bid  #DVT ppx  lovenox    #Progress Note Handoff:  Pending (specify):  Consults_________, Tests________, Test Results_______, Other___f/u psych______  Family discussion:d/w pt at bedside re: treatment plan, primary dx  Disposition: Home___/SNF___/Other________/Unknown at this time____x____

## 2020-12-01 NOTE — PROGRESS NOTE BEHAVIORAL HEALTH - NSBHFUPINTERVALHXFT_PSY_A_CORE
Pt remains on 1:1 for agitation.  Patient with episode of agitation earlier today, was not re-directable and haloperidol was administered with good response. Collateral obtained from nursing staff who reports that patient was calling staff "prostitutes" and stating that staff owed him money.   On approach this evening, patient lying in bed with mask on. Patient unable to state where he is or how long he has been here. He asks where he mom is and states that she is supposed to be here, "Shima Chris". He reports that he is supposed to be here and states that staff has been annoying him. He also reports that I am annoying him during evaluation with questions. Patient reports that he would like "sealed oats" with staff reporting that patient has been suspicious of food and not eating or taking medications. Nursing also reports that patient has been suspicious of staff, with more fondness towards dietary staff. During encounter patient reports that "only one of them I am going to ", referring to the lady who dropped off his food.   Patient not able to elaborate on what transpired earlier in the day, becoming more agitated. He closed his eyes and refused to engage further. Pt remains on 1:1 for agitation.  Patient with episode of agitation earlier today, was not redirectable and haloperidol was administered with good response. Collateral obtained from nursing staff who reports that patient was calling staff "prostitutes" and stating that staff owed him money.   On approach this evening, patient lying in bed with mask on. Patient unable to state where he is or how long he has been here. He asks where he mom is and states that she is supposed to be here, "Shima Chris". He reports that he, himself, is supposed to be here and states that staff has been annoying him. He also reports that I am annoying him during evaluation with questions. Patient reports that he would like "sealed oats" (referring to oatmeal) with staff reporting that patient has been suspicious of food and not eating or taking medications. Nursing also reports that patient has been suspicious of staff, with more fondness towards dietary staff. During encounter patient reports that "only one of them I am going to ", referring to the lady who dropped off his food.   Patient not able to elaborate on what transpired earlier in the day, becoming more agitated. He closed his eyes and refused to engage further. Pt remains on 1:1 for agitation.  Patient with episode of agitation earlier today, was not re-directable and haloperidol was administered with good response. Collateral obtained from nursing staff who reports that patient was calling staff "prostitutes" and stating that staff owed him money.   On approach this evening, patient lying in bed with mask on. Patient unable to state where he is or how long he has been here. He asks where he mom is and states that she is supposed to be here, "Shima Chris". He reports that he, himself, is supposed to be here and states that staff has been annoying him. He also reports that I am annoying him during evaluation with questions. Patient reports that he would like "sealed oats" (referring to oatmeal) with staff reporting that patient has been suspicious of food and not eating or taking medications. Nursing also reports that patient has been suspicious of staff, with more fondness towards dietary staff. During encounter patient reports that "only one of them I am going to ", referring to the lady who dropped off his food.   Patient not able to elaborate on what transpired earlier in the day, becoming more agitated. He closed his eyes and refused to engage further.

## 2020-12-01 NOTE — CHART NOTE - NSCHARTNOTEFT_GEN_A_CORE
2036 today patient aggressive with nursing staff, threatening to physically assault staff  not redirectable  haldol IM 5mg stat  monitor  psych consult

## 2020-12-01 NOTE — PROGRESS NOTE BEHAVIORAL HEALTH - PRN MEDS
haloperidol 5mg IM administered at 9am
Pt was given Haldol 5 mg IM PRN on 9/28/2020 at 6 AM. No PRNs since then.

## 2020-12-01 NOTE — CHART NOTE - NSCHARTNOTEFT_GEN_A_CORE
Registered Dietitian Limited Follow-Up:    Altered mental status, toxic metabolic encephalopathy noted. Pt receives DASH/TLC diet with Ensure Clear q24hrs. Consumes 100% of most meals at this time, indicating a good appetite. Latest wt 62.5 kg (9/14) - no new wt at this time. No c/o N/V/D/C reported at this time. No pressure ulcers noted. Labs/meds reviewed. Appears to be tolerating current nutrition regimen at this time; no new nutrition intervention at this time. Not at nutrition risk - will review in 7 days.

## 2020-12-01 NOTE — PROGRESS NOTE BEHAVIORAL HEALTH - SUMMARY
Patient is a 60-year-old, [place], male, with unknown social demographics, or medical history, with reported h/o schizophrenia per chart review. Patient was reportedly admitted to medicine for workup of AMS. Previously seen by psychiatry consult team for mental health evaluation and diagnosed with Neurocognitive disorder. Psychiatry reconsulted as pt has been irritable and has intermittent agitation.  Currently on evaluation pt is agitated but now aggressive, although there was an incident earlier today requiring chemical restraints. His behavioral changes appear to be secondary to neurocognitive disorder than a primary psychotic disorder, with no overt signs psychosis on exam despite reported h/o chronic schizophrenia. Patient's suspicions and thought process possibly due to neurocognitive impairment with patient in unfamiliar environment. He does not meet criteria for IPP admission.           Will recommend to continue Zyprexa 5 mg QHS for AH, mood lability s/t neurocognitive disorder. Considering his age and possibility of LBD, will recommend switching from Haldol to Zyprexa 5 mg IM for agitation, aggression not responding verbal and behavioral interventions. Typical antipsychotics are more likely to produce EPS in the elderly and with neurocognitive disorder.

## 2020-12-02 PROCEDURE — 99232 SBSQ HOSP IP/OBS MODERATE 35: CPT

## 2020-12-02 NOTE — PROGRESS NOTE ADULT - SUBJECTIVE AND OBJECTIVE BOX
60M PMHx seizure disorder, schizophrenia, HTN here with altered mental status.    Today:  Patient seen at bedside, offered no complaints.          REVIEW OF SYSTEMS:  Not a reliable historian    MEDICATIONS  (STANDING):  amLODIPine   Tablet 10 milliGRAM(s) Oral daily  AQUAPHOR (petrolatum Ointment) 1 Application(s) Topical daily  chlorhexidine 4% Liquid 1 Application(s) Topical <User Schedule>  diVALproex  milliGRAM(s) Oral two times a day  enalapril 10 milliGRAM(s) Oral daily  enoxaparin Injectable 40 milliGRAM(s) SubCutaneous daily  gabapentin 100 milliGRAM(s) Oral three times a day  levETIRAcetam 750 milliGRAM(s) Oral two times a day  OLANZapine 5 milliGRAM(s) Oral daily  senna 2 Tablet(s) Oral at bedtime    MEDICATIONS  (PRN):  cloNIDine 0.1 milliGRAM(s) Oral every 8 hours PRN htn  haloperidol     Tablet 0.5 milliGRAM(s) Oral every 8 hours PRN agitation  polyethylene glycol 3350 17 Gram(s) Oral two times a day PRN Constipation      Allergies  No Known Allergies              FAMILY HISTORY:  No pertinent family history in first degree relatives  unknown        Vital Signs Last 24 Hrs  T(C): --  T(F): --  HR: --  BP: --  BP(mean): --  RR: --  SpO2: --    PHYSICAL EXAM:    GENERAL: NAD, well-groomed, well-developed  HEAD:  Atraumatic, Normocephalic  EYES: EOMI, PERRLA, conjunctiva and sclera clear  NECK: Supple, No JVD, Normal thyroid  NERVOUS SYSTEM:  Alert & Oriented X1  CHEST/LUNG: Clear to percussion bilaterally; No rales, rhonchi, wheezing, or rubs  HEART: Regular rate and rhythm; No murmurs, rubs, or gallops  ABDOMEN: Soft, Nontender, Nondistended; Bowel sounds present  EXTREMITIES:  2+ Peripheral Pulses, No clubbing, cyanosis, or edema

## 2020-12-03 PROCEDURE — 99232 SBSQ HOSP IP/OBS MODERATE 35: CPT

## 2020-12-03 NOTE — PROGRESS NOTE ADULT - SUBJECTIVE AND OBJECTIVE BOX
60M PMHx seizure disorder, schizophrenia, HTN here with altered mental status.    Today:  Seen at bedside, no complaints or events overnight.          REVIEW OF SYSTEMS:  Not a reliable historian.      MEDICATIONS  (STANDING):  amLODIPine   Tablet 10 milliGRAM(s) Oral daily  AQUAPHOR (petrolatum Ointment) 1 Application(s) Topical daily  chlorhexidine 4% Liquid 1 Application(s) Topical <User Schedule>  diVALproex  milliGRAM(s) Oral two times a day  enalapril 10 milliGRAM(s) Oral daily  enoxaparin Injectable 40 milliGRAM(s) SubCutaneous daily  gabapentin 100 milliGRAM(s) Oral three times a day  levETIRAcetam 750 milliGRAM(s) Oral two times a day  OLANZapine 5 milliGRAM(s) Oral daily  senna 2 Tablet(s) Oral at bedtime    MEDICATIONS  (PRN):  cloNIDine 0.1 milliGRAM(s) Oral every 8 hours PRN htn  haloperidol     Tablet 0.5 milliGRAM(s) Oral every 8 hours PRN agitation  polyethylene glycol 3350 17 Gram(s) Oral two times a day PRN Constipation      Allergies  No Known Allergies        FAMILY HISTORY:  No pertinent family history in first degree relatives  unknown        Vital Signs Last 24 Hrs  T(C): --  T(F): --  HR: --  BP: --  BP(mean): --  RR: --  SpO2: --    PHYSICAL EXAM:  GENERAL: NAD, well-groomed, well-developed  HEAD:  Atraumatic, Normocephalic  EYES: EOMI, PERRLA, conjunctiva and sclera clear  NECK: Supple, No JVD, Normal thyroid  NERVOUS SYSTEM:  Alert & Oriented X1  CHEST/LUNG: Clear to percussion bilaterally; No rales, rhonchi, wheezing, or rubs  HEART: Regular rate and rhythm; No murmurs, rubs, or gallops  ABDOMEN: Soft, Nontender, Nondistended; Bowel sounds present  EXTREMITIES:  2+ Peripheral Pulses, No clubbing, cyanosis, or edema

## 2020-12-04 PROCEDURE — 99232 SBSQ HOSP IP/OBS MODERATE 35: CPT

## 2020-12-05 PROCEDURE — 99232 SBSQ HOSP IP/OBS MODERATE 35: CPT

## 2020-12-05 RX ADMIN — OLANZAPINE 5 MILLIGRAM(S): 15 TABLET, FILM COATED ORAL at 14:06

## 2020-12-05 NOTE — PROGRESS NOTE ADULT - SUBJECTIVE AND OBJECTIVE BOX
60M PMHx seizure disorder, schizophrenia, HTN here with altered mental status.    Today: No complaints when seen.          REVIEW OF SYSTEMS:  Not a reliable historian.      MEDICATIONS  (STANDING):  amLODIPine   Tablet 10 milliGRAM(s) Oral daily  AQUAPHOR (petrolatum Ointment) 1 Application(s) Topical daily  chlorhexidine 4% Liquid 1 Application(s) Topical <User Schedule>  diVALproex  milliGRAM(s) Oral two times a day  enalapril 10 milliGRAM(s) Oral daily  enoxaparin Injectable 40 milliGRAM(s) SubCutaneous daily  gabapentin 100 milliGRAM(s) Oral three times a day  levETIRAcetam 750 milliGRAM(s) Oral two times a day  OLANZapine 5 milliGRAM(s) Oral daily  senna 2 Tablet(s) Oral at bedtime    MEDICATIONS  (PRN):  cloNIDine 0.1 milliGRAM(s) Oral every 8 hours PRN htn  haloperidol     Tablet 0.5 milliGRAM(s) Oral every 8 hours PRN agitation  polyethylene glycol 3350 17 Gram(s) Oral two times a day PRN Constipation      Allergies  No Known Allergies        FAMILY HISTORY:  No pertinent family history in first degree relatives  unknown        Vital Signs Last 24 Hrs  T(C): --  T(F): --  HR: --  BP: --  BP(mean): --  RR: --  SpO2: --    PHYSICAL EXAM:    GENERAL: NAD, well-groomed, well-developed  HEAD:  Atraumatic, Normocephalic  EYES: EOMI, PERRLA, conjunctiva and sclera clear  ENMT: No tonsillar erythema, exudates, or enlargement; Moist mucous membranes, Good dentition, No lesions  NECK: Supple, No JVD, Normal thyroid  NERVOUS SYSTEM:  Awake, alert  CHEST/LUNG: Clear to percussion bilaterally; No rales, rhonchi, wheezing, or rubs  HEART: Regular rate and rhythm; No murmurs, rubs, or gallops  ABDOMEN: Soft, Nontender, Nondistended; Bowel sounds present

## 2020-12-06 PROCEDURE — 99232 SBSQ HOSP IP/OBS MODERATE 35: CPT

## 2020-12-06 NOTE — PROGRESS NOTE ADULT - SUBJECTIVE AND OBJECTIVE BOX
60M PMHx seizure disorder, schizophrenia, HTN here with altered mental status.    12/6: No complaints today, no overnight events.        REVIEW OF SYSTEMS:  Not a reliable historian.      MEDICATIONS  (STANDING):  amLODIPine   Tablet 10 milliGRAM(s) Oral daily  AQUAPHOR (petrolatum Ointment) 1 Application(s) Topical daily  chlorhexidine 4% Liquid 1 Application(s) Topical <User Schedule>  diVALproex  milliGRAM(s) Oral two times a day  enalapril 10 milliGRAM(s) Oral daily  enoxaparin Injectable 40 milliGRAM(s) SubCutaneous daily  gabapentin 100 milliGRAM(s) Oral three times a day  levETIRAcetam 750 milliGRAM(s) Oral two times a day  OLANZapine 5 milliGRAM(s) Oral daily  senna 2 Tablet(s) Oral at bedtime    MEDICATIONS  (PRN):  cloNIDine 0.1 milliGRAM(s) Oral every 8 hours PRN htn  haloperidol     Tablet 0.5 milliGRAM(s) Oral every 8 hours PRN agitation  polyethylene glycol 3350 17 Gram(s) Oral two times a day PRN Constipation      Allergies  No Known Allergies        FAMILY HISTORY:  No pertinent family history in first degree relatives  unknown        Vital Signs Last 24 Hrs  T(C): --  T(F): --  HR: --  BP: --  BP(mean): --  RR: --  SpO2: --    PHYSICAL EXAM:  GENERAL: NAD, well-groomed, well-developed  HEAD:  Atraumatic, Normocephalic  EYES: EOMI, PERRLA, conjunctiva and sclera clear  ENMT: No tonsillar erythema, exudates, or enlargement; Moist mucous membranes, Good dentition, No lesions  NECK: Supple, No JVD, Normal thyroid  NERVOUS SYSTEM:  Awake, alert  CHEST/LUNG: Clear to percussion bilaterally; No rales, rhonchi, wheezing, or rubs  HEART: Regular rate and rhythm; No murmurs, rubs, or gallops  ABDOMEN: Soft, Nontender, Nondistended; Bowel sounds present

## 2020-12-07 PROCEDURE — 99231 SBSQ HOSP IP/OBS SF/LOW 25: CPT

## 2020-12-07 RX ADMIN — GABAPENTIN 100 MILLIGRAM(S): 400 CAPSULE ORAL at 21:48

## 2020-12-07 RX ADMIN — SENNA PLUS 2 TABLET(S): 8.6 TABLET ORAL at 21:48

## 2020-12-07 NOTE — PROGRESS NOTE ADULT - SUBJECTIVE AND OBJECTIVE BOX
60M PMHx seizure disorder, schizophrenia, HTN here with altered mental status.    12/7:  Pt seen at bedside, no new complaints.          REVIEW OF SYSTEMS:  Not a reliable historian.      MEDICATIONS  (STANDING):  amLODIPine   Tablet 10 milliGRAM(s) Oral daily  AQUAPHOR (petrolatum Ointment) 1 Application(s) Topical daily  chlorhexidine 4% Liquid 1 Application(s) Topical <User Schedule>  diVALproex  milliGRAM(s) Oral two times a day  enalapril 10 milliGRAM(s) Oral daily  enoxaparin Injectable 40 milliGRAM(s) SubCutaneous daily  gabapentin 100 milliGRAM(s) Oral three times a day  levETIRAcetam 750 milliGRAM(s) Oral two times a day  OLANZapine 5 milliGRAM(s) Oral daily  senna 2 Tablet(s) Oral at bedtime    MEDICATIONS  (PRN):  cloNIDine 0.1 milliGRAM(s) Oral every 8 hours PRN htn  haloperidol     Tablet 0.5 milliGRAM(s) Oral every 8 hours PRN agitation  polyethylene glycol 3350 17 Gram(s) Oral two times a day PRN Constipation      Allergies  No Known Allergies        FAMILY HISTORY:  No pertinent family history in first degree relatives  unknown        Vital Signs Last 24 Hrs  T(C): 35.8 (07 Dec 2020 09:45), Max: 35.8 (07 Dec 2020 09:45)  T(F): 96.4 (07 Dec 2020 09:45), Max: 96.4 (07 Dec 2020 09:45)  HR: 100 (07 Dec 2020 09:45) (100 - 100)  BP: 108/71 (07 Dec 2020 09:45) (108/71 - 108/71)  RR: 16 (07 Dec 2020 09:45) (16 - 16)    PHYSICAL EXAM:  GENERAL: NAD, well-groomed, well-developed  HEAD:  Atraumatic, Normocephalic  EYES: EOMI, PERRLA, conjunctiva and sclera clear  ENMT: No tonsillar erythema, exudates, or enlargement; Moist mucous membranes, Good dentition, No lesions  NECK: Supple, No JVD, Normal thyroid  NERVOUS SYSTEM:  Awake, alert  CHEST/LUNG: Clear to percussion bilaterally; No rales, rhonchi, wheezing, or rubs  HEART: Regular rate and rhythm; No murmurs, rubs, or gallops  ABDOMEN: Soft, Nontender, Nondistended; Bowel sounds present

## 2020-12-08 PROCEDURE — 99231 SBSQ HOSP IP/OBS SF/LOW 25: CPT

## 2020-12-08 NOTE — PROGRESS NOTE ADULT - SUBJECTIVE AND OBJECTIVE BOX
60M PMHx seizure disorder, schizophrenia, HTN here with altered mental status.    Today:  No complaints, no events at night.        REVIEW OF SYSTEMS:  Not a reliable historian.      MEDICATIONS  (STANDING):  amLODIPine   Tablet 10 milliGRAM(s) Oral daily  AQUAPHOR (petrolatum Ointment) 1 Application(s) Topical daily  chlorhexidine 4% Liquid 1 Application(s) Topical <User Schedule>  diVALproex  milliGRAM(s) Oral two times a day  enalapril 10 milliGRAM(s) Oral daily  enoxaparin Injectable 40 milliGRAM(s) SubCutaneous daily  gabapentin 100 milliGRAM(s) Oral three times a day  levETIRAcetam 750 milliGRAM(s) Oral two times a day  OLANZapine 5 milliGRAM(s) Oral daily  senna 2 Tablet(s) Oral at bedtime    MEDICATIONS  (PRN):  cloNIDine 0.1 milliGRAM(s) Oral every 8 hours PRN htn  haloperidol     Tablet 0.5 milliGRAM(s) Oral every 8 hours PRN agitation  polyethylene glycol 3350 17 Gram(s) Oral two times a day PRN Constipation      Allergies  No Known Allergies        FAMILY HISTORY:  No pertinent family history in first degree relatives  unknown        Vital Signs Last 24 Hrs  T(C): 36.2 (07 Dec 2020 21:48), Max: 36.2 (07 Dec 2020 21:48)  T(F): 97.1 (07 Dec 2020 21:48), Max: 97.1 (07 Dec 2020 21:48)  HR: 95 (07 Dec 2020 21:48) (95 - 95)  BP: 120/77 (07 Dec 2020 21:48) (120/77 - 120/77)  RR: 16 (07 Dec 2020 21:48) (16 - 16)      PHYSICAL EXAM:  GENERAL: NAD, well-groomed, well-developed  HEAD:  Atraumatic, Normocephalic  EYES: EOMI, PERRLA, conjunctiva and sclera clear  ENMT: No tonsillar erythema, exudates, or enlargement; Moist mucous membranes, Good dentition, No lesions  NECK: Supple, No JVD, Normal thyroid  NERVOUS SYSTEM:  Awake, alert  CHEST/LUNG: Clear to percussion bilaterally; No rales, rhonchi, wheezing, or rubs  HEART: Regular rate and rhythm; No murmurs, rubs, or gallops  ABDOMEN: Soft, Nontender, Nondistended; Bowel sounds present

## 2020-12-08 NOTE — CHART NOTE - NSCHARTNOTEFT_GEN_A_CORE
Limited reassessment    Meds reviewed/no new labs recorded    No edema noted; Skin is WDL (12/7).     No new wt recorded.    Diet order: DASH/TLC, Ensure Clear q24h.     Pt remains admitted pending case management- placement vs citizenship issues.  Pt continues to consuming >75% of meals consistently since previously assessed. No noted of N/V/D/C at this time. No further nutrition interventions at this time. Pt remains not at risk. RD to f/u within the next 7 days.

## 2020-12-09 PROCEDURE — 99231 SBSQ HOSP IP/OBS SF/LOW 25: CPT

## 2020-12-09 NOTE — PROGRESS NOTE ADULT - SUBJECTIVE AND OBJECTIVE BOX
60M PMHx seizure disorder, schizophrenia, HTN here with altered mental status.    Today:  No new complaints.          REVIEW OF SYSTEMS:  Not a reliable historian.      MEDICATIONS  (STANDING):  amLODIPine   Tablet 10 milliGRAM(s) Oral daily  AQUAPHOR (petrolatum Ointment) 1 Application(s) Topical daily  chlorhexidine 4% Liquid 1 Application(s) Topical <User Schedule>  diVALproex  milliGRAM(s) Oral two times a day  enalapril 10 milliGRAM(s) Oral daily  enoxaparin Injectable 40 milliGRAM(s) SubCutaneous daily  gabapentin 100 milliGRAM(s) Oral three times a day  levETIRAcetam 750 milliGRAM(s) Oral two times a day  OLANZapine 5 milliGRAM(s) Oral daily  senna 2 Tablet(s) Oral at bedtime    MEDICATIONS  (PRN):  cloNIDine 0.1 milliGRAM(s) Oral every 8 hours PRN htn  haloperidol     Tablet 0.5 milliGRAM(s) Oral every 8 hours PRN agitation  polyethylene glycol 3350 17 Gram(s) Oral two times a day PRN Constipation      Allergies  No Known Allergies        FAMILY HISTORY:  No pertinent family history in first degree relatives  unknown        Vital Signs Last 24 Hrs  T(C): --  T(F): --  HR: --  BP: --  BP(mean): --  RR: --  SpO2: --    PHYSICAL EXAM:    GENERAL: NAD, well-groomed, well-developed  HEAD:  Atraumatic, Normocephalic  EYES: EOMI, PERRLA, conjunctiva and sclera clear  ENMT: No tonsillar erythema, exudates, or enlargement; Moist mucous membranes, Good dentition, No lesions  NECK: Supple, No JVD, Normal thyroid  NERVOUS SYSTEM:  Awake and alert  CHEST/LUNG: Clear to percussion bilaterally; No rales, rhonchi, wheezing, or rubs  HEART: Regular rate and rhythm; No murmurs, rubs, or gallops  ABDOMEN: Soft, Nontender, Nondistended; Bowel sounds present  EXTREMITIES:  2+ Peripheral Pulses, No clubbing, cyanosis, or edema

## 2020-12-10 PROCEDURE — 99231 SBSQ HOSP IP/OBS SF/LOW 25: CPT

## 2020-12-10 NOTE — PROGRESS NOTE ADULT - SUBJECTIVE AND OBJECTIVE BOX
60M PMHx seizure disorder, schizophrenia, HTN here with altered mental status.    12/10:  Seen at bedside, no complaints.          REVIEW OF SYSTEMS:  Not a reliable historian      MEDICATIONS  (STANDING):  amLODIPine   Tablet 10 milliGRAM(s) Oral daily  AQUAPHOR (petrolatum Ointment) 1 Application(s) Topical daily  chlorhexidine 4% Liquid 1 Application(s) Topical <User Schedule>  diVALproex  milliGRAM(s) Oral two times a day  enalapril 10 milliGRAM(s) Oral daily  enoxaparin Injectable 40 milliGRAM(s) SubCutaneous daily  gabapentin 100 milliGRAM(s) Oral three times a day  levETIRAcetam 750 milliGRAM(s) Oral two times a day  OLANZapine 5 milliGRAM(s) Oral daily  senna 2 Tablet(s) Oral at bedtime    MEDICATIONS  (PRN):  cloNIDine 0.1 milliGRAM(s) Oral every 8 hours PRN htn  haloperidol     Tablet 0.5 milliGRAM(s) Oral every 8 hours PRN agitation  polyethylene glycol 3350 17 Gram(s) Oral two times a day PRN Constipation      Allergies  No Known Allergies        FAMILY HISTORY:  No pertinent family history in first degree relatives  unknown        Vital Signs Last 24 Hrs  T(C): 37.1 (09 Dec 2020 21:11), Max: 37.1 (09 Dec 2020 21:11)  T(F): 98.7 (09 Dec 2020 21:11), Max: 98.7 (09 Dec 2020 21:11)  HR: 59 (09 Dec 2020 21:11) (59 - 59)  BP: 150/72 (09 Dec 2020 21:11) (150/72 - 150/72)  RR: 16 (09 Dec 2020 21:11) (16 - 16)      PHYSICAL EXAM:  GENERAL: NAD, well-groomed, well-developed  HEAD:  Atraumatic, Normocephalic  EYES: EOMI, PERRLA, conjunctiva and sclera clear  NECK: Supple, No JVD, Normal thyroid  NERVOUS SYSTEM:  Awake and alert  CHEST/LUNG: Clear to percussion bilaterally; No rales, rhonchi, wheezing, or rubs  HEART: Regular rate and rhythm; No murmurs, rubs, or gallops  ABDOMEN: Soft, Nontender, Nondistended; Bowel sounds present  EXTREMITIES:  2+ Peripheral Pulses, No clubbing, cyanosis, or edema

## 2020-12-10 NOTE — PROGRESS NOTE ADULT - ASSESSMENT
60M PMHx seizure disorder, schizophrenia, HTN here with altered mental status.    #Altered mental status, toxic metabolic encephalopathy  possible h/o schizophrenia, suspect psychosis vs. neurocognitive disorder  calm but refusing blood draws, medications  psych consult appreciated-not a candidate for IPP  Zyprexa    #tinea pedis  -Resolved s/p clotrimazole cream    #HTN  norvasc 10  added enalapril, increased dose to 10 mg  Apparently patient has been refusing meds and vitals at times.    #Seizure disorder  depakote 500 bid  keppra 750 bid    #DVT ppx  lovenox    -DC pending case mgmt/SW; pt is placement issue, citizenship.    #Progress Note Handoff:  Pending (specify):  Consults_________, Tests________, Test Results_______, Other___discharge planning______  Family discussion:d/w pt at bedside re: treatment plan, primary dx  Disposition: Home___/SNF___/Other________/Unknown at this time____x____

## 2020-12-11 PROCEDURE — 99231 SBSQ HOSP IP/OBS SF/LOW 25: CPT

## 2020-12-11 NOTE — PROGRESS NOTE ADULT - SUBJECTIVE AND OBJECTIVE BOX
60M PMHx seizure disorder, schizophrenia, HTN here with altered mental status.    12/11:  No events overnight, no complaints.        REVIEW OF SYSTEMS:  Not a reliable historian.      MEDICATIONS  (STANDING):  amLODIPine   Tablet 10 milliGRAM(s) Oral daily  AQUAPHOR (petrolatum Ointment) 1 Application(s) Topical daily  chlorhexidine 4% Liquid 1 Application(s) Topical <User Schedule>  diVALproex  milliGRAM(s) Oral two times a day  enalapril 10 milliGRAM(s) Oral daily  enoxaparin Injectable 40 milliGRAM(s) SubCutaneous daily  gabapentin 100 milliGRAM(s) Oral three times a day  levETIRAcetam 750 milliGRAM(s) Oral two times a day  OLANZapine 5 milliGRAM(s) Oral daily  senna 2 Tablet(s) Oral at bedtime    MEDICATIONS  (PRN):  cloNIDine 0.1 milliGRAM(s) Oral every 8 hours PRN htn  haloperidol     Tablet 0.5 milliGRAM(s) Oral every 8 hours PRN agitation  polyethylene glycol 3350 17 Gram(s) Oral two times a day PRN Constipation      Allergies  No Known Allergies        FAMILY HISTORY:  No pertinent family history in first degree relatives  unknown        Vital Signs Last 24 Hrs  T(C): --  T(F): --  HR: --  BP: --  BP(mean): --  RR: --  SpO2: --    PHYSICAL EXAM:  GENERAL: NAD, well-groomed, well-developed  HEAD:  Atraumatic, Normocephalic  EYES: EOMI, PERRLA, conjunctiva and sclera clear  NECK: Supple, No JVD, Normal thyroid  NERVOUS SYSTEM:  Awake and alert  CHEST/LUNG: Clear to percussion bilaterally; No rales, rhonchi, wheezing, or rubs  HEART: Regular rate and rhythm; No murmurs, rubs, or gallops  ABDOMEN: Soft, Nontender, Nondistended; Bowel sounds present  EXTREMITIES:  2+ Peripheral Pulses, No clubbing, cyanosis, or edema

## 2020-12-11 NOTE — PROGRESS NOTE ADULT - ASSESSMENT

## 2020-12-12 PROCEDURE — 99232 SBSQ HOSP IP/OBS MODERATE 35: CPT

## 2020-12-12 NOTE — PROGRESS NOTE ADULT - SUBJECTIVE AND OBJECTIVE BOX
INTERVAL HPI/OVERNIGHT EVENTS:    SUBJECTIVE: Patient seen and examined at bedside.     no cp, sob, abd pain, fever  no ha, syncope, lightheadedness, dizziness    OBJECTIVE:    VITAL SIGNS:  Vital Signs Last 24 Hrs  T(C): --  T(F): --  HR: --  BP: --  BP(mean): --  RR: --  SpO2: --      PHYSICAL EXAM:    General: NAD  HEENT: NC/AT; PERRL, clear conjunctiva  Neck: supple  Respiratory: CTA b/l  Cardiovascular: +S1/S2; RRR  Abdomen: soft, NT/ND; +BS x4  Extremities: WWP, 2+ peripheral pulses b/l; no LE edema  Skin: normal color and turgor; no rash  Neurological:    MEDICATIONS:  MEDICATIONS  (STANDING):  amLODIPine   Tablet 10 milliGRAM(s) Oral daily  AQUAPHOR (petrolatum Ointment) 1 Application(s) Topical daily  chlorhexidine 4% Liquid 1 Application(s) Topical <User Schedule>  diVALproex  milliGRAM(s) Oral two times a day  enalapril 10 milliGRAM(s) Oral daily  enoxaparin Injectable 40 milliGRAM(s) SubCutaneous daily  gabapentin 100 milliGRAM(s) Oral three times a day  levETIRAcetam 750 milliGRAM(s) Oral two times a day  OLANZapine 5 milliGRAM(s) Oral daily  senna 2 Tablet(s) Oral at bedtime    MEDICATIONS  (PRN):  cloNIDine 0.1 milliGRAM(s) Oral every 8 hours PRN htn  haloperidol     Tablet 0.5 milliGRAM(s) Oral every 8 hours PRN agitation  LORazepam   Injectable 2 milliGRAM(s) IV Push every 4 hours PRN seizures  polyethylene glycol 3350 17 Gram(s) Oral two times a day PRN Constipation      ALLERGIES:  Allergies    No Known Allergies    Intolerances        LABS:              Creatinine Trend:         hs Troponin:              CSF:                      EKG:   MICROBIOLOGY:    IMAGING:      Labs, imaging, EKG personally reviewed    RADIOLOGY & ADDITIONAL TESTS: Reviewed.

## 2020-12-12 NOTE — PROGRESS NOTE ADULT - ASSESSMENT
60M PMHx seizure disorder, schizophrenia, HTN here with altered mental status.    #Altered mental status, toxic metabolic encephalopathy  possible h/o schizophrenia, suspect psychosis vs. neurocognitive disorder  still with apparent visual hallucinations  f/u psych  zyprexa 5  #HTN  norvasc 5  #Seizure disorder  depakote 500 bid  keppra 750 bid  #DVT ppx  lovenox    #Progress Note Handoff:  Pending (specify):  Consults_________, Tests________, Test Results_______, Other___f/u psych______  Family discussion:d/w pt at bedside re: treatment plan, primary dx  Disposition: Home___/SNF___/Other________/Unknown at this time____x____

## 2020-12-13 PROCEDURE — 99232 SBSQ HOSP IP/OBS MODERATE 35: CPT

## 2020-12-14 PROCEDURE — 99232 SBSQ HOSP IP/OBS MODERATE 35: CPT

## 2020-12-14 RX ORDER — HALOPERIDOL DECANOATE 100 MG/ML
5 INJECTION INTRAMUSCULAR ONCE
Refills: 0 | Status: COMPLETED | OUTPATIENT
Start: 2020-12-14 | End: 2020-12-14

## 2020-12-14 RX ADMIN — HALOPERIDOL DECANOATE 5 MILLIGRAM(S): 100 INJECTION INTRAMUSCULAR at 12:05

## 2020-12-14 NOTE — PROGRESS NOTE ADULT - SUBJECTIVE AND OBJECTIVE BOX
INTERVAL HPI/OVERNIGHT EVENTS:    SUBJECTIVE: Patient seen and examined at bedside.     no cp, sob, abd pain, fever  no ha, syncope, lightheadedness, dizziness    OBJECTIVE:    VITAL SIGNS:  Vital Signs Last 24 Hrs  T(C): 36 (13 Dec 2020 13:26), Max: 36 (13 Dec 2020 13:26)  T(F): 96.8 (13 Dec 2020 13:26), Max: 96.8 (13 Dec 2020 13:26)  HR: 94 (13 Dec 2020 13:26) (94 - 94)  BP: 141/91 (13 Dec 2020 13:26) (141/91 - 141/91)  BP(mean): --  RR: 18 (13 Dec 2020 13:26) (18 - 18)  SpO2: --      PHYSICAL EXAM:    General: NAD  HEENT: NC/AT; PERRL, clear conjunctiva  Neck: supple  Respiratory: CTA b/l  Cardiovascular: +S1/S2; RRR  Abdomen: soft, NT/ND; +BS x4  Extremities: WWP, 2+ peripheral pulses b/l; no LE edema  Skin: normal color and turgor; no rash  Neurological:    MEDICATIONS:  MEDICATIONS  (STANDING):  amLODIPine   Tablet 10 milliGRAM(s) Oral daily  AQUAPHOR (petrolatum Ointment) 1 Application(s) Topical daily  chlorhexidine 4% Liquid 1 Application(s) Topical <User Schedule>  diVALproex  milliGRAM(s) Oral two times a day  enalapril 10 milliGRAM(s) Oral daily  enoxaparin Injectable 40 milliGRAM(s) SubCutaneous daily  gabapentin 100 milliGRAM(s) Oral three times a day  levETIRAcetam 750 milliGRAM(s) Oral two times a day  OLANZapine 5 milliGRAM(s) Oral daily  senna 2 Tablet(s) Oral at bedtime    MEDICATIONS  (PRN):  cloNIDine 0.1 milliGRAM(s) Oral every 8 hours PRN htn  haloperidol     Tablet 0.5 milliGRAM(s) Oral every 8 hours PRN agitation  LORazepam   Injectable 2 milliGRAM(s) IV Push every 4 hours PRN seizures  polyethylene glycol 3350 17 Gram(s) Oral two times a day PRN Constipation      ALLERGIES:  Allergies    No Known Allergies    Intolerances        LABS:              Creatinine Trend:         hs Troponin:              CSF:                      EKG:   MICROBIOLOGY:    IMAGING:      Labs, imaging, EKG personally reviewed    RADIOLOGY & ADDITIONAL TESTS: Reviewed.
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
## 2020-12-15 PROCEDURE — 99231 SBSQ HOSP IP/OBS SF/LOW 25: CPT

## 2020-12-15 NOTE — CHART NOTE - NSCHARTNOTEFT_GEN_A_CORE
Limited reassessment    Meds/labs reviewed (No new labs-- pt noted to refuse meds and labs)    No edema noted  Skin is WDL (12/14)     No new wts recorded    Diet order: DASH/TLC, Ensure Clear q24h    Pt remains admitted d/t placement issues. Pt agitated/aggressive toward staff at times. Pt dx with neurocognitive disorder per psychiatry team sp mental health evaluation. IPP admission criteria not met per behavior health notes.    Pt continues to mostly eat well with a good appetite. Po intake varies 0-100% throughout LOS, but is mostly recorded as 75% or >. Last BM on 12/12 per EMR documentation with no GI s/s noted. There are no further nutrition interventions at this time. Pt remains not at risk. RD to f/u within the next 7 days.

## 2020-12-15 NOTE — PROGRESS NOTE ADULT - SUBJECTIVE AND OBJECTIVE BOX
INTERVAL HPI/OVERNIGHT EVENTS:    SUBJECTIVE: Patient seen and examined at bedside.   **** no change in current medical plan and pt is stable for d/c pending citizenship (prolonged hospitalization)  no cp, sob, abd pain, fever  no ha, syncope, lightheadedness, dizziness    OBJECTIVE:      Vital Signs Last 24 Hrs --- refused labs (to be done)  T(C): --  T(F): --  HR: --  BP: --  BP(mean): --  RR: --  SpO2: --      PHYSICAL EXAM:    General: NAD  HEENT: NC/AT; PERRL, clear conjunctiva  Neck: supple  Respiratory: CTA b/l  Cardiovascular: +S1/S2; RRR  Abdomen: soft, NT/ND; +BS x4  Extremities: WWP, 2+ peripheral pulses b/l; no LE edema  Skin: normal color and turgor; no rash  Neurological:      ALLERGIES:  Allergies    No Known Allergies    Intolerances    LABS:    no new labs       Labs, imaging, EKG personally reviewed    RADIOLOGY & ADDITIONAL TESTS: Reviewed.      MEDICATIONS  (STANDING):  amLODIPine   Tablet 10 milliGRAM(s) Oral daily  AQUAPHOR (petrolatum Ointment) 1 Application(s) Topical daily  chlorhexidine 4% Liquid 1 Application(s) Topical <User Schedule>  diVALproex  milliGRAM(s) Oral two times a day  enalapril 10 milliGRAM(s) Oral daily  enoxaparin Injectable 40 milliGRAM(s) SubCutaneous daily  gabapentin 100 milliGRAM(s) Oral three times a day  levETIRAcetam 750 milliGRAM(s) Oral two times a day  OLANZapine 5 milliGRAM(s) Oral daily  senna 2 Tablet(s) Oral at bedtime    MEDICATIONS  (PRN):  cloNIDine 0.1 milliGRAM(s) Oral every 8 hours PRN htn  haloperidol     Tablet 0.5 milliGRAM(s) Oral every 8 hours PRN agitation  LORazepam   Injectable 2 milliGRAM(s) IV Push every 4 hours PRN seizures  polyethylene glycol 3350 17 Gram(s) Oral two times a day PRN Constipation

## 2020-12-15 NOTE — PROGRESS NOTE ADULT - ASSESSMENT
60M PMHx seizure disorder, schizophrenia, HTN here with altered mental status.    #Altered mental status, toxic metabolic encephalopathy  possible h/o schizophrenia, suspect psychosis vs. neurocognitive disorder  still with apparent visual hallucinations  f/u psych  zyprexa 5  #HTN  norvasc 5  #Seizure disorder  depakote 500 bid  keppra 750 bid  #DVT ppx  lovenox    #Progress Note Handoff:  Pending (specify):  Consults_________, Tests________, Test Results_______, Other___f/u psych______  Family discussion:d/w pt at bedside re: treatment plan, primary dx  Disposition: Home___/SNF___/Other________/Unknown at this time____x____      pt awaiting citizenship documentation for d/c planning; otherwise stable for d/c

## 2020-12-16 PROCEDURE — 99231 SBSQ HOSP IP/OBS SF/LOW 25: CPT

## 2020-12-16 NOTE — PROGRESS NOTE ADULT - SUBJECTIVE AND OBJECTIVE BOX
60M PMHx seizure disorder, schizophrenia, HTN here with altered mental status.    12/16: No new complaints, no events.        REVIEW OF SYSTEMS:  Not a reliable historian      MEDICATIONS  (STANDING):  amLODIPine   Tablet 10 milliGRAM(s) Oral daily  AQUAPHOR (petrolatum Ointment) 1 Application(s) Topical daily  chlorhexidine 4% Liquid 1 Application(s) Topical <User Schedule>  diVALproex  milliGRAM(s) Oral two times a day  enalapril 10 milliGRAM(s) Oral daily  enoxaparin Injectable 40 milliGRAM(s) SubCutaneous daily  gabapentin 100 milliGRAM(s) Oral three times a day  levETIRAcetam 750 milliGRAM(s) Oral two times a day  OLANZapine 5 milliGRAM(s) Oral daily  senna 2 Tablet(s) Oral at bedtime    MEDICATIONS  (PRN):  cloNIDine 0.1 milliGRAM(s) Oral every 8 hours PRN htn  haloperidol     Tablet 0.5 milliGRAM(s) Oral every 8 hours PRN agitation  LORazepam   Injectable 2 milliGRAM(s) IV Push every 4 hours PRN seizures  polyethylene glycol 3350 17 Gram(s) Oral two times a day PRN Constipation      Allergies  No Known Allergies        FAMILY HISTORY:  No pertinent family history in first degree relatives  unknown        Vital Signs Last 24 Hrs  T(C): --  T(F): --  HR: --  BP: --  BP(mean): --  RR: --  SpO2: --    PHYSICAL EXAM:  GENERAL: NAD, well-groomed, well-developed  HEAD:  Atraumatic, Normocephalic  EYES: EOMI, PERRLA, conjunctiva and sclera clear  NECK: Supple, No JVD, Normal thyroid  NERVOUS SYSTEM:  Awake and alert  CHEST/LUNG: Clear to percussion bilaterally; No rales, rhonchi, wheezing, or rubs  HEART: Regular rate and rhythm; No murmurs, rubs, or gallops  ABDOMEN: Soft, Nontender, Nondistended; Bowel sounds present  EXTREMITIES:  2+ Peripheral Pulses, No clubbing, cyanosis, or edema

## 2020-12-16 NOTE — PROGRESS NOTE ADULT - ASSESSMENT

## 2020-12-17 PROCEDURE — 99231 SBSQ HOSP IP/OBS SF/LOW 25: CPT

## 2020-12-17 NOTE — PROGRESS NOTE ADULT - SUBJECTIVE AND OBJECTIVE BOX
60M PMHx seizure disorder, schizophrenia, HTN here with altered mental status.    Today: No overnight events, no complaints.            REVIEW OF SYSTEMS:  Not a reliable historian.      MEDICATIONS  (STANDING):  amLODIPine   Tablet 10 milliGRAM(s) Oral daily  AQUAPHOR (petrolatum Ointment) 1 Application(s) Topical daily  chlorhexidine 4% Liquid 1 Application(s) Topical <User Schedule>  diVALproex  milliGRAM(s) Oral two times a day  enalapril 10 milliGRAM(s) Oral daily  enoxaparin Injectable 40 milliGRAM(s) SubCutaneous daily  gabapentin 100 milliGRAM(s) Oral three times a day  levETIRAcetam 750 milliGRAM(s) Oral two times a day  OLANZapine 5 milliGRAM(s) Oral daily  senna 2 Tablet(s) Oral at bedtime    MEDICATIONS  (PRN):  cloNIDine 0.1 milliGRAM(s) Oral every 8 hours PRN htn  haloperidol     Tablet 0.5 milliGRAM(s) Oral every 8 hours PRN agitation  LORazepam   Injectable 2 milliGRAM(s) IV Push every 4 hours PRN seizures  polyethylene glycol 3350 17 Gram(s) Oral two times a day PRN Constipation      Allergies    No Known Allergies    Intolerances        SOCIAL HISTORY:    FAMILY HISTORY:  No pertinent family history in first degree relatives  unknown        Vital Signs Last 24 Hrs  T(C): --  T(F): --  HR: --  BP: --  BP(mean): --  RR: --  SpO2: --    PHYSICAL EXAM:  GENERAL: NAD, well-groomed, well-developed  HEAD:  Atraumatic, Normocephalic  EYES: EOMI, PERRLA, conjunctiva and sclera clear  NECK: Supple, No JVD, Normal thyroid  NERVOUS SYSTEM:  Awake and alert  CHEST/LUNG: Clear to percussion bilaterally; No rales, rhonchi, wheezing, or rubs  HEART: Regular rate and rhythm; No murmurs, rubs, or gallops  ABDOMEN: Soft, Nontender, Nondistended; Bowel sounds present  EXTREMITIES:  2+ Peripheral Pulses, No clubbing, cyanosis, or edema

## 2020-12-17 NOTE — PROGRESS NOTE ADULT - ASSESSMENT

## 2020-12-18 PROCEDURE — 99231 SBSQ HOSP IP/OBS SF/LOW 25: CPT
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
## 2020-12-18 NOTE — PROVIDER CONTACT NOTE (OTHER) - SITUATION
Patient agitated, standing in hallway yelling at staff. pt states "I will throw this hot water all over you. So you better stop making noise. And get rid of that girl! Now!"

## 2020-12-19 PROCEDURE — 99231 SBSQ HOSP IP/OBS SF/LOW 25: CPT

## 2020-12-19 NOTE — PROGRESS NOTE ADULT - SUBJECTIVE AND OBJECTIVE BOX
60M PMHx seizure disorder, schizophrenia, HTN here with altered mental status.    Today:  Patient calm and cooperative when seen in AM, no complaints.            REVIEW OF SYSTEMS:  Not a reliable historian.      MEDICATIONS  (STANDING):  amLODIPine   Tablet 10 milliGRAM(s) Oral daily  AQUAPHOR (petrolatum Ointment) 1 Application(s) Topical daily  chlorhexidine 4% Liquid 1 Application(s) Topical <User Schedule>  diVALproex  milliGRAM(s) Oral two times a day  enalapril 10 milliGRAM(s) Oral daily  enoxaparin Injectable 40 milliGRAM(s) SubCutaneous daily  gabapentin 100 milliGRAM(s) Oral three times a day  levETIRAcetam 750 milliGRAM(s) Oral two times a day  OLANZapine 5 milliGRAM(s) Oral daily  senna 2 Tablet(s) Oral at bedtime    MEDICATIONS  (PRN):  cloNIDine 0.1 milliGRAM(s) Oral every 8 hours PRN htn  haloperidol     Tablet 0.5 milliGRAM(s) Oral every 8 hours PRN agitation  polyethylene glycol 3350 17 Gram(s) Oral two times a day PRN Constipation      Allergies  No Known Allergies        FAMILY HISTORY:  No pertinent family history in first degree relatives  unknown        Vital Signs Last 24 Hrs  T(C): --  T(F): --  HR: --  BP: --  BP(mean): --  RR: --  SpO2: --    PHYSICAL EXAM:  GENERAL: NAD, well-groomed, well-developed  HEAD:  Atraumatic, Normocephalic  EYES: EOMI, PERRLA, conjunctiva and sclera clear  NECK: Supple, No JVD, Normal thyroid  NERVOUS SYSTEM:  Awake and alert  CHEST/LUNG: Clear to percussion bilaterally; No rales, rhonchi, wheezing, or rubs  HEART: Regular rate and rhythm; No murmurs, rubs, or gallops  ABDOMEN: Soft, Nontender, Nondistended; Bowel sounds present  EXTREMITIES:  2+ Peripheral Pulses, No clubbing, cyanosis, or edema

## 2020-12-20 PROCEDURE — 99231 SBSQ HOSP IP/OBS SF/LOW 25: CPT

## 2020-12-20 NOTE — PROGRESS NOTE ADULT - SUBJECTIVE AND OBJECTIVE BOX
60M PMHx seizure disorder, schizophrenia, HTN here with altered mental status.    12/20: No events overnight.          REVIEW OF SYSTEMS:  Not a reliable historian.      MEDICATIONS  (STANDING):  amLODIPine   Tablet 10 milliGRAM(s) Oral daily  AQUAPHOR (petrolatum Ointment) 1 Application(s) Topical daily  chlorhexidine 4% Liquid 1 Application(s) Topical <User Schedule>  diVALproex  milliGRAM(s) Oral two times a day  enalapril 10 milliGRAM(s) Oral daily  enoxaparin Injectable 40 milliGRAM(s) SubCutaneous daily  gabapentin 100 milliGRAM(s) Oral three times a day  levETIRAcetam 750 milliGRAM(s) Oral two times a day  OLANZapine 5 milliGRAM(s) Oral daily  senna 2 Tablet(s) Oral at bedtime    MEDICATIONS  (PRN):  cloNIDine 0.1 milliGRAM(s) Oral every 8 hours PRN htn  haloperidol     Tablet 0.5 milliGRAM(s) Oral every 8 hours PRN agitation  polyethylene glycol 3350 17 Gram(s) Oral two times a day PRN Constipation      Allergies  No Known Allergies      FAMILY HISTORY:  No pertinent family history in first degree relatives  unknown        Vital Signs Last 24 Hrs  T(C): --  T(F): --  HR: --  BP: --  BP(mean): --  RR: --  SpO2: --    PHYSICAL EXAM:  GENERAL: NAD, well-groomed, well-developed  HEAD:  Atraumatic, Normocephalic  EYES: EOMI, PERRLA, conjunctiva and sclera clear  NECK: Supple, No JVD, Normal thyroid  NERVOUS SYSTEM:  Awake and alert  CHEST/LUNG: Clear to percussion bilaterally; No rales, rhonchi, wheezing, or rubs  HEART: Regular rate and rhythm; No murmurs, rubs, or gallops  ABDOMEN: Soft, Nontender, Nondistended; Bowel sounds present  EXTREMITIES:  2+ Peripheral Pulses, No clubbing, cyanosis, or edema

## 2020-12-21 PROCEDURE — 99231 SBSQ HOSP IP/OBS SF/LOW 25: CPT

## 2020-12-21 RX ORDER — HALOPERIDOL DECANOATE 100 MG/ML
5 INJECTION INTRAMUSCULAR ONCE
Refills: 0 | Status: COMPLETED | OUTPATIENT
Start: 2020-12-21 | End: 2020-12-21

## 2020-12-21 RX ADMIN — HALOPERIDOL DECANOATE 5 MILLIGRAM(S): 100 INJECTION INTRAMUSCULAR at 18:45

## 2020-12-21 NOTE — PROVIDER CONTACT NOTE (OTHER) - SITUATION
pt agitated, slamming doors, entering other pts rooms while having no gown on. pt also throwing boxes of masks and gloves pt agitated, slamming doors, entering other pts rooms while having no gown on. pt also throwing boxes of masks and gloves at staff. Code grey called

## 2020-12-21 NOTE — PROGRESS NOTE BEHAVIORAL HEALTH - NSBHCONSULTMEDAGITATION_PSY_A_CORE FT
olanzapine 5mg bid PRN, can be administered IM if patient not redirected and appears to be an imminent risk to self or others
If agitation remains an issue, recommend against benzodiazapine treatment, would recommend haldol 2 mg PO Q12H PRN for agitation, escalating to IM if threat to self or others and refusing PO. Appeared significantly sedated with Haldol 5mg/Ativan 2mg IM when given in ED

## 2020-12-21 NOTE — PROGRESS NOTE BEHAVIORAL HEALTH - NSBHFUPINTERVALHXFT_PSY_A_CORE
Chart reviewed. Pt was seen and evaluated. As per nursing staff, pt has been intermittently agitated. Was running around the unit earlier, attempting to get into other patients rooms, he then proceeded to slam his door several times. Pt is not compliant with any of his medications. On evaluation, pt is AAO x1. Pt states he is "ok" and then proceeds to ask the writer what he ordered for dinner. He starts to mumble to self incoherently.

## 2020-12-21 NOTE — PROGRESS NOTE ADULT - SUBJECTIVE AND OBJECTIVE BOX
60M PMHx seizure disorder, schizophrenia, HTN here with altered mental status.    12/21:  Code grey called this AM for increased agitation.    Per RNs patient's hygiene has been getting worse and he has not been showering.  He has been a disturbance and trying to enter other patient's rooms naked.            REVIEW OF SYSTEMS:  Not a reliable historian.      MEDICATIONS  (STANDING):  amLODIPine   Tablet 10 milliGRAM(s) Oral daily  AQUAPHOR (petrolatum Ointment) 1 Application(s) Topical daily  chlorhexidine 4% Liquid 1 Application(s) Topical <User Schedule>  diVALproex  milliGRAM(s) Oral two times a day  enalapril 10 milliGRAM(s) Oral daily  enoxaparin Injectable 40 milliGRAM(s) SubCutaneous daily  gabapentin 100 milliGRAM(s) Oral three times a day  levETIRAcetam 750 milliGRAM(s) Oral two times a day  OLANZapine 5 milliGRAM(s) Oral daily  senna 2 Tablet(s) Oral at bedtime    MEDICATIONS  (PRN):  cloNIDine 0.1 milliGRAM(s) Oral every 8 hours PRN htn  haloperidol     Tablet 0.5 milliGRAM(s) Oral every 8 hours PRN agitation  haloperidol    Injectable 5 milliGRAM(s) IntraMuscular once PRN agitation  polyethylene glycol 3350 17 Gram(s) Oral two times a day PRN Constipation      Allergies  No Known Allergies        FAMILY HISTORY:  No pertinent family history in first degree relatives  unknown        Vital Signs Last 24 Hrs  T(C): --  T(F): --  HR: --  BP: --  BP(mean): --  RR: --  SpO2: --    PHYSICAL EXAM:  GENERAL: agitated, well-developed  HEAD:  Atraumatic, Normocephalic  EYES: EOMI, PERRLA, conjunctiva and sclera clear  NECK: Supple, No JVD, Normal thyroid  NERVOUS SYSTEM:  Awake and alert  CHEST/LUNG: Clear to percussion bilaterally; No rales, rhonchi, wheezing, or rubs  HEART: Regular rate and rhythm; No murmurs, rubs, or gallops  ABDOMEN: Soft, Nontender, Nondistended; Bowel sounds present  EXTREMITIES:  2+ Peripheral Pulses, No clubbing, cyanosis, or edema  PSYCH: agitated

## 2020-12-21 NOTE — PROGRESS NOTE ADULT - ASSESSMENT
60M PMHx seizure disorder, schizophrenia, HTN here with altered mental status.    #Altered mental status, toxic metabolic encephalopathy  possible h/o schizophrenia, suspect psychosis vs. neurocognitive disorder  Still refusing blood draws, medications, vitals  Zyprexa  Psych consult re-called as he has become more aggressive and abusive to staff.    #tinea pedis  -Resolved s/p clotrimazole cream    #HTN  norvasc 10  added enalapril, increased dose to 10 mg  Apparently patient has been refusing meds and vitals at times.    #Seizure disorder  depakote 500 bid  keppra 750 bid  Ativan PRN for Seizures  -Pt has been refusing, might benefit from psych eval/admission for treatment against objection as he has been refusing AEDs.    #DVT ppx  lovenox    -DC pending case mgmt/SW; pt is placement issue, citizenship.    #Progress Note Handoff:  Pending (specify):  Consults_________, Tests________, Test Results_______, Other___discharge planning______  Family discussion:d/w pt at bedside re: treatment plan, primary dx  Disposition: Home___/SNF___/Other________/Unknown at this time____x____

## 2020-12-21 NOTE — PROVIDER CONTACT NOTE (OTHER) - ASSESSMENT
pt cannot be redirected at this time. pt is getting increasingly agitated, with a labile mood. pt refuses to take any medications.
pt is hard to redirect and cannot focus on writer or 1:1. pt is labile and has unpredictable behaviors. pt believes this is his home and yells and swats at anyone in the hallway to get away. When pt is left alone, he begins to act out and slam doors demanding food and everyone to leave.

## 2020-12-21 NOTE — PROVIDER CONTACT NOTE (OTHER) - RECOMMENDATIONS
norvasc and enalapril was ordered and administer to the pt
Psych consult
pt would benefit from psychotropic medications

## 2020-12-21 NOTE — PROVIDER CONTACT NOTE (OTHER) - BACKGROUND
pt continuously refuses meds, pt actively hallucinates, yelling in room while no one is around him. pt is labile and has unpredictable behavior. pt cannot be redirected pt continuously refuses meds, pt actively hallucinates, pt yells in room while no one is around him. pt is labile and has unpredictable behavior. pt gets self agitated by hallucinations
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
## 2020-12-22 PROCEDURE — 99231 SBSQ HOSP IP/OBS SF/LOW 25: CPT

## 2020-12-22 RX ORDER — OLANZAPINE 15 MG/1
5 TABLET, FILM COATED ORAL ONCE
Refills: 0 | Status: COMPLETED | OUTPATIENT
Start: 2020-12-22 | End: 2020-12-31

## 2020-12-22 NOTE — CHART NOTE - NSCHARTNOTEFT_GEN_A_CORE
RD limited note:    Spoke to RN who reported patients appetite has been improving and has been consuming between % of meals; + ensure clear. Patients current diet order Diet, DASH/TLC:   Sodium & Cholesterol Restricted -Ensure Clear x1. Labs and medications reviewed.  skin intact / no edema noted per RN flow sheets. No complaints of N/V/C/D per RN. LBM: 12/21 per RN. No new weights recorded per EMR, will continue to monitor weight trend.  Patient noted with Altered mental status, toxic metabolic encephalopathy; psych reconsulted. -DC pending case mgmt/SW; pt is placement issue, citizenship. No RD interventions at this time. Will continue ginny monitor intake. Will f/u in 7 days.

## 2020-12-22 NOTE — PROGRESS NOTE ADULT - ASSESSMENT
60M PMHx seizure disorder, schizophrenia, HTN here with altered mental status.    #Altered mental status, toxic metabolic encephalopathy  possible h/o schizophrenia, suspect psychosis vs. neurocognitive disorder  Still refusing blood draws, medications, vitals  Zyprexa, Zyprexa IM PRN  Psych consulted re-called as he has become more aggressive and abusive to staff.  -Psych agrees patient would benefit from treatment against objection; hospital  contacted, will start the process.    #tinea pedis  -Resolved s/p clotrimazole cream    #HTN  norvasc 10  added enalapril, increased dose to 10 mg    #Seizure disorder  depakote 500 bid  keppra 750 bid  Ativan PRN for Seizures  -Pt has been refusing, might benefit from  treatment against objection as he has been refusing AEDs.    #DVT ppx  lovenox    -DC pending case mgmt/SW; pt is placement issue, citizenship, court ordered treatment against objection.    #Progress Note Handoff:  Pending (specify):  Consults_________, Tests________, Test Results_______, Other___discharge planning______  Family discussion:d/w pt at bedside re: treatment plan, primary dx  Disposition: Home___/SNF___/Other________/Unknown at this time____x____

## 2020-12-22 NOTE — PROGRESS NOTE ADULT - SUBJECTIVE AND OBJECTIVE BOX
60M PMHx seizure disorder, schizophrenia, HTN here with altered mental status.    12/22:  Seen at bedside this AM, calm and cooperative.          REVIEW OF SYSTEMS:  Not a reliable historian.    MEDICATIONS  (STANDING):  amLODIPine   Tablet 10 milliGRAM(s) Oral daily  AQUAPHOR (petrolatum Ointment) 1 Application(s) Topical daily  chlorhexidine 4% Liquid 1 Application(s) Topical <User Schedule>  diVALproex  milliGRAM(s) Oral two times a day  enalapril 10 milliGRAM(s) Oral daily  enoxaparin Injectable 40 milliGRAM(s) SubCutaneous daily  gabapentin 100 milliGRAM(s) Oral three times a day  levETIRAcetam 750 milliGRAM(s) Oral two times a day  OLANZapine 5 milliGRAM(s) Oral daily  senna 2 Tablet(s) Oral at bedtime    MEDICATIONS  (PRN):  cloNIDine 0.1 milliGRAM(s) Oral every 8 hours PRN htn  haloperidol     Tablet 0.5 milliGRAM(s) Oral every 8 hours PRN agitation  polyethylene glycol 3350 17 Gram(s) Oral two times a day PRN Constipation      Allergies  No Known Allergies        FAMILY HISTORY:  No pertinent family history in first degree relatives  unknown        Vital Signs Last 24 Hrs  T(C): --  T(F): --  HR: --  BP: --  BP(mean): --  RR: --  SpO2: --    PHYSICAL EXAM:  GENERAL: well-developed  HEAD:  Atraumatic, Normocephalic  EYES: EOMI, PERRLA, conjunctiva and sclera clear  NECK: Supple, No JVD, Normal thyroid  NERVOUS SYSTEM:  Awake and alert  CHEST/LUNG: Clear to percussion bilaterally; No rales, rhonchi, wheezing, or rubs  HEART: Regular rate and rhythm; No murmurs, rubs, or gallops  ABDOMEN: Soft, Nontender, Nondistended; Bowel sounds present  EXTREMITIES:  2+ Peripheral Pulses, No clubbing, cyanosis, or edema  PSYCH: calm

## 2020-12-23 PROCEDURE — 99232 SBSQ HOSP IP/OBS MODERATE 35: CPT

## 2020-12-23 NOTE — PROGRESS NOTE ADULT - SUBJECTIVE AND OBJECTIVE BOX
INTERVAL HPI/OVERNIGHT EVENTS:    SUBJECTIVE: Patient seen and examined at bedside.     no cp, sob, abd pain, fever  no ha, syncope, lightheadedness, dizziness    OBJECTIVE:    VITAL SIGNS:  Vital Signs Last 24 Hrs  T(C): --  T(F): --  HR: --  BP: --  BP(mean): --  RR: --  SpO2: --      PHYSICAL EXAM:    General: NAD  HEENT: NC/AT; PERRL, clear conjunctiva  Neck: supple  Respiratory: CTA b/l  Cardiovascular: +S1/S2; RRR  Abdomen: soft, NT/ND; +BS x4  Extremities: WWP, 2+ peripheral pulses b/l; no LE edema  Skin: normal color and turgor; no rash  Neurological:    MEDICATIONS:  MEDICATIONS  (STANDING):  amLODIPine   Tablet 10 milliGRAM(s) Oral daily  AQUAPHOR (petrolatum Ointment) 1 Application(s) Topical daily  chlorhexidine 4% Liquid 1 Application(s) Topical <User Schedule>  diVALproex  milliGRAM(s) Oral two times a day  enalapril 10 milliGRAM(s) Oral daily  enoxaparin Injectable 40 milliGRAM(s) SubCutaneous daily  gabapentin 100 milliGRAM(s) Oral three times a day  levETIRAcetam 750 milliGRAM(s) Oral two times a day  OLANZapine 5 milliGRAM(s) Oral daily  senna 2 Tablet(s) Oral at bedtime    MEDICATIONS  (PRN):  cloNIDine 0.1 milliGRAM(s) Oral every 8 hours PRN htn  OLANZapine Injectable 5 milliGRAM(s) IntraMuscular once PRN agitation  polyethylene glycol 3350 17 Gram(s) Oral two times a day PRN Constipation      ALLERGIES:  Allergies    No Known Allergies    Intolerances        LABS:              Creatinine Trend:         hs Troponin:              CSF:                      EKG:   MICROBIOLOGY:    IMAGING:      Labs, imaging, EKG personally reviewed    RADIOLOGY & ADDITIONAL TESTS: Reviewed.

## 2020-12-24 PROCEDURE — 99232 SBSQ HOSP IP/OBS MODERATE 35: CPT

## 2020-12-25 PROCEDURE — 99232 SBSQ HOSP IP/OBS MODERATE 35: CPT

## 2020-12-26 PROCEDURE — 99232 SBSQ HOSP IP/OBS MODERATE 35: CPT

## 2020-12-26 NOTE — PROGRESS NOTE ADULT - SUBJECTIVE AND OBJECTIVE BOX
INTERVAL HPI/OVERNIGHT EVENTS:    SUBJECTIVE: Patient seen and examined at bedside.     no cp sob, abd pain, fever  no ha, syncope, lightheadedness, dizziness    OBJECTIVE:    VITAL SIGNS:  Vital Signs Last 24 Hrs  T(C): --  T(F): --  HR: --  BP: --  BP(mean): --  RR: --  SpO2: --      PHYSICAL EXAM:    General: NAD  HEENT: NC/AT; PERRL, clear conjunctiva  Neck: supple  Respiratory: CTA b/l  Cardiovascular: +S1/S2; RRR  Abdomen: soft, NT/ND; +BS x4  Extremities: WWP, 2+ peripheral pulses b/l; no LE edema  Skin: normal color and turgor; no rash  Neurological:    MEDICATIONS:  MEDICATIONS  (STANDING):  amLODIPine   Tablet 10 milliGRAM(s) Oral daily  AQUAPHOR (petrolatum Ointment) 1 Application(s) Topical daily  chlorhexidine 4% Liquid 1 Application(s) Topical <User Schedule>  diVALproex  milliGRAM(s) Oral two times a day  enalapril 10 milliGRAM(s) Oral daily  enoxaparin Injectable 40 milliGRAM(s) SubCutaneous daily  gabapentin 100 milliGRAM(s) Oral three times a day  levETIRAcetam 750 milliGRAM(s) Oral two times a day  OLANZapine 5 milliGRAM(s) Oral daily  senna 2 Tablet(s) Oral at bedtime    MEDICATIONS  (PRN):  cloNIDine 0.1 milliGRAM(s) Oral every 8 hours PRN htn  OLANZapine Injectable 5 milliGRAM(s) IntraMuscular once PRN agitation  polyethylene glycol 3350 17 Gram(s) Oral two times a day PRN Constipation      ALLERGIES:  Allergies    No Known Allergies    Intolerances        LABS:              Creatinine Trend:         hs Troponin:              CSF:                      EKG:   MICROBIOLOGY:    IMAGING:      Labs, imaging, EKG personally reviewed    RADIOLOGY & ADDITIONAL TESTS: Reviewed.

## 2020-12-27 PROCEDURE — 99232 SBSQ HOSP IP/OBS MODERATE 35: CPT

## 2020-12-27 RX ADMIN — Medication 0.1 MILLIGRAM(S): at 21:14

## 2020-12-27 NOTE — PROGRESS NOTE ADULT - SUBJECTIVE AND OBJECTIVE BOX
INTERVAL HPI/OVERNIGHT EVENTS:    SUBJECTIVE: Patient seen and examined at bedside.     no cp, sob, abd pain, fever  no ha, syncope, lighteadedness, dizziness    OBJECTIVE:    VITAL SIGNS:  Vital Signs Last 24 Hrs  T(C): 36 (26 Dec 2020 21:27), Max: 36.7 (26 Dec 2020 13:31)  T(F): 96.8 (26 Dec 2020 21:27), Max: 98 (26 Dec 2020 13:31)  HR: 64 (26 Dec 2020 21:27) (64 - 83)  BP: 194/88 (26 Dec 2020 21:27) (166/83 - 194/88)  BP(mean): --  RR: 18 (26 Dec 2020 21:27) (18 - 18)  SpO2: --      PHYSICAL EXAM:    General: NAD  HEENT: NC/AT; PERRL, clear conjunctiva  Neck: supple  Respiratory: CTA b/l  Cardiovascular: +S1/S2; RRR  Abdomen: soft, NT/ND; +BS x4  Extremities: WWP, 2+ peripheral pulses b/l; no LE edema  Skin: normal color and turgor; no rash  Neurological:    MEDICATIONS:  MEDICATIONS  (STANDING):  amLODIPine   Tablet 10 milliGRAM(s) Oral daily  AQUAPHOR (petrolatum Ointment) 1 Application(s) Topical daily  chlorhexidine 4% Liquid 1 Application(s) Topical <User Schedule>  diVALproex  milliGRAM(s) Oral two times a day  enalapril 10 milliGRAM(s) Oral daily  enoxaparin Injectable 40 milliGRAM(s) SubCutaneous daily  gabapentin 100 milliGRAM(s) Oral three times a day  levETIRAcetam 750 milliGRAM(s) Oral two times a day  OLANZapine 5 milliGRAM(s) Oral daily  senna 2 Tablet(s) Oral at bedtime    MEDICATIONS  (PRN):  cloNIDine 0.1 milliGRAM(s) Oral every 8 hours PRN htn  OLANZapine Injectable 5 milliGRAM(s) IntraMuscular once PRN agitation  polyethylene glycol 3350 17 Gram(s) Oral two times a day PRN Constipation      ALLERGIES:  Allergies    No Known Allergies    Intolerances        LABS:              Creatinine Trend:         hs Troponin:              CSF:                      EKG:   MICROBIOLOGY:    IMAGING:      Labs, imaging, EKG personally reviewed    RADIOLOGY & ADDITIONAL TESTS: Reviewed.

## 2020-12-28 PROCEDURE — 99232 SBSQ HOSP IP/OBS MODERATE 35: CPT

## 2020-12-28 NOTE — PROGRESS NOTE ADULT - SUBJECTIVE AND OBJECTIVE BOX
INTERVAL HPI/OVERNIGHT EVENTS:    SUBJECTIVE: Patient seen and examined at bedside.     no cp, sob, abd pain, fever  no ha, dizziness, lightheadedness, syncope    OBJECTIVE:    VITAL SIGNS:  Vital Signs Last 24 Hrs  T(C): 35.6 (27 Dec 2020 21:24), Max: 36.2 (27 Dec 2020 14:32)  T(F): 96 (27 Dec 2020 21:24), Max: 97.1 (27 Dec 2020 14:32)  HR: 60 (27 Dec 2020 22:54) (60 - 80)  BP: 171/83 (27 Dec 2020 22:54) (171/83 - 204/92)  BP(mean): --  RR: 18 (27 Dec 2020 21:24) (18 - 18)  SpO2: --      PHYSICAL EXAM:    General: NAD  HEENT: NC/AT; PERRL, clear conjunctiva  Neck: supple  Respiratory: CTA b/l  Cardiovascular: +S1/S2; RRR  Abdomen: soft, NT/ND; +BS x4  Extremities: WWP, 2+ peripheral pulses b/l; no LE edema  Skin: normal color and turgor; no rash  Neurological:    MEDICATIONS:  MEDICATIONS  (STANDING):  amLODIPine   Tablet 10 milliGRAM(s) Oral daily  AQUAPHOR (petrolatum Ointment) 1 Application(s) Topical daily  chlorhexidine 4% Liquid 1 Application(s) Topical <User Schedule>  diVALproex  milliGRAM(s) Oral two times a day  enalapril 10 milliGRAM(s) Oral daily  enoxaparin Injectable 40 milliGRAM(s) SubCutaneous daily  gabapentin 100 milliGRAM(s) Oral three times a day  levETIRAcetam 750 milliGRAM(s) Oral two times a day  OLANZapine 5 milliGRAM(s) Oral daily  senna 2 Tablet(s) Oral at bedtime    MEDICATIONS  (PRN):  cloNIDine 0.1 milliGRAM(s) Oral every 8 hours PRN htn  OLANZapine Injectable 5 milliGRAM(s) IntraMuscular once PRN agitation  polyethylene glycol 3350 17 Gram(s) Oral two times a day PRN Constipation      ALLERGIES:  Allergies    No Known Allergies    Intolerances        LABS:              Creatinine Trend:         hs Troponin:              CSF:                      EKG:   MICROBIOLOGY:    IMAGING:      Labs, imaging, EKG personally reviewed    RADIOLOGY & ADDITIONAL TESTS: Reviewed.

## 2020-12-29 PROCEDURE — 99232 SBSQ HOSP IP/OBS MODERATE 35: CPT

## 2020-12-29 NOTE — PROGRESS NOTE ADULT - SUBJECTIVE AND OBJECTIVE BOX
INTERVAL HPI/OVERNIGHT EVENTS:    SUBJECTIVE: Patient seen and examined at bedside.     no cp, sob, abd pain, fever  no ha, syncope, lightheadedness, dizziness    OBJECTIVE:    VITAL SIGNS:  Vital Signs Last 24 Hrs  T(C): --  T(F): --  HR: 65 (28 Dec 2020 21:33) (65 - 65)  BP: 172/89 (28 Dec 2020 21:33) (172/89 - 172/89)  BP(mean): --  RR: 18 (28 Dec 2020 21:33) (18 - 18)  SpO2: --      PHYSICAL EXAM:    General: NAD  HEENT: NC/AT; PERRL, clear conjunctiva  Neck: supple  Respiratory: CTA b/l  Cardiovascular: +S1/S2; RRR  Abdomen: soft, NT/ND; +BS x4  Extremities: WWP, 2+ peripheral pulses b/l; no LE edema  Skin: normal color and turgor; no rash  Neurological:    MEDICATIONS:  MEDICATIONS  (STANDING):  amLODIPine   Tablet 10 milliGRAM(s) Oral daily  AQUAPHOR (petrolatum Ointment) 1 Application(s) Topical daily  chlorhexidine 4% Liquid 1 Application(s) Topical <User Schedule>  diVALproex  milliGRAM(s) Oral two times a day  enalapril 10 milliGRAM(s) Oral daily  enoxaparin Injectable 40 milliGRAM(s) SubCutaneous daily  gabapentin 100 milliGRAM(s) Oral three times a day  levETIRAcetam 750 milliGRAM(s) Oral two times a day  OLANZapine 5 milliGRAM(s) Oral daily  senna 2 Tablet(s) Oral at bedtime    MEDICATIONS  (PRN):  cloNIDine 0.1 milliGRAM(s) Oral every 8 hours PRN htn  OLANZapine Injectable 5 milliGRAM(s) IntraMuscular once PRN agitation  polyethylene glycol 3350 17 Gram(s) Oral two times a day PRN Constipation      ALLERGIES:  Allergies    No Known Allergies    Intolerances        LABS:              Creatinine Trend:         hs Troponin:              CSF:                      EKG:   MICROBIOLOGY:    IMAGING:      Labs, imaging, EKG personally reviewed    RADIOLOGY & ADDITIONAL TESTS: Reviewed.

## 2020-12-29 NOTE — CHART NOTE - NSCHARTNOTEFT_GEN_A_CORE
Limited reassessment    Meds/labs reviewed    No edema noted; skin is WDL (12/28)    No new wt recorded    Diet order: DASH/TLC, Ensure Clear q24h.       Psych team was recalled d/t pt becoming more aggressive/abusive toward staff- agreed that pt would benefit from treatment against objection; hospital  was contacted + will start the process per progress note on 12/22 @18:59.   Po intake varies 0-100% throughout LOS depending. No noted diarrhea/constipation at this time. Last BM unknown.   If po intake remains suboptimal upon f/u, consider liberalized diet order to regular (remove DASH/TLC modifier).   There are no further nutrition interventions at this time otherwise. Pt remains not at risk. RD to f/u within the next 7 days.

## 2020-12-30 PROCEDURE — 99231 SBSQ HOSP IP/OBS SF/LOW 25: CPT

## 2020-12-30 NOTE — PROGRESS NOTE ADULT - SUBJECTIVE AND OBJECTIVE BOX
CC.  Mental health evaluation  HPI.  Patient appears to be comfortable  Offers no other complaints         PHYSICAL EXAM-  GENERAL: NAD,   HEAD:  Atraumatic, Normocephalic  EYES: EOMI, PERRLA, conjunctiva and sclera clear  NECK: Supple, No JVD, Normal thyroid  NERVOUS SYSTEM:  Alert & Oriented X3, agitated at time  CHEST/LUNG: Clear to percussion bilaterally; No rales, rhonchi, wheezing, or rubs  HEART: Regular rate and rhythm; No murmurs, rubs, or gallops  ABDOMEN: Soft, Nontender, Nondistended; Bowel sounds present  EXTREMITIES:   No clubbing, cyanosis, or edema  SKIN: No rashes or lesions      MEDICATIONS  (STANDING):  amLODIPine   Tablet 10 milliGRAM(s) Oral daily  AQUAPHOR (petrolatum Ointment) 1 Application(s) Topical daily  chlorhexidine 4% Liquid 1 Application(s) Topical <User Schedule>  diVALproex  milliGRAM(s) Oral two times a day  enalapril 10 milliGRAM(s) Oral daily  enoxaparin Injectable 40 milliGRAM(s) SubCutaneous daily  gabapentin 100 milliGRAM(s) Oral three times a day  levETIRAcetam 750 milliGRAM(s) Oral two times a day  OLANZapine 5 milliGRAM(s) Oral daily  senna 2 Tablet(s) Oral at bedtime    MEDICATIONS  (PRN):  cloNIDine 0.1 milliGRAM(s) Oral every 8 hours PRN htn  OLANZapine Injectable 5 milliGRAM(s) IntraMuscular once PRN agitation  polyethylene glycol 3350 17 Gram(s) Oral two times a day PRN Constipation         Imaging Personally Reviewed:     [x ] YES  [ ] NO    Consultant(s) Notes Reviewed:  [x ] YES  [ ] NO    Care Discussed with Consultants/Other Providers [x ] YES  [ ] NO

## 2020-12-30 NOTE — PROGRESS NOTE ADULT - ASSESSMENT
60M PMHx seizure disorder, schizophrenia, HTN here with altered mental status.    #Altered mental status, toxic metabolic encephalopathy  possible h/o schizophrenia, suspect psychosis vs. neurocognitive disorder  still with apparent visual hallucinations  f/u psych  zyprexa 5    #HTN  norvasc 5    #Seizure disorder  depakote 500 bid  keppra 750 bid    #DVT ppx  lovenox      #Progress Note Handoff  Pending (specify):  await placement  Family discussion:  plan of care was discussed with patient  in details.  all questions were answered.  seems to understand, and in agreement  Disposition:  unknown

## 2020-12-31 PROCEDURE — 99232 SBSQ HOSP IP/OBS MODERATE 35: CPT

## 2020-12-31 RX ADMIN — OLANZAPINE 5 MILLIGRAM(S): 15 TABLET, FILM COATED ORAL at 12:16

## 2020-12-31 NOTE — PROGRESS NOTE ADULT - SUBJECTIVE AND OBJECTIVE BOX
CC.  Mental health evaluation  HPI.  Patient appears to be comfortable  Offers no other complaints     Vital Signs Last 24 Hrs  T(C): 35.8 (30 Dec 2020 20:06), Max: 35.8 (30 Dec 2020 20:06)  T(F): 96.4 (30 Dec 2020 20:06), Max: 96.4 (30 Dec 2020 20:06)  HR: 74 (30 Dec 2020 20:06) (74 - 74)  BP: 158/71 (30 Dec 2020 20:06) (158/71 - 158/71)  BP(mean): --  RR: --  SpO2: --    PHYSICAL EXAM-  GENERAL: NAD,   HEAD:  Atraumatic, Normocephalic  EYES: EOMI, PERRLA, conjunctiva and sclera clear  NECK: Supple, No JVD, Normal thyroid  NERVOUS SYSTEM:  Alert & Oriented X3, agitated at time  CHEST/LUNG: Clear to percussion bilaterally; No rales, rhonchi, wheezing, or rubs  HEART: Regular rate and rhythm; No murmurs, rubs, or gallops  ABDOMEN: Soft, Nontender, Nondistended; Bowel sounds present  EXTREMITIES:   No clubbing, cyanosis, or edema  SKIN: No rashes or lesions      MEDICATIONS  (STANDING):  amLODIPine   Tablet 10 milliGRAM(s) Oral daily  AQUAPHOR (petrolatum Ointment) 1 Application(s) Topical daily  chlorhexidine 4% Liquid 1 Application(s) Topical <User Schedule>  diVALproex  milliGRAM(s) Oral two times a day  enalapril 10 milliGRAM(s) Oral daily  enoxaparin Injectable 40 milliGRAM(s) SubCutaneous daily  gabapentin 100 milliGRAM(s) Oral three times a day  levETIRAcetam 750 milliGRAM(s) Oral two times a day  OLANZapine 5 milliGRAM(s) Oral daily  senna 2 Tablet(s) Oral at bedtime    MEDICATIONS  (PRN):  cloNIDine 0.1 milliGRAM(s) Oral every 8 hours PRN htn  OLANZapine Injectable 5 milliGRAM(s) IntraMuscular once PRN agitation  polyethylene glycol 3350 17 Gram(s) Oral two times a day PRN Constipation         Imaging Personally Reviewed:     [x ] YES  [ ] NO    Consultant(s) Notes Reviewed:  [x ] YES  [ ] NO    Care Discussed with Consultants/Other Providers [x ] YES  [ ] NO

## 2021-01-01 PROCEDURE — 99231 SBSQ HOSP IP/OBS SF/LOW 25: CPT

## 2021-01-01 NOTE — PROGRESS NOTE ADULT - SUBJECTIVE AND OBJECTIVE BOX
CC.  Mental health evaluation  HPI.  Patient appears to be comfortable  Offers no other complaints     Vital Signs Last 24 Hrs  T(C): --  T(F): --  HR: --  BP: --  BP(mean): --  RR: --  SpO2: --    PHYSICAL EXAM-  GENERAL: NAD,   HEAD:  Atraumatic, Normocephalic  EYES: EOMI, PERRLA, conjunctiva and sclera clear  NECK: Supple, No JVD, Normal thyroid  NERVOUS SYSTEM:  Alert & Oriented X3, agitated at time  CHEST/LUNG: Clear to percussion bilaterally; No rales, rhonchi, wheezing, or rubs  HEART: Regular rate and rhythm; No murmurs, rubs, or gallops  ABDOMEN: Soft, Nontender, Nondistended; Bowel sounds present  EXTREMITIES:   No clubbing, cyanosis, or edema  SKIN: No rashes or lesions      MEDICATIONS  (STANDING):  amLODIPine   Tablet 10 milliGRAM(s) Oral daily  AQUAPHOR (petrolatum Ointment) 1 Application(s) Topical daily  chlorhexidine 4% Liquid 1 Application(s) Topical <User Schedule>  diVALproex  milliGRAM(s) Oral two times a day  enalapril 10 milliGRAM(s) Oral daily  enoxaparin Injectable 40 milliGRAM(s) SubCutaneous daily  gabapentin 100 milliGRAM(s) Oral three times a day  levETIRAcetam 750 milliGRAM(s) Oral two times a day  OLANZapine 5 milliGRAM(s) Oral daily  senna 2 Tablet(s) Oral at bedtime    MEDICATIONS  (PRN):  cloNIDine 0.1 milliGRAM(s) Oral every 8 hours PRN htn  OLANZapine Injectable 5 milliGRAM(s) IntraMuscular once PRN agitation  polyethylene glycol 3350 17 Gram(s) Oral two times a day PRN Constipation         Imaging Personally Reviewed:     [x ] YES  [ ] NO    Consultant(s) Notes Reviewed:  [x ] YES  [ ] NO    Care Discussed with Consultants/Other Providers [x ] YES  [ ] NO

## 2021-01-02 PROCEDURE — 99231 SBSQ HOSP IP/OBS SF/LOW 25: CPT

## 2021-01-02 NOTE — PROGRESS NOTE ADULT - SUBJECTIVE AND OBJECTIVE BOX
CC.  Mental health evaluation  HPI.  Patient appears to be comfortable  Offers no other complaints     Vital Signs Last 24 Hrs  T(C): --  T(F): --  HR: 64 (01 Jan 2021 14:05) (64 - 64)  BP: 159/73 (01 Jan 2021 14:05) (159/73 - 159/73)  BP(mean): --  RR: --  SpO2: --  PHYSICAL EXAM-  GENERAL: NAD,   HEAD:  Atraumatic, Normocephalic  EYES: EOMI, PERRLA, conjunctiva and sclera clear  NECK: Supple, No JVD, Normal thyroid  NERVOUS SYSTEM:  Alert & Oriented x1 agitated at time  CHEST/LUNG: Clear to percussion bilaterally; No rales, rhonchi, wheezing, or rubs  HEART: Regular rate and rhythm; No murmurs, rubs, or gallops  ABDOMEN: Soft, Nontender, Nondistended; Bowel sounds present  EXTREMITIES:   No clubbing, cyanosis, or edema  SKIN: No rashes or lesions      MEDICATIONS  (STANDING):  amLODIPine   Tablet 10 milliGRAM(s) Oral daily  AQUAPHOR (petrolatum Ointment) 1 Application(s) Topical daily  chlorhexidine 4% Liquid 1 Application(s) Topical <User Schedule>  diVALproex  milliGRAM(s) Oral two times a day  enalapril 10 milliGRAM(s) Oral daily  enoxaparin Injectable 40 milliGRAM(s) SubCutaneous daily  gabapentin 100 milliGRAM(s) Oral three times a day  levETIRAcetam 750 milliGRAM(s) Oral two times a day  OLANZapine 5 milliGRAM(s) Oral daily  senna 2 Tablet(s) Oral at bedtime    MEDICATIONS  (PRN):  cloNIDine 0.1 milliGRAM(s) Oral every 8 hours PRN htn  OLANZapine Injectable 5 milliGRAM(s) IntraMuscular once PRN agitation  polyethylene glycol 3350 17 Gram(s) Oral two times a day PRN Constipation         Imaging Personally Reviewed:     [x ] YES  [ ] NO    Consultant(s) Notes Reviewed:  [x ] YES  [ ] NO    Care Discussed with Consultants/Other Providers [x ] YES  [ ] NO

## 2021-01-03 PROCEDURE — 99231 SBSQ HOSP IP/OBS SF/LOW 25: CPT

## 2021-01-03 NOTE — PROGRESS NOTE ADULT - SUBJECTIVE AND OBJECTIVE BOX
CC.  Mental health evaluation  HPI.  Patient appears to be comfortable  Offers no other complaints     Vital Signs Last 24 Hrs  T(C): --  T(F): --  HR: --  BP: --  BP(mean): --  RR: --  SpO2: --    PHYSICAL EXAM-  GENERAL: NAD,   HEAD:  Atraumatic, Normocephalic  EYES: EOMI, PERRLA, conjunctiva and sclera clear  NECK: Supple, No JVD, Normal thyroid  NERVOUS SYSTEM:  Alert & Oriented x1 agitated at time  CHEST/LUNG: Clear to percussion bilaterally; No rales, rhonchi, wheezing, or rubs  HEART: Regular rate and rhythm; No murmurs, rubs, or gallops  ABDOMEN: Soft, Nontender, Nondistended; Bowel sounds present  EXTREMITIES:   No clubbing, cyanosis, or edema  SKIN: No rashes or lesions      MEDICATIONS  (STANDING):  amLODIPine   Tablet 10 milliGRAM(s) Oral daily  AQUAPHOR (petrolatum Ointment) 1 Application(s) Topical daily  chlorhexidine 4% Liquid 1 Application(s) Topical <User Schedule>  diVALproex  milliGRAM(s) Oral two times a day  enalapril 10 milliGRAM(s) Oral daily  enoxaparin Injectable 40 milliGRAM(s) SubCutaneous daily  gabapentin 100 milliGRAM(s) Oral three times a day  levETIRAcetam 750 milliGRAM(s) Oral two times a day  OLANZapine 5 milliGRAM(s) Oral daily  senna 2 Tablet(s) Oral at bedtime    MEDICATIONS  (PRN):  cloNIDine 0.1 milliGRAM(s) Oral every 8 hours PRN htn  OLANZapine Injectable 5 milliGRAM(s) IntraMuscular once PRN agitation  polyethylene glycol 3350 17 Gram(s) Oral two times a day PRN Constipation         Imaging Personally Reviewed:     [x ] YES  [ ] NO    Consultant(s) Notes Reviewed:  [x ] YES  [ ] NO    Care Discussed with Consultants/Other Providers [x ] YES  [ ] NO

## 2021-01-04 PROCEDURE — 99231 SBSQ HOSP IP/OBS SF/LOW 25: CPT

## 2021-01-05 PROCEDURE — 99231 SBSQ HOSP IP/OBS SF/LOW 25: CPT

## 2021-01-05 NOTE — PROGRESS NOTE ADULT - SUBJECTIVE AND OBJECTIVE BOX
CC.  Mental health evaluation  HPI.  Patient appears to be comfortable  Offers no other complaints     Vital Signs Last 24 Hrs  T(C): --  T(F): --  HR: 78 (04 Jan 2021 13:31) (78 - 78)  BP: 181/82 (04 Jan 2021 13:31) (181/82 - 181/82)  BP(mean): --  RR: 16 (04 Jan 2021 13:31) (16 - 16)  SpO2: --    PHYSICAL EXAM-  GENERAL: NAD,   HEAD:  Atraumatic, Normocephalic  EYES: EOMI, PERRLA, conjunctiva and sclera clear  NECK: Supple, No JVD, Normal thyroid  NERVOUS SYSTEM:  Alert & Oriented x1 agitated at time  CHEST/LUNG: Clear to percussion bilaterally; No rales, rhonchi, wheezing, or rubs  HEART: Regular rate and rhythm; No murmurs, rubs, or gallops  ABDOMEN: Soft, Nontender, Nondistended; Bowel sounds present  EXTREMITIES:   No clubbing, cyanosis, or edema  SKIN: No rashes or lesions      MEDICATIONS  (STANDING):  amLODIPine   Tablet 10 milliGRAM(s) Oral daily  AQUAPHOR (petrolatum Ointment) 1 Application(s) Topical daily  chlorhexidine 4% Liquid 1 Application(s) Topical <User Schedule>  diVALproex  milliGRAM(s) Oral two times a day  enalapril 10 milliGRAM(s) Oral daily  enoxaparin Injectable 40 milliGRAM(s) SubCutaneous daily  gabapentin 100 milliGRAM(s) Oral three times a day  levETIRAcetam 750 milliGRAM(s) Oral two times a day  OLANZapine 5 milliGRAM(s) Oral daily  senna 2 Tablet(s) Oral at bedtime    MEDICATIONS  (PRN):  cloNIDine 0.1 milliGRAM(s) Oral every 8 hours PRN htn  OLANZapine Injectable 5 milliGRAM(s) IntraMuscular once PRN agitation  polyethylene glycol 3350 17 Gram(s) Oral two times a day PRN Constipation         Imaging Personally Reviewed:     [x ] YES  [ ] NO    Consultant(s) Notes Reviewed:  [x ] YES  [ ] NO    Care Discussed with Consultants/Other Providers [x ] YES  [ ] NO

## 2021-01-05 NOTE — PROGRESS NOTE ADULT - ASSESSMENT
60M PMHx seizure disorder, schizophrenia, HTN here with altered mental status.    #Altered mental status, toxic metabolic encephalopathy  possible h/o schizophrenia, suspect psychosis vs. neurocognitive disorder  still with apparent visual hallucinations  f/u psych  zyprexa 5    #HTN  norvasc 5    #Seizure disorder  depakote 500 bid  keppra 750 bid    #DVT ppx  lovenox  counselled about the importance of medications compliance, and the complications associated with it    #Progress Note Handoff  Pending (specify):  await placement  Family discussion:  plan of care was discussed with patient  in details.  all questions were answered.  seems to understand, and in agreement  Disposition:  unknown

## 2021-01-05 NOTE — CHART NOTE - NSCHARTNOTEFT_GEN_A_CORE
RD limited note     Patient has been consuming between 0-100% of meals throughout LOS per RN.   Patient has had good appetite throughout LOS + consumes nutrition oral supplements. Per RN, there are no factors affecting appetite. Patients current diet order Diet, DASH/TLC - Sodium & Cholesterol Restricted + Ensure Clear Cans. Labs and medications reviewed.  skin intact / no edema noted per RN flow sheets. No complaints of N/V/C/D per RN. LBM: 1/3 per RN. No new weights recorded per EMR, will continue to monitor weight trend. Patient noted with Altered mental status, toxic metabolic encephalopathy; psych following. DC pending case mgmt/SW; pt is placement issue, citizenship. No RD interventions at this time. Will continue ginny monitor intake. Will f/u in 7 days.

## 2021-01-06 PROCEDURE — 99232 SBSQ HOSP IP/OBS MODERATE 35: CPT

## 2021-01-06 NOTE — PROGRESS NOTE ADULT - SUBJECTIVE AND OBJECTIVE BOX
INTERVAL HPI/OVERNIGHT EVENTS:    SUBJECTIVE: Patient seen and examined at bedside.     no cp, sob, abd pain, fever  no syncope, ha, lightheadedness, dizziness    OBJECTIVE:    VITAL SIGNS:  Vital Signs Last 24 Hrs  T(C): --  T(F): --  HR: --  BP: --  BP(mean): --  RR: --  SpO2: --      PHYSICAL EXAM:    General: NAD  HEENT: NC/AT; PERRL, clear conjunctiva  Neck: supple  Respiratory: CTA b/l  Cardiovascular: +S1/S2; RRR  Abdomen: soft, NT/ND; +BS x4  Extremities: WWP, 2+ peripheral pulses b/l; no LE edema  Skin: normal color and turgor; no rash  Neurological:    MEDICATIONS:  MEDICATIONS  (STANDING):  amLODIPine   Tablet 10 milliGRAM(s) Oral daily  AQUAPHOR (petrolatum Ointment) 1 Application(s) Topical daily  chlorhexidine 4% Liquid 1 Application(s) Topical <User Schedule>  diVALproex  milliGRAM(s) Oral two times a day  enalapril 10 milliGRAM(s) Oral daily  enoxaparin Injectable 40 milliGRAM(s) SubCutaneous daily  gabapentin 100 milliGRAM(s) Oral three times a day  levETIRAcetam 750 milliGRAM(s) Oral two times a day  OLANZapine 5 milliGRAM(s) Oral daily  senna 2 Tablet(s) Oral at bedtime    MEDICATIONS  (PRN):  cloNIDine 0.1 milliGRAM(s) Oral every 8 hours PRN htn  polyethylene glycol 3350 17 Gram(s) Oral two times a day PRN Constipation      ALLERGIES:  Allergies    No Known Allergies    Intolerances        LABS:              Creatinine Trend:         hs Troponin:              CSF:                      EKG:   MICROBIOLOGY:    IMAGING:      Labs, imaging, EKG personally reviewed    RADIOLOGY & ADDITIONAL TESTS: Reviewed.

## 2021-01-07 PROCEDURE — 99232 SBSQ HOSP IP/OBS MODERATE 35: CPT

## 2021-01-07 RX ORDER — OLANZAPINE 15 MG/1
5 TABLET, FILM COATED ORAL ONCE
Refills: 0 | Status: COMPLETED | OUTPATIENT
Start: 2021-01-07 | End: 2021-01-07

## 2021-01-07 NOTE — PROGRESS NOTE ADULT - SUBJECTIVE AND OBJECTIVE BOX
INTERVAL HPI/OVERNIGHT EVENTS:    SUBJECTIVE: Patient seen and examined at bedside.     no cp, sob, abd pain, fever  no syncope, ha, lightheadedness, dizziness    OBJECTIVE:    VITAL SIGNS:  Vital Signs Last 24 Hrs  T(C): 36.1 (06 Jan 2021 21:12), Max: 36.1 (06 Jan 2021 21:12)  T(F): 97 (06 Jan 2021 21:12), Max: 97 (06 Jan 2021 21:12)  HR: 72 (06 Jan 2021 21:12) (72 - 72)  BP: 173/87 (06 Jan 2021 21:12) (173/87 - 173/87)  BP(mean): --  RR: 16 (06 Jan 2021 21:12) (16 - 16)  SpO2: --      PHYSICAL EXAM:    General: NAD  HEENT: NC/AT; PERRL, clear conjunctiva  Neck: supple  Respiratory: CTA b/l  Cardiovascular: +S1/S2; RRR  Abdomen: soft, NT/ND; +BS x4  Extremities: WWP, 2+ peripheral pulses b/l; no LE edema  Skin: normal color and turgor; no rash  Neurological:    MEDICATIONS:  MEDICATIONS  (STANDING):  amLODIPine   Tablet 10 milliGRAM(s) Oral daily  AQUAPHOR (petrolatum Ointment) 1 Application(s) Topical daily  chlorhexidine 4% Liquid 1 Application(s) Topical <User Schedule>  diVALproex  milliGRAM(s) Oral two times a day  enalapril 10 milliGRAM(s) Oral daily  enoxaparin Injectable 40 milliGRAM(s) SubCutaneous daily  gabapentin 100 milliGRAM(s) Oral three times a day  levETIRAcetam 750 milliGRAM(s) Oral two times a day  OLANZapine 5 milliGRAM(s) Oral daily  senna 2 Tablet(s) Oral at bedtime    MEDICATIONS  (PRN):  cloNIDine 0.1 milliGRAM(s) Oral every 8 hours PRN htn  polyethylene glycol 3350 17 Gram(s) Oral two times a day PRN Constipation      ALLERGIES:  Allergies    No Known Allergies    Intolerances        LABS:              Creatinine Trend:         hs Troponin:              CSF:                      EKG:   MICROBIOLOGY:    IMAGING:      Labs, imaging, EKG personally reviewed    RADIOLOGY & ADDITIONAL TESTS: Reviewed.

## 2021-01-08 PROCEDURE — 99232 SBSQ HOSP IP/OBS MODERATE 35: CPT

## 2021-01-08 RX ORDER — HYDRALAZINE HCL 50 MG
25 TABLET ORAL ONCE
Refills: 0 | Status: DISCONTINUED | OUTPATIENT
Start: 2021-01-08 | End: 2021-04-29

## 2021-01-08 RX ADMIN — OLANZAPINE 5 MILLIGRAM(S): 15 TABLET, FILM COATED ORAL at 03:40

## 2021-01-08 NOTE — PROVIDER CONTACT NOTE (OTHER) - BACKGROUND
pt has a labile mood and behavior is unpredictable. pt refuses to take his daily medications and remains on 1:1 for elopement

## 2021-01-08 NOTE — PROVIDER CONTACT NOTE (OTHER) - REASON
Pt status
high bp 191/86
pt agitated, slamming doors, entering other pts rooms while having no gown on. pt also throwing boxes of masks and gloves
High blood pressure
pt agitated and threatening staff

## 2021-01-09 PROCEDURE — 99232 SBSQ HOSP IP/OBS MODERATE 35: CPT

## 2021-01-09 NOTE — PROGRESS NOTE ADULT - SUBJECTIVE AND OBJECTIVE BOX
INTERVAL HPI/OVERNIGHT EVENTS:    SUBJECTIVE: Patient seen and examined at bedside.     no cp, sob, abd pain, fever  no ha, syncope, lightheadedness, dizziness    OBJECTIVE:    VITAL SIGNS:  Vital Signs Last 24 Hrs  T(C): 36.2 (08 Jan 2021 14:39), Max: 36.2 (08 Jan 2021 14:39)  T(F): 97.1 (08 Jan 2021 14:39), Max: 97.1 (08 Jan 2021 14:39)  HR: 86 (08 Jan 2021 14:39) (86 - 86)  BP: 198/111 (08 Jan 2021 14:39) (198/111 - 198/111)  BP(mean): --  RR: 16 (08 Jan 2021 14:39) (16 - 16)  SpO2: --      PHYSICAL EXAM:    General: NAD  HEENT: NC/AT; PERRL, clear conjunctiva  Neck: supple  Respiratory: CTA b/l  Cardiovascular: +S1/S2; RRR  Abdomen: soft, NT/ND; +BS x4  Extremities: WWP, 2+ peripheral pulses b/l; no LE edema  Skin: normal color and turgor; no rash  Neurological:    MEDICATIONS:  MEDICATIONS  (STANDING):  amLODIPine   Tablet 10 milliGRAM(s) Oral daily  AQUAPHOR (petrolatum Ointment) 1 Application(s) Topical daily  chlorhexidine 4% Liquid 1 Application(s) Topical <User Schedule>  diVALproex  milliGRAM(s) Oral two times a day  enalapril 10 milliGRAM(s) Oral daily  enoxaparin Injectable 40 milliGRAM(s) SubCutaneous daily  gabapentin 100 milliGRAM(s) Oral three times a day  hydrALAZINE 25 milliGRAM(s) Oral once  levETIRAcetam 750 milliGRAM(s) Oral two times a day  OLANZapine 5 milliGRAM(s) Oral daily  senna 2 Tablet(s) Oral at bedtime    MEDICATIONS  (PRN):  cloNIDine 0.1 milliGRAM(s) Oral every 8 hours PRN htn  polyethylene glycol 3350 17 Gram(s) Oral two times a day PRN Constipation      ALLERGIES:  Allergies    No Known Allergies    Intolerances        LABS:              Creatinine Trend:         hs Troponin:              CSF:                      EKG:   MICROBIOLOGY:    IMAGING:      Labs, imaging, EKG personally reviewed    RADIOLOGY & ADDITIONAL TESTS: Reviewed.

## 2021-01-10 PROCEDURE — 99232 SBSQ HOSP IP/OBS MODERATE 35: CPT

## 2021-01-10 NOTE — PROGRESS NOTE ADULT - SUBJECTIVE AND OBJECTIVE BOX
INTERVAL HPI/OVERNIGHT EVENTS:    SUBJECTIVE: Patient seen and examined at bedside.     no cp, sob, abd pain, fever  no ha, syncope, lightheadedness, dizziness    OBJECTIVE:    VITAL SIGNS:  Vital Signs Last 24 Hrs  T(C): --  T(F): --  HR: --  BP: --  BP(mean): --  RR: --  SpO2: --      PHYSICAL EXAM:    General: NAD  HEENT: NC/AT; PERRL, clear conjunctiva  Neck: supple  Respiratory: CTA b/l  Cardiovascular: +S1/S2; RRR  Abdomen: soft, NT/ND; +BS x4  Extremities: WWP, 2+ peripheral pulses b/l; no LE edema  Skin: normal color and turgor; no rash  Neurological:    MEDICATIONS:  MEDICATIONS  (STANDING):  amLODIPine   Tablet 10 milliGRAM(s) Oral daily  AQUAPHOR (petrolatum Ointment) 1 Application(s) Topical daily  chlorhexidine 4% Liquid 1 Application(s) Topical <User Schedule>  diVALproex  milliGRAM(s) Oral two times a day  enalapril 10 milliGRAM(s) Oral daily  enoxaparin Injectable 40 milliGRAM(s) SubCutaneous daily  gabapentin 100 milliGRAM(s) Oral three times a day  hydrALAZINE 25 milliGRAM(s) Oral once  levETIRAcetam 750 milliGRAM(s) Oral two times a day  OLANZapine 5 milliGRAM(s) Oral daily  senna 2 Tablet(s) Oral at bedtime    MEDICATIONS  (PRN):  cloNIDine 0.1 milliGRAM(s) Oral every 8 hours PRN htn  polyethylene glycol 3350 17 Gram(s) Oral two times a day PRN Constipation      ALLERGIES:  Allergies    No Known Allergies    Intolerances        LABS:              Creatinine Trend:         hs Troponin:              CSF:                      EKG:   MICROBIOLOGY:    IMAGING:      Labs, imaging, EKG personally reviewed    RADIOLOGY & ADDITIONAL TESTS: Reviewed.

## 2021-01-11 PROCEDURE — 99232 SBSQ HOSP IP/OBS MODERATE 35: CPT

## 2021-01-11 NOTE — PROGRESS NOTE ADULT - SUBJECTIVE AND OBJECTIVE BOX
INTERVAL HPI/OVERNIGHT EVENTS:    SUBJECTIVE: Patient seen and examined at bedside.     pt seen and evaluated on 1/11  no cp, sob, abd pain, fever  no ha, syncope, lightheadedness, dizziness    OBJECTIVE:    VITAL SIGNS:  Vital Signs Last 24 Hrs  T(C): --  T(F): --  HR: --  BP: --  BP(mean): --  RR: --  SpO2: --      PHYSICAL EXAM:    General: NAD  HEENT: NC/AT; PERRL, clear conjunctiva  Neck: supple  Respiratory: CTA b/l  Cardiovascular: +S1/S2; RRR  Abdomen: soft, NT/ND; +BS x4  Extremities: WWP, 2+ peripheral pulses b/l; no LE edema  Skin: normal color and turgor; no rash  Neurological:    MEDICATIONS:  MEDICATIONS  (STANDING):  amLODIPine   Tablet 10 milliGRAM(s) Oral daily  AQUAPHOR (petrolatum Ointment) 1 Application(s) Topical daily  chlorhexidine 4% Liquid 1 Application(s) Topical <User Schedule>  diVALproex  milliGRAM(s) Oral two times a day  enalapril 10 milliGRAM(s) Oral daily  enoxaparin Injectable 40 milliGRAM(s) SubCutaneous daily  gabapentin 100 milliGRAM(s) Oral three times a day  hydrALAZINE 25 milliGRAM(s) Oral once  levETIRAcetam 750 milliGRAM(s) Oral two times a day  OLANZapine 5 milliGRAM(s) Oral daily  senna 2 Tablet(s) Oral at bedtime    MEDICATIONS  (PRN):  cloNIDine 0.1 milliGRAM(s) Oral every 8 hours PRN htn  polyethylene glycol 3350 17 Gram(s) Oral two times a day PRN Constipation      ALLERGIES:  Allergies    No Known Allergies    Intolerances        LABS:              Creatinine Trend:         hs Troponin:              CSF:                      EKG:   MICROBIOLOGY:    IMAGING:      Labs, imaging, EKG personally reviewed    RADIOLOGY & ADDITIONAL TESTS: Reviewed.

## 2021-01-12 PROCEDURE — 99232 SBSQ HOSP IP/OBS MODERATE 35: CPT

## 2021-01-12 NOTE — CHART NOTE - NSCHARTNOTEFT_GEN_A_CORE
Limited reassessment    Meds/labs reviewed    No edema noted; skin is WDL (1/12)    No new wts recorded     Diet order: DASH/TLC, Ensure Clear q24h. Limited reassessment    Meds/labs reviewed    No edema noted; skin is WDL (1/12)    No new wts recorded     Diet order: DASH/TLC, Ensure Clear q24h.    Pt has court hearing for Guardianship planned on 1/28. Pt followed by psych. AMS/toxic metabolic encephalopathy- possible h/o schizophrenia, suspect psychosis vs neurocognitive disorder. Placement pending.   Pt continues to eat well. Po intake recorded as 75% consistently since previously assessed. No GI s/s noted. No diarrhea or constipation. No BMs this shift noted. There are no further nutrition interventions at this time. Pt remains not at risk. RD to f/u within the next 7 days.

## 2021-01-13 PROCEDURE — 99232 SBSQ HOSP IP/OBS MODERATE 35: CPT

## 2021-01-13 NOTE — PROGRESS NOTE ADULT - SUBJECTIVE AND OBJECTIVE BOX
60M PMHx seizure disorder, schizophrenia, HTN here with altered mental status.    Today:  Seen at bedside, calm and cooperative today.          REVIEW OF SYSTEMS:  Not a reliable historian       MEDICATIONS  (STANDING):  amLODIPine   Tablet 10 milliGRAM(s) Oral daily  AQUAPHOR (petrolatum Ointment) 1 Application(s) Topical daily  chlorhexidine 4% Liquid 1 Application(s) Topical <User Schedule>  diVALproex  milliGRAM(s) Oral two times a day  enalapril 10 milliGRAM(s) Oral daily  enoxaparin Injectable 40 milliGRAM(s) SubCutaneous daily  gabapentin 100 milliGRAM(s) Oral three times a day  hydrALAZINE 25 milliGRAM(s) Oral once  levETIRAcetam 750 milliGRAM(s) Oral two times a day  OLANZapine 5 milliGRAM(s) Oral daily  senna 2 Tablet(s) Oral at bedtime    MEDICATIONS  (PRN):  cloNIDine 0.1 milliGRAM(s) Oral every 8 hours PRN htn  polyethylene glycol 3350 17 Gram(s) Oral two times a day PRN Constipation      Allergies  No Known Allergies        FAMILY HISTORY:  No pertinent family history in first degree relatives  unknown        Vital Signs Last 24 Hrs  T(C): --  T(F): --  HR: --  BP: --  BP(mean): --  RR: --  SpO2: --    PHYSICAL EXAM:  GENERAL: well-developed  HEAD:  Atraumatic, Normocephalic  EYES: EOMI, PERRLA, conjunctiva and sclera clear  NECK: Supple, No JVD, Normal thyroid  NERVOUS SYSTEM:  Awake and alert  CHEST/LUNG: Clear to percussion bilaterally; No rales, rhonchi, wheezing, or rubs  HEART: Regular rate and rhythm; No murmurs, rubs, or gallops  ABDOMEN: Soft, Nontender, Nondistended; Bowel sounds present  EXTREMITIES:  2+ Peripheral Pulses, No clubbing, cyanosis, or edema  PSYCH: calm

## 2021-01-13 NOTE — PROGRESS NOTE ADULT - ASSESSMENT
60M PMHx seizure disorder, schizophrenia, HTN here with altered mental status.    #Altered mental status, toxic metabolic encephalopathy  possible h/o schizophrenia, suspect psychosis vs. neurocognitive disorder  Still refusing blood draws, medications, vitals  Zyprexa, Zyprexa IM PRN  Psych consulted re-called as he has become more aggressive and abusive to staff.  -Psych agrees patient would benefit from treatment against objection; hospital  contacted, patient has court case pending.    #tinea pedis  -Resolved s/p clotrimazole cream    #HTN  norvasc 10  added enalapril, increased dose to 10 mg    #Seizure disorder  depakote 500 bid  keppra 750 bid  Ativan PRN for Seizures  -Pt has been refusing, might benefit from  treatment against objection as he has been refusing AEDs.    #DVT ppx  lovenox    -DC pending case mgmt/SW; pt is placement issue, citizenship, court ordered treatment against objection.    #Progress Note Handoff:  Pending (specify):  Consults_________, Tests________, Test Results_______, Other___discharge planning______  Family discussion:d/w pt at bedside re: treatment plan, primary dx  Disposition: Home___/SNF___/Other________/Unknown at this time____x____

## 2021-01-14 PROCEDURE — 99232 SBSQ HOSP IP/OBS MODERATE 35: CPT

## 2021-01-14 NOTE — PROGRESS NOTE ADULT - SUBJECTIVE AND OBJECTIVE BOX
60M PMHx seizure disorder, schizophrenia, HTN here with altered mental status.    Today:  No events overnight.        REVIEW OF SYSTEMS:  Not a reliable historian.      MEDICATIONS  (STANDING):  amLODIPine   Tablet 10 milliGRAM(s) Oral daily  AQUAPHOR (petrolatum Ointment) 1 Application(s) Topical daily  chlorhexidine 4% Liquid 1 Application(s) Topical <User Schedule>  diVALproex  milliGRAM(s) Oral two times a day  enalapril 10 milliGRAM(s) Oral daily  enoxaparin Injectable 40 milliGRAM(s) SubCutaneous daily  gabapentin 100 milliGRAM(s) Oral three times a day  hydrALAZINE 25 milliGRAM(s) Oral once  levETIRAcetam 750 milliGRAM(s) Oral two times a day  OLANZapine 5 milliGRAM(s) Oral daily  senna 2 Tablet(s) Oral at bedtime    MEDICATIONS  (PRN):  cloNIDine 0.1 milliGRAM(s) Oral every 8 hours PRN htn  polyethylene glycol 3350 17 Gram(s) Oral two times a day PRN Constipation      Allergies  No Known Allergies        FAMILY HISTORY:  No pertinent family history in first degree relatives  unknown        Vital Signs Last 24 Hrs  T(C): --  T(F): --  HR: --  BP: --  BP(mean): --  RR: --  SpO2: --    PHYSICAL EXAM:    GENERAL: NAD, well-groomed, well-developed  HEAD:  Atraumatic, Normocephalic  EYES: EOMI, PERRLA, conjunctiva and sclera clear  ENMT: No tonsillar erythema, exudates, or enlargement; Moist mucous membranes, Good dentition, No lesions  NECK: Supple, No JVD, Normal thyroid  NERVOUS SYSTEM:  Alert, oriented x 1  CHEST/LUNG: Clear to percussion bilaterally; No rales, rhonchi, wheezing, or rubs  HEART: Regular rate and rhythm; No murmurs, rubs, or gallops  ABDOMEN: Soft, Nontender, Nondistended; Bowel sounds present

## 2021-01-15 PROCEDURE — 99232 SBSQ HOSP IP/OBS MODERATE 35: CPT

## 2021-01-15 NOTE — PROGRESS NOTE ADULT - SUBJECTIVE AND OBJECTIVE BOX
60M PMHx seizure disorder, schizophrenia, HTN here with altered mental status.    Today:  Seen at bedside, watching TV, no complaints.        REVIEW OF SYSTEMS:  Not a reliable historian.      MEDICATIONS  (STANDING):  amLODIPine   Tablet 10 milliGRAM(s) Oral daily  AQUAPHOR (petrolatum Ointment) 1 Application(s) Topical daily  chlorhexidine 4% Liquid 1 Application(s) Topical <User Schedule>  diVALproex  milliGRAM(s) Oral two times a day  enalapril 10 milliGRAM(s) Oral daily  enoxaparin Injectable 40 milliGRAM(s) SubCutaneous daily  gabapentin 100 milliGRAM(s) Oral three times a day  hydrALAZINE 25 milliGRAM(s) Oral once  levETIRAcetam 750 milliGRAM(s) Oral two times a day  OLANZapine 5 milliGRAM(s) Oral daily  senna 2 Tablet(s) Oral at bedtime    MEDICATIONS  (PRN):  cloNIDine 0.1 milliGRAM(s) Oral every 8 hours PRN htn  polyethylene glycol 3350 17 Gram(s) Oral two times a day PRN Constipation      Allergies  No Known Allergies        FAMILY HISTORY:  No pertinent family history in first degree relatives  unknown        Vital Signs Last 24 Hrs  T(C): 36.3 (14 Jan 2021 21:00), Max: 36.3 (14 Jan 2021 21:00)  T(F): 97.4 (14 Jan 2021 21:00), Max: 97.4 (14 Jan 2021 21:00)  HR: 70 (14 Jan 2021 21:00) (70 - 70)  BP: 178/88 (14 Jan 2021 21:00) (178/88 - 178/88)  RR: 16 (14 Jan 2021 21:00) (16 - 16)    PHYSICAL EXAM:  GENERAL: NAD, well-groomed, well-developed  HEAD:  Atraumatic, Normocephalic  EYES: EOMI, PERRLA, conjunctiva and sclera clear  ENMT: No tonsillar erythema, exudates, or enlargement; Moist mucous membranes, Good dentition, No lesions  NECK: Supple, No JVD, Normal thyroid  NERVOUS SYSTEM:  Alert, oriented x 1  CHEST/LUNG: Clear to percussion bilaterally; No rales, rhonchi, wheezing, or rubs  HEART: Regular rate and rhythm; No murmurs, rubs, or gallops  ABDOMEN: Soft, Nontender, Nondistended; Bowel sounds present

## 2021-01-16 PROCEDURE — 99232 SBSQ HOSP IP/OBS MODERATE 35: CPT

## 2021-01-16 NOTE — PROGRESS NOTE ADULT - SUBJECTIVE AND OBJECTIVE BOX
60M PMHx seizure disorder, schizophrenia, HTN here with altered mental status.    Today:  Seen at bedside, no complaints.        REVIEW OF SYSTEMS:  Not a reliable historian.      MEDICATIONS  (STANDING):  amLODIPine   Tablet 10 milliGRAM(s) Oral daily  AQUAPHOR (petrolatum Ointment) 1 Application(s) Topical daily  chlorhexidine 4% Liquid 1 Application(s) Topical <User Schedule>  diVALproex  milliGRAM(s) Oral two times a day  enalapril 10 milliGRAM(s) Oral daily  enoxaparin Injectable 40 milliGRAM(s) SubCutaneous daily  gabapentin 100 milliGRAM(s) Oral three times a day  hydrALAZINE 25 milliGRAM(s) Oral once  levETIRAcetam 750 milliGRAM(s) Oral two times a day  OLANZapine 5 milliGRAM(s) Oral daily  senna 2 Tablet(s) Oral at bedtime    MEDICATIONS  (PRN):  cloNIDine 0.1 milliGRAM(s) Oral every 8 hours PRN htn  polyethylene glycol 3350 17 Gram(s) Oral two times a day PRN Constipation      Allergies  No Known Allergies        FAMILY HISTORY:  No pertinent family history in first degree relatives  unknown        Vital Signs Last 24 Hrs  T(C): --  T(F): --  HR: --  BP: --  BP(mean): --  RR: --  SpO2: --    PHYSICAL EXAM:  GENERAL: NAD, well-groomed, well-developed  HEAD:  Atraumatic, Normocephalic  EYES: EOMI, PERRLA, conjunctiva and sclera clear  ENMT: No tonsillar erythema, exudates, or enlargement; Moist mucous membranes, Good dentition, No lesions  NECK: Supple, No JVD, Normal thyroid  NERVOUS SYSTEM:  Alert, oriented x 1  CHEST/LUNG: Clear to percussion bilaterally; No rales, rhonchi, wheezing, or rubs  HEART: Regular rate and rhythm; No murmurs, rubs, or gallops  ABDOMEN: Soft, Nontender, Nondistended; Bowel sounds present

## 2021-01-17 PROCEDURE — 99232 SBSQ HOSP IP/OBS MODERATE 35: CPT

## 2021-01-17 NOTE — PROGRESS NOTE ADULT - SUBJECTIVE AND OBJECTIVE BOX
60M PMHx seizure disorder, schizophrenia, HTN here with altered mental status.    Today:  Seen at bedside, no events overnight.  Asked about his passport.        REVIEW OF SYSTEMS:  Not a reliable historian      MEDICATIONS  (STANDING):  amLODIPine   Tablet 10 milliGRAM(s) Oral daily  AQUAPHOR (petrolatum Ointment) 1 Application(s) Topical daily  chlorhexidine 4% Liquid 1 Application(s) Topical <User Schedule>  diVALproex  milliGRAM(s) Oral two times a day  enalapril 10 milliGRAM(s) Oral daily  enoxaparin Injectable 40 milliGRAM(s) SubCutaneous daily  gabapentin 100 milliGRAM(s) Oral three times a day  hydrALAZINE 25 milliGRAM(s) Oral once  levETIRAcetam 750 milliGRAM(s) Oral two times a day  OLANZapine 5 milliGRAM(s) Oral daily  senna 2 Tablet(s) Oral at bedtime    MEDICATIONS  (PRN):  cloNIDine 0.1 milliGRAM(s) Oral every 8 hours PRN htn  polyethylene glycol 3350 17 Gram(s) Oral two times a day PRN Constipation      Allergies  No Known Allergies        FAMILY HISTORY:  No pertinent family history in first degree relatives  unknown        Vital Signs Last 24 Hrs  T(C): --  T(F): --  HR: --  BP: --  BP(mean): --  RR: --  SpO2: --    PHYSICAL EXAM:  GENERAL: NAD, well-groomed, well-developed  HEAD:  Atraumatic, Normocephalic  EYES: EOMI, PERRLA, conjunctiva and sclera clear  ENMT: No tonsillar erythema, exudates, or enlargement; Moist mucous membranes, Good dentition, No lesions  NECK: Supple, No JVD, Normal thyroid  NERVOUS SYSTEM:  Alert, oriented x 1  CHEST/LUNG: Clear to percussion bilaterally; No rales, rhonchi, wheezing, or rubs  HEART: Regular rate and rhythm; No murmurs, rubs, or gallops  ABDOMEN: Soft, Nontender, Nondistended; Bowel sounds present

## 2021-01-18 PROCEDURE — 99233 SBSQ HOSP IP/OBS HIGH 50: CPT

## 2021-01-18 NOTE — PROGRESS NOTE ADULT - SUBJECTIVE AND OBJECTIVE BOX
60M PMHx seizure disorder, schizophrenia, HTN here with altered mental status.    Today:  Seen at bedside, no complaints, no events overnight.      REVIEW OF SYSTEMS:  Not a reliable historian      MEDICATIONS  (STANDING):  amLODIPine   Tablet 10 milliGRAM(s) Oral daily  AQUAPHOR (petrolatum Ointment) 1 Application(s) Topical daily  chlorhexidine 4% Liquid 1 Application(s) Topical <User Schedule>  diVALproex  milliGRAM(s) Oral two times a day  enalapril 10 milliGRAM(s) Oral daily  enoxaparin Injectable 40 milliGRAM(s) SubCutaneous daily  gabapentin 100 milliGRAM(s) Oral three times a day  hydrALAZINE 25 milliGRAM(s) Oral once  levETIRAcetam 750 milliGRAM(s) Oral two times a day  OLANZapine 5 milliGRAM(s) Oral daily  senna 2 Tablet(s) Oral at bedtime    MEDICATIONS  (PRN):  cloNIDine 0.1 milliGRAM(s) Oral every 8 hours PRN htn  polyethylene glycol 3350 17 Gram(s) Oral two times a day PRN Constipation      Allergies  No Known Allergies        FAMILY HISTORY:  No pertinent family history in first degree relatives  unknown        Vital Signs Last 24 Hrs  T(C): --  T(F): --  HR: --  BP: --  BP(mean): --  RR: --  SpO2: --    PHYSICAL EXAM:  GENERAL: NAD, well-groomed, well-developed  HEAD:  Atraumatic, Normocephalic  EYES: EOMI, PERRLA, conjunctiva and sclera clear  ENMT: No tonsillar erythema, exudates, or enlargement; Moist mucous membranes, Good dentition, No lesions  NECK: Supple, No JVD, Normal thyroid  NERVOUS SYSTEM:  Alert, oriented x 1  CHEST/LUNG: Clear to percussion bilaterally; No rales, rhonchi, wheezing, or rubs  HEART: Regular rate and rhythm; No murmurs, rubs, or gallops  ABDOMEN: Soft, Nontender, Nondistended; Bowel sounds present

## 2021-01-19 PROCEDURE — 99232 SBSQ HOSP IP/OBS MODERATE 35: CPT

## 2021-01-19 NOTE — CHART NOTE - NSCHARTNOTEFT_GEN_A_CORE
RD limited note:    Meds/labs reviewed    No edema noted; skin is WDL (1/12)    No new wts recorded     Diet order: DASH/TLC, Ensure Clear q24h.    Pt has court hearing for Guardianship planned on 1/28. Pt followed by psych. AMS/toxic metabolic encephalopathy- possible h/o schizophrenia, suspect psychosis vs neurocognitive disorder. Placement pending.   Pt continues to eat well. Po intake recorded as 75% consistently since previously assessed. No GI s/s noted. No diarrhea or constipation. No BMs this shift noted. DC pending case mgmt/SW; pt is placement issue, citizenship, court ordered treatment against objection. There are no further nutrition interventions at this time. Pt remains not at risk. RD to f/u within the next 7 days.

## 2021-01-20 PROCEDURE — 99232 SBSQ HOSP IP/OBS MODERATE 35: CPT

## 2021-01-20 NOTE — PROGRESS NOTE ADULT - ASSESSMENT
60M PMHx seizure disorder, schizophrenia, HTN here with altered mental status.    #Altered mental status, toxic metabolic encephalopathy  possible h/o schizophrenia, suspect psychosis vs. neurocognitive disorder  Still refusing blood draws, medications, vitals  Zyprexa, Zyprexa IM PRN    #tinea pedis  -Resolved s/p clotrimazole cream    #HTN  norvasc 10mg  added enalapril, increased dose to 10 mg    #Seizure disorder  depakote 500mg bid  keppra 750mg bid  Ativan PRN for Seizures  -Pt has been refusing, might benefit from  treatment against objection as he has been refusing AEDs.    #DVT ppx  lovenox sq    -DC pending case mgmt/SW; pt is placement issue, citizenship, court ordered treatment against objection.    #Progress Note Handoff:  Pending (specify):  Consults_________, Tests________, Test Results_______, Other___discharge planning______  Family discussion:d/w pt at bedside re: treatment plan, primary dx  Disposition: Home___/SNF___/Other________/Unknown at this time____x____

## 2021-01-20 NOTE — PROGRESS NOTE ADULT - SUBJECTIVE AND OBJECTIVE BOX
60M PMHx seizure disorder, schizophrenia, HTN here with altered mental status.    Today:  -no change in current clinical care and pt awaits citizenship (prolonged hospitalization)      REVIEW OF SYSTEMS:  Not a reliable historian.    Allergies  No Known Allergies        FAMILY HISTORY:  No pertinent family history in first degree relatives  unknown      Vital Signs Last 24 Hrs --- refused vitals   T(C): --  T(F): --  HR: --  BP: --  BP(mean): --  RR: --  SpO2: --      PHYSICAL EXAM:  GENERAL: NAD, well-groomed, well-developed  HEAD:  Atraumatic, Normocephalic  EYES: EOMI, PERRLA, conjunctiva and sclera clear  ENMT: No tonsillar erythema, exudates, or enlargement; Moist mucous membranes, Good dentition, No lesions  NECK: Supple, No JVD, Normal thyroid  NERVOUS SYSTEM:  Alert, oriented x 1  CHEST/LUNG: Clear to percussion bilaterally; No rales, rhonchi, wheezing, or rubs  HEART: Regular rate and rhythm; No murmurs, rubs, or gallops  ABDOMEN: Soft, Nontender, Nondistended; Bowel sounds present    MEDICATIONS  (STANDING):  amLODIPine   Tablet 10 milliGRAM(s) Oral daily  AQUAPHOR (petrolatum Ointment) 1 Application(s) Topical daily  chlorhexidine 4% Liquid 1 Application(s) Topical <User Schedule>  diVALproex  milliGRAM(s) Oral two times a day  enalapril 10 milliGRAM(s) Oral daily  enoxaparin Injectable 40 milliGRAM(s) SubCutaneous daily  gabapentin 100 milliGRAM(s) Oral three times a day  hydrALAZINE 25 milliGRAM(s) Oral once  levETIRAcetam 750 milliGRAM(s) Oral two times a day  OLANZapine 5 milliGRAM(s) Oral daily  senna 2 Tablet(s) Oral at bedtime    MEDICATIONS  (PRN):  cloNIDine 0.1 milliGRAM(s) Oral every 8 hours PRN htn  polyethylene glycol 3350 17 Gram(s) Oral two times a day PRN Constipation

## 2021-01-21 PROCEDURE — 99232 SBSQ HOSP IP/OBS MODERATE 35: CPT

## 2021-01-21 NOTE — PROGRESS NOTE ADULT - SUBJECTIVE AND OBJECTIVE BOX
60M PMHx seizure disorder, schizophrenia, HTN here with altered mental status.    Today:  -no change in current clinical care and pt awaits citizenship (prolonged hospitalization)  -sitting up chair; comfortable       REVIEW OF SYSTEMS:  Not a reliable historian.    Allergies  No Known Allergies        FAMILY HISTORY:  No pertinent family history in first degree relatives  unknown        Vital Signs Last 24 Hrs  T(C): --  T(F): --  HR: --  BP: --  BP(mean): --  RR: --  SpO2: --      PHYSICAL EXAM:  GENERAL: NAD, well-groomed, well-developed  HEAD:  Atraumatic, Normocephalic  EYES: EOMI, PERRLA, conjunctiva and sclera clear  ENMT: No tonsillar erythema, exudates, or enlargement; Moist mucous membranes, Good dentition, No lesions  NECK: Supple, No JVD, Normal thyroid  NERVOUS SYSTEM:  Alert, oriented x 1  CHEST/LUNG: Clear to percussion bilaterally; No rales, rhonchi, wheezing, or rubs  HEART: Regular rate and rhythm; No murmurs, rubs, or gallops  ABDOMEN: Soft, Nontender, Nondistended; Bowel sounds present    MEDICATIONS  (STANDING):  amLODIPine   Tablet 10 milliGRAM(s) Oral daily  AQUAPHOR (petrolatum Ointment) 1 Application(s) Topical daily  chlorhexidine 4% Liquid 1 Application(s) Topical <User Schedule>  diVALproex  milliGRAM(s) Oral two times a day  enalapril 10 milliGRAM(s) Oral daily  enoxaparin Injectable 40 milliGRAM(s) SubCutaneous daily  gabapentin 100 milliGRAM(s) Oral three times a day  hydrALAZINE 25 milliGRAM(s) Oral once  levETIRAcetam 750 milliGRAM(s) Oral two times a day  OLANZapine 5 milliGRAM(s) Oral daily  senna 2 Tablet(s) Oral at bedtime    MEDICATIONS  (PRN):  cloNIDine 0.1 milliGRAM(s) Oral every 8 hours PRN htn  polyethylene glycol 3350 17 Gram(s) Oral two times a day PRN Constipation

## 2021-01-21 NOTE — PROGRESS NOTE ADULT - ASSESSMENT
60M PMHx seizure disorder, schizophrenia, HTN here with altered mental status.    #Altered mental status, toxic metabolic encephalopathy  possible h/o schizophrenia, suspect psychosis vs. neurocognitive disorder  Still refusing blood draws, medications, vitals  Zyprexa, Zyprexa IM PRN    #tinea pedis  -Resolved s/p clotrimazole cream    #HTN  norvasc 10mg  added enalapril, increased dose to 10 mg    #Seizure disorder  depakote 500mg bid25  keppra 750mg bid  Ativan PRN for Seizures  -Pt has been refusing, might benefit from  treatment against objection as he has been refusing AEDs.    #DVT ppx  lovenox sq    -DC pending case mgmt/SW; pt is placement issue, citizenship, court ordered treatment against objection.    #Progress Note Handoff:  Pending (specify):  Consults_________, Tests________, Test Results_______, Other___discharge planning______  Family discussion:d/w pt at bedside re: treatment plan, primary dx  Disposition: Home___/SNF___/Other________/Unknown at this time____x____

## 2021-01-22 PROCEDURE — 99232 SBSQ HOSP IP/OBS MODERATE 35: CPT

## 2021-01-22 NOTE — PROGRESS NOTE ADULT - SUBJECTIVE AND OBJECTIVE BOX
60M PMHx seizure disorder, schizophrenia, HTN here with altered mental status.    Today:  -no change in current clinical care and pt awaits citizenship (prolonged hospitalization)  -sitting up chair; comfortable   -no major events notified to me     REVIEW OF SYSTEMS:  Not a reliable historian.    Allergies  No Known Allergies        FAMILY HISTORY:  No pertinent family history in first degree relatives  unknown      refused vitals    Vital Signs Last 24 Hrs  T(C): --  T(F): --  HR: --  BP: --  BP(mean): --  RR: --  SpO2: --      PHYSICAL EXAM:  GENERAL: NAD, not agitated   HEAD:  Atraumatic, Normocephalic  EYES: EOMI, PERRLA, conjunctiva and sclera clear  ENMT: No tonsillar erythema, exudates, or enlargement; Moist mucous membranes, Good dentition, No lesions  NECK: Supple, No JVD, Normal thyroid  NERVOUS SYSTEM:  Alert, oriented x 1  CHEST/LUNG: Clear to percussion bilaterally; No rales, rhonchi, wheezing, or rubs  HEART: Regular rate and rhythm; No murmurs, rubs, or gallops  ABDOMEN: Soft, Nontender, Nondistended; Bowel sounds present    MEDICATIONS  (STANDING):  amLODIPine   Tablet 10 milliGRAM(s) Oral daily  AQUAPHOR (petrolatum Ointment) 1 Application(s) Topical daily  chlorhexidine 4% Liquid 1 Application(s) Topical <User Schedule>  diVALproex  milliGRAM(s) Oral two times a day  enalapril 10 milliGRAM(s) Oral daily  enoxaparin Injectable 40 milliGRAM(s) SubCutaneous daily  gabapentin 100 milliGRAM(s) Oral three times a day  hydrALAZINE 25 milliGRAM(s) Oral once  levETIRAcetam 750 milliGRAM(s) Oral two times a day  OLANZapine 5 milliGRAM(s) Oral daily  senna 2 Tablet(s) Oral at bedtime    MEDICATIONS  (PRN):  cloNIDine 0.1 milliGRAM(s) Oral every 8 hours PRN htn  polyethylene glycol 3350 17 Gram(s) Oral two times a day PRN Constipation

## 2021-01-22 NOTE — PROGRESS NOTE ADULT - ASSESSMENT
60M PMHx seizure disorder, schizophrenia, HTN here with altered mental status.    #Altered mental status, toxic metabolic encephalopathy  possible h/o schizophrenia, suspect psychosis vs. neurocognitive disorder  Still refusing blood draws, medications, vitals  Zyprexa, Zyprexa IM PRN    #tinea pedis  -Resolved s/p clotrimazole cream    #HTN  norvasc 10mg + enalapril 10 mg    #Seizure disorder  depakote 500mg bid25  keppra 750mg bid  Ativan PRN for Seizures  -Pt has been refusing, might benefit from  treatment against objection as he has been refusing AEDs.    #DVT ppx  lovenox sq    -DC pending case mgmt/SW; pt is placement issue, citizenship, court ordered treatment against objection.    #Progress Note Handoff:  Pending (specify):  Consults_________, Tests________, Test Results_______, Other___discharge planning______  Family discussion:d/w pt at bedside re: treatment plan, primary dx  Disposition: Home___/SNF___/Other________/Unknown at this time____x____

## 2021-01-23 PROCEDURE — 99232 SBSQ HOSP IP/OBS MODERATE 35: CPT

## 2021-01-23 NOTE — PROGRESS NOTE ADULT - SUBJECTIVE AND OBJECTIVE BOX
60M PMHx seizure disorder, schizophrenia, HTN here with altered mental status.    Today:  -no change in current clinical care and pt awaits citizenship (prolonged hospitalization)  -sitting up chair; comfortable   -no major events notified to me     REVIEW OF SYSTEMS:  Not a reliable historian.    Allergies  No Known Allergies        FAMILY HISTORY:  No pertinent family history in first degree relatives  unknown      refused vitals    Vital Signs Last 24 Hrs  T(C): --  T(F): --  HR: --  BP: --  BP(mean): --  RR: --  SpO2: --      PHYSICAL EXAM:  GENERAL: NAD, not agitated, ambulating in the room; calm   HEAD:  Atraumatic, Normocephalic  EYES: EOMI, PERRLA, conjunctiva and sclera clear  ENMT: No tonsillar erythema, exudates, or enlargement; Moist mucous membranes, Good dentition, No lesions  NECK: Supple, No JVD, Normal thyroid  NERVOUS SYSTEM:  Alert, oriented x 1  CHEST/LUNG: Clear to percussion bilaterally; No rales, rhonchi, wheezing, or rubs  HEART: Regular rate and rhythm; No murmurs, rubs, or gallops  ABDOMEN: Soft, Nontender, Nondistended; Bowel sounds present    MEDICATIONS  (STANDING):  amLODIPine   Tablet 10 milliGRAM(s) Oral daily  AQUAPHOR (petrolatum Ointment) 1 Application(s) Topical daily  chlorhexidine 4% Liquid 1 Application(s) Topical <User Schedule>  diVALproex  milliGRAM(s) Oral two times a day  enalapril 10 milliGRAM(s) Oral daily  enoxaparin Injectable 40 milliGRAM(s) SubCutaneous daily  gabapentin 100 milliGRAM(s) Oral three times a day  hydrALAZINE 25 milliGRAM(s) Oral once  levETIRAcetam 750 milliGRAM(s) Oral two times a day  OLANZapine 5 milliGRAM(s) Oral daily  senna 2 Tablet(s) Oral at bedtime    MEDICATIONS  (PRN):  cloNIDine 0.1 milliGRAM(s) Oral every 8 hours PRN htn  polyethylene glycol 3350 17 Gram(s) Oral two times a day PRN Constipation

## 2021-01-24 PROCEDURE — 99232 SBSQ HOSP IP/OBS MODERATE 35: CPT

## 2021-01-24 NOTE — PROGRESS NOTE ADULT - SUBJECTIVE AND OBJECTIVE BOX
60M PMHx seizure disorder, schizophrenia, HTN here with altered mental status.    Today:  -no change in current clinical care and pt awaits citizenship (prolonged hospitalization)  -sitting up chair  -no major events notified to me     REVIEW OF SYSTEMS:  Not a reliable historian.    Allergies  No Known Allergies        FAMILY HISTORY:  No pertinent family history in first degree relatives  unknown      refused vitals    Vital Signs Last 24 Hrs  T(C): --  T(F): --  HR: --  BP: --  BP(mean): --  RR: --  SpO2: --      PHYSICAL EXAM:  GENERAL: NAD, not agitated, ambulating in the room; calm   HEAD:  Atraumatic, Normocephalic  EYES: EOMI, PERRLA, conjunctiva and sclera clear  ENMT: No tonsillar erythema, exudates, or enlargement; Moist mucous membranes, Good dentition, No lesions  NECK: Supple, No JVD, Normal thyroid  NERVOUS SYSTEM:  Alert, oriented x 1  CHEST/LUNG: Clear to percussion bilaterally; No rales, rhonchi, wheezing, or rubs  HEART: Regular rate and rhythm; No murmurs, rubs, or gallops  ABDOMEN: Soft, Nontender, Nondistended; Bowel sounds present    MEDICATIONS  (STANDING):  amLODIPine   Tablet 10 milliGRAM(s) Oral daily  AQUAPHOR (petrolatum Ointment) 1 Application(s) Topical daily  chlorhexidine 4% Liquid 1 Application(s) Topical <User Schedule>  diVALproex  milliGRAM(s) Oral two times a day  enalapril 10 milliGRAM(s) Oral daily  enoxaparin Injectable 40 milliGRAM(s) SubCutaneous daily  gabapentin 100 milliGRAM(s) Oral three times a day  hydrALAZINE 25 milliGRAM(s) Oral once  levETIRAcetam 750 milliGRAM(s) Oral two times a day  OLANZapine 5 milliGRAM(s) Oral daily  senna 2 Tablet(s) Oral at bedtime    MEDICATIONS  (PRN):  cloNIDine 0.1 milliGRAM(s) Oral every 8 hours PRN htn  polyethylene glycol 3350 17 Gram(s) Oral two times a day PRN Constipation

## 2021-01-25 PROCEDURE — 99232 SBSQ HOSP IP/OBS MODERATE 35: CPT

## 2021-01-25 NOTE — PROGRESS NOTE ADULT - SUBJECTIVE AND OBJECTIVE BOX
60M PMHx seizure disorder, schizophrenia, HTN here with altered mental status.    Today:  -no change in current clinical care and pt awaits citizenship (prolonged hospitalization)  -sitting up chair  -no major events notified to me and pt is stable on current meds     REVIEW OF SYSTEMS:  Not a reliable historian.    Allergies  No Known Allergies        FAMILY HISTORY:  No pertinent family history in first degree relatives  unknown      refused vitals    Vital Signs Last 24 Hrs  T(C): --  T(F): --  HR: --  BP: --  BP(mean): --  RR: --  SpO2: --      PHYSICAL EXAM:  GENERAL: NAD, not agitated, ambulating in the room; calm   HEAD:  Atraumatic, Normocephalic  EYES: EOMI, PERRLA, conjunctiva and sclera clear  ENMT: No tonsillar erythema, exudates, or enlargement; Moist mucous membranes, Good dentition, No lesions  NECK: Supple, No JVD, Normal thyroid  NERVOUS SYSTEM:  Alert, oriented x 1  CHEST/LUNG: Clear to percussion bilaterally; No rales, rhonchi, wheezing, or rubs  HEART: Regular rate and rhythm; No murmurs, rubs, or gallops  ABDOMEN: Soft, Nontender, Nondistended; Bowel sounds present    MEDICATIONS  (STANDING):  amLODIPine   Tablet 10 milliGRAM(s) Oral daily  AQUAPHOR (petrolatum Ointment) 1 Application(s) Topical daily  chlorhexidine 4% Liquid 1 Application(s) Topical <User Schedule>  diVALproex  milliGRAM(s) Oral two times a day  enalapril 10 milliGRAM(s) Oral daily  enoxaparin Injectable 40 milliGRAM(s) SubCutaneous daily  gabapentin 100 milliGRAM(s) Oral three times a day  hydrALAZINE 25 milliGRAM(s) Oral once  levETIRAcetam 750 milliGRAM(s) Oral two times a day  OLANZapine 5 milliGRAM(s) Oral daily  senna 2 Tablet(s) Oral at bedtime    MEDICATIONS  (PRN):  cloNIDine 0.1 milliGRAM(s) Oral every 8 hours PRN htn  polyethylene glycol 3350 17 Gram(s) Oral two times a day PRN Constipation

## 2021-01-26 PROCEDURE — 99232 SBSQ HOSP IP/OBS MODERATE 35: CPT

## 2021-01-26 NOTE — PROGRESS NOTE ADULT - SUBJECTIVE AND OBJECTIVE BOX
60M PMHx seizure disorder, schizophrenia, HTN here with altered mental status.    Today:  -no change in current clinical care and pt awaits citizenship (prolonged hospitalization)  -sitting up chair  -no major events notified to me and pt is stable on current meds   -Spoke to court appointed representative yesterday and gave updates for upcoming court date for Guardianship     REVIEW OF SYSTEMS:  Not a reliable historian.    Allergies  No Known Allergies        FAMILY HISTORY:  No pertinent family history in first degree relatives  unknown      refused vitals    Vital Signs Last 24 Hrs  T(C): --  T(F): --  HR: --  BP: --  BP(mean): --  RR: --  SpO2: --      PHYSICAL EXAM:  GENERAL: NAD, not agitated, ambulating in the room; calm   HEAD:  Atraumatic, Normocephalic  EYES: EOMI, PERRLA, conjunctiva and sclera clear  ENMT: No tonsillar erythema, exudates, or enlargement; Moist mucous membranes, Good dentition, No lesions  NECK: Supple, No JVD, Normal thyroid  NERVOUS SYSTEM:  Alert, oriented x 1  CHEST/LUNG: Clear to percussion bilaterally; No rales, rhonchi, wheezing, or rubs  HEART: Regular rate and rhythm; No murmurs, rubs, or gallops  ABDOMEN: Soft, Nontender, Nondistended; Bowel sounds present    MEDICATIONS  (STANDING):  amLODIPine   Tablet 10 milliGRAM(s) Oral daily  AQUAPHOR (petrolatum Ointment) 1 Application(s) Topical daily  chlorhexidine 4% Liquid 1 Application(s) Topical <User Schedule>  diVALproex  milliGRAM(s) Oral two times a day  enalapril 10 milliGRAM(s) Oral daily  enoxaparin Injectable 40 milliGRAM(s) SubCutaneous daily  gabapentin 100 milliGRAM(s) Oral three times a day  hydrALAZINE 25 milliGRAM(s) Oral once  levETIRAcetam 750 milliGRAM(s) Oral two times a day  OLANZapine 5 milliGRAM(s) Oral daily  senna 2 Tablet(s) Oral at bedtime    MEDICATIONS  (PRN):  cloNIDine 0.1 milliGRAM(s) Oral every 8 hours PRN htn  polyethylene glycol 3350 17 Gram(s) Oral two times a day PRN Constipation

## 2021-01-26 NOTE — CHART NOTE - NSCHARTNOTEFT_GEN_A_CORE
RD limited note:    Meds/labs reviewed    No edema noted; skin is WDL     No new wts recorded     Diet order: DASH/TLC, Ensure Clear q24h.    Pt has court hearing for Guardianship planned on 1/28. Pt followed by psych. AMS/toxic metabolic encephalopathy- possible h/o schizophrenia, suspect psychosis vs neurocognitive disorder. Placement pending.   Pt continues to eat well. Po intake recorded as 75% consistently since previously assessed. No GI s/s noted. No diarrhea or constipation. No BMs this shift noted. DC pending case mgmt/SW; pt is placement issue, citizenship, court ordered treatment against objection. There are no further nutrition interventions at this time. Pt remains not at risk. RD to f/u within the next 7 days.

## 2021-01-26 NOTE — PROGRESS NOTE ADULT - ASSESSMENT
60M PMHx seizure disorder, schizophrenia, HTN here with altered mental status.    #Altered mental status, toxic metabolic encephalopathy  possible h/o schizophrenia, suspect psychosis vs. neurocognitive disorder  Still refusing blood draws, medications, vitals  Zyprexa, Zyprexa IM PRN    #tinea pedis  -Resolved s/p clotrimazole cream    #HTN  norvasc 10mg + enalapril 10 mg    #Seizure disorder  depakote 500mg bid25  keppra 750mg bid  Ativan PRN for Seizures  -Pt has been refusing, might benefit from  treatment against objection as he has been refusing AEDs.    #DVT ppx  lovenox sq    -DC pending case mgmt/SW; pt is placement issue, citizenship, court ordered treatment against objection.  -Spoke to court appointed representative yesterday and gave updates for upcoming court date for Guardianship     #Progress Note Handoff:  Pending (specify):  Consults_________, Tests________, Test Results_______, Other___discharge planning______  Family discussion:d/w pt at bedside re: treatment plan, primary dx  Disposition: Home___/SNF___/Other________/Unknown at this time____x____

## 2021-01-27 PROCEDURE — 99232 SBSQ HOSP IP/OBS MODERATE 35: CPT

## 2021-01-27 NOTE — PROGRESS NOTE ADULT - SUBJECTIVE AND OBJECTIVE BOX
60M PMHx seizure disorder, schizophrenia, HTN here with altered mental status.    Today:  Seen at bedside, no events overnight.          REVIEW OF SYSTEMS:  Not a reliable historian.      MEDICATIONS  (STANDING):  amLODIPine   Tablet 10 milliGRAM(s) Oral daily  AQUAPHOR (petrolatum Ointment) 1 Application(s) Topical daily  chlorhexidine 4% Liquid 1 Application(s) Topical <User Schedule>  diVALproex  milliGRAM(s) Oral two times a day  enalapril 10 milliGRAM(s) Oral daily  enoxaparin Injectable 40 milliGRAM(s) SubCutaneous daily  gabapentin 100 milliGRAM(s) Oral three times a day  hydrALAZINE 25 milliGRAM(s) Oral once  levETIRAcetam 750 milliGRAM(s) Oral two times a day  OLANZapine 5 milliGRAM(s) Oral daily  senna 2 Tablet(s) Oral at bedtime    MEDICATIONS  (PRN):  cloNIDine 0.1 milliGRAM(s) Oral every 8 hours PRN htn  polyethylene glycol 3350 17 Gram(s) Oral two times a day PRN Constipation      Allergies  No Known Allergies        FAMILY HISTORY:  No pertinent family history in first degree relatives  unknown        Vital Signs Last 24 Hrs  T(C): --  T(F): --  HR: --  BP: --  BP(mean): --  RR: --  SpO2: --    PHYSICAL EXAM:  GENERAL: NAD, not agitated, ambulating in the room; calm   HEAD:  Atraumatic, Normocephalic  EYES: EOMI, PERRLA, conjunctiva and sclera clear  ENMT: No tonsillar erythema, exudates, or enlargement; Moist mucous membranes, Good dentition, No lesions  NECK: Supple, No JVD, Normal thyroid  NERVOUS SYSTEM:  Alert, oriented x 1  CHEST/LUNG: Clear to percussion bilaterally; No rales, rhonchi, wheezing, or rubs  HEART: Regular rate and rhythm; No murmurs, rubs, or gallops  ABDOMEN: Soft, Nontender, Nondistended; Bowel sounds present

## 2021-01-27 NOTE — PROGRESS NOTE ADULT - ASSESSMENT
60M PMHx seizure disorder, schizophrenia, HTN here with altered mental status.    #Altered mental status, toxic metabolic encephalopathy  possible h/o schizophrenia, suspect psychosis vs. neurocognitive disorder  Still refusing blood draws, medications, vitals  Zyprexa, Zyprexa IM PRN    #tinea pedis  -Resolved s/p clotrimazole cream    #HTN  norvasc 10mg + enalapril 10 mg    #Seizure disorder  depakote 500mg bid25  keppra 750mg bid  Ativan PRN for Seizures  -Pt has been refusing, might benefit from  treatment against objection as he has been refusing AEDs.    #DVT ppx  lovenox sq    -DC pending case mgmt/SW; pt is placement issue, citizenship, court ordered treatment against objection.  -Dr. Lincoln Spoke to court appointed representative and gave updates for upcoming court date for Guardianship     #Progress Note Handoff:  Pending (specify):  Consults_________, Tests________, Test Results_______, Other___discharge planning______  Family discussion:d/w pt at bedside re: treatment plan, primary dx  Disposition: Home___/SNF___/Other________/Unknown at this time____x____

## 2021-01-28 PROCEDURE — 99232 SBSQ HOSP IP/OBS MODERATE 35: CPT

## 2021-01-28 NOTE — PROGRESS NOTE ADULT - SUBJECTIVE AND OBJECTIVE BOX
60M PMHx seizure disorder, schizophrenia, HTN here with altered mental status.    Today:  No overnight events.  Calm and cooperative today.          REVIEW OF SYSTEMS:  Not a reliable historian.      MEDICATIONS  (STANDING):  amLODIPine   Tablet 10 milliGRAM(s) Oral daily  AQUAPHOR (petrolatum Ointment) 1 Application(s) Topical daily  chlorhexidine 4% Liquid 1 Application(s) Topical <User Schedule>  diVALproex  milliGRAM(s) Oral two times a day  enalapril 10 milliGRAM(s) Oral daily  enoxaparin Injectable 40 milliGRAM(s) SubCutaneous daily  gabapentin 100 milliGRAM(s) Oral three times a day  hydrALAZINE 25 milliGRAM(s) Oral once  levETIRAcetam 750 milliGRAM(s) Oral two times a day  OLANZapine 5 milliGRAM(s) Oral daily  senna 2 Tablet(s) Oral at bedtime    MEDICATIONS  (PRN):  cloNIDine 0.1 milliGRAM(s) Oral every 8 hours PRN htn  polyethylene glycol 3350 17 Gram(s) Oral two times a day PRN Constipation      Allergies  No Known Allergies      FAMILY HISTORY:  No pertinent family history in first degree relatives  unknown        Vital Signs Last 24 Hrs  T(C): --  T(F): --  HR: --  BP: --  BP(mean): --  RR: --  SpO2: --    PHYSICAL EXAM:  GENERAL: NAD, not agitated, ambulating in the room; calm   HEAD:  Atraumatic, Normocephalic  EYES: EOMI, PERRLA, conjunctiva and sclera clear  ENMT: No tonsillar erythema, exudates, or enlargement; Moist mucous membranes, Good dentition, No lesions  NECK: Supple, No JVD, Normal thyroid  NERVOUS SYSTEM:  Alert, oriented x 1  CHEST/LUNG: Clear to percussion bilaterally; No rales, rhonchi, wheezing, or rubs  HEART: Regular rate and rhythm; No murmurs, rubs, or gallops  ABDOMEN: Soft, Nontender, Nondistended; Bowel sounds present

## 2021-01-29 PROCEDURE — 99232 SBSQ HOSP IP/OBS MODERATE 35: CPT

## 2021-01-29 NOTE — PROGRESS NOTE ADULT - ASSESSMENT
60M PMHx seizure disorder, schizophrenia, HTN here with altered mental status.    #Altered mental status, toxic metabolic encephalopathy  possible h/o schizophrenia, suspect psychosis vs. neurocognitive disorder  Still refusing blood draws, medications, vitals  Zyprexa, Zyprexa IM PRN    #tinea pedis  -Resolved s/p clotrimazole cream    #HTN  norvasc 10mg + enalapril 10 mg    #Seizure disorder  depakote 500mg bid25  keppra 750mg bid  Ativan PRN for Seizures  -Pt has been refusing, might benefit from  treatment against objection as he has been refusing AEDs.    #DVT ppx  lovenox sq    -DC pending case mgmt/SW; pt is placement issue, citizenship, court ordered treatment against objection.  -Court date was 1/28, guardian to be appointed.    #Progress Note Handoff:  Pending (specify):  Consults_________, Tests________, Test Results_______, Other___discharge planning______  Family discussion:d/w pt at bedside re: treatment plan, primary dx  Disposition: Home___/SNF___/Other________/Unknown at this time____x____

## 2021-01-29 NOTE — PROGRESS NOTE ADULT - SUBJECTIVE AND OBJECTIVE BOX
60M PMHx seizure disorder, schizophrenia, HTN here with altered mental status.    Today:  Seen at bedside, no complaints, no events overnight.        REVIEW OF SYSTEMS:  Not a reliable historian.      MEDICATIONS  (STANDING):  amLODIPine   Tablet 10 milliGRAM(s) Oral daily  AQUAPHOR (petrolatum Ointment) 1 Application(s) Topical daily  chlorhexidine 4% Liquid 1 Application(s) Topical <User Schedule>  diVALproex  milliGRAM(s) Oral two times a day  enalapril 10 milliGRAM(s) Oral daily  enoxaparin Injectable 40 milliGRAM(s) SubCutaneous daily  gabapentin 100 milliGRAM(s) Oral three times a day  hydrALAZINE 25 milliGRAM(s) Oral once  levETIRAcetam 750 milliGRAM(s) Oral two times a day  OLANZapine 5 milliGRAM(s) Oral daily  senna 2 Tablet(s) Oral at bedtime    MEDICATIONS  (PRN):  cloNIDine 0.1 milliGRAM(s) Oral every 8 hours PRN htn  polyethylene glycol 3350 17 Gram(s) Oral two times a day PRN Constipation      Allergies  No Known Allergies        FAMILY HISTORY:  No pertinent family history in first degree relatives  unknown        Vital Signs Last 24 Hrs  T(C): --  T(F): --  HR: --  BP: --  BP(mean): --  RR: --  SpO2: --    PHYSICAL EXAM:  GENERAL: NAD, not agitated, ambulating in the room; calm   HEAD:  Atraumatic, Normocephalic  EYES: EOMI, PERRLA, conjunctiva and sclera clear  ENMT: No tonsillar erythema, exudates, or enlargement; Moist mucous membranes, Good dentition, No lesions  NECK: Supple, No JVD, Normal thyroid  NERVOUS SYSTEM:  Alert, oriented x 1  CHEST/LUNG: Clear to percussion bilaterally; No rales, rhonchi, wheezing, or rubs  HEART: Regular rate and rhythm; No murmurs, rubs, or gallops  ABDOMEN: Soft, Nontender, Nondistended; Bowel sounds present

## 2021-01-30 PROCEDURE — 99232 SBSQ HOSP IP/OBS MODERATE 35: CPT

## 2021-01-30 NOTE — PROGRESS NOTE ADULT - SUBJECTIVE AND OBJECTIVE BOX
60M PMHx seizure disorder, schizophrenia, HTN here with altered mental status.    -No events overnight.          REVIEW OF SYSTEMS:  Not a reliable historian.      MEDICATIONS  (STANDING):  amLODIPine   Tablet 10 milliGRAM(s) Oral daily  AQUAPHOR (petrolatum Ointment) 1 Application(s) Topical daily  chlorhexidine 4% Liquid 1 Application(s) Topical <User Schedule>  diVALproex  milliGRAM(s) Oral two times a day  enalapril 10 milliGRAM(s) Oral daily  enoxaparin Injectable 40 milliGRAM(s) SubCutaneous daily  gabapentin 100 milliGRAM(s) Oral three times a day  hydrALAZINE 25 milliGRAM(s) Oral once  levETIRAcetam 750 milliGRAM(s) Oral two times a day  OLANZapine 5 milliGRAM(s) Oral daily  senna 2 Tablet(s) Oral at bedtime    MEDICATIONS  (PRN):  cloNIDine 0.1 milliGRAM(s) Oral every 8 hours PRN htn  polyethylene glycol 3350 17 Gram(s) Oral two times a day PRN Constipation      Allergies  No Known Allergies        FAMILY HISTORY:  No pertinent family history in first degree relatives  unknown        Vital Signs Last 24 Hrs  T(C): --  T(F): --  HR: --  BP: --  BP(mean): --  RR: --  SpO2: --    PHYSICAL EXAM:  GENERAL: NAD, not agitated, ambulating in the room; calm   HEAD:  Atraumatic, Normocephalic  EYES: EOMI, PERRLA, conjunctiva and sclera clear  ENMT: No tonsillar erythema, exudates, or enlargement; Moist mucous membranes, Good dentition, No lesions  NECK: Supple, No JVD, Normal thyroid  NERVOUS SYSTEM:  Alert, oriented x 1  CHEST/LUNG: Clear to percussion bilaterally; No rales, rhonchi, wheezing, or rubs  HEART: Regular rate and rhythm; No murmurs, rubs, or gallops  ABDOMEN: Soft, Nontender, Nondistended; Bowel sounds present

## 2021-01-31 PROCEDURE — 99232 SBSQ HOSP IP/OBS MODERATE 35: CPT

## 2021-01-31 NOTE — PROGRESS NOTE ADULT - SUBJECTIVE AND OBJECTIVE BOX
60M PMHx seizure disorder, schizophrenia, HTN here with altered mental status.    Today:  Seen at bedside, behaving well, no complaints or overnight events.            REVIEW OF SYSTEMS:  Not a reliable historian.      MEDICATIONS  (STANDING):  amLODIPine   Tablet 10 milliGRAM(s) Oral daily  AQUAPHOR (petrolatum Ointment) 1 Application(s) Topical daily  chlorhexidine 4% Liquid 1 Application(s) Topical <User Schedule>  diVALproex  milliGRAM(s) Oral two times a day  enalapril 10 milliGRAM(s) Oral daily  enoxaparin Injectable 40 milliGRAM(s) SubCutaneous daily  gabapentin 100 milliGRAM(s) Oral three times a day  hydrALAZINE 25 milliGRAM(s) Oral once  levETIRAcetam 750 milliGRAM(s) Oral two times a day  OLANZapine 5 milliGRAM(s) Oral daily  senna 2 Tablet(s) Oral at bedtime    MEDICATIONS  (PRN):  cloNIDine 0.1 milliGRAM(s) Oral every 8 hours PRN htn  polyethylene glycol 3350 17 Gram(s) Oral two times a day PRN Constipation      Allergies  No Known Allergies        FAMILY HISTORY:  No pertinent family history in first degree relatives  unknown        Vital Signs Last 24 Hrs  T(C): --  T(F): --  HR: --  BP: --  BP(mean): --  RR: --  SpO2: --    PHYSICAL EXAM:  GENERAL: NAD, not agitated, ambulating in the room; calm   HEAD:  Atraumatic, Normocephalic  EYES: EOMI, PERRLA, conjunctiva and sclera clear  ENMT: No tonsillar erythema, exudates, or enlargement; Moist mucous membranes, Good dentition, No lesions  NECK: Supple, No JVD, Normal thyroid  NERVOUS SYSTEM:  Alert, oriented x 1  CHEST/LUNG: Clear to percussion bilaterally; No rales, rhonchi, wheezing, or rubs  HEART: Regular rate and rhythm; No murmurs, rubs, or gallops  ABDOMEN: Soft, Nontender, Nondistended; Bowel sounds present

## 2021-02-01 PROCEDURE — 99232 SBSQ HOSP IP/OBS MODERATE 35: CPT

## 2021-02-01 NOTE — PROGRESS NOTE ADULT - SUBJECTIVE AND OBJECTIVE BOX
60M PMHx seizure disorder, schizophrenia, HTN here with altered mental status.    Today:  Seen at bedside, well-behaved.          REVIEW OF SYSTEMS:  Not a reliable historian.      MEDICATIONS  (STANDING):  amLODIPine   Tablet 10 milliGRAM(s) Oral daily  AQUAPHOR (petrolatum Ointment) 1 Application(s) Topical daily  chlorhexidine 4% Liquid 1 Application(s) Topical <User Schedule>  diVALproex  milliGRAM(s) Oral two times a day  enalapril 10 milliGRAM(s) Oral daily  enoxaparin Injectable 40 milliGRAM(s) SubCutaneous daily  gabapentin 100 milliGRAM(s) Oral three times a day  hydrALAZINE 25 milliGRAM(s) Oral once  levETIRAcetam 750 milliGRAM(s) Oral two times a day  OLANZapine 5 milliGRAM(s) Oral daily  senna 2 Tablet(s) Oral at bedtime    MEDICATIONS  (PRN):  cloNIDine 0.1 milliGRAM(s) Oral every 8 hours PRN htn  polyethylene glycol 3350 17 Gram(s) Oral two times a day PRN Constipation      Allergies  No Known Allergies      FAMILY HISTORY:  No pertinent family history in first degree relatives  unknown        Vital Signs Last 24 Hrs  T(C): --  T(F): --  HR: --  BP: --  BP(mean): --  RR: --  SpO2: --    PHYSICAL EXAM:  GENERAL: NAD, not agitated, ambulating in the room; calm   HEAD:  Atraumatic, Normocephalic  EYES: EOMI, PERRLA, conjunctiva and sclera clear  ENMT: No tonsillar erythema, exudates, or enlargement; Moist mucous membranes, Good dentition, No lesions  NECK: Supple, No JVD, Normal thyroid  NERVOUS SYSTEM:  Alert, oriented x 1  CHEST/LUNG: Clear to percussion bilaterally; No rales, rhonchi, wheezing, or rubs  HEART: Regular rate and rhythm; No murmurs, rubs, or gallops  ABDOMEN: Soft, Nontender, Nondistended; Bowel sounds present

## 2021-02-02 PROCEDURE — 99232 SBSQ HOSP IP/OBS MODERATE 35: CPT

## 2021-02-02 NOTE — CHART NOTE - NSCHARTNOTEFT_GEN_A_CORE
RD limited note:    Meds/labs reviewed    No edema noted; skin is WDL     No new wts recorded     Diet order: DASH/TLC, Ensure Clear q24h.    Pt followed by psych. AMS/toxic metabolic encephalopathy- possible h/o schizophrenia, suspect psychosis vs neurocognitive disorder. Placement pending.   Pt continues to eat well. Po intake recorded as 75% consistently since previously assessed. No GI s/s noted. No diarrhea or constipation. No BMs this shift noted. DC pending case mgmt/SW; pt is placement issue, citizenship, court ordered treatment against objection. There are no further nutrition interventions at this time. Pt remains not at risk. RD to f/u within the next 7 days.

## 2021-02-02 NOTE — PROGRESS NOTE ADULT - SUBJECTIVE AND OBJECTIVE BOX
60M PMHx seizure disorder, schizophrenia, HTN here with altered mental status.    Today:  Seen at bedside, no overnight events.        REVIEW OF SYSTEMS:  Not a reliable historian.      MEDICATIONS  (STANDING):  amLODIPine   Tablet 10 milliGRAM(s) Oral daily  AQUAPHOR (petrolatum Ointment) 1 Application(s) Topical daily  chlorhexidine 4% Liquid 1 Application(s) Topical <User Schedule>  diVALproex  milliGRAM(s) Oral two times a day  enalapril 10 milliGRAM(s) Oral daily  enoxaparin Injectable 40 milliGRAM(s) SubCutaneous daily  gabapentin 100 milliGRAM(s) Oral three times a day  hydrALAZINE 25 milliGRAM(s) Oral once  levETIRAcetam 750 milliGRAM(s) Oral two times a day  OLANZapine 5 milliGRAM(s) Oral daily  senna 2 Tablet(s) Oral at bedtime    MEDICATIONS  (PRN):  cloNIDine 0.1 milliGRAM(s) Oral every 8 hours PRN htn  polyethylene glycol 3350 17 Gram(s) Oral two times a day PRN Constipation      Allergies  No Known Allergies        FAMILY HISTORY:  No pertinent family history in first degree relatives  unknown        Vital Signs Last 24 Hrs  T(C): --  T(F): --  HR: --  BP: --  BP(mean): --  RR: --  SpO2: --    PHYSICAL EXAM:  GENERAL: NAD, not agitated, ambulating in the room; calm   HEAD:  Atraumatic, Normocephalic  EYES: EOMI, PERRLA, conjunctiva and sclera clear  ENMT: No tonsillar erythema, exudates, or enlargement; Moist mucous membranes, Good dentition, No lesions  NECK: Supple, No JVD, Normal thyroid  NERVOUS SYSTEM:  Alert, oriented x 1  CHEST/LUNG: Clear to percussion bilaterally; No rales, rhonchi, wheezing, or rubs  HEART: Regular rate and rhythm; No murmurs, rubs, or gallops  ABDOMEN: Soft, Nontender, Nondistended; Bowel sounds present

## 2021-02-03 PROCEDURE — 99232 SBSQ HOSP IP/OBS MODERATE 35: CPT

## 2021-02-03 NOTE — PROGRESS NOTE ADULT - ASSESSMENT
60M PMHx seizure disorder, schizophrenia, HTN here with altered mental status.    #Altered mental status, toxic metabolic encephalopathy  possible h/o schizophrenia, suspect psychosis vs. neurocognitive disorder  still with apparent visual hallucinations  f/u psych  zyprexa 5  court date 1/28, guardian to be appointed  pending citizenship court ordered treatment against objection  #HTN  norvasc 5  #Seizure disorder  depakote 500 bid  keppra 750 bid  #DVT ppx  lovenox    #Progress Note Handoff:  Pending (specify):  Consults_________, Tests________, Test Results_______, Other___f/u psych______  Family discussion:d/w pt at bedside re: treatment plan, primary dx  Disposition: Home___/SNF___/Other________/Unknown at this time____x____

## 2021-02-04 PROCEDURE — 99232 SBSQ HOSP IP/OBS MODERATE 35: CPT

## 2021-02-04 NOTE — PROGRESS NOTE ADULT - SUBJECTIVE AND OBJECTIVE BOX
INTERVAL HPI/OVERNIGHT EVENTS:    SUBJECTIVE: Patient seen and examined at bedside.     no cp, sob, abd pain, fever  no ha, dizziness, lightheadedness, syncope    OBJECTIVE:    VITAL SIGNS:  Vital Signs Last 24 Hrs  T(C): --  T(F): --  HR: --  BP: --  BP(mean): --  RR: --  SpO2: --      PHYSICAL EXAM:    General: NAD  HEENT: NC/AT; PERRL, clear conjunctiva  Neck: supple  Respiratory: CTA b/l  Cardiovascular: +S1/S2; RRR  Abdomen: soft, NT/ND; +BS x4  Extremities: WWP, 2+ peripheral pulses b/l; no LE edema  Skin: normal color and turgor; no rash  Neurological:    MEDICATIONS:  MEDICATIONS  (STANDING):  amLODIPine   Tablet 10 milliGRAM(s) Oral daily  AQUAPHOR (petrolatum Ointment) 1 Application(s) Topical daily  chlorhexidine 4% Liquid 1 Application(s) Topical <User Schedule>  diVALproex  milliGRAM(s) Oral two times a day  enalapril 10 milliGRAM(s) Oral daily  enoxaparin Injectable 40 milliGRAM(s) SubCutaneous daily  gabapentin 100 milliGRAM(s) Oral three times a day  hydrALAZINE 25 milliGRAM(s) Oral once  levETIRAcetam 750 milliGRAM(s) Oral two times a day  OLANZapine 5 milliGRAM(s) Oral daily  senna 2 Tablet(s) Oral at bedtime    MEDICATIONS  (PRN):  cloNIDine 0.1 milliGRAM(s) Oral every 8 hours PRN htn  polyethylene glycol 3350 17 Gram(s) Oral two times a day PRN Constipation      ALLERGIES:  Allergies    No Known Allergies    Intolerances        LABS:              Creatinine Trend:         hs Troponin:              CSF:                      EKG:   MICROBIOLOGY:    IMAGING:      Labs, imaging, EKG personally reviewed    RADIOLOGY & ADDITIONAL TESTS: Reviewed.

## 2021-02-05 PROCEDURE — 99233 SBSQ HOSP IP/OBS HIGH 50: CPT

## 2021-02-05 NOTE — PROGRESS NOTE ADULT - SUBJECTIVE AND OBJECTIVE BOX
INTERVAL HPI/OVERNIGHT EVENTS:    SUBJECTIVE: Patient seen and examined at bedside.     no cp, sob, abd pain, fever  no ha, syncope, dizziness, lightheadedness  OBJECTIVE:    VITAL SIGNS:  Vital Signs Last 24 Hrs  T(C): 36.3 (05 Feb 2021 08:48), Max: 36.3 (05 Feb 2021 08:48)  T(F): 97.3 (05 Feb 2021 08:48), Max: 97.3 (05 Feb 2021 08:48)  HR: 74 (05 Feb 2021 08:48) (74 - 74)  BP: 93/61 (05 Feb 2021 08:48) (93/61 - 93/61)  BP(mean): --  RR: 22 (05 Feb 2021 08:48) (22 - 22)  SpO2: --      PHYSICAL EXAM:    General: NAD  HEENT: NC/AT; PERRL, clear conjunctiva  Neck: supple  Respiratory: CTA b/l  Cardiovascular: +S1/S2; RRR  Abdomen: soft, NT/ND; +BS x4  Extremities: WWP, 2+ peripheral pulses b/l; no LE edema  Skin: normal color and turgor; no rash  Neurological:    MEDICATIONS:  MEDICATIONS  (STANDING):  amLODIPine   Tablet 10 milliGRAM(s) Oral daily  AQUAPHOR (petrolatum Ointment) 1 Application(s) Topical daily  chlorhexidine 4% Liquid 1 Application(s) Topical <User Schedule>  diVALproex  milliGRAM(s) Oral two times a day  enalapril 10 milliGRAM(s) Oral daily  enoxaparin Injectable 40 milliGRAM(s) SubCutaneous daily  gabapentin 100 milliGRAM(s) Oral three times a day  hydrALAZINE 25 milliGRAM(s) Oral once  levETIRAcetam 750 milliGRAM(s) Oral two times a day  OLANZapine 5 milliGRAM(s) Oral daily  senna 2 Tablet(s) Oral at bedtime    MEDICATIONS  (PRN):  cloNIDine 0.1 milliGRAM(s) Oral every 8 hours PRN htn  polyethylene glycol 3350 17 Gram(s) Oral two times a day PRN Constipation      ALLERGIES:  Allergies    No Known Allergies    Intolerances        LABS:              Creatinine Trend:         hs Troponin:              CSF:                      EKG:   MICROBIOLOGY:    IMAGING:      Labs, imaging, EKG personally reviewed    RADIOLOGY & ADDITIONAL TESTS: Reviewed.

## 2021-02-05 NOTE — PROGRESS NOTE ADULT - ASSESSMENT
60M PMHx seizure disorder, schizophrenia, HTN here with altered mental status.    #Fall, mechanical, witnessed by pca, no syncope  difficult to obtain history, pt denies ha, lightheadedness/ dizziness  sbp noted 90s, suspect vasovagal/ presyncope episode  +diaphoresis noted  check cbc, bmp  no head trauma, landed on L hip, pt refusing xray; no point tenderness  mental status now at baseline 2 hours post event  #Altered mental status, toxic metabolic encephalopathy  possible h/o schizophrenia, suspect psychosis vs. neurocognitive disorder  still with apparent visual hallucinations  f/u psych  zyprexa 5  court date 1/28, guardian to be appointed  pending citizenship court ordered treatment against objection  #HTN  norvasc 5  #Seizure disorder  depakote 500 bid  keppra 750 bid  #DVT ppx  lovenox    #Progress Note Handoff:  Pending (specify):  Consults_________, Tests________, Test Results_______, Other___f/u psych______  Family discussion:d/w pt at bedside re: treatment plan, primary dx  Disposition: Home___/SNF___/Other________/Unknown at this time____x____

## 2021-02-06 PROCEDURE — 99233 SBSQ HOSP IP/OBS HIGH 50: CPT

## 2021-02-06 NOTE — PROGRESS NOTE ADULT - SUBJECTIVE AND OBJECTIVE BOX
INTERVAL HPI/OVERNIGHT EVENTS:    SUBJECTIVE: Patient seen and examined at bedside.     no cp, sob, abd pain, fever  no ha, sycnope, lightheadedness, fever    OBJECTIVE:    VITAL SIGNS:  Vital Signs Last 24 Hrs  T(C): --  T(F): --  HR: 95 (05 Feb 2021 13:29) (95 - 95)  BP: 169/75 (05 Feb 2021 13:29) (169/75 - 169/75)  BP(mean): --  RR: 20 (05 Feb 2021 13:29) (20 - 20)  SpO2: --      PHYSICAL EXAM:    General: NAD  HEENT: NC/AT; PERRL, clear conjunctiva  Neck: supple  Respiratory: CTA b/l  Cardiovascular: +S1/S2; RRR  Abdomen: soft, NT/ND; +BS x4  Extremities: WWP, 2+ peripheral pulses b/l; no LE edema  Skin: normal color and turgor; no rash  Neurological:    MEDICATIONS:  MEDICATIONS  (STANDING):  amLODIPine   Tablet 10 milliGRAM(s) Oral daily  AQUAPHOR (petrolatum Ointment) 1 Application(s) Topical daily  chlorhexidine 4% Liquid 1 Application(s) Topical <User Schedule>  diVALproex  milliGRAM(s) Oral two times a day  enalapril 10 milliGRAM(s) Oral daily  enoxaparin Injectable 40 milliGRAM(s) SubCutaneous daily  gabapentin 100 milliGRAM(s) Oral three times a day  hydrALAZINE 25 milliGRAM(s) Oral once  levETIRAcetam 750 milliGRAM(s) Oral two times a day  OLANZapine 5 milliGRAM(s) Oral daily  senna 2 Tablet(s) Oral at bedtime    MEDICATIONS  (PRN):  cloNIDine 0.1 milliGRAM(s) Oral every 8 hours PRN htn  polyethylene glycol 3350 17 Gram(s) Oral two times a day PRN Constipation      ALLERGIES:  Allergies    No Known Allergies    Intolerances        LABS:              Creatinine Trend:         hs Troponin:              CSF:                      EKG:   MICROBIOLOGY:    IMAGING:      Labs, imaging, EKG personally reviewed    RADIOLOGY & ADDITIONAL TESTS: Reviewed.

## 2021-02-06 NOTE — PROGRESS NOTE ADULT - ASSESSMENT
60M PMHx seizure disorder, schizophrenia, HTN here with altered mental status.    #Fall, mechanical, witnessed by pca, no syncope  no further episodes  pt at baseline mental status  no point tenderness  bp near baseline  #Altered mental status, toxic metabolic encephalopathy  possible h/o schizophrenia, suspect psychosis vs. neurocognitive disorder  still with apparent visual hallucinations  f/u psych  zyprexa 5  court date 1/28, guardian to be appointed  pending citizenship court ordered treatment against objection  #HTN  norvasc 5  #Seizure disorder  depakote 500 bid  keppra 750 bid  #DVT ppx  lovenox    #Progress Note Handoff:  Pending (specify):  Consults_________, Tests________, Test Results_______, Other___f/u psych______  Family discussion:d/w pt at bedside re: treatment plan, primary dx  Disposition: Home___/SNF___/Other________/Unknown at this time____x____

## 2021-02-07 PROCEDURE — 99232 SBSQ HOSP IP/OBS MODERATE 35: CPT

## 2021-02-08 PROCEDURE — 99232 SBSQ HOSP IP/OBS MODERATE 35: CPT

## 2021-02-09 PROCEDURE — 99232 SBSQ HOSP IP/OBS MODERATE 35: CPT

## 2021-02-09 NOTE — CHART NOTE - NSCHARTNOTEFT_GEN_A_CORE
Limited reassessment    MEDICATIONS  (STANDING):  amLODIPine   Tablet 10 milliGRAM(s) Oral daily  AQUAPHOR (petrolatum Ointment) 1 Application(s) Topical daily  chlorhexidine 4% Liquid 1 Application(s) Topical <User Schedule>  diVALproex  milliGRAM(s) Oral two times a day  enalapril 10 milliGRAM(s) Oral daily  enoxaparin Injectable 40 milliGRAM(s) SubCutaneous daily  gabapentin 100 milliGRAM(s) Oral three times a day  hydrALAZINE 25 milliGRAM(s) Oral once  levETIRAcetam 750 milliGRAM(s) Oral two times a day  OLANZapine 5 milliGRAM(s) Oral daily  senna 2 Tablet(s) Oral at bedtime    MEDICATIONS  (PRN):  cloNIDine 0.1 milliGRAM(s) Oral every 8 hours PRN htn  polyethylene glycol 3350 17 Gram(s) Oral two times a day PRN Constipation    No new labs    No edema noted; skin is WDL (2/9)    Diet order: DASH/TLC, Ensure clear q24h    Pt went to court on 1/28-- guardian to be appointed.  Pt is placement issue, citizenship, court ordered treatment against objection.  Pt continues to eat well/good appetite. Po intake varies % since previously assessed. No BM this shift. Last BM unknown-- not consistently recorded per EMR. Pt is on a bowel regimen. There are no further nutrition interventions at this time. Pt remains not at risk. RD to f/u within the next 7 days.

## 2021-02-10 PROCEDURE — 99232 SBSQ HOSP IP/OBS MODERATE 35: CPT

## 2021-02-10 NOTE — PROGRESS NOTE ADULT - SUBJECTIVE AND OBJECTIVE BOX
60M PMHx seizure disorder, schizophrenia, HTN here with altered mental status.    Today:  Seen at bedside, no overnight events reported.            REVIEW OF SYSTEMS:  Not a reliable historian.      MEDICATIONS  (STANDING):  amLODIPine   Tablet 10 milliGRAM(s) Oral daily  AQUAPHOR (petrolatum Ointment) 1 Application(s) Topical daily  chlorhexidine 4% Liquid 1 Application(s) Topical <User Schedule>  diVALproex  milliGRAM(s) Oral two times a day  enalapril 10 milliGRAM(s) Oral daily  enoxaparin Injectable 40 milliGRAM(s) SubCutaneous daily  gabapentin 100 milliGRAM(s) Oral three times a day  hydrALAZINE 25 milliGRAM(s) Oral once  levETIRAcetam 750 milliGRAM(s) Oral two times a day  OLANZapine 5 milliGRAM(s) Oral daily  senna 2 Tablet(s) Oral at bedtime    MEDICATIONS  (PRN):  cloNIDine 0.1 milliGRAM(s) Oral every 8 hours PRN htn  polyethylene glycol 3350 17 Gram(s) Oral two times a day PRN Constipation      Allergies  No Known Allergies        FAMILY HISTORY:  No pertinent family history in first degree relatives  unknown        Vital Signs Last 24 Hrs  T(C): --  T(F): --  HR: --  BP: --  BP(mean): --  RR: --  SpO2: --    PHYSICAL EXAM:  GENERAL: NAD, not agitated, ambulating in the room; calm   HEAD:  Atraumatic, Normocephalic  EYES: EOMI, PERRLA, conjunctiva and sclera clear  ENMT: No tonsillar erythema, exudates, or enlargement; Moist mucous membranes, Good dentition, No lesions  NECK: Supple, No JVD, Normal thyroid  NERVOUS SYSTEM:  Alert, oriented x 1  CHEST/LUNG: Clear to percussion bilaterally; No rales, rhonchi, wheezing, or rubs  HEART: Regular rate and rhythm; No murmurs, rubs, or gallops  ABDOMEN: Soft, Nontender, Nondistended; Bowel sounds present

## 2021-02-11 PROCEDURE — 99232 SBSQ HOSP IP/OBS MODERATE 35: CPT

## 2021-02-11 NOTE — PROGRESS NOTE ADULT - SUBJECTIVE AND OBJECTIVE BOX
60M PMHx seizure disorder, schizophrenia, HTN here with altered mental status.    Today:  No overnight events.  Calm and cooperative today.        REVIEW OF SYSTEMS:  Not a reliable historian      MEDICATIONS  (STANDING):  amLODIPine   Tablet 10 milliGRAM(s) Oral daily  AQUAPHOR (petrolatum Ointment) 1 Application(s) Topical daily  chlorhexidine 4% Liquid 1 Application(s) Topical <User Schedule>  diVALproex  milliGRAM(s) Oral two times a day  enalapril 10 milliGRAM(s) Oral daily  enoxaparin Injectable 40 milliGRAM(s) SubCutaneous daily  gabapentin 100 milliGRAM(s) Oral three times a day  hydrALAZINE 25 milliGRAM(s) Oral once  levETIRAcetam 750 milliGRAM(s) Oral two times a day  OLANZapine 5 milliGRAM(s) Oral daily  senna 2 Tablet(s) Oral at bedtime    MEDICATIONS  (PRN):  cloNIDine 0.1 milliGRAM(s) Oral every 8 hours PRN htn  polyethylene glycol 3350 17 Gram(s) Oral two times a day PRN Constipation      Allergies  No Known Allergies        FAMILY HISTORY:  No pertinent family history in first degree relatives  unknown        Vital Signs Last 24 Hrs  T(C): --  T(F): --  HR: --  BP: --  BP(mean): --  RR: --  SpO2: --    PHYSICAL EXAM:    GENERAL: NAD, well-groomed, well-developed  Patient did not allow further exam today

## 2021-02-12 PROCEDURE — 99232 SBSQ HOSP IP/OBS MODERATE 35: CPT

## 2021-02-12 NOTE — PROGRESS NOTE ADULT - SUBJECTIVE AND OBJECTIVE BOX
60M PMHx seizure disorder, schizophrenia, HTN here with altered mental status.    Today:  Calm, cooperative, no events reported overnight.            REVIEW OF SYSTEMS:  Not a reliable historian      MEDICATIONS  (STANDING):  amLODIPine   Tablet 10 milliGRAM(s) Oral daily  AQUAPHOR (petrolatum Ointment) 1 Application(s) Topical daily  chlorhexidine 4% Liquid 1 Application(s) Topical <User Schedule>  diVALproex  milliGRAM(s) Oral two times a day  enalapril 10 milliGRAM(s) Oral daily  enoxaparin Injectable 40 milliGRAM(s) SubCutaneous daily  gabapentin 100 milliGRAM(s) Oral three times a day  hydrALAZINE 25 milliGRAM(s) Oral once  levETIRAcetam 750 milliGRAM(s) Oral two times a day  OLANZapine 5 milliGRAM(s) Oral daily  senna 2 Tablet(s) Oral at bedtime    MEDICATIONS  (PRN):  cloNIDine 0.1 milliGRAM(s) Oral every 8 hours PRN htn  polyethylene glycol 3350 17 Gram(s) Oral two times a day PRN Constipation      Allergies  No Known Allergies        FAMILY HISTORY:  No pertinent family history in first degree relatives  unknown        Vital Signs Last 24 Hrs  T(C): --  T(F): --  HR: --  BP: --  BP(mean): --  RR: --  SpO2: --    PHYSICAL EXAM:  GENERAL: NAD, not agitated, ambulating in the room; calm   HEAD:  Atraumatic, Normocephalic  EYES: EOMI, PERRLA, conjunctiva and sclera clear  ENMT: No tonsillar erythema, exudates, or enlargement; Moist mucous membranes, Good dentition, No lesions  NECK: Supple, No JVD, Normal thyroid  NERVOUS SYSTEM:  Alert, oriented x 1  CHEST/LUNG: Clear to percussion bilaterally; No rales, rhonchi, wheezing, or rubs  HEART: Regular rate and rhythm; No murmurs, rubs, or gallops  ABDOMEN: Soft, Nontender, Nondistended; Bowel sounds present

## 2021-02-13 PROCEDURE — 99232 SBSQ HOSP IP/OBS MODERATE 35: CPT

## 2021-02-13 NOTE — PROGRESS NOTE ADULT - SUBJECTIVE AND OBJECTIVE BOX
60M PMHx seizure disorder, schizophrenia, HTN here with altered mental status.    -Seen at bedside, no events overnight        REVIEW OF SYSTEMS:  Not a reliable historian.      MEDICATIONS  (STANDING):  amLODIPine   Tablet 10 milliGRAM(s) Oral daily  AQUAPHOR (petrolatum Ointment) 1 Application(s) Topical daily  chlorhexidine 4% Liquid 1 Application(s) Topical <User Schedule>  diVALproex  milliGRAM(s) Oral two times a day  enalapril 10 milliGRAM(s) Oral daily  enoxaparin Injectable 40 milliGRAM(s) SubCutaneous daily  gabapentin 100 milliGRAM(s) Oral three times a day  hydrALAZINE 25 milliGRAM(s) Oral once  levETIRAcetam 750 milliGRAM(s) Oral two times a day  OLANZapine 5 milliGRAM(s) Oral daily  senna 2 Tablet(s) Oral at bedtime    MEDICATIONS  (PRN):  cloNIDine 0.1 milliGRAM(s) Oral every 8 hours PRN htn  polyethylene glycol 3350 17 Gram(s) Oral two times a day PRN Constipation      Allergies  No Known Allergies        FAMILY HISTORY:  No pertinent family history in first degree relatives  unknown        Vital Signs Last 24 Hrs  T(C): --  T(F): --  HR: --  BP: --  BP(mean): --  RR: --  SpO2: --    PHYSICAL EXAM:  GENERAL: NAD, not agitated, ambulating in the room; calm   HEAD:  Atraumatic, Normocephalic  EYES: EOMI, PERRLA, conjunctiva and sclera clear  ENMT: No tonsillar erythema, exudates, or enlargement; Moist mucous membranes, Good dentition, No lesions  NECK: Supple, No JVD, Normal thyroid  NERVOUS SYSTEM:  Alert, oriented x 1  CHEST/LUNG: Clear to percussion bilaterally; No rales, rhonchi, wheezing, or rubs  HEART: Regular rate and rhythm; No murmurs, rubs, or gallops  ABDOMEN: Soft, Nontender, Nondistended; Bowel sounds present

## 2021-02-14 PROCEDURE — 99232 SBSQ HOSP IP/OBS MODERATE 35: CPT

## 2021-02-14 NOTE — PROGRESS NOTE ADULT - SUBJECTIVE AND OBJECTIVE BOX
60M PMHx seizure disorder, schizophrenia, HTN here with altered mental status.    Today:  No events reported.        REVIEW OF SYSTEMS:  Not a reliable historian      MEDICATIONS  (STANDING):  amLODIPine   Tablet 10 milliGRAM(s) Oral daily  AQUAPHOR (petrolatum Ointment) 1 Application(s) Topical daily  chlorhexidine 4% Liquid 1 Application(s) Topical <User Schedule>  diVALproex  milliGRAM(s) Oral two times a day  enalapril 10 milliGRAM(s) Oral daily  enoxaparin Injectable 40 milliGRAM(s) SubCutaneous daily  gabapentin 100 milliGRAM(s) Oral three times a day  hydrALAZINE 25 milliGRAM(s) Oral once  levETIRAcetam 750 milliGRAM(s) Oral two times a day  OLANZapine 5 milliGRAM(s) Oral daily  senna 2 Tablet(s) Oral at bedtime    MEDICATIONS  (PRN):  cloNIDine 0.1 milliGRAM(s) Oral every 8 hours PRN htn  polyethylene glycol 3350 17 Gram(s) Oral two times a day PRN Constipation      Allergies  No Known Allergies        FAMILY HISTORY:  No pertinent family history in first degree relatives  unknown        Vital Signs Last 24 Hrs  T(C): --  T(F): --  HR: --  BP: --  BP(mean): --  RR: --  SpO2: --    PHYSICAL EXAM:  GENERAL: NAD, not agitated, ambulating in the room; calm   HEAD:  Atraumatic, Normocephalic  EYES: EOMI, PERRLA, conjunctiva and sclera clear  ENMT: No tonsillar erythema, exudates, or enlargement; Moist mucous membranes, Good dentition, No lesions  NECK: Supple, No JVD, Normal thyroid  NERVOUS SYSTEM:  Alert, oriented x 1  CHEST/LUNG: Clear to percussion bilaterally; No rales, rhonchi, wheezing, or rubs  HEART: Regular rate and rhythm; No murmurs, rubs, or gallops  ABDOMEN: Soft, Nontender, Nondistended; Bowel sounds present

## 2021-02-15 PROCEDURE — 99232 SBSQ HOSP IP/OBS MODERATE 35: CPT

## 2021-02-15 NOTE — PROGRESS NOTE ADULT - SUBJECTIVE AND OBJECTIVE BOX
60M PMHx seizure disorder, schizophrenia, HTN here with altered mental status.    Today:  No overnight events reported.        REVIEW OF SYSTEMS:  Not a reliable historian.      MEDICATIONS  (STANDING):  amLODIPine   Tablet 10 milliGRAM(s) Oral daily  AQUAPHOR (petrolatum Ointment) 1 Application(s) Topical daily  chlorhexidine 4% Liquid 1 Application(s) Topical <User Schedule>  diVALproex  milliGRAM(s) Oral two times a day  enalapril 10 milliGRAM(s) Oral daily  enoxaparin Injectable 40 milliGRAM(s) SubCutaneous daily  gabapentin 100 milliGRAM(s) Oral three times a day  hydrALAZINE 25 milliGRAM(s) Oral once  levETIRAcetam 750 milliGRAM(s) Oral two times a day  OLANZapine 5 milliGRAM(s) Oral daily  senna 2 Tablet(s) Oral at bedtime    MEDICATIONS  (PRN):  cloNIDine 0.1 milliGRAM(s) Oral every 8 hours PRN htn  polyethylene glycol 3350 17 Gram(s) Oral two times a day PRN Constipation      Allergies  No Known Allergies        FAMILY HISTORY:  No pertinent family history in first degree relatives  unknown        Vital Signs Last 24 Hrs  T(C): --  T(F): --  HR: --  BP: --  BP(mean): --  RR: --  SpO2: --    PHYSICAL EXAM:  GENERAL: NAD, not agitated, ambulating in the room; calm   HEAD:  Atraumatic, Normocephalic  EYES: EOMI, PERRLA, conjunctiva and sclera clear  ENMT: No tonsillar erythema, exudates, or enlargement; Moist mucous membranes, Good dentition, No lesions  NECK: Supple, No JVD, Normal thyroid  NERVOUS SYSTEM:  Alert, oriented x 1  CHEST/LUNG: Clear to percussion bilaterally; No rales, rhonchi, wheezing, or rubs  HEART: Regular rate and rhythm; No murmurs, rubs, or gallops  ABDOMEN: Soft, Nontender, Nondistended; Bowel sounds present

## 2021-02-16 PROCEDURE — 99232 SBSQ HOSP IP/OBS MODERATE 35: CPT

## 2021-02-16 NOTE — PROGRESS NOTE ADULT - SUBJECTIVE AND OBJECTIVE BOX
60M PMHx seizure disorder, schizophrenia, HTN here with altered mental status.    Today:  No overnight events.        REVIEW OF SYSTEMS:  Not a reliable historian.      MEDICATIONS  (STANDING):  amLODIPine   Tablet 10 milliGRAM(s) Oral daily  AQUAPHOR (petrolatum Ointment) 1 Application(s) Topical daily  chlorhexidine 4% Liquid 1 Application(s) Topical <User Schedule>  diVALproex  milliGRAM(s) Oral two times a day  enalapril 10 milliGRAM(s) Oral daily  enoxaparin Injectable 40 milliGRAM(s) SubCutaneous daily  gabapentin 100 milliGRAM(s) Oral three times a day  hydrALAZINE 25 milliGRAM(s) Oral once  levETIRAcetam 750 milliGRAM(s) Oral two times a day  OLANZapine 5 milliGRAM(s) Oral daily  senna 2 Tablet(s) Oral at bedtime    MEDICATIONS  (PRN):  cloNIDine 0.1 milliGRAM(s) Oral every 8 hours PRN htn  polyethylene glycol 3350 17 Gram(s) Oral two times a day PRN Constipation      Allergies  No Known Allergies        FAMILY HISTORY:  No pertinent family history in first degree relatives  unknown        Vital Signs Last 24 Hrs  T(C): --  T(F): --  HR: --  BP: --  BP(mean): --  RR: --  SpO2: --    PHYSICAL EXAM:  GENERAL: NAD, not agitated, ambulating in the room; calm   HEAD:  Atraumatic, Normocephalic  EYES: EOMI, PERRLA, conjunctiva and sclera clear  ENMT: No tonsillar erythema, exudates, or enlargement; Moist mucous membranes, Good dentition, No lesions  NECK: Supple, No JVD, Normal thyroid  NERVOUS SYSTEM:  Alert, oriented x 1  CHEST/LUNG: Clear to percussion bilaterally; No rales, rhonchi, wheezing, or rubs  HEART: Regular rate and rhythm; No murmurs, rubs, or gallops  ABDOMEN: Soft, Nontender, Nondistended; Bowel sounds present

## 2021-02-16 NOTE — CHART NOTE - NSCHARTNOTEFT_GEN_A_CORE
RD limited assessment     MEDICATIONS  (STANDING):  amLODIPine   Tablet 10 milliGRAM(s) Oral daily  AQUAPHOR (petrolatum Ointment) 1 Application(s) Topical daily  chlorhexidine 4% Liquid 1 Application(s) Topical <User Schedule>  diVALproex  milliGRAM(s) Oral two times a day  enalapril 10 milliGRAM(s) Oral daily  enoxaparin Injectable 40 milliGRAM(s) SubCutaneous daily  gabapentin 100 milliGRAM(s) Oral three times a day  hydrALAZINE 25 milliGRAM(s) Oral once  levETIRAcetam 750 milliGRAM(s) Oral two times a day  OLANZapine 5 milliGRAM(s) Oral daily  senna 2 Tablet(s) Oral at bedtime    MEDICATIONS  (PRN):  cloNIDine 0.1 milliGRAM(s) Oral every 8 hours PRN htn  polyethylene glycol 3350 17 Gram(s) Oral two times a day PRN Constipation      No new labs    No edema noted; skin is WDL     Diet order: DASH/TLC, Ensure clear q24h    Pt went to court on 1/28-- guardian to be appointed.  Pt is placement issue, citizenship, court ordered treatment against objection.  Pt continues to eat well/good appetite. Po intake varies % since previously assessed. No BM this shift. Last BM unknown-- not consistently recorded per EMR. Pt is on a bowel regimen. There are no further nutrition interventions at this time. Pt remains not at risk. RD to f/u within the next 7 days.

## 2021-02-17 PROCEDURE — 99232 SBSQ HOSP IP/OBS MODERATE 35: CPT

## 2021-02-17 RX ADMIN — Medication 1 APPLICATION(S): at 12:24

## 2021-02-17 NOTE — PROGRESS NOTE ADULT - SUBJECTIVE AND OBJECTIVE BOX
60M PMHx seizure disorder, schizophrenia, HTN here with altered mental status.    Today:  No overnight events.  -no change in medical care         REVIEW OF SYSTEMS:  Not a reliable historian.      MEDICATIONS  (STANDING):  amLODIPine   Tablet 10 milliGRAM(s) Oral daily  AQUAPHOR (petrolatum Ointment) 1 Application(s) Topical daily  chlorhexidine 4% Liquid 1 Application(s) Topical <User Schedule>  diVALproex  milliGRAM(s) Oral two times a day  enalapril 10 milliGRAM(s) Oral daily  enoxaparin Injectable 40 milliGRAM(s) SubCutaneous daily  gabapentin 100 milliGRAM(s) Oral three times a day  hydrALAZINE 25 milliGRAM(s) Oral once  levETIRAcetam 750 milliGRAM(s) Oral two times a day  OLANZapine 5 milliGRAM(s) Oral daily  senna 2 Tablet(s) Oral at bedtime    MEDICATIONS  (PRN):  cloNIDine 0.1 milliGRAM(s) Oral every 8 hours PRN htn  polyethylene glycol 3350 17 Gram(s) Oral two times a day PRN Constipation      Allergies  No Known Allergies        FAMILY HISTORY:  No pertinent family history in first degree relatives  unknown        Vital Signs Last 24 Hrs  T(C): --  T(F): --  HR: --  BP: --  BP(mean): --  RR: --  SpO2: --    PHYSICAL EXAM:  GENERAL: NAD, not agitated, ambulating in the room; calm   HEAD:  Atraumatic, Normocephalic  EYES: EOMI, PERRLA, conjunctiva and sclera clear  ENMT: No tonsillar erythema, exudates, or enlargement; Moist mucous membranes, Good dentition, No lesions  NECK: Supple, No JVD, Normal thyroid  NERVOUS SYSTEM:  Alert, oriented x 1  CHEST/LUNG: Clear to percussion bilaterally; No rales, rhonchi, wheezing, or rubs  HEART: Regular rate and rhythm; No murmurs, rubs, or gallops  ABDOMEN: Soft, Nontender, Nondistended; Bowel sounds present

## 2021-02-18 PROCEDURE — 99232 SBSQ HOSP IP/OBS MODERATE 35: CPT

## 2021-02-19 PROCEDURE — 99232 SBSQ HOSP IP/OBS MODERATE 35: CPT

## 2021-02-19 NOTE — PROGRESS NOTE ADULT - SUBJECTIVE AND OBJECTIVE BOX
60M PMHx seizure disorder, schizophrenia, HTN here with altered mental status.    Today:  No overnight events.  -no change in medical care   -pending safe dispo         REVIEW OF SYSTEMS:  Not a reliable historian.    MEDICATIONS  (STANDING):  amLODIPine   Tablet 10 milliGRAM(s) Oral daily  AQUAPHOR (petrolatum Ointment) 1 Application(s) Topical daily  chlorhexidine 4% Liquid 1 Application(s) Topical <User Schedule>  diVALproex  milliGRAM(s) Oral two times a day  enalapril 10 milliGRAM(s) Oral daily  enoxaparin Injectable 40 milliGRAM(s) SubCutaneous daily  gabapentin 100 milliGRAM(s) Oral three times a day  hydrALAZINE 25 milliGRAM(s) Oral once  levETIRAcetam 750 milliGRAM(s) Oral two times a day  OLANZapine 5 milliGRAM(s) Oral daily  senna 2 Tablet(s) Oral at bedtime    MEDICATIONS  (PRN):  cloNIDine 0.1 milliGRAM(s) Oral every 8 hours PRN htn  polyethylene glycol 3350 17 Gram(s) Oral two times a day PRN Constipation      Allergies  No Known Allergies        FAMILY HISTORY:  No pertinent family history in first degree relatives  unknown        Vital Signs Last 24 Hrs  T(C): --  T(F): --  HR: --  BP: --  BP(mean): --  RR: --  SpO2: --    PHYSICAL EXAM:  GENERAL: NAD, not agitated, ambulating in the room; calm   HEAD:  Atraumatic, Normocephalic  EYES: EOMI, PERRLA, conjunctiva and sclera clear  ENMT: No tonsillar erythema, exudates, or enlargement; Moist mucous membranes, Good dentition, No lesions  NECK: Supple, No JVD, Normal thyroid  NERVOUS SYSTEM:  Alert, oriented x 1  CHEST/LUNG: Clear to percussion bilaterally; No rales, rhonchi, wheezing, or rubs  HEART: Regular rate and rhythm; No murmurs, rubs, or gallops  ABDOMEN: Soft, Nontender, Nondistended; Bowel sounds present

## 2021-02-20 PROCEDURE — 99232 SBSQ HOSP IP/OBS MODERATE 35: CPT

## 2021-02-21 PROCEDURE — 99232 SBSQ HOSP IP/OBS MODERATE 35: CPT

## 2021-02-22 PROCEDURE — 99232 SBSQ HOSP IP/OBS MODERATE 35: CPT

## 2021-02-22 NOTE — PROGRESS NOTE ADULT - SUBJECTIVE AND OBJECTIVE BOX
60M PMHx seizure disorder, schizophrenia, HTN here with altered mental status.    Today:  No overnight events.  -no change in medical care   -pending safe dispo       REVIEW OF SYSTEMS:  Not a reliable historian.      Allergies  No Known Allergies        FAMILY HISTORY:  No pertinent family history in first degree relatives  unknown        Vital Signs Last 24 Hrs  T(C): --  T(F): --  HR: --  BP: --  BP(mean): --  RR: --  SpO2: --    PHYSICAL EXAM:  GENERAL: NAD, not agitated, ambulating in the room; calm   HEAD:  Atraumatic, Normocephalic  EYES: EOMI, PERRLA, conjunctiva and sclera clear  ENMT: No tonsillar erythema, exudates, or enlargement; Moist mucous membranes, Good dentition, No lesions  NECK: Supple, No JVD, Normal thyroid  NERVOUS SYSTEM:  Alert, oriented x 1  CHEST/LUNG: Clear to percussion bilaterally; No rales, rhonchi, wheezing, or rubs  HEART: Regular rate and rhythm; No murmurs, rubs, or gallops  ABDOMEN: Soft, Nontender, Nondistended; Bowel sounds present      MEDICATIONS  (STANDING):  amLODIPine   Tablet 10 milliGRAM(s) Oral daily  AQUAPHOR (petrolatum Ointment) 1 Application(s) Topical daily  chlorhexidine 4% Liquid 1 Application(s) Topical <User Schedule>  diVALproex  milliGRAM(s) Oral two times a day  enalapril 10 milliGRAM(s) Oral daily  enoxaparin Injectable 40 milliGRAM(s) SubCutaneous daily  gabapentin 100 milliGRAM(s) Oral three times a day  hydrALAZINE 25 milliGRAM(s) Oral once  levETIRAcetam 750 milliGRAM(s) Oral two times a day  OLANZapine 5 milliGRAM(s) Oral daily  senna 2 Tablet(s) Oral at bedtime    MEDICATIONS  (PRN):  cloNIDine 0.1 milliGRAM(s) Oral every 8 hours PRN htn  polyethylene glycol 3350 17 Gram(s) Oral two times a day PRN Constipation

## 2021-02-23 PROCEDURE — 99232 SBSQ HOSP IP/OBS MODERATE 35: CPT

## 2021-02-24 PROCEDURE — 99231 SBSQ HOSP IP/OBS SF/LOW 25: CPT

## 2021-02-24 NOTE — PROGRESS NOTE ADULT - SUBJECTIVE AND OBJECTIVE BOX
HPI  Patient is a 60y old Male who presents with a chief complaint of mental health marco (23 Feb 2021 13:43)    Currently admitted to medicine with the primary diagnosis of Altered mental status       Today is hospital day 172d.     INTERVAL HPI / OVERNIGHT EVENTS:  Patient was examined and seen at bedside.   Patient states he does not want a doctor  states he is doing fine. Per staff patient able to ambulate        PAST MEDICAL & SURGICAL HISTORY  No pertinent past medical history  unknown    No significant past surgical history  unknown      ALLERGIES  No Known Allergies    MEDICATIONS  STANDING MEDICATIONS  amLODIPine   Tablet 10 milliGRAM(s) Oral daily  AQUAPHOR (petrolatum Ointment) 1 Application(s) Topical daily  chlorhexidine 4% Liquid 1 Application(s) Topical <User Schedule>  diVALproex  milliGRAM(s) Oral two times a day  enalapril 10 milliGRAM(s) Oral daily  enoxaparin Injectable 40 milliGRAM(s) SubCutaneous daily  gabapentin 100 milliGRAM(s) Oral three times a day  hydrALAZINE 25 milliGRAM(s) Oral once  levETIRAcetam 750 milliGRAM(s) Oral two times a day  OLANZapine 5 milliGRAM(s) Oral daily  senna 2 Tablet(s) Oral at bedtime    PRN MEDICATIONS  cloNIDine 0.1 milliGRAM(s) Oral every 8 hours PRN  polyethylene glycol 3350 17 Gram(s) Oral two times a day PRN    VITALS:  T(F): --  HR: --  BP: --  RR: --  SpO2: --    PHYSICAL EXAM  GEN: ooks comfortable  patient refused examination    LABS                        RADIOLOGY

## 2021-02-24 NOTE — PROGRESS NOTE ADULT - ASSESSMENT
60M PMHx seizure disorder, schizophrenia, HTN presented with altered mental status.    #Altered mental status, toxic metabolic encephalopathy  possible h/o schizophrenia, suspect psychosis vs. neurocognitive disorder  Still refusing blood draws, medications, vitals  continue olanzapine daily    #HTN  norvasc 10mg + enalapril 10 mg    #Seizure disorder  depakote 500mg bid  keppra 750mg bid      #DVT ppx  lovenox sq    -DC pending case mgmt/SW; pt is placement issue, citizenship, court ordered treatment against objection.  -Court date was 1/28, guardian to be appointed.

## 2021-02-25 PROCEDURE — 99231 SBSQ HOSP IP/OBS SF/LOW 25: CPT

## 2021-02-25 NOTE — PROGRESS NOTE ADULT - SUBJECTIVE AND OBJECTIVE BOX
HPI  Patient is a 60y old Male who presents with a chief complaint of mental health marco (24 Feb 2021 17:13)    Currently admitted to medicine with the primary diagnosis of Altered mental status       Today is hospital day 173d.     INTERVAL HPI / OVERNIGHT EVENTS:  Patient was examined and seen at bedside. patient lying in bed comfortably  does not want to talk and asking to get out of the room  per staff patient tolerating diet; able to ambulate and hasn't had any complaints        PAST MEDICAL & SURGICAL HISTORY  No pertinent past medical history  unknown    No significant past surgical history  unknown      ALLERGIES  No Known Allergies    MEDICATIONS  STANDING MEDICATIONS  amLODIPine   Tablet 10 milliGRAM(s) Oral daily  AQUAPHOR (petrolatum Ointment) 1 Application(s) Topical daily  chlorhexidine 4% Liquid 1 Application(s) Topical <User Schedule>  diVALproex  milliGRAM(s) Oral two times a day  enalapril 10 milliGRAM(s) Oral daily  enoxaparin Injectable 40 milliGRAM(s) SubCutaneous daily  gabapentin 100 milliGRAM(s) Oral three times a day  hydrALAZINE 25 milliGRAM(s) Oral once  levETIRAcetam 750 milliGRAM(s) Oral two times a day  OLANZapine 5 milliGRAM(s) Oral daily  senna 2 Tablet(s) Oral at bedtime    PRN MEDICATIONS  cloNIDine 0.1 milliGRAM(s) Oral every 8 hours PRN  polyethylene glycol 3350 17 Gram(s) Oral two times a day PRN    VITALS:  T(F): --  HR: --  BP: --  RR: --  SpO2: --    PHYSICAL EXAM  patietn lying in bed cofmortably  refuses to examination    LABS                        RADIOLOGY

## 2021-02-25 NOTE — PROGRESS NOTE ADULT - ASSESSMENT
60M PMHx seizure disorder, schizophrenia, HTN presented with altered mental status.    #Altered mental status, toxic metabolic encephalopathy-stable   possible h/o schizophrenia, suspect psychosis vs. neurocognitive disorder  Still refusing blood draws, medications, vitals  continue olanzapine daily    #HTN  norvasc 10mg + enalapril 10 mg    #Seizure disorder  depakote 500mg bid  keppra 750mg bid      #DVT ppx  lovenox sq    -DC pending case mgmt/SW; pt is placement issue, citizenship, court ordered treatment against objection.  -Court date was 1/28, guardian to be appointed.  Patient refusing labs and other monitoring

## 2021-02-26 PROCEDURE — 99231 SBSQ HOSP IP/OBS SF/LOW 25: CPT

## 2021-02-26 NOTE — PROGRESS NOTE ADULT - SUBJECTIVE AND OBJECTIVE BOX
S: patient lying in bed; stating he want no physician  he wants me to turn around and walk back.  he didint eat breakfast; and asking me to taste it.  As per PCT patient sometime is suspicious of food and do not eat; but other time patient eats.  patient not taking any medications    Physical Exam  Patient refusing exam

## 2021-02-26 NOTE — PROGRESS NOTE ADULT - ASSESSMENT
60M PMHx seizure disorder, schizophrenia, HTN presented with altered mental status.    #Altered mental status with toxic metabolic encephalopathy-resolved  # patient with paranoia and hallucinations along with medication non compliance  possible h/o schizophrenia, suspect psychosis vs. neurocognitive disorder    #HTN- patient refusing medications  norvasc 10mg + enalapril 10 mg    #Seizure disorder- patient not taking medicaitons  depakote 500mg bid  keppra 750mg bid      #DVT ppx- ordered; lovenox sq    -DC pending case mgmt/SW; pt is placement issue, citizenship, court ordered treatment against objection.  -Court date was 1/28, guardian to be appointed.  Patient refusing labs and other monitoring and medications

## 2021-02-27 PROCEDURE — 99231 SBSQ HOSP IP/OBS SF/LOW 25: CPT

## 2021-02-27 NOTE — PROGRESS NOTE ADULT - SUBJECTIVE AND OBJECTIVE BOX
S: patient refusing to talk  Patient is verbally aggressive and asking to leave the room    O: unable to examine

## 2021-02-27 NOTE — PROGRESS NOTE ADULT - ASSESSMENT
Patient refusing medications, labs and vitals. Patient is monitored in the hospital for social issues.

## 2021-02-28 PROCEDURE — 99231 SBSQ HOSP IP/OBS SF/LOW 25: CPT

## 2021-02-28 NOTE — PROGRESS NOTE ADULT - ASSESSMENT
patient refusing all medical care. Awaiting guardian ship and recommendations for treatement against objection for his neurocognitive/psych disorder Normal rate, regular rhythm, normal S1, S2 heart sounds heard.

## 2021-03-01 PROCEDURE — 99231 SBSQ HOSP IP/OBS SF/LOW 25: CPT

## 2021-03-02 PROCEDURE — 99231 SBSQ HOSP IP/OBS SF/LOW 25: CPT

## 2021-03-02 NOTE — CHART NOTE - NSCHARTNOTEFT_GEN_A_CORE
Limited reassessment      MEDICATIONS  (STANDING):  amLODIPine   Tablet 10 milliGRAM(s) Oral daily  AQUAPHOR (petrolatum Ointment) 1 Application(s) Topical daily  chlorhexidine 4% Liquid 1 Application(s) Topical <User Schedule>  diVALproex  milliGRAM(s) Oral two times a day  enalapril 10 milliGRAM(s) Oral daily  enoxaparin Injectable 40 milliGRAM(s) SubCutaneous daily  gabapentin 100 milliGRAM(s) Oral three times a day  hydrALAZINE 25 milliGRAM(s) Oral once  levETIRAcetam 750 milliGRAM(s) Oral two times a day  OLANZapine 5 milliGRAM(s) Oral daily  senna 2 Tablet(s) Oral at bedtime    MEDICATIONS  (PRN):  cloNIDine 0.1 milliGRAM(s) Oral every 8 hours PRN htn  polyethylene glycol 3350 17 Gram(s) Oral two times a day PRN Constipation      No labs recorded.    No edema noted; skin is WDL (2/22)    No new wt recorded    Diet order: DASH/TLC; Ensure clear x24h.    Per progress notes- no changes in medical care; pending safe dispo.  Po intake remains variable (0-100%)- pt known to refuse meals at times. Otherwise eats well. Pt remains on bowel regimen PRN for constipation. No BM noted this shift. There are no further nutrition interventions at this time. Pt remains not at risk. RD to f/u within the next 7 days.

## 2021-03-02 NOTE — PROGRESS NOTE ADULT - SUBJECTIVE AND OBJECTIVE BOX
S: patient refusing to participate in any conversation and asking to get out of the room. He is gets agitated for any attempt to converse with him    O: patient refuses

## 2021-03-02 NOTE — PROGRESS NOTE ADULT - ASSESSMENT
60M PMHx seizure disorder, schizophrenia, HTN presented with altered mental status.    #Altered mental status with toxic metabolic encephalopathy-resolved  # patient with paranoia and hallucinations along with medication non compliance  possible h/o schizophrenia, suspect psychosis vs. neurocognitive disorder    #HTN- patient refusing medications  norvasc 10mg + enalapril 10 mg    #Seizure disorder- patient not taking medicaitons  depakote 500mg bid  keppra 750mg bid      #DVT ppx- ordered; lovenox sq    -DC pending case mgmt/SW; pt is placement issue, citizenship, court ordered treatment against objection.  -Court date was 1/28, guardian to be appointed.    patient refusing all medical care. Awaiting guardian ship and recommendations for treatement against objection for his neurocognitive/psych disorder

## 2021-03-03 PROCEDURE — 99231 SBSQ HOSP IP/OBS SF/LOW 25: CPT

## 2021-03-03 RX ADMIN — Medication 1 APPLICATION(S): at 11:57

## 2021-03-03 NOTE — PROGRESS NOTE ADULT - SUBJECTIVE AND OBJECTIVE BOX
· Subjective and Objective:   60M PMHx seizure disorder, schizophrenia, HTN here with altered mental status.    Today:  No overnight events.  -no change in medical care   -pending safe dispo     REVIEW OF SYSTEMS:  Not a reliable historian.    Allergies  No Known Allergies    FAMILY HISTORY:  No pertinent family history in first degree relatives  unknown    Vital Signs Last 24 Hrs  T(C): --  T(F): --  HR: --  BP: --  BP(mean): --  RR: --  SpO2: --      PHYSICAL EXAM:  GENERAL: NAD, not agitated, ambulating in the room; calm   HEAD:  Atraumatic, Normocephalic  EYES: EOMI, PERRLA, conjunctiva and sclera clear  ENMT: No tonsillar erythema, exudates, or enlargement; Moist mucous membranes, Good dentition, No lesions  NECK: Supple, No JVD, Normal thyroid  NERVOUS SYSTEM:  Alert, oriented x 1  CHEST/LUNG: Clear to percussion bilaterally; No rales, rhonchi, wheezing, or rubs  HEART: Regular rate and rhythm; No murmurs, rubs, or gallops  ABDOMEN: Soft, Nontender, Nondistended; Bowel sounds present    MEDICATIONS  (STANDING):  amLODIPine   Tablet 10 milliGRAM(s) Oral daily  AQUAPHOR (petrolatum Ointment) 1 Application(s) Topical daily  chlorhexidine 4% Liquid 1 Application(s) Topical <User Schedule>  diVALproex  milliGRAM(s) Oral two times a day  enalapril 10 milliGRAM(s) Oral daily  enoxaparin Injectable 40 milliGRAM(s) SubCutaneous daily  gabapentin 100 milliGRAM(s) Oral three times a day  hydrALAZINE 25 milliGRAM(s) Oral once  levETIRAcetam 750 milliGRAM(s) Oral two times a day  OLANZapine 5 milliGRAM(s) Oral daily  senna 2 Tablet(s) Oral at bedtime    MEDICATIONS  (PRN):  cloNIDine 0.1 milliGRAM(s) Oral every 8 hours PRN htn  polyethylene glycol 3350 17 Gram(s) Oral two times a day PRN Constipation    Assessment and Plan:   · Assessment	  60M PMHx seizure disorder, schizophrenia, HTN here with altered mental status.    #Altered mental status, toxic metabolic encephalopathy  possible h/o schizophrenia, suspect psychosis vs. neurocognitive disorder  Still refusing blood draws, medications, vitals  Zyprexa, Zyprexa IM PRN    #tinea pedis  -Resolved s/p clotrimazole cream    #HTN  norvasc 10mg + enalapril 10 mg    #Seizure disorder  depakote 500mg bid25  keppra 750mg bid  Ativan PRN for Seizures  -Pt has been refusing, might benefit from  treatment against objection as he has been refusing AEDs.    #DVT ppx  lovenox sq    -DC pending case mgmt/SW; pt is placement issue, citizenship, court ordered treatment against objection.  -Court date was 1/28, guardian to be appointed.    Attending Physician Dr. Carrie Lincoln # [id number]

## 2021-03-04 PROCEDURE — 99231 SBSQ HOSP IP/OBS SF/LOW 25: CPT

## 2021-03-04 NOTE — PROGRESS NOTE ADULT - SUBJECTIVE AND OBJECTIVE BOX
· Subjective and Objective:   60M PMHx seizure disorder, schizophrenia, HTN here with altered mental status.    Today:  No overnight events.  -no change in medical care   -pending safe dispo     REVIEW OF SYSTEMS:  Not a reliable historian.    Allergies  No Known Allergies    FAMILY HISTORY:  No pertinent family history in first degree relatives  unknown    Vital Signs Last 24 Hrs  T(C): --  T(F): --  HR: --  BP: --  BP(mean): --  RR: --  SpO2: --      PHYSICAL EXAM:  GENERAL: NAD, not agitated, ambulating in the room; calm   HEAD:  Atraumatic, Normocephalic  EYES: EOMI, PERRLA, conjunctiva and sclera clear  ENMT: No tonsillar erythema, exudates, or enlargement; Moist mucous membranes, Good dentition, No lesions  NECK: Supple, No JVD, Normal thyroid  NERVOUS SYSTEM:  Alert, oriented x 1  CHEST/LUNG: Clear to percussion bilaterally; No rales, rhonchi, wheezing, or rubs  HEART: Regular rate and rhythm; No murmurs, rubs, or gallops  ABDOMEN: Soft, Nontender, Nondistended; Bowel sounds present    MEDICATIONS  (STANDING):  amLODIPine   Tablet 10 milliGRAM(s) Oral daily  AQUAPHOR (petrolatum Ointment) 1 Application(s) Topical daily  chlorhexidine 4% Liquid 1 Application(s) Topical <User Schedule>  diVALproex  milliGRAM(s) Oral two times a day  enalapril 10 milliGRAM(s) Oral daily  enoxaparin Injectable 40 milliGRAM(s) SubCutaneous daily  gabapentin 100 milliGRAM(s) Oral three times a day  hydrALAZINE 25 milliGRAM(s) Oral once  levETIRAcetam 750 milliGRAM(s) Oral two times a day  OLANZapine 5 milliGRAM(s) Oral daily  senna 2 Tablet(s) Oral at bedtime    MEDICATIONS  (PRN):  cloNIDine 0.1 milliGRAM(s) Oral every 8 hours PRN htn  polyethylene glycol 3350 17 Gram(s) Oral two times a day PRN Constipation    Assessment and Plan:   · Assessment	  60M PMHx seizure disorder, schizophrenia, HTN here with altered mental status.    #Altered mental status, toxic metabolic encephalopathy  possible h/o schizophrenia, suspect psychosis vs. neurocognitive disorder  Still refusing blood draws, medications, vitals  Zyprexa, Zyprexa IM PRN    #tinea pedis  -Resolved s/p clotrimazole cream    #HTN  norvasc 10mg + enalapril 10 mg    #Seizure disorder  depakote 500mg bid25  keppra 750mg bid  Ativan PRN for Seizures  -Pt has been refusing, might benefit from  treatment against objection as he has been refusing AEDs.    #DVT ppx  lovenox sq    -DC pending case mgmt/SW; pt is placement issue, citizenship, court ordered treatment against objection.  -Court date was 1/28, guardian to be appointed.    Attending Physician Dr. Carrie Lincoln # [id number]

## 2021-03-05 PROCEDURE — 99231 SBSQ HOSP IP/OBS SF/LOW 25: CPT

## 2021-03-05 NOTE — PROGRESS NOTE ADULT - SUBJECTIVE AND OBJECTIVE BOX
· Subjective and Objective:   60M PMHx seizure disorder, schizophrenia, HTN here with altered mental status.    Today:  No overnight events.  -no change in medical care   -pending safe dispo     REVIEW OF SYSTEMS:  Not a reliable historian.    Allergies  No Known Allergies    FAMILY HISTORY:  No pertinent family history in first degree relatives  unknown    Vital Signs Last 24 Hrs  T(C): --  T(F): --  HR: --  BP: --  BP(mean): --  RR: --  SpO2: --      PHYSICAL EXAM:  GENERAL: NAD, not agitated, ambulating in the room; calm   HEAD:  Atraumatic, Normocephalic  EYES: EOMI, PERRLA, conjunctiva and sclera clear  ENMT: No tonsillar erythema, exudates, or enlargement; Moist mucous membranes, Good dentition, No lesions  NECK: Supple, No JVD, Normal thyroid  NERVOUS SYSTEM:  Alert, oriented x 1  CHEST/LUNG: Clear to percussion bilaterally; No rales, rhonchi, wheezing, or rubs  HEART: Regular rate and rhythm; No murmurs, rubs, or gallops  ABDOMEN: Soft, Nontender, Nondistended; Bowel sounds present    MEDICATIONS  (STANDING):  amLODIPine   Tablet 10 milliGRAM(s) Oral daily  AQUAPHOR (petrolatum Ointment) 1 Application(s) Topical daily  chlorhexidine 4% Liquid 1 Application(s) Topical <User Schedule>  diVALproex  milliGRAM(s) Oral two times a day  enalapril 10 milliGRAM(s) Oral daily  enoxaparin Injectable 40 milliGRAM(s) SubCutaneous daily  gabapentin 100 milliGRAM(s) Oral three times a day  hydrALAZINE 25 milliGRAM(s) Oral once  levETIRAcetam 750 milliGRAM(s) Oral two times a day  OLANZapine 5 milliGRAM(s) Oral daily  senna 2 Tablet(s) Oral at bedtime    MEDICATIONS  (PRN):  cloNIDine 0.1 milliGRAM(s) Oral every 8 hours PRN htn  polyethylene glycol 3350 17 Gram(s) Oral two times a day PRN Constipation    Assessment and Plan:   · Assessment	  60M PMHx seizure disorder, schizophrenia, HTN here with altered mental status.    #Altered mental status, toxic metabolic encephalopathy  possible h/o schizophrenia, suspect psychosis vs. neurocognitive disorder  Still refusing blood draws, medications, vitals  Zyprexa, Zyprexa IM PRN    #tinea pedis  -Resolved s/p clotrimazole cream    #HTN  norvasc 10mg + enalapril 10 mg    #Seizure disorder  depakote 500mg bid25  keppra 750mg bid  Ativan PRN for Seizures  -Pt has been refusing, might benefit from  treatment against objection as he has been refusing AEDs.    #DVT ppx  lovenox sq    -DC pending case mgmt/SW; pt is placement issue, citizenship, court ordered treatment against objection.  -Court date was 1/28, guardian to be appointed.    Attending Physician Dr. Carrie Lincoln # [id number]

## 2021-03-06 PROCEDURE — 99231 SBSQ HOSP IP/OBS SF/LOW 25: CPT

## 2021-03-06 NOTE — PROGRESS NOTE ADULT - SUBJECTIVE AND OBJECTIVE BOX
· Subjective and Objective:   60M PMHx seizure disorder, schizophrenia, HTN here with altered mental status.    Today:  No overnight events.  -no change in medical care   -pending safe dispo     REVIEW OF SYSTEMS:  Not a reliable historian.    Allergies  No Known Allergies    FAMILY HISTORY:  No pertinent family history in first degree relatives  unknown    Vital Signs Last 24 Hrs  T(C): --  T(F): --  HR: --  BP: --  BP(mean): --  RR: --  SpO2: --      PHYSICAL EXAM:  GENERAL: NAD, not agitated, ambulating in the room; calm   HEAD:  Atraumatic, Normocephalic  EYES: EOMI, PERRLA, conjunctiva and sclera clear  ENMT: No tonsillar erythema, exudates, or enlargement; Moist mucous membranes, Good dentition, No lesions  NECK: Supple, No JVD, Normal thyroid  NERVOUS SYSTEM:  Alert, oriented x 1  CHEST/LUNG: Clear to percussion bilaterally; No rales, rhonchi, wheezing, or rubs  HEART: Regular rate and rhythm; No murmurs, rubs, or gallops  ABDOMEN: Soft, Nontender, Nondistended; Bowel sounds present    MEDICATIONS  (STANDING):  amLODIPine   Tablet 10 milliGRAM(s) Oral daily  AQUAPHOR (petrolatum Ointment) 1 Application(s) Topical daily  chlorhexidine 4% Liquid 1 Application(s) Topical <User Schedule>  diVALproex  milliGRAM(s) Oral two times a day  enalapril 10 milliGRAM(s) Oral daily  enoxaparin Injectable 40 milliGRAM(s) SubCutaneous daily  gabapentin 100 milliGRAM(s) Oral three times a day  hydrALAZINE 25 milliGRAM(s) Oral once  levETIRAcetam 750 milliGRAM(s) Oral two times a day  OLANZapine 5 milliGRAM(s) Oral daily  senna 2 Tablet(s) Oral at bedtime    MEDICATIONS  (PRN):  cloNIDine 0.1 milliGRAM(s) Oral every 8 hours PRN htn  polyethylene glycol 3350 17 Gram(s) Oral two times a day PRN Constipation    Assessment and Plan:   · Assessment	  60M PMHx seizure disorder, schizophrenia, HTN here with altered mental status.    #Altered mental status, toxic metabolic encephalopathy  possible h/o schizophrenia, suspect psychosis vs. neurocognitive disorder  Still refusing blood draws, medications, vitals  Zyprexa, Zyprexa IM PRN    #tinea pedis  -Resolved s/p clotrimazole cream    #HTN  norvasc 10mg + enalapril 10 mg    #Seizure disorder  depakote 500mg bid25  keppra 750mg bid  Ativan PRN for Seizures  -Pt has been refusing, might benefit from  treatment against objection as he has been refusing AEDs.    #DVT ppx  lovenox sq    -DC pending case mgmt/SW; pt is placement issue, citizenship, court ordered treatment against objection.  -Court date was 1/28, guardian to be appointed.    Attending Physician Dr. Carrie Lincoln # [id number]

## 2021-03-07 PROCEDURE — 99231 SBSQ HOSP IP/OBS SF/LOW 25: CPT

## 2021-03-07 NOTE — PROGRESS NOTE ADULT - SUBJECTIVE AND OBJECTIVE BOX
· Subjective and Objective:   60M PMHx seizure disorder, schizophrenia, HTN here with altered mental status.    Today:  No overnight events.  -no change in medical care   -pending safe dispo     REVIEW OF SYSTEMS:  Not a reliable historian.    Allergies  No Known Allergies    FAMILY HISTORY:  No pertinent family history in first degree relatives  unknown    Vital Signs Last 24 Hrs  T(C): --  T(F): --  HR: --  BP: --  BP(mean): --  RR: --  SpO2: --      PHYSICAL EXAM:  GENERAL: NAD, not agitated, ambulating in the room; calm   HEAD:  Atraumatic, Normocephalic  EYES: EOMI, PERRLA, conjunctiva and sclera clear  ENMT: No tonsillar erythema, exudates, or enlargement; Moist mucous membranes, Good dentition, No lesions  NECK: Supple, No JVD, Normal thyroid  NERVOUS SYSTEM:  Alert, oriented x 1  CHEST/LUNG: Clear to percussion bilaterally; No rales, rhonchi, wheezing, or rubs  HEART: Regular rate and rhythm; No murmurs, rubs, or gallops  ABDOMEN: Soft, Nontender, Nondistended; Bowel sounds present    MEDICATIONS  (STANDING):  amLODIPine   Tablet 10 milliGRAM(s) Oral daily  AQUAPHOR (petrolatum Ointment) 1 Application(s) Topical daily  chlorhexidine 4% Liquid 1 Application(s) Topical <User Schedule>  diVALproex  milliGRAM(s) Oral two times a day  enalapril 10 milliGRAM(s) Oral daily  enoxaparin Injectable 40 milliGRAM(s) SubCutaneous daily  gabapentin 100 milliGRAM(s) Oral three times a day  hydrALAZINE 25 milliGRAM(s) Oral once  levETIRAcetam 750 milliGRAM(s) Oral two times a day  OLANZapine 5 milliGRAM(s) Oral daily  senna 2 Tablet(s) Oral at bedtime    MEDICATIONS  (PRN):  cloNIDine 0.1 milliGRAM(s) Oral every 8 hours PRN htn  polyethylene glycol 3350 17 Gram(s) Oral two times a day PRN Constipation    Assessment and Plan:   · Assessment	  60M PMHx seizure disorder, schizophrenia, HTN here with altered mental status.    #Altered mental status, toxic metabolic encephalopathy  possible h/o schizophrenia, suspect psychosis vs. neurocognitive disorder  Still refusing blood draws, medications, vitals  Zyprexa, Zyprexa IM PRN    #tinea pedis  -Resolved s/p clotrimazole cream    #HTN  norvasc 10mg + enalapril 10 mg    #Seizure disorder  depakote 500mg bid25  keppra 750mg bid  Ativan PRN for Seizures  -Pt has been refusing, might benefit from  treatment against objection as he has been refusing AEDs.    #DVT ppx  lovenox sq    -DC pending case mgmt/SW; pt is placement issue, citizenship, court ordered treatment against objection.  -Court date was 1/28, guardian to be appointed.    Attending Physician Dr. Carrie Lincoln # [id number]

## 2021-03-08 PROCEDURE — 99231 SBSQ HOSP IP/OBS SF/LOW 25: CPT

## 2021-03-08 NOTE — PROGRESS NOTE ADULT - SUBJECTIVE AND OBJECTIVE BOX
· Subjective and Objective:   60M PMHx seizure disorder, schizophrenia, HTN here with altered mental status.    Today:  No overnight events.  -no change in medical care   -pending safe dispo     REVIEW OF SYSTEMS:  Not a reliable historian.    Allergies  No Known Allergies    FAMILY HISTORY:  No pertinent family history in first degree relatives  unknown    Vital Signs Last 24 Hrs  T(C): --  T(F): --  HR: --  BP: --  BP(mean): --  RR: --  SpO2: --      PHYSICAL EXAM:  GENERAL: NAD, not agitated, ambulating in the room; calm   HEAD:  Atraumatic, Normocephalic  EYES: EOMI, PERRLA, conjunctiva and sclera clear  ENMT: No tonsillar erythema, exudates, or enlargement; Moist mucous membranes, Good dentition, No lesions  NECK: Supple, No JVD, Normal thyroid  NERVOUS SYSTEM:  Alert, oriented x 1  CHEST/LUNG: Clear to percussion bilaterally; No rales, rhonchi, wheezing, or rubs  HEART: Regular rate and rhythm; No murmurs, rubs, or gallops  ABDOMEN: Soft, Nontender, Nondistended; Bowel sounds present    MEDICATIONS  (STANDING):  amLODIPine   Tablet 10 milliGRAM(s) Oral daily  AQUAPHOR (petrolatum Ointment) 1 Application(s) Topical daily  chlorhexidine 4% Liquid 1 Application(s) Topical <User Schedule>  diVALproex  milliGRAM(s) Oral two times a day  enalapril 10 milliGRAM(s) Oral daily  enoxaparin Injectable 40 milliGRAM(s) SubCutaneous daily  gabapentin 100 milliGRAM(s) Oral three times a day  hydrALAZINE 25 milliGRAM(s) Oral once  levETIRAcetam 750 milliGRAM(s) Oral two times a day  OLANZapine 5 milliGRAM(s) Oral daily  senna 2 Tablet(s) Oral at bedtime    MEDICATIONS  (PRN):  cloNIDine 0.1 milliGRAM(s) Oral every 8 hours PRN htn  polyethylene glycol 3350 17 Gram(s) Oral two times a day PRN Constipation    Assessment and Plan:   · Assessment	  60M PMHx seizure disorder, schizophrenia, HTN here with altered mental status.    #Altered mental status, toxic metabolic encephalopathy  possible h/o schizophrenia, suspect psychosis vs. neurocognitive disorder  Still refusing blood draws, medications, vitals  Zyprexa, Zyprexa IM PRN    #tinea pedis  -Resolved s/p clotrimazole cream    #HTN  norvasc 10mg + enalapril 10 mg    #Seizure disorder  depakote 500mg bid25  keppra 750mg bid  Ativan PRN for Seizures  -Pt has been refusing, might benefit from  treatment against objection as he has been refusing AEDs.    #DVT ppx  lovenox sq    -DC pending case mgmt/SW; pt is placement issue, citizenship, court ordered treatment against objection.  -Court date was 1/28, guardian to be appointed.    Attending Physician Dr. Carrie Lincoln # [id number]

## 2021-03-09 PROCEDURE — 99231 SBSQ HOSP IP/OBS SF/LOW 25: CPT

## 2021-03-09 NOTE — PROGRESS NOTE ADULT - SUBJECTIVE AND OBJECTIVE BOX
· Subjective and Objective:   60M PMHx seizure disorder, schizophrenia, HTN here with altered mental status.    Today:  No overnight events.  -no change in medical care   -pending safe dispo     REVIEW OF SYSTEMS:  Not a reliable historian.    Allergies  No Known Allergies    FAMILY HISTORY:  No pertinent family history in first degree relatives  unknown    Vital Signs Last 24 Hrs  T(C): --  T(F): --  HR: --  BP: --  BP(mean): --  RR: --  SpO2: --      PHYSICAL EXAM:  GENERAL: NAD, not agitated, ambulating in the room; calm   HEAD:  Atraumatic, Normocephalic  EYES: EOMI, PERRLA, conjunctiva and sclera clear  ENMT: No tonsillar erythema, exudates, or enlargement; Moist mucous membranes, Good dentition, No lesions  NECK: Supple, No JVD, Normal thyroid  NERVOUS SYSTEM:  Alert, oriented x 1  CHEST/LUNG: Clear to percussion bilaterally; No rales, rhonchi, wheezing, or rubs  HEART: Regular rate and rhythm; No murmurs, rubs, or gallops  ABDOMEN: Soft, Nontender, Nondistended; Bowel sounds present    MEDICATIONS  (STANDING):  amLODIPine   Tablet 10 milliGRAM(s) Oral daily  AQUAPHOR (petrolatum Ointment) 1 Application(s) Topical daily  chlorhexidine 4% Liquid 1 Application(s) Topical <User Schedule>  diVALproex  milliGRAM(s) Oral two times a day  enalapril 10 milliGRAM(s) Oral daily  enoxaparin Injectable 40 milliGRAM(s) SubCutaneous daily  gabapentin 100 milliGRAM(s) Oral three times a day  hydrALAZINE 25 milliGRAM(s) Oral once  levETIRAcetam 750 milliGRAM(s) Oral two times a day  OLANZapine 5 milliGRAM(s) Oral daily  senna 2 Tablet(s) Oral at bedtime    MEDICATIONS  (PRN):  cloNIDine 0.1 milliGRAM(s) Oral every 8 hours PRN htn  polyethylene glycol 3350 17 Gram(s) Oral two times a day PRN Constipation    Assessment and Plan:   · Assessment	  60M PMHx seizure disorder, schizophrenia, HTN here with altered mental status.    #Altered mental status, toxic metabolic encephalopathy  possible h/o schizophrenia, suspect psychosis vs. neurocognitive disorder  Still refusing blood draws, medications, vitals  Zyprexa, Zyprexa IM PRN    #tinea pedis  -Resolved s/p clotrimazole cream    #HTN  norvasc 10mg + enalapril 10 mg    #Seizure disorder  depakote 500mg bid25  keppra 750mg bid  Ativan PRN for Seizures  -Pt has been refusing, might benefit from  treatment against objection as he has been refusing AEDs.    #DVT ppx  lovenox sq    -DC pending case mgmt/SW; pt is placement issue, citizenship, court ordered treatment against objection.  -Court date was 1/28, guardian to be appointed.    Attending Physician Dr. Carrie Lincoln # [id number]

## 2021-03-09 NOTE — CHART NOTE - NSCHARTNOTEFT_GEN_A_CORE
Limited reassessment      MEDICATIONS  (STANDING):  amLODIPine   Tablet 10 milliGRAM(s) Oral daily  AQUAPHOR (petrolatum Ointment) 1 Application(s) Topical daily  chlorhexidine 4% Liquid 1 Application(s) Topical <User Schedule>  diVALproex  milliGRAM(s) Oral two times a day  enalapril 10 milliGRAM(s) Oral daily  enoxaparin Injectable 40 milliGRAM(s) SubCutaneous daily  gabapentin 100 milliGRAM(s) Oral three times a day  hydrALAZINE 25 milliGRAM(s) Oral once  levETIRAcetam 750 milliGRAM(s) Oral two times a day  OLANZapine 5 milliGRAM(s) Oral daily  senna 2 Tablet(s) Oral at bedtime    MEDICATIONS  (PRN):  cloNIDine 0.1 milliGRAM(s) Oral every 8 hours PRN htn  polyethylene glycol 3350 17 Gram(s) Oral two times a day PRN Constipation      No labs recorded.    No edema noted; skin is WDL    No new wt recorded    Diet order: DASH/TLC; Ensure clear x24h.    Per progress notes- no changes in medical care; pending safe dispo.  Po intake remains variable (0-100%)- pt known to refuse meals at times. Otherwise eats well. Pt remains on bowel regimen PRN for constipation. No BM noted this shift. There are no further nutrition interventions at this time. Pt remains not at risk. RD to f/u within the next 7 days.

## 2021-03-10 PROCEDURE — 99231 SBSQ HOSP IP/OBS SF/LOW 25: CPT

## 2021-03-10 NOTE — PROGRESS NOTE ADULT - ASSESSMENT
60M PMHx seizure disorder, schizophrenia, HTN here with altered mental status.    #Altered mental status, toxic metabolic encephalopathy  possible h/o schizophrenia, suspect psychosis vs. neurocognitive disorder  Still refusing blood draws, medications, vitals  Zyprexa, Zyprexa IM PRN    #tinea pedis  -Resolved s/p clotrimazole cream    #HTN  norvasc 10mg + enalapril 10 mg    #Seizure disorder  depakote 500mg bid25  keppra 750mg bid  Ativan PRN for Seizures  -Pt has been refusing, might benefit from  treatment against objection as he has been refusing AEDs.    #DVT ppx  lovenox sq    -DC pending case mgmt/SW; pt is placement issue, citizenship, court ordered treatment against objection.  -Court date was 1/28, guardian to be appointed.

## 2021-03-10 NOTE — PROGRESS NOTE ADULT - SUBJECTIVE AND OBJECTIVE BOX
60M PMHx seizure disorder, schizophrenia, HTN here with altered mental status.    Today:  No overnight events reported.        REVIEW OF SYSTEMS:  Not a reliable historian      MEDICATIONS  (STANDING):  amLODIPine   Tablet 10 milliGRAM(s) Oral daily  AQUAPHOR (petrolatum Ointment) 1 Application(s) Topical daily  chlorhexidine 4% Liquid 1 Application(s) Topical <User Schedule>  diVALproex  milliGRAM(s) Oral two times a day  enalapril 10 milliGRAM(s) Oral daily  enoxaparin Injectable 40 milliGRAM(s) SubCutaneous daily  gabapentin 100 milliGRAM(s) Oral three times a day  hydrALAZINE 25 milliGRAM(s) Oral once  levETIRAcetam 750 milliGRAM(s) Oral two times a day  OLANZapine 5 milliGRAM(s) Oral daily  senna 2 Tablet(s) Oral at bedtime    MEDICATIONS  (PRN):  cloNIDine 0.1 milliGRAM(s) Oral every 8 hours PRN htn  polyethylene glycol 3350 17 Gram(s) Oral two times a day PRN Constipation      Allergies  No Known Allergies        FAMILY HISTORY:  No pertinent family history in first degree relatives  unknown        Vital Signs Last 24 Hrs  T(C): --  T(F): --  HR: --  BP: --  BP(mean): --  RR: --  SpO2: --    PHYSICAL EXAM:    GENERAL: NAD, well-groomed, well-developed  HEAD:  Atraumatic, Normocephalic  NECK: Supple, No JVD, Normal thyroid  NERVOUS SYSTEM:  Awake, alert x 1  CHEST/LUNG: Clear to percussion bilaterally; No rales, rhonchi, wheezing, or rubs  HEART: Regular rate and rhythm; No murmurs, rubs, or gallops  ABDOMEN: Soft, Nontender, Nondistended; Bowel sounds present

## 2021-03-11 PROCEDURE — 99231 SBSQ HOSP IP/OBS SF/LOW 25: CPT

## 2021-03-11 NOTE — PROGRESS NOTE ADULT - SUBJECTIVE AND OBJECTIVE BOX
60M PMHx seizure disorder, schizophrenia, HTN here with altered mental status.    Today:  Seen in room, no overnight events.          REVIEW OF SYSTEMS:  Not a reliable historian.      MEDICATIONS  (STANDING):  amLODIPine   Tablet 10 milliGRAM(s) Oral daily  AQUAPHOR (petrolatum Ointment) 1 Application(s) Topical daily  chlorhexidine 4% Liquid 1 Application(s) Topical <User Schedule>  diVALproex  milliGRAM(s) Oral two times a day  enalapril 10 milliGRAM(s) Oral daily  enoxaparin Injectable 40 milliGRAM(s) SubCutaneous daily  gabapentin 100 milliGRAM(s) Oral three times a day  hydrALAZINE 25 milliGRAM(s) Oral once  levETIRAcetam 750 milliGRAM(s) Oral two times a day  OLANZapine 5 milliGRAM(s) Oral daily  senna 2 Tablet(s) Oral at bedtime    MEDICATIONS  (PRN):  cloNIDine 0.1 milliGRAM(s) Oral every 8 hours PRN htn  polyethylene glycol 3350 17 Gram(s) Oral two times a day PRN Constipation      Allergies  No Known Allergies        FAMILY HISTORY:  No pertinent family history in first degree relatives  unknown        Vital Signs Last 24 Hrs  T(C): --  T(F): --  HR: --  BP: --  BP(mean): --  RR: --  SpO2: --    PHYSICAL EXAM:    GENERAL: NAD, well-groomed, well-developed  HEAD:  Atraumatic, Normocephalic  NECK: Supple, No JVD, Normal thyroid  NERVOUS SYSTEM:  Alert & Oriented X1  CHEST/LUNG: Clear to percussion bilaterally; No rales, rhonchi, wheezing, or rubs  HEART: Regular rate and rhythm; No murmurs, rubs, or gallops  ABDOMEN: Soft, Nontender, Nondistended; Bowel sounds present  EXTREMITIES:  2+ Peripheral Pulses, No clubbing, cyanosis, or edema

## 2021-03-12 PROCEDURE — 99231 SBSQ HOSP IP/OBS SF/LOW 25: CPT

## 2021-03-12 NOTE — PROGRESS NOTE ADULT - SUBJECTIVE AND OBJECTIVE BOX
60M PMHx seizure disorder, schizophrenia, HTN here with altered mental status.    Today:  No overnight events.        REVIEW OF SYSTEMS:  Not a reliable historian.        MEDICATIONS  (STANDING):  amLODIPine   Tablet 10 milliGRAM(s) Oral daily  AQUAPHOR (petrolatum Ointment) 1 Application(s) Topical daily  chlorhexidine 4% Liquid 1 Application(s) Topical <User Schedule>  diVALproex  milliGRAM(s) Oral two times a day  enalapril 10 milliGRAM(s) Oral daily  enoxaparin Injectable 40 milliGRAM(s) SubCutaneous daily  gabapentin 100 milliGRAM(s) Oral three times a day  hydrALAZINE 25 milliGRAM(s) Oral once  levETIRAcetam 750 milliGRAM(s) Oral two times a day  OLANZapine 5 milliGRAM(s) Oral daily  senna 2 Tablet(s) Oral at bedtime    MEDICATIONS  (PRN):  cloNIDine 0.1 milliGRAM(s) Oral every 8 hours PRN htn  polyethylene glycol 3350 17 Gram(s) Oral two times a day PRN Constipation      Allergies  No Known Allergies        FAMILY HISTORY:  No pertinent family history in first degree relatives  unknown        Vital Signs Last 24 Hrs  T(C): --  T(F): --  HR: --  BP: --  BP(mean): --  RR: --  SpO2: --    PHYSICAL EXAM:  GENERAL: NAD, well-groomed, well-developed  HEAD:  Atraumatic, Normocephalic  NECK: Supple, No JVD, Normal thyroid  NERVOUS SYSTEM:  Alert & Oriented X1  CHEST/LUNG: Clear to percussion bilaterally; No rales, rhonchi, wheezing, or rubs  HEART: Regular rate and rhythm; No murmurs, rubs, or gallops  ABDOMEN: Soft, Nontender, Nondistended; Bowel sounds present  EXTREMITIES:  2+ Peripheral Pulses, No clubbing, cyanosis, or edema

## 2021-03-13 PROCEDURE — 99231 SBSQ HOSP IP/OBS SF/LOW 25: CPT

## 2021-03-13 NOTE — PROGRESS NOTE ADULT - SUBJECTIVE AND OBJECTIVE BOX
60M PMHx seizure disorder, schizophrenia, HTN here with altered mental status.    -No overnight events.        REVIEW OF SYSTEMS:  Not a reliable historian      MEDICATIONS  (STANDING):  amLODIPine   Tablet 10 milliGRAM(s) Oral daily  AQUAPHOR (petrolatum Ointment) 1 Application(s) Topical daily  chlorhexidine 4% Liquid 1 Application(s) Topical <User Schedule>  diVALproex  milliGRAM(s) Oral two times a day  enalapril 10 milliGRAM(s) Oral daily  enoxaparin Injectable 40 milliGRAM(s) SubCutaneous daily  gabapentin 100 milliGRAM(s) Oral three times a day  hydrALAZINE 25 milliGRAM(s) Oral once  levETIRAcetam 750 milliGRAM(s) Oral two times a day  OLANZapine 5 milliGRAM(s) Oral daily  senna 2 Tablet(s) Oral at bedtime    MEDICATIONS  (PRN):  cloNIDine 0.1 milliGRAM(s) Oral every 8 hours PRN htn  polyethylene glycol 3350 17 Gram(s) Oral two times a day PRN Constipation      Allergies  No Known Allergies        FAMILY HISTORY:  No pertinent family history in first degree relatives  unknown        Vital Signs Last 24 Hrs  T(C): --  T(F): --  HR: --  BP: --  BP(mean): --  RR: --  SpO2: --    PHYSICAL EXAM:  GENERAL: NAD, well-groomed, well-developed  HEAD:  Atraumatic, Normocephalic  NECK: Supple, No JVD, Normal thyroid  NERVOUS SYSTEM:  Alert & Oriented X1  CHEST/LUNG: Clear to percussion bilaterally; No rales, rhonchi, wheezing, or rubs  HEART: Regular rate and rhythm; No murmurs, rubs, or gallops  ABDOMEN: Soft, Nontender, Nondistended; Bowel sounds present  EXTREMITIES:  2+ Peripheral Pulses, No clubbing, cyanosis, or edema

## 2021-03-14 PROCEDURE — 99231 SBSQ HOSP IP/OBS SF/LOW 25: CPT

## 2021-03-14 NOTE — PROGRESS NOTE ADULT - SUBJECTIVE AND OBJECTIVE BOX
60M PMHx seizure disorder, schizophrenia, HTN here with altered mental status.    Today:  Seen at bedside, no overnight events.          REVIEW OF SYSTEMS:  Not a reliable historian.      MEDICATIONS  (STANDING):  amLODIPine   Tablet 10 milliGRAM(s) Oral daily  AQUAPHOR (petrolatum Ointment) 1 Application(s) Topical daily  chlorhexidine 4% Liquid 1 Application(s) Topical <User Schedule>  diVALproex  milliGRAM(s) Oral two times a day  enalapril 10 milliGRAM(s) Oral daily  enoxaparin Injectable 40 milliGRAM(s) SubCutaneous daily  gabapentin 100 milliGRAM(s) Oral three times a day  hydrALAZINE 25 milliGRAM(s) Oral once  levETIRAcetam 750 milliGRAM(s) Oral two times a day  OLANZapine 5 milliGRAM(s) Oral daily  senna 2 Tablet(s) Oral at bedtime    MEDICATIONS  (PRN):  cloNIDine 0.1 milliGRAM(s) Oral every 8 hours PRN htn  polyethylene glycol 3350 17 Gram(s) Oral two times a day PRN Constipation      Allergies  No Known Allergies      FAMILY HISTORY:  No pertinent family history in first degree relatives  unknown        Vital Signs Last 24 Hrs  T(C): --  T(F): --  HR: --  BP: --  BP(mean): --  RR: --  SpO2: --    PHYSICAL EXAM:  GENERAL: NAD, well-groomed, well-developed  HEAD:  Atraumatic, Normocephalic  NECK: Supple, No JVD, Normal thyroid  NERVOUS SYSTEM:  Alert & Oriented X1  CHEST/LUNG: Clear to percussion bilaterally; No rales, rhonchi, wheezing, or rubs  HEART: Regular rate and rhythm; No murmurs, rubs, or gallops  ABDOMEN: Soft, Nontender, Nondistended; Bowel sounds present  EXTREMITIES:  2+ Peripheral Pulses, No clubbing, cyanosis, or edema

## 2021-03-15 PROCEDURE — 99231 SBSQ HOSP IP/OBS SF/LOW 25: CPT

## 2021-03-15 NOTE — PROGRESS NOTE ADULT - SUBJECTIVE AND OBJECTIVE BOX
60M PMHx seizure disorder, schizophrenia, HTN here with altered mental status.    Today:  Seen in room, calm and cooperative.          REVIEW OF SYSTEMS:  Not a reliable historian.      MEDICATIONS  (STANDING):  amLODIPine   Tablet 10 milliGRAM(s) Oral daily  AQUAPHOR (petrolatum Ointment) 1 Application(s) Topical daily  chlorhexidine 4% Liquid 1 Application(s) Topical <User Schedule>  diVALproex  milliGRAM(s) Oral two times a day  enalapril 10 milliGRAM(s) Oral daily  enoxaparin Injectable 40 milliGRAM(s) SubCutaneous daily  gabapentin 100 milliGRAM(s) Oral three times a day  hydrALAZINE 25 milliGRAM(s) Oral once  levETIRAcetam 750 milliGRAM(s) Oral two times a day  OLANZapine 5 milliGRAM(s) Oral daily  senna 2 Tablet(s) Oral at bedtime    MEDICATIONS  (PRN):  cloNIDine 0.1 milliGRAM(s) Oral every 8 hours PRN htn  polyethylene glycol 3350 17 Gram(s) Oral two times a day PRN Constipation      Allergies  No Known Allergies        FAMILY HISTORY:  No pertinent family history in first degree relatives  unknown        Vital Signs Last 24 Hrs  T(C): --  T(F): --  HR: --  BP: --  BP(mean): --  RR: --  SpO2: --    PHYSICAL EXAM:  GENERAL: NAD, well-groomed, well-developed  HEAD:  Atraumatic, Normocephalic  NECK: Supple, No JVD, Normal thyroid  NERVOUS SYSTEM:  Alert & Oriented X1  CHEST/LUNG: Clear to percussion bilaterally; No rales, rhonchi, wheezing, or rubs  HEART: Regular rate and rhythm; No murmurs, rubs, or gallops  ABDOMEN: Soft, Nontender, Nondistended; Bowel sounds present  EXTREMITIES:  2+ Peripheral Pulses, No clubbing, cyanosis, or edema

## 2021-03-16 PROCEDURE — 99231 SBSQ HOSP IP/OBS SF/LOW 25: CPT

## 2021-03-16 NOTE — PROGRESS NOTE ADULT - SUBJECTIVE AND OBJECTIVE BOX
60M PMHx seizure disorder, schizophrenia, HTN here with altered mental status.    Today:  Patient calm sitting on bed, no overnight events reported.        REVIEW OF SYSTEMS:  Not a reliable historian.      MEDICATIONS  (STANDING):  amLODIPine   Tablet 10 milliGRAM(s) Oral daily  AQUAPHOR (petrolatum Ointment) 1 Application(s) Topical daily  chlorhexidine 4% Liquid 1 Application(s) Topical <User Schedule>  diVALproex  milliGRAM(s) Oral two times a day  enalapril 10 milliGRAM(s) Oral daily  enoxaparin Injectable 40 milliGRAM(s) SubCutaneous daily  gabapentin 100 milliGRAM(s) Oral three times a day  hydrALAZINE 25 milliGRAM(s) Oral once  levETIRAcetam 750 milliGRAM(s) Oral two times a day  OLANZapine 5 milliGRAM(s) Oral daily  senna 2 Tablet(s) Oral at bedtime    MEDICATIONS  (PRN):  cloNIDine 0.1 milliGRAM(s) Oral every 8 hours PRN htn  polyethylene glycol 3350 17 Gram(s) Oral two times a day PRN Constipation      Allergies  No Known Allergies        FAMILY HISTORY:  No pertinent family history in first degree relatives  unknown        Vital Signs Last 24 Hrs  T(C): --  T(F): --  HR: --  BP: --  BP(mean): --  RR: --  SpO2: --    PHYSICAL EXAM:  GENERAL: NAD, well-groomed, well-developed  HEAD:  Atraumatic, Normocephalic  NECK: Supple, No JVD, Normal thyroid  NERVOUS SYSTEM:  Alert & Oriented X1  CHEST/LUNG: Clear to percussion bilaterally; No rales, rhonchi, wheezing, or rubs  HEART: Regular rate and rhythm; No murmurs, rubs, or gallops  ABDOMEN: Soft, Nontender, Nondistended; Bowel sounds present  EXTREMITIES:  2+ Peripheral Pulses, No clubbing, cyanosis, or edema

## 2021-03-16 NOTE — CHART NOTE - NSCHARTNOTEFT_GEN_A_CORE
Limited reassessment    MEDICATIONS  (STANDING):  amLODIPine   Tablet 10 milliGRAM(s) Oral daily  AQUAPHOR (petrolatum Ointment) 1 Application(s) Topical daily  chlorhexidine 4% Liquid 1 Application(s) Topical <User Schedule>  diVALproex  milliGRAM(s) Oral two times a day  enalapril 10 milliGRAM(s) Oral daily  enoxaparin Injectable 40 milliGRAM(s) SubCutaneous daily  gabapentin 100 milliGRAM(s) Oral three times a day  hydrALAZINE 25 milliGRAM(s) Oral once  levETIRAcetam 750 milliGRAM(s) Oral two times a day  OLANZapine 5 milliGRAM(s) Oral daily  senna 2 Tablet(s) Oral at bedtime    MEDICATIONS  (PRN):  cloNIDine 0.1 milliGRAM(s) Oral every 8 hours PRN htn  polyethylene glycol 3350 17 Gram(s) Oral two times a day PRN Constipation    No labs recorded    no edema noted; skin WDL (3/15)    no new wts recorded    Diet order: DASH/TLC, ensure clear q24h    Pt is a social admission pending safe d/c plan.  Pt continue with an adequate appetite/po intake on most days. Po intake typically ranges % since previously assessed. No GI s/s noted. Pt remains on a bowel regimen. There are no further nutrition recommendations at this time. Pt remains not at risk. RD to f/u within the next 7 days.

## 2021-03-17 PROCEDURE — 99231 SBSQ HOSP IP/OBS SF/LOW 25: CPT

## 2021-03-17 NOTE — PROGRESS NOTE ADULT - SUBJECTIVE AND OBJECTIVE BOX
60M PMHx seizure disorder, schizophrenia, HTN here with altered mental status.    Today:  -no acute events notified to me  -awaiting for safe dispo; with prolonged hospitalization; CM on board for d/c planning         REVIEW OF SYSTEMS:  Not a reliable historian.      Allergies  No Known Allergies        FAMILY HISTORY:  No pertinent family history in first degree relatives  unknown    Vital Signs Last 24 Hrs  T(C): --  T(F): --  HR: --  BP: --  BP(mean): --  RR: --  SpO2: --    PHYSICAL EXAM:  GENERAL: NAD, well-groomed, well-developed  HEAD:  Atraumatic, Normocephalic  NECK: Supple, No JVD, Normal thyroid  NERVOUS SYSTEM:  Alert & Oriented X1  CHEST/LUNG: Clear to percussion bilaterally; No rales, rhonchi, wheezing, or rubs  HEART: Regular rate and rhythm; No murmurs, rubs, or gallops  ABDOMEN: Soft, Nontender, Nondistended; Bowel sounds present  EXTREMITIES:  2+ Peripheral Pulses, No clubbing, cyanosis, or edema      MEDICATIONS  (STANDING):  amLODIPine   Tablet 10 milliGRAM(s) Oral daily  AQUAPHOR (petrolatum Ointment) 1 Application(s) Topical daily  chlorhexidine 4% Liquid 1 Application(s) Topical <User Schedule>  diVALproex  milliGRAM(s) Oral two times a day  enalapril 10 milliGRAM(s) Oral daily  enoxaparin Injectable 40 milliGRAM(s) SubCutaneous daily  gabapentin 100 milliGRAM(s) Oral three times a day  hydrALAZINE 25 milliGRAM(s) Oral once  levETIRAcetam 750 milliGRAM(s) Oral two times a day  OLANZapine 5 milliGRAM(s) Oral daily  senna 2 Tablet(s) Oral at bedtime    MEDICATIONS  (PRN):  cloNIDine 0.1 milliGRAM(s) Oral every 8 hours PRN htn  polyethylene glycol 3350 17 Gram(s) Oral two times a day PRN Constipation

## 2021-03-17 NOTE — PROGRESS NOTE ADULT - ASSESSMENT
60M PMHx seizure disorder, schizophrenia, HTN here with altered mental status.    #Altered mental status, toxic metabolic encephalopathy  possible h/o schizophrenia, suspect psychosis vs. neurocognitive disorder  Still refusing blood draws, medications, vitals  Zyprexa, Zyprexa IM PRN    #tinea pedis  -Resolved s/p clotrimazole cream    #HTN  norvasc 10mg + enalapril 10 mg    #Seizure disorder  depakote 500mg bid25  keppra 750mg bid  Ativan PRN for Seizures  -Pt has been refusing, might benefit from  treatment against objection as he has been refusing AEDs.    #DVT ppx  lovenox sq    -DC pending case mgmt/SW; pt is placement issue, citizenship, court ordered treatment against objection.  -Court date was 1/28, guardian to be appointed.      Attending Physician Dr. Carrie Lincoln # [id number]

## 2021-03-18 PROCEDURE — 99231 SBSQ HOSP IP/OBS SF/LOW 25: CPT

## 2021-03-18 NOTE — PROGRESS NOTE ADULT - ASSESSMENT
60M PMHx seizure disorder, schizophrenia, HTN here with altered mental status.    #Altered mental status, toxic metabolic encephalopathy  possible h/o schizophrenia, suspect psychosis vs. neurocognitive disorder  Still refusing blood draws, medications, vitals  Zyprexa, Zyprexa IM PRN    #tinea pedis  -Resolved s/p clotrimazole cream    #HTN  norvasc 10mg + enalapril 10 mg    #Seizure disorder  depakote 500mg bid25  keppra 750mg bid  Ativan PRN for Seizures  -Pt has been refusing, might benefit from  treatment against objection as he has been refusing AEDs.    #DVT ppx  lovenox sq    -DC pending case mgmt/SW; pt is placement issue, citizenship, court ordered treatment against objection.  -Court date was 1/28, guardian to be appointed.      Attending Physician Dr. Carrie Lincoln # [id number]

## 2021-03-19 PROCEDURE — 99231 SBSQ HOSP IP/OBS SF/LOW 25: CPT

## 2021-03-19 NOTE — PROGRESS NOTE ADULT - ASSESSMENT
60M PMHx seizure disorder, schizophrenia, HTN here with altered mental status.    #Altered mental status, toxic metabolic encephalopathy  possible h/o schizophrenia, suspect psychosis vs. neurocognitive disorder  Still refusing blood draws, medications, vitals  Zyprexa, Zyprexa IM PRN    #tinea pedis  -Resolved s/p clotrimazole cream    #HTN  norvasc 10mg + enalapril 10 mg    #Seizure disorder  depakote 500mg bid25  keppra 750mg bid  Ativan PRN for Seizures  -Pt has been refusing, might benefit from  treatment against objection as he has been refusing AEDs.    #DVT ppx  lovenox sq    -DC pending case mgmt/SW; pt is placement issue, citizenship, court ordered treatment against objection.  -Court date was 1/28, guardian to be appointed.      Attending Physician Dr. aCrrie Lincoln # [id number]

## 2021-03-20 PROCEDURE — 99231 SBSQ HOSP IP/OBS SF/LOW 25: CPT

## 2021-03-20 NOTE — PROGRESS NOTE ADULT - ASSESSMENT
60M PMHx seizure disorder, schizophrenia, HTN here with altered mental status.    #Altered mental status, toxic metabolic encephalopathy  possible h/o schizophrenia, suspect psychosis vs. neurocognitive disorder  Still refusing blood draws, medications, vitals  Zyprexa, Zyprexa IM PRN    #tinea pedis  -Resolved s/p clotrimazole cream    #HTN  norvasc 10mg + enalapril 10 mg    #Seizure disorder  depakote 500mg bid25  keppra 750mg bid  Ativan PRN for Seizures  -Pt has been refusing, might benefit from  treatment against objection as he has been refusing AEDs.    #DVT ppx  lovenox sq    -DC pending case mgmt/SW; pt is placement issue, citizenship, court ordered treatment against objection.  -Court date was 1/28, guardian to be appointed.      Attending Physician Dr. Carrie Lincoln # [id number]

## 2021-03-21 PROCEDURE — 99231 SBSQ HOSP IP/OBS SF/LOW 25: CPT

## 2021-03-21 NOTE — PROGRESS NOTE ADULT - ASSESSMENT
60M PMHx seizure disorder, schizophrenia, HTN here with altered mental status.    #Altered mental status, toxic metabolic encephalopathy  possible h/o schizophrenia, suspect psychosis vs. neurocognitive disorder  Still refusing blood draws, medications, vitals  Zyprexa, Zyprexa IM PRN    #tinea pedis  -Resolved s/p clotrimazole cream    #HTN  norvasc 10mg + enalapril 10 mg    #Seizure disorder  depakote 500mg bid25  keppra 750mg bid  Ativan PRN for Seizures  -Pt has been refusing, might benefit from  treatment against objection as he has been refusing AEDs.    #DVT ppx  lovenox sq    -DC pending case mgmt/SW; pt is placement issue, citizenship, court ordered treatment against objection.  -Court date was 1/28, guardian to be appointed.      Attending Physician Dr. Carrie Lincoln # [id number]

## 2021-03-22 PROCEDURE — 99231 SBSQ HOSP IP/OBS SF/LOW 25: CPT

## 2021-03-22 NOTE — PROGRESS NOTE ADULT - ASSESSMENT
60M PMHx seizure disorder, schizophrenia, HTN here with altered mental status.    #Altered mental status, toxic metabolic encephalopathy  possible h/o schizophrenia, suspect psychosis vs. neurocognitive disorder  Still refusing blood draws, medications, vitals  Zyprexa, Zyprexa IM PRN    #tinea pedis  -Resolved s/p clotrimazole cream    #HTN  norvasc 10mg + enalapril 10 mg    #Seizure disorder  depakote 500mg bid25  keppra 750mg bid  Ativan PRN for Seizures  -Pt has been refusing, might benefit from  treatment against objection as he has been refusing AEDs.    #DVT ppx  lovenox sq    -DC pending case mgmt/SW; pt is placement issue, citizenship, court ordered treatment against objection.  -Court date was 1/28, guardian to be appointed.      Attending Physician Dr. Carrie Lincoln # [id number]

## 2021-03-23 PROCEDURE — 99231 SBSQ HOSP IP/OBS SF/LOW 25: CPT

## 2021-03-23 NOTE — CHART NOTE - NSCHARTNOTEFT_GEN_A_CORE
Limited reassessment    MEDICATIONS  (STANDING):  amLODIPine   Tablet 10 milliGRAM(s) Oral daily  AQUAPHOR (petrolatum Ointment) 1 Application(s) Topical daily  chlorhexidine 4% Liquid 1 Application(s) Topical <User Schedule>  diVALproex  milliGRAM(s) Oral two times a day  enalapril 10 milliGRAM(s) Oral daily  enoxaparin Injectable 40 milliGRAM(s) SubCutaneous daily  gabapentin 100 milliGRAM(s) Oral three times a day  hydrALAZINE 25 milliGRAM(s) Oral once  levETIRAcetam 750 milliGRAM(s) Oral two times a day  OLANZapine 5 milliGRAM(s) Oral daily  senna 2 Tablet(s) Oral at bedtime    MEDICATIONS  (PRN):  cloNIDine 0.1 milliGRAM(s) Oral every 8 hours PRN htn  polyethylene glycol 3350 17 Gram(s) Oral two times a day PRN Constipation    No labs    No edema noted; skin WDL (3/23).    No new wts.    Diet order: DASH/TLC, ensure clear q24h    Pt is a social admission pending placement/guardianship appointment. Pt continues to eat well with a good appetite throughout LOS. Po intake consistently recorded as 100% since previously assessed. No GI s/s noted. BM not consistently recorded per EMR; pt remains on a bowel regimen PRN. There are no further nutrition interventions at this time, pt remains not at risk. RD to f/u within the next 7 days.

## 2021-03-23 NOTE — PROGRESS NOTE ADULT - ASSESSMENT
60M PMHx seizure disorder, schizophrenia, HTN here with altered mental status.    #Altered mental status, toxic metabolic encephalopathy  possible h/o schizophrenia, suspect psychosis vs. neurocognitive disorder  Still refusing blood draws, medications, vitals  Zyprexa, Zyprexa IM PRN    #tinea pedis  -Resolved s/p clotrimazole cream    #HTN  norvasc 10mg + enalapril 10 mg    #Seizure disorder  depakote 500mg bid25  keppra 750mg bid  Ativan PRN for Seizures  -Pt has been refusing, might benefit from  treatment against objection as he has been refusing AEDs.    #DVT ppx  lovenox sq    -DC pending case mgmt/SW; pt is placement issue, citizenship, court ordered treatment against objection.  -Court date was 1/28, guardian to be appointed.      Attending Physician Dr. Carrie Lincoln # [phone]

## 2021-03-23 NOTE — PROGRESS NOTE ADULT - NSHPATTENDINGPLANDISCUSS_GEN_ALL_CORE
medical staff

## 2021-03-24 PROCEDURE — 99231 SBSQ HOSP IP/OBS SF/LOW 25: CPT

## 2021-03-24 NOTE — PROGRESS NOTE ADULT - SUBJECTIVE AND OBJECTIVE BOX
60M PMHx seizure disorder, schizophrenia, HTN here with altered mental status.    -Today, seen in room, no overnight events.      REVIEW OF SYSTEMS:  Not a reliable historian.      MEDICATIONS  (STANDING):  amLODIPine   Tablet 10 milliGRAM(s) Oral daily  AQUAPHOR (petrolatum Ointment) 1 Application(s) Topical daily  chlorhexidine 4% Liquid 1 Application(s) Topical <User Schedule>  diVALproex  milliGRAM(s) Oral two times a day  enalapril 10 milliGRAM(s) Oral daily  enoxaparin Injectable 40 milliGRAM(s) SubCutaneous daily  gabapentin 100 milliGRAM(s) Oral three times a day  hydrALAZINE 25 milliGRAM(s) Oral once  levETIRAcetam 750 milliGRAM(s) Oral two times a day  OLANZapine 5 milliGRAM(s) Oral daily  senna 2 Tablet(s) Oral at bedtime    MEDICATIONS  (PRN):  cloNIDine 0.1 milliGRAM(s) Oral every 8 hours PRN htn  polyethylene glycol 3350 17 Gram(s) Oral two times a day PRN Constipation      Allergies  No Known Allergies        FAMILY HISTORY:  No pertinent family history in first degree relatives  unknown        Vital Signs Last 24 Hrs  T(C): --  T(F): --  HR: --  BP: --  BP(mean): --  RR: --  SpO2: --    PHYSICAL EXAM:  GENERAL: NAD, well-groomed, well-developed  HEAD:  Atraumatic, Normocephalic  NECK: Supple, No JVD, Normal thyroid  NERVOUS SYSTEM:  Alert & Oriented X1  CHEST/LUNG: Clear to percussion bilaterally; No rales, rhonchi, wheezing, or rubs  HEART: Regular rate and rhythm; No murmurs, rubs, or gallops  ABDOMEN: Soft, Nontender, Nondistended; Bowel sounds present  EXTREMITIES:  2+ Peripheral Pulses, No clubbing, cyanosis, or edema

## 2021-03-25 PROCEDURE — 99231 SBSQ HOSP IP/OBS SF/LOW 25: CPT

## 2021-03-26 PROCEDURE — 99231 SBSQ HOSP IP/OBS SF/LOW 25: CPT

## 2021-03-26 NOTE — PROGRESS NOTE ADULT - SUBJECTIVE AND OBJECTIVE BOX
60M PMHx seizure disorder, schizophrenia, HTN here with altered mental status.    Today:  Seen at bedside, no overnight events.          REVIEW OF SYSTEMS:  Not a reliable historian      MEDICATIONS  (STANDING):  amLODIPine   Tablet 10 milliGRAM(s) Oral daily  AQUAPHOR (petrolatum Ointment) 1 Application(s) Topical daily  chlorhexidine 4% Liquid 1 Application(s) Topical <User Schedule>  diVALproex  milliGRAM(s) Oral two times a day  enalapril 10 milliGRAM(s) Oral daily  enoxaparin Injectable 40 milliGRAM(s) SubCutaneous daily  gabapentin 100 milliGRAM(s) Oral three times a day  hydrALAZINE 25 milliGRAM(s) Oral once  levETIRAcetam 750 milliGRAM(s) Oral two times a day  OLANZapine 5 milliGRAM(s) Oral daily  senna 2 Tablet(s) Oral at bedtime    MEDICATIONS  (PRN):  cloNIDine 0.1 milliGRAM(s) Oral every 8 hours PRN htn  polyethylene glycol 3350 17 Gram(s) Oral two times a day PRN Constipation      Allergies  No Known Allergies        FAMILY HISTORY:  No pertinent family history in first degree relatives  unknown        Vital Signs Last 24 Hrs  T(C): --  T(F): --  HR: --  BP: --  BP(mean): --  RR: --  SpO2: --    PHYSICAL EXAM:    GENERAL: NAD, well-groomed, well-developed  HEAD:  Atraumatic, Normocephalic  Patient did not allow further exam today.

## 2021-03-27 PROCEDURE — 99231 SBSQ HOSP IP/OBS SF/LOW 25: CPT

## 2021-03-27 NOTE — PROGRESS NOTE ADULT - SUBJECTIVE AND OBJECTIVE BOX
60M PMHx seizure disorder, schizophrenia, HTN here with altered mental status.    Today:  Seen at bedside, no overnight events.          REVIEW OF SYSTEMS:  Not a reliable historian.      MEDICATIONS  (STANDING):  amLODIPine   Tablet 10 milliGRAM(s) Oral daily  AQUAPHOR (petrolatum Ointment) 1 Application(s) Topical daily  chlorhexidine 4% Liquid 1 Application(s) Topical <User Schedule>  diVALproex  milliGRAM(s) Oral two times a day  enalapril 10 milliGRAM(s) Oral daily  enoxaparin Injectable 40 milliGRAM(s) SubCutaneous daily  gabapentin 100 milliGRAM(s) Oral three times a day  hydrALAZINE 25 milliGRAM(s) Oral once  levETIRAcetam 750 milliGRAM(s) Oral two times a day  OLANZapine 5 milliGRAM(s) Oral daily  senna 2 Tablet(s) Oral at bedtime    MEDICATIONS  (PRN):  cloNIDine 0.1 milliGRAM(s) Oral every 8 hours PRN htn  polyethylene glycol 3350 17 Gram(s) Oral two times a day PRN Constipation      Allergies  No Known Allergies        FAMILY HISTORY:  No pertinent family history in first degree relatives  unknown        Vital Signs Last 24 Hrs  T(C): --  T(F): --  HR: --  BP: --  BP(mean): --  RR: --  SpO2: --    PHYSICAL EXAM:  GENERAL: NAD, well-groomed, well-developed  HEAD:  Atraumatic, Normocephalic  EYES: EOMI, PERRLA, conjunctiva and sclera clear  NECK: Supple, No JVD, Normal thyroid  NERVOUS SYSTEM:  Alert & Oriented X1  CHEST/LUNG: Clear to percussion bilaterally; No rales, rhonchi, wheezing, or rubs  HEART: Regular rate and rhythm; No murmurs, rubs, or gallops  ABDOMEN: Soft, Nontender, Nondistended; Bowel sounds present

## 2021-03-28 PROCEDURE — 99231 SBSQ HOSP IP/OBS SF/LOW 25: CPT

## 2021-03-28 NOTE — PROGRESS NOTE ADULT - SUBJECTIVE AND OBJECTIVE BOX
60M PMHx seizure disorder, schizophrenia, HTN here with altered mental status.    Today:  No overnight events.            REVIEW OF SYSTEMS:  Not a reliable historian.      MEDICATIONS  (STANDING):  amLODIPine   Tablet 10 milliGRAM(s) Oral daily  AQUAPHOR (petrolatum Ointment) 1 Application(s) Topical daily  chlorhexidine 4% Liquid 1 Application(s) Topical <User Schedule>  diVALproex  milliGRAM(s) Oral two times a day  enalapril 10 milliGRAM(s) Oral daily  enoxaparin Injectable 40 milliGRAM(s) SubCutaneous daily  gabapentin 100 milliGRAM(s) Oral three times a day  hydrALAZINE 25 milliGRAM(s) Oral once  levETIRAcetam 750 milliGRAM(s) Oral two times a day  OLANZapine 5 milliGRAM(s) Oral daily  senna 2 Tablet(s) Oral at bedtime    MEDICATIONS  (PRN):  cloNIDine 0.1 milliGRAM(s) Oral every 8 hours PRN htn  polyethylene glycol 3350 17 Gram(s) Oral two times a day PRN Constipation      Allergies  No Known Allergies        FAMILY HISTORY:  No pertinent family history in first degree relatives  unknown        Vital Signs Last 24 Hrs  T(C): --  T(F): --  HR: --  BP: --  BP(mean): --  RR: --  SpO2: --    PHYSICAL EXAM:  GENERAL: NAD, well-groomed, well-developed  HEAD:  Atraumatic, Normocephalic  EYES: EOMI, PERRLA, conjunctiva and sclera clear  NECK: Supple, No JVD, Normal thyroid  NERVOUS SYSTEM:  Alert & Oriented X1  CHEST/LUNG: Clear to percussion bilaterally; No rales, rhonchi, wheezing, or rubs  HEART: Regular rate and rhythm; No murmurs, rubs, or gallops  ABDOMEN: Soft, Nontender, Nondistended; Bowel sounds present

## 2021-03-29 PROCEDURE — 99231 SBSQ HOSP IP/OBS SF/LOW 25: CPT

## 2021-03-29 NOTE — PROGRESS NOTE ADULT - SUBJECTIVE AND OBJECTIVE BOX
60M PMHx seizure disorder, schizophrenia, HTN here with altered mental status.    Today:  Seen in room, no events overnight.            REVIEW OF SYSTEMS:  Not a reliable historian.      MEDICATIONS  (STANDING):  amLODIPine   Tablet 10 milliGRAM(s) Oral daily  AQUAPHOR (petrolatum Ointment) 1 Application(s) Topical daily  chlorhexidine 4% Liquid 1 Application(s) Topical <User Schedule>  diVALproex  milliGRAM(s) Oral two times a day  enalapril 10 milliGRAM(s) Oral daily  enoxaparin Injectable 40 milliGRAM(s) SubCutaneous daily  gabapentin 100 milliGRAM(s) Oral three times a day  hydrALAZINE 25 milliGRAM(s) Oral once  levETIRAcetam 750 milliGRAM(s) Oral two times a day  OLANZapine 5 milliGRAM(s) Oral daily  senna 2 Tablet(s) Oral at bedtime    MEDICATIONS  (PRN):  cloNIDine 0.1 milliGRAM(s) Oral every 8 hours PRN htn  polyethylene glycol 3350 17 Gram(s) Oral two times a day PRN Constipation      Allergies  No Known Allergies        FAMILY HISTORY:  No pertinent family history in first degree relatives  unknown        Vital Signs Last 24 Hrs  T(C): --  T(F): --  HR: --  BP: --  BP(mean): --  RR: --  SpO2: --    PHYSICAL EXAM:  GENERAL: NAD, well-groomed, well-developed  HEAD:  Atraumatic, Normocephalic  EYES: EOMI, PERRLA, conjunctiva and sclera clear  NECK: Supple, No JVD, Normal thyroid  NERVOUS SYSTEM:  Alert & Oriented X1  CHEST/LUNG: Clear to percussion bilaterally; No rales, rhonchi, wheezing, or rubs  HEART: Regular rate and rhythm; No murmurs, rubs, or gallops  ABDOMEN: Soft, Nontender, Nondistended; Bowel sounds present

## 2021-03-30 PROCEDURE — 99231 SBSQ HOSP IP/OBS SF/LOW 25: CPT

## 2021-03-30 NOTE — CHART NOTE - NSCHARTNOTEFT_GEN_A_CORE
RD limited note     MEDICATIONS  (STANDING):  amLODIPine   Tablet 10 milliGRAM(s) Oral daily  AQUAPHOR (petrolatum Ointment) 1 Application(s) Topical daily  chlorhexidine 4% Liquid 1 Application(s) Topical <User Schedule>  diVALproex  milliGRAM(s) Oral two times a day  enalapril 10 milliGRAM(s) Oral daily  enoxaparin Injectable 40 milliGRAM(s) SubCutaneous daily  gabapentin 100 milliGRAM(s) Oral three times a day  hydrALAZINE 25 milliGRAM(s) Oral once  levETIRAcetam 750 milliGRAM(s) Oral two times a day  OLANZapine 5 milliGRAM(s) Oral daily  senna 2 Tablet(s) Oral at bedtime    MEDICATIONS  (PRN):  cloNIDine 0.1 milliGRAM(s) Oral every 8 hours PRN htn  polyethylene glycol 3350 17 Gram(s) Oral two times a day PRN Constipation      No labs    No edema noted; skin WDL (3/23).    No new wts.    Diet order: DASH/TLC, ensure clear q24h    Pt is a social admission pending placement/guardianship appointment. Pt continues to eat well with a good appetite throughout LOS. Po intake consistently recorded as 100% since previously assessed. No GI s/s noted. BM not consistently recorded per EMR; pt remains on a bowel regimen PRN. There are no further nutrition interventions at this time, pt remains not at risk. RD to f/u within the next 7 days.

## 2021-03-30 NOTE — PROGRESS NOTE ADULT - SUBJECTIVE AND OBJECTIVE BOX
60M PMHx seizure disorder, schizophrenia, HTN here with altered mental status.    Today:  No overnight events.          REVIEW OF SYSTEMS:  Not a reliable historian.      MEDICATIONS  (STANDING):  amLODIPine   Tablet 10 milliGRAM(s) Oral daily  AQUAPHOR (petrolatum Ointment) 1 Application(s) Topical daily  chlorhexidine 4% Liquid 1 Application(s) Topical <User Schedule>  diVALproex  milliGRAM(s) Oral two times a day  enalapril 10 milliGRAM(s) Oral daily  enoxaparin Injectable 40 milliGRAM(s) SubCutaneous daily  gabapentin 100 milliGRAM(s) Oral three times a day  hydrALAZINE 25 milliGRAM(s) Oral once  levETIRAcetam 750 milliGRAM(s) Oral two times a day  OLANZapine 5 milliGRAM(s) Oral daily  senna 2 Tablet(s) Oral at bedtime    MEDICATIONS  (PRN):  cloNIDine 0.1 milliGRAM(s) Oral every 8 hours PRN htn  polyethylene glycol 3350 17 Gram(s) Oral two times a day PRN Constipation      Allergies  No Known Allergies        FAMILY HISTORY:  No pertinent family history in first degree relatives  unknown        Vital Signs Last 24 Hrs  T(C): 36.6 (29 Mar 2021 14:00), Max: 36.6 (29 Mar 2021 14:00)  T(F): 97.8 (29 Mar 2021 14:00), Max: 97.8 (29 Mar 2021 14:00)  HR: 70 (29 Mar 2021 14:00) (70 - 70)  BP: 131/76 (29 Mar 2021 14:00) (131/76 - 131/76)  RR: 18 (29 Mar 2021 14:00) (18 - 18)    PHYSICAL EXAM:  GENERAL: NAD, well-groomed, well-developed  HEAD:  Atraumatic, Normocephalic  EYES: EOMI, PERRLA, conjunctiva and sclera clear  NECK: Supple, No JVD, Normal thyroid  NERVOUS SYSTEM:  Alert & Oriented X1  CHEST/LUNG: Clear to percussion bilaterally; No rales, rhonchi, wheezing, or rubs  HEART: Regular rate and rhythm; No murmurs, rubs, or gallops  ABDOMEN: Soft, Nontender, Nondistended; Bowel sounds present

## 2021-03-31 PROCEDURE — 99231 SBSQ HOSP IP/OBS SF/LOW 25: CPT

## 2021-03-31 NOTE — PROGRESS NOTE ADULT - ASSESSMENT
60M PMHx seizure disorder, schizophrenia, HTN here with altered mental status.    #Altered mental status, toxic metabolic encephalopathy  possible h/o schizophrenia, suspect psychosis vs. neurocognitive disorder  Still refusing blood draws, medications, vitals  Zyprexa, Zyprexa IM PRN    #tinea pedis  -Resolved s/p clotrimazole cream    #HTN  norvasc 10mg + enalapril 10 mg    #Seizure disorder  depakote 500mg bid25  keppra 750mg bid  Ativan PRN for Seizures  -Pt has been refusing, might benefit from  treatment against objection as he has been refusing AEDs.    #DVT ppx  lovenox sq    -DC pending case mgmt/SW; pt is placement issue, citizenship, court ordered treatment against objection.  -Court date was 1/28, guardian to be appointed.    spent over 15 mins today's care

## 2021-03-31 NOTE — PROGRESS NOTE ADULT - SUBJECTIVE AND OBJECTIVE BOX
60M PMHx seizure disorder, schizophrenia, HTN here with altered mental status.    Today:  -no significant events notified to me   -with prolonged hospitalization and CM working on safe dispo; pending guardianship       REVIEW OF SYSTEMS:  Not a reliable historian.      Allergies  No Known Allergies        FAMILY HISTORY:  No pertinent family history in first degree relatives  unknown    no new vitals today   patient refused I was told     Vital Signs Last 24 Hrs  T(C): --  T(F): --  HR: --  BP: --  BP(mean): --  RR: --    PHYSICAL EXAM:  GENERAL: NAD, well-groomed, well-developed  HEAD:  Atraumatic, Normocephalic  EYES: EOMI, PERRLA, conjunctiva and sclera clear  NECK: Supple, No JVD, Normal thyroid  NERVOUS SYSTEM:  Alert & Oriented X1  CHEST/LUNG: Clear to percussion bilaterally; No rales, rhonchi, wheezing, or rubs  HEART: Regular rate and rhythm; No murmurs, rubs, or gallops  ABDOMEN: Soft, Nontender, Nondistended; Bowel sounds present      MEDICATIONS  (STANDING):  amLODIPine   Tablet 10 milliGRAM(s) Oral daily  AQUAPHOR (petrolatum Ointment) 1 Application(s) Topical daily  chlorhexidine 4% Liquid 1 Application(s) Topical <User Schedule>  diVALproex  milliGRAM(s) Oral two times a day  enalapril 10 milliGRAM(s) Oral daily  enoxaparin Injectable 40 milliGRAM(s) SubCutaneous daily  gabapentin 100 milliGRAM(s) Oral three times a day  hydrALAZINE 25 milliGRAM(s) Oral once  levETIRAcetam 750 milliGRAM(s) Oral two times a day  OLANZapine 5 milliGRAM(s) Oral daily  senna 2 Tablet(s) Oral at bedtime    MEDICATIONS  (PRN):  cloNIDine 0.1 milliGRAM(s) Oral every 8 hours PRN htn  polyethylene glycol 3350 17 Gram(s) Oral two times a day PRN Constipation

## 2021-04-01 PROCEDURE — 99231 SBSQ HOSP IP/OBS SF/LOW 25: CPT

## 2021-04-02 PROCEDURE — 99231 SBSQ HOSP IP/OBS SF/LOW 25: CPT

## 2021-04-03 PROCEDURE — 99231 SBSQ HOSP IP/OBS SF/LOW 25: CPT

## 2021-04-04 PROCEDURE — 99231 SBSQ HOSP IP/OBS SF/LOW 25: CPT

## 2021-04-05 PROCEDURE — 99231 SBSQ HOSP IP/OBS SF/LOW 25: CPT

## 2021-04-06 PROCEDURE — 99231 SBSQ HOSP IP/OBS SF/LOW 25: CPT

## 2021-04-07 PROCEDURE — 99231 SBSQ HOSP IP/OBS SF/LOW 25: CPT

## 2021-04-07 NOTE — CHART NOTE - NSCHARTNOTEFT_GEN_A_CORE
Limited reassessment; overdue x24h likely r/t high volume of pts.    MEDICATIONS  (STANDING):  amLODIPine   Tablet 10 milliGRAM(s) Oral daily  AQUAPHOR (petrolatum Ointment) 1 Application(s) Topical daily  chlorhexidine 4% Liquid 1 Application(s) Topical <User Schedule>  diVALproex  milliGRAM(s) Oral two times a day  enalapril 10 milliGRAM(s) Oral daily  enoxaparin Injectable 40 milliGRAM(s) SubCutaneous daily  gabapentin 100 milliGRAM(s) Oral three times a day  hydrALAZINE 25 milliGRAM(s) Oral once  levETIRAcetam 750 milliGRAM(s) Oral two times a day  OLANZapine 5 milliGRAM(s) Oral daily  senna 2 Tablet(s) Oral at bedtime    MEDICATIONS  (PRN):  cloNIDine 0.1 milliGRAM(s) Oral every 8 hours PRN htn  polyethylene glycol 3350 17 Gram(s) Oral two times a day PRN Constipation    No labs recorded    No edema noted; skin WDL (4/7).    No new wts.    Diet order: DASH/TLC, Ensure Clear q24h.    Pt is a social admission pending placement/guardianship. Pt continues to eat relatively well throughout LOS. Per EMR, po intake varies 0-100%; consistently >75% on average. No GI s/s noted. Pt remains on a bowel regimen PRN. There are no further nutrition interventions at this time. Pt remains not at risk. RD to f/u within the next 7 days.

## 2021-04-07 NOTE — PROGRESS NOTE ADULT - SUBJECTIVE AND OBJECTIVE BOX
60M PMHx seizure disorder, schizophrenia, HTN here with altered mental status.    Today:  Seen at bedside, no overnight events.        REVIEW OF SYSTEMS:  Not a reliable historian.      MEDICATIONS  (STANDING):  amLODIPine   Tablet 10 milliGRAM(s) Oral daily  AQUAPHOR (petrolatum Ointment) 1 Application(s) Topical daily  chlorhexidine 4% Liquid 1 Application(s) Topical <User Schedule>  diVALproex  milliGRAM(s) Oral two times a day  enalapril 10 milliGRAM(s) Oral daily  enoxaparin Injectable 40 milliGRAM(s) SubCutaneous daily  gabapentin 100 milliGRAM(s) Oral three times a day  hydrALAZINE 25 milliGRAM(s) Oral once  levETIRAcetam 750 milliGRAM(s) Oral two times a day  OLANZapine 5 milliGRAM(s) Oral daily  senna 2 Tablet(s) Oral at bedtime    MEDICATIONS  (PRN):  cloNIDine 0.1 milliGRAM(s) Oral every 8 hours PRN htn  polyethylene glycol 3350 17 Gram(s) Oral two times a day PRN Constipation      Allergies  No Known Allergies        FAMILY HISTORY:  No pertinent family history in first degree relatives  unknown        Vital Signs Last 24 Hrs  T(C): 36.2 (07 Apr 2021 13:28), Max: 36.2 (07 Apr 2021 13:28)  T(F): 97.2 (07 Apr 2021 13:28), Max: 97.2 (07 Apr 2021 13:28)  HR: 81 (07 Apr 2021 13:28) (81 - 81)  BP: 133/62 (07 Apr 2021 13:28) (133/62 - 133/62)  RR: 16 (07 Apr 2021 13:28) (16 - 16)      PHYSICAL EXAM:  GENERAL: NAD, well-groomed, well-developed  HEAD:  Atraumatic, Normocephalic  EYES: EOMI, PERRLA, conjunctiva and sclera clear  NECK: Supple, No JVD, Normal thyroid  NERVOUS SYSTEM:  Alert & Oriented X1  CHEST/LUNG: Clear to percussion bilaterally; No rales, rhonchi, wheezing, or rubs  HEART: Regular rate and rhythm; No murmurs, rubs, or gallops  ABDOMEN: Soft, Nontender, Nondistended; Bowel sounds present

## 2021-04-08 PROCEDURE — 99231 SBSQ HOSP IP/OBS SF/LOW 25: CPT

## 2021-04-08 NOTE — PROGRESS NOTE ADULT - SUBJECTIVE AND OBJECTIVE BOX
61M PMHx seizure disorder, schizophrenia, HTN here with altered mental status.    Today:  Seen at bedside, no overnight events.            REVIEW OF SYSTEMS:  Not a reliable historian.      MEDICATIONS  (STANDING):  amLODIPine   Tablet 10 milliGRAM(s) Oral daily  AQUAPHOR (petrolatum Ointment) 1 Application(s) Topical daily  chlorhexidine 4% Liquid 1 Application(s) Topical <User Schedule>  diVALproex  milliGRAM(s) Oral two times a day  enalapril 10 milliGRAM(s) Oral daily  enoxaparin Injectable 40 milliGRAM(s) SubCutaneous daily  gabapentin 100 milliGRAM(s) Oral three times a day  hydrALAZINE 25 milliGRAM(s) Oral once  levETIRAcetam 750 milliGRAM(s) Oral two times a day  OLANZapine 5 milliGRAM(s) Oral daily  senna 2 Tablet(s) Oral at bedtime    MEDICATIONS  (PRN):  cloNIDine 0.1 milliGRAM(s) Oral every 8 hours PRN htn  polyethylene glycol 3350 17 Gram(s) Oral two times a day PRN Constipation      Allergies  No Known Allergies        FAMILY HISTORY:  No pertinent family history in first degree relatives  unknown        Vital Signs Last 24 Hrs  T(C): --  T(F): --  HR: --  BP: --  BP(mean): --  RR: --  SpO2: --    PHYSICAL EXAM:  GENERAL: NAD, well-groomed, well-developed  HEAD:  Atraumatic, Normocephalic  EYES: EOMI, PERRLA, conjunctiva and sclera clear  NECK: Supple, No JVD, Normal thyroid  NERVOUS SYSTEM:  Alert & Oriented X1  CHEST/LUNG: Clear to percussion bilaterally; No rales, rhonchi, wheezing, or rubs  HEART: Regular rate and rhythm; No murmurs, rubs, or gallops  ABDOMEN: Soft, Nontender, Nondistended; Bowel sounds present

## 2021-04-08 NOTE — PROGRESS NOTE ADULT - ASSESSMENT
61M PMHx seizure disorder, schizophrenia, HTN here with altered mental status.    #Altered mental status, toxic metabolic encephalopathy  possible h/o schizophrenia, suspect psychosis vs. neurocognitive disorder  Still refusing blood draws, medications, vitals  Zyprexa, Zyprexa IM PRN  Psychiatry consult appreciated; this is more likely a neurocognitive d/o and patient does not require IPP admission.    #tinea pedis  -Resolved s/p clotrimazole cream    #HTN  norvasc 10mg + enalapril 10 mg    #Seizure disorder  depakote 500mg bid25  keppra 750mg bid  Ativan PRN for Seizures  -Pt has been refusing, might benefit from treatment against objection as he has been refusing AEDs.    #DVT ppx  lovenox sq    -DC pending case mgmt/SW; pt is placement issue, citizenship, court ordered treatment against objection.  -Court date was 1/28, guardian to be appointed.

## 2021-04-09 PROCEDURE — 99231 SBSQ HOSP IP/OBS SF/LOW 25: CPT

## 2021-04-09 NOTE — PROGRESS NOTE ADULT - SUBJECTIVE AND OBJECTIVE BOX
61M PMHx seizure disorder, schizophrenia, HTN here with altered mental status.    Today:  Seen in room, patient did not allow exam.        REVIEW OF SYSTEMS:  Not a reliable historian.      MEDICATIONS  (STANDING):  amLODIPine   Tablet 10 milliGRAM(s) Oral daily  AQUAPHOR (petrolatum Ointment) 1 Application(s) Topical daily  chlorhexidine 4% Liquid 1 Application(s) Topical <User Schedule>  diVALproex  milliGRAM(s) Oral two times a day  enalapril 10 milliGRAM(s) Oral daily  enoxaparin Injectable 40 milliGRAM(s) SubCutaneous daily  gabapentin 100 milliGRAM(s) Oral three times a day  hydrALAZINE 25 milliGRAM(s) Oral once  levETIRAcetam 750 milliGRAM(s) Oral two times a day  OLANZapine 5 milliGRAM(s) Oral daily  senna 2 Tablet(s) Oral at bedtime    MEDICATIONS  (PRN):  cloNIDine 0.1 milliGRAM(s) Oral every 8 hours PRN htn  polyethylene glycol 3350 17 Gram(s) Oral two times a day PRN Constipation      Allergies  No Known Allergies        FAMILY HISTORY:  No pertinent family history in first degree relatives  unknown        Vital Signs Last 24 Hrs  T(C): --  T(F): --  HR: --  BP: --  BP(mean): --  RR: --  SpO2: --    PHYSICAL EXAM:    GENERAL: NAD, well-groomed, well-developed

## 2021-04-10 PROCEDURE — 99231 SBSQ HOSP IP/OBS SF/LOW 25: CPT

## 2021-04-10 NOTE — PROGRESS NOTE ADULT - ASSESSMENT
61M PMHx seizure disorder, schizophrenia, HTN here with altered mental status.    #Altered mental status, toxic metabolic encephalopathy  possible h/o schizophrenia, suspect psychosis vs. neurocognitive disorder  Still refusing blood draws, medications, vitals  Zyprexa, Zyprexa IM PRN  Psychiatry consult appreciated; this is more likely a neurocognitive d/o and patient does not require IPP admission.    #tinea pedis  -Resolved s/p clotrimazole cream    #HTN  norvasc 10mg + enalapril 10 mg    #Seizure disorder  depakote 500mg bid25  keppra 750mg bid  Ativan PRN for Seizures  -Pt has been refusing, might benefit from treatment against objection as he has been refusing AEDs.    #DVT ppx  lovenox sc    -DC pending case mgmt/SW; pt is placement issue, citizenship, court ordered treatment against objection.  -Court date was 1/28, guardian to be appointed.

## 2021-04-11 PROCEDURE — 99231 SBSQ HOSP IP/OBS SF/LOW 25: CPT

## 2021-04-11 NOTE — PROGRESS NOTE ADULT - SUBJECTIVE AND OBJECTIVE BOX
61M PMHx seizure disorder, schizophrenia, HTN here with altered mental status.    Today:  Seen at bedside, no overnight events.        REVIEW OF SYSTEMS:  Not a reliable historian.    MEDICATIONS  (STANDING):  amLODIPine   Tablet 10 milliGRAM(s) Oral daily  AQUAPHOR (petrolatum Ointment) 1 Application(s) Topical daily  chlorhexidine 4% Liquid 1 Application(s) Topical <User Schedule>  diVALproex  milliGRAM(s) Oral two times a day  enalapril 10 milliGRAM(s) Oral daily  enoxaparin Injectable 40 milliGRAM(s) SubCutaneous daily  gabapentin 100 milliGRAM(s) Oral three times a day  hydrALAZINE 25 milliGRAM(s) Oral once  levETIRAcetam 750 milliGRAM(s) Oral two times a day  OLANZapine 5 milliGRAM(s) Oral daily  senna 2 Tablet(s) Oral at bedtime    MEDICATIONS  (PRN):  cloNIDine 0.1 milliGRAM(s) Oral every 8 hours PRN htn  polyethylene glycol 3350 17 Gram(s) Oral two times a day PRN Constipation      Allergies  No Known Allergies        FAMILY HISTORY:  No pertinent family history in first degree relatives  unknown        Vital Signs Last 24 Hrs  T(C): --  T(F): --  HR: --  BP: --  BP(mean): --  RR: --  SpO2: --    PHYSICAL EXAM:  General: NAD, alert, unable to assess orientation  HEENT: NC/AT; PERRL, clear conjunctiva  Neck: supple  Respiratory: good air entry   Cardiovascular: +S1/S2; RRR, Grade 2 LEAH   Abdomen: soft, NT/ND; +BS x4  Extremities: WWP, 2+ peripheral pulses b/l; no LE edema  Skin: normal color and turgor; no rash

## 2021-04-12 PROCEDURE — 99231 SBSQ HOSP IP/OBS SF/LOW 25: CPT

## 2021-04-13 PROCEDURE — 99231 SBSQ HOSP IP/OBS SF/LOW 25: CPT

## 2021-04-13 NOTE — PROGRESS NOTE ADULT - SUBJECTIVE AND OBJECTIVE BOX
61M PMHx seizure disorder, schizophrenia, HTN here with altered mental status.    Today:  Seen in room.  No events overnight.          REVIEW OF SYSTEMS:  Not a reliable historian    MEDICATIONS  (STANDING):  amLODIPine   Tablet 10 milliGRAM(s) Oral daily  AQUAPHOR (petrolatum Ointment) 1 Application(s) Topical daily  chlorhexidine 4% Liquid 1 Application(s) Topical <User Schedule>  diVALproex  milliGRAM(s) Oral two times a day  enalapril 10 milliGRAM(s) Oral daily  enoxaparin Injectable 40 milliGRAM(s) SubCutaneous daily  gabapentin 100 milliGRAM(s) Oral three times a day  hydrALAZINE 25 milliGRAM(s) Oral once  levETIRAcetam 750 milliGRAM(s) Oral two times a day  OLANZapine 5 milliGRAM(s) Oral daily  senna 2 Tablet(s) Oral at bedtime    MEDICATIONS  (PRN):  cloNIDine 0.1 milliGRAM(s) Oral every 8 hours PRN htn  polyethylene glycol 3350 17 Gram(s) Oral two times a day PRN Constipation      Allergies  No Known Allergies        FAMILY HISTORY:  No pertinent family history in first degree relatives  unknown        Vital Signs Last 24 Hrs  T(C): --  T(F): --  HR: --  BP: --  BP(mean): --  RR: --  SpO2: --    PHYSICAL EXAM:  General: NAD, alert, unable to assess orientation  HEENT: NC/AT; PERRL, clear conjunctiva  Neck: supple  Respiratory: good air entry   Cardiovascular: +S1/S2; RRR, Grade 2 LEAH   Abdomen: soft, NT/ND; +BS x4  Extremities: WWP, 2+ peripheral pulses b/l; no LE edema  Skin: normal color and turgor; no rash

## 2021-04-14 PROCEDURE — 99232 SBSQ HOSP IP/OBS MODERATE 35: CPT

## 2021-04-14 NOTE — PROGRESS NOTE ADULT - ASSESSMENT
61M PMHx seizure disorder, schizophrenia, HTN here with altered mental status.    #Altered mental status, toxic metabolic encephalopathy  possible h/o schizophrenia, suspect psychosis vs. neurocognitive disorder  Still refusing blood draws, medications, vitals  Zyprexa, Zyprexa IM PRN  Psychiatry consult appreciated; this is more likely a neurocognitive d/o and patient does not require IPP admission as per psych reccs     #tinea pedis  -Resolved s/p clotrimazole cream    #HTN  norvasc 10mg + enalapril 10 mg    #Seizure disorder  Depakote 500mg bid25  Keppra 750mg bid  Ativan PRN for Seizures  -Pt has been refusing, encourage AEDs   #DVT ppx  Lovenox sc    -DC pending case mgmt/SW; pt is placement issue, citizenship, court ordered treatment against objection.  -Court date was 1/28, guardian to be appointed.    Attending Physician Dr. Carrie Lincoln # [id number]

## 2021-04-14 NOTE — CHART NOTE - NSCHARTNOTEFT_GEN_A_CORE
Limited reassessment     MEDICATIONS  (STANDING):  amLODIPine   Tablet 10 milliGRAM(s) Oral daily  AQUAPHOR (petrolatum Ointment) 1 Application(s) Topical daily  chlorhexidine 4% Liquid 1 Application(s) Topical <User Schedule>  diVALproex  milliGRAM(s) Oral two times a day  enalapril 10 milliGRAM(s) Oral daily  enoxaparin Injectable 40 milliGRAM(s) SubCutaneous daily  gabapentin 100 milliGRAM(s) Oral three times a day  hydrALAZINE 25 milliGRAM(s) Oral once  levETIRAcetam 750 milliGRAM(s) Oral two times a day  OLANZapine 5 milliGRAM(s) Oral daily  senna 2 Tablet(s) Oral at bedtime    MEDICATIONS  (PRN):  cloNIDine 0.1 milliGRAM(s) Oral every 8 hours PRN htn  polyethylene glycol 3350 17 Gram(s) Oral two times a day PRN Constipation    Labs- none recorded; pt continues to refuse.    No edema noted; skin WDL (4/14)    No new wts recorded.     Diet order: DASH/TLC, ensure clear q24h.    Pt is a social admission, pending guardianship appointment. Pt continues to eat relatively well, refuses meals @ times. Po intake consistently ranges from % since previously assessed. Last BM unknown; no GI s/s noted. Pt is on a bowel regimen. There are no further nutrition interventions at this time. Pt remains not at risk. RD to f/u within the next 7 days,

## 2021-04-14 NOTE — PROGRESS NOTE ADULT - SUBJECTIVE AND OBJECTIVE BOX
61M PMHx seizure disorder, schizophrenia, HTN here with altered mental status.    Today:  pt seen in room; no acute events notified to me       REVIEW OF SYSTEMS:  Not a reliable historian    Allergies  No Known Allergies    FAMILY HISTORY:  No pertinent family history in first degree relatives  unknown    Vital Signs Last 24 Hrs --- labs refused; nursing staff to document vitals at least once a day   T(C): --  T(F): --  HR: --  BP: --  BP(mean): --  RR: --  SpO2: --    PHYSICAL EXAM:  General: NAD, alert, unable to assess orientation  HEENT: NC/AT; PERRL, clear conjunctiva  Neck: supple  Respiratory: good air entry   Cardiovascular: +S1/S2; RRR, Grade 2 LEAH   Abdomen: soft, NT/ND; +BS x4  Extremities: WWP, 2+ peripheral pulses b/l; no LE edema  Skin: normal color and turgor; no rash        MEDICATIONS  (STANDING):  amLODIPine   Tablet 10 milliGRAM(s) Oral daily  AQUAPHOR (petrolatum Ointment) 1 Application(s) Topical daily  chlorhexidine 4% Liquid 1 Application(s) Topical <User Schedule>  diVALproex  milliGRAM(s) Oral two times a day  enalapril 10 milliGRAM(s) Oral daily  enoxaparin Injectable 40 milliGRAM(s) SubCutaneous daily  gabapentin 100 milliGRAM(s) Oral three times a day  hydrALAZINE 25 milliGRAM(s) Oral once  levETIRAcetam 750 milliGRAM(s) Oral two times a day  OLANZapine 5 milliGRAM(s) Oral daily  senna 2 Tablet(s) Oral at bedtime    MEDICATIONS  (PRN):  cloNIDine 0.1 milliGRAM(s) Oral every 8 hours PRN htn  polyethylene glycol 3350 17 Gram(s) Oral two times a day PRN Constipation

## 2021-04-15 PROCEDURE — 99231 SBSQ HOSP IP/OBS SF/LOW 25: CPT

## 2021-04-15 NOTE — PROGRESS NOTE ADULT - ASSESSMENT
61M PMHx seizure disorder, schizophrenia, HTN here with altered mental status.    #Altered mental status, toxic metabolic encephalopathy  possible h/o schizophrenia, suspect psychosis vs. neurocognitive disorder  Still refusing blood draws, medications, vitals  Zyprexa, Zyprexa IM PRN  Psychiatry consult appreciated; this is more likely a neurocognitive d/o and patient does not require IPP admission as per psych reccs   -on 1:1 sit for safety and elopement risk     #tinea pedis  -Resolved s/p clotrimazole cream    #HTN  norvasc 10mg + enalapril 10 mg    #Seizure disorder  Depakote 500mg bid25  Keppra 750mg bid  Ativan PRN for Seizures  -Pt has been refusing, encourage AEDs   #DVT ppx  Lovenox sc    -DC pending case mgmt/SW; pt is placement issue, citizenship, court ordered treatment against objection.  -Court date was 1/28, guardian to be appointed.    Attending Physician Dr. Carrie Lincoln # [id number]

## 2021-04-15 NOTE — PROGRESS NOTE ADULT - SUBJECTIVE AND OBJECTIVE BOX
61M PMHx seizure disorder, schizophrenia, HTN here with altered mental status.    Today:  pt seen in room; no acute events notified to me   -regular covid swab as per the staff/during multidisciplinary rounds       REVIEW OF SYSTEMS:  Not a reliable historian    Allergies  No Known Allergies    FAMILY HISTORY:  No pertinent family history in first degree relatives  unknown    Vital Signs Last 24 Hrs  T(C): --  T(F): --  HR: --  BP: --  BP(mean): --  RR: --  SpO2: --    PHYSICAL EXAM:  General: NAD, alert, unable to assess orientation  HEENT: NC/AT; PERRL, clear conjunctiva  Neck: supple  Respiratory: good air entry   Cardiovascular: +S1/S2; RRR, Grade 2 LEAH   Abdomen: soft, NT/ND; +BS x4  Extremities: WWP, 2+ peripheral pulses b/l; no LE edema  Skin: normal color and turgor; no rash      MEDICATIONS  (STANDING):  amLODIPine   Tablet 10 milliGRAM(s) Oral daily  AQUAPHOR (petrolatum Ointment) 1 Application(s) Topical daily  chlorhexidine 4% Liquid 1 Application(s) Topical <User Schedule>  diVALproex  milliGRAM(s) Oral two times a day  enalapril 10 milliGRAM(s) Oral daily  enoxaparin Injectable 40 milliGRAM(s) SubCutaneous daily  gabapentin 100 milliGRAM(s) Oral three times a day  hydrALAZINE 25 milliGRAM(s) Oral once  levETIRAcetam 750 milliGRAM(s) Oral two times a day  OLANZapine 5 milliGRAM(s) Oral daily  senna 2 Tablet(s) Oral at bedtime    MEDICATIONS  (PRN):  cloNIDine 0.1 milliGRAM(s) Oral every 8 hours PRN htn  polyethylene glycol 3350 17 Gram(s) Oral two times a day PRN Constipation

## 2021-04-16 PROCEDURE — 99231 SBSQ HOSP IP/OBS SF/LOW 25: CPT

## 2021-04-16 NOTE — PROGRESS NOTE ADULT - ASSESSMENT
61M PMHx seizure disorder, schizophrenia, HTN here with altered mental status.    #Altered mental status, toxic metabolic encephalopathy  possible h/o schizophrenia, suspect psychosis vs. neurocognitive disorder  Still refusing blood draws, medications, vitals  Zyprexa, Zyprexa IM PRN  Psychiatry consult appreciated; this is more likely a neurocognitive d/o and patient does not require IPP admission as per psych reccs   -on 1:1 sit for safety and elopement risk     #tinea pedis  -Resolved s/p clotrimazole cream    #HTN  norvasc 10mg + enalapril 10 mg    #Seizure disorder  Depakote 500mg bid25  Keppra 750mg bid  Ativan PRN for Seizures  -Pt has been refusing, encourage AEDs   #DVT ppx  Lovenox sc    -DC pending case mgmt/SW; pt is placement issue, citizenship, court ordered treatment against objection.  -Court date was 1/28, guardian to be appointed.    Attending Physician Dr. Carrie Lincoln # [id number]

## 2021-04-16 NOTE — PROGRESS NOTE ADULT - SUBJECTIVE AND OBJECTIVE BOX
61M PMHx seizure disorder, schizophrenia, HTN here with altered mental status.    Today:  -no acute events notified to me       REVIEW OF SYSTEMS:  Not a reliable historian    Allergies  No Known Allergies    FAMILY HISTORY:  No pertinent family history in first degree relatives  unknown    Vital Signs Last 24 Hrs  T(C): --  T(F): --  HR: --  BP: --  BP(mean): --  RR: --  SpO2: --    PHYSICAL EXAM:  General: NAD, alert, unable to assess orientation  HEENT: NC/AT; PERRL, clear conjunctiva  Neck: supple  Respiratory: good air entry   Cardiovascular: +S1/S2; RRR, Grade 2 LEAH   Abdomen: soft, NT/ND; +BS x4  Extremities: WWP, 2+ peripheral pulses b/l; no LE edema  Skin: normal color and turgor; no rash      MEDICATIONS  (STANDING):  amLODIPine   Tablet 10 milliGRAM(s) Oral daily  AQUAPHOR (petrolatum Ointment) 1 Application(s) Topical daily  chlorhexidine 4% Liquid 1 Application(s) Topical <User Schedule>  diVALproex  milliGRAM(s) Oral two times a day  enalapril 10 milliGRAM(s) Oral daily  enoxaparin Injectable 40 milliGRAM(s) SubCutaneous daily  gabapentin 100 milliGRAM(s) Oral three times a day  hydrALAZINE 25 milliGRAM(s) Oral once  levETIRAcetam 750 milliGRAM(s) Oral two times a day  OLANZapine 5 milliGRAM(s) Oral daily  senna 2 Tablet(s) Oral at bedtime    MEDICATIONS  (PRN):  cloNIDine 0.1 milliGRAM(s) Oral every 8 hours PRN htn  polyethylene glycol 3350 17 Gram(s) Oral two times a day PRN Constipation

## 2021-04-17 PROCEDURE — 99231 SBSQ HOSP IP/OBS SF/LOW 25: CPT

## 2021-04-18 PROCEDURE — 99231 SBSQ HOSP IP/OBS SF/LOW 25: CPT

## 2021-04-18 NOTE — PROGRESS NOTE ADULT - ASSESSMENT
61M PMHx seizure disorder, schizophrenia, HTN here with altered mental status.    #Altered mental status, toxic metabolic encephalopathy  possible h/o schizophrenia, suspect psychosis vs. neurocognitive disorder  Still refusing blood draws, medications, vitals  Zyprexa, Zyprexa IM PRN  Psychiatry consult appreciated; this is more likely a neurocognitive d/o and patient does not require IPP admission as per psych reccs   -on 1:1 sit for safety and elopement risk     #tinea pedis  -Resolved s/p clotrimazole cream    #HTN  norvasc 10mg + enalapril 10 mg    #Seizure disorder  Depakote 500mg bid25  Keppra 750mg bid  Ativan PRN for Seizures  -Pt has been refusing, encourage AEDs   #DVT ppx  Lovenox sc    -DC pending case mgmt/SW; pt is placement issue, citizenship, court ordered treatment against objection.  -Court date was 1/28, guardian to be appointed.    Attending Physician Dr. Carrie Lincoln # [id number]

## 2021-04-19 PROCEDURE — 99231 SBSQ HOSP IP/OBS SF/LOW 25: CPT

## 2021-04-19 NOTE — PROGRESS NOTE ADULT - ASSESSMENT
61M PMHx seizure disorder, schizophrenia, HTN here with altered mental status.    #Altered mental status, toxic metabolic encephalopathy  possible h/o schizophrenia, suspect psychosis vs. neurocognitive disorder  Still refusing blood draws, medications, vitals  Zyprexa, Zyprexa IM PRN  Psychiatry consult appreciated; this is more likely a neurocognitive d/o and patient does not require IPP admission as per psych reccs   -on 1:1 sit for safety and elopement risk     #tinea pedis  -Resolved s/p clotrimazole cream    #HTN  norvasc 10mg + enalapril 10 mg    #Seizure disorder  Depakote 500mg bid25  Keppra 750mg bid  Ativan PRN for Seizures  -Pt has been refusing, encourage AEDs   #DVT ppx  Lovenox sc    -DC pending case mgmt/SW; pt is placement issue, citizenship, court ordered treatment against objection.  -Court date was 1/28, guardian to be appointed.    Attending Physician Dr. Carrie Lincoln # [id number]

## 2021-04-20 PROCEDURE — 99231 SBSQ HOSP IP/OBS SF/LOW 25: CPT

## 2021-04-20 NOTE — PROGRESS NOTE ADULT - ASSESSMENT
61M PMHx seizure disorder, schizophrenia, HTN here with altered mental status.    #Altered mental status, toxic metabolic encephalopathy  possible h/o schizophrenia, suspect psychosis vs. neurocognitive disorder  Still refusing blood draws, medications, vitals  Zyprexa, Zyprexa IM PRN  Psychiatry consult appreciated; this is more likely a neurocognitive d/o and patient does not require IPP admission as per psych reccs   -on 1:1 sit for safety and elopement risk     #tinea pedis  -Resolved s/p clotrimazole cream    #HTN  norvasc 10mg + enalapril 10 mg    #Seizure disorder  Depakote 500mg bid25  Keppra 750mg bid  Ativan PRN for Seizures  -Pt has been refusing, encourage AEDs   #DVT ppx  Lovenox sc    -DC pending case mgmt/SW; pt is placement issue, citizenship, court ordered treatment against objection.  -Court date was 1/28, guardian to be appointed.    Attending Physician Dr. Carrie Lincoln # [id number]

## 2021-04-21 PROCEDURE — 99231 SBSQ HOSP IP/OBS SF/LOW 25: CPT

## 2021-04-22 PROCEDURE — 99231 SBSQ HOSP IP/OBS SF/LOW 25: CPT

## 2021-04-22 NOTE — PROGRESS NOTE ADULT - ASSESSMENT
A&P    patient refuses all medical care  patient non co-opertive and non compliant  Pending placement for social issues

## 2021-04-22 NOTE — PROGRESS NOTE ADULT - SUBJECTIVE AND OBJECTIVE BOX
S: patient refusing to talk or doing physical exam  patient doing conversation to himself; lying in bed comfortably

## 2021-04-22 NOTE — CHART NOTE - NSCHARTNOTEFT_GEN_A_CORE
Limited reassessment     MEDICATIONS  (STANDING):  amLODIPine   Tablet 10 milliGRAM(s) Oral daily  AQUAPHOR (petrolatum Ointment) 1 Application(s) Topical daily  chlorhexidine 4% Liquid 1 Application(s) Topical <User Schedule>  diVALproex  milliGRAM(s) Oral two times a day  enalapril 10 milliGRAM(s) Oral daily  enoxaparin Injectable 40 milliGRAM(s) SubCutaneous daily  gabapentin 100 milliGRAM(s) Oral three times a day  hydrALAZINE 25 milliGRAM(s) Oral once  levETIRAcetam 750 milliGRAM(s) Oral two times a day  OLANZapine 5 milliGRAM(s) Oral daily  senna 2 Tablet(s) Oral at bedtime    MEDICATIONS  (PRN):  cloNIDine 0.1 milliGRAM(s) Oral every 8 hours PRN htn  polyethylene glycol 3350 17 Gram(s) Oral two times a day PRN Constipation      Labs- none recorded; pt continues to refuse.    No edema noted; skin WDL    No new wts recorded.     Diet order: DASH/TLC, ensure clear q24h.    Pt is a social admission, pending guardianship appointment. Pt continues to eat relatively well, refuses meals @ times. Po intake consistently ranges from % since previously assessed. Last BM unknown; no GI s/s noted. Pt is on a bowel regimen. There are no further nutrition interventions at this time. Pt remains not at risk. RD to f/u within the next 7 days,

## 2021-04-23 PROCEDURE — 99231 SBSQ HOSP IP/OBS SF/LOW 25: CPT

## 2021-04-23 NOTE — PROGRESS NOTE ADULT - ASSESSMENT
non compliant and non cooperative patient. unable to provide any medical care.    pending placement arrangements

## 2021-04-23 NOTE — PROGRESS NOTE ADULT - SUBJECTIVE AND OBJECTIVE BOX
S:  patient sitting in chair  states he wants to build a home  also stating that he will call me when he needs me

## 2021-04-24 PROCEDURE — 99231 SBSQ HOSP IP/OBS SF/LOW 25: CPT

## 2021-04-24 NOTE — PROGRESS NOTE ADULT - SUBJECTIVE AND OBJECTIVE BOX
HPI  Patient is a 61y old Male who presents with a chief complaint of mental health marco (23 Apr 2021 16:28)    Currently admitted to medicine with the primary diagnosis of Altered mental status       Today is hospital day 231d.     INTERVAL HPI / OVERNIGHT EVENTS:  patient states he is fine and not permitting to interview further        PAST MEDICAL & SURGICAL HISTORY  No pertinent past medical history  unknown    No significant past surgical history  unknown      ALLERGIES  No Known Allergies    MEDICATIONS  STANDING MEDICATIONS  amLODIPine   Tablet 10 milliGRAM(s) Oral daily  AQUAPHOR (petrolatum Ointment) 1 Application(s) Topical daily  chlorhexidine 4% Liquid 1 Application(s) Topical <User Schedule>  diVALproex  milliGRAM(s) Oral two times a day  enalapril 10 milliGRAM(s) Oral daily  enoxaparin Injectable 40 milliGRAM(s) SubCutaneous daily  gabapentin 100 milliGRAM(s) Oral three times a day  hydrALAZINE 25 milliGRAM(s) Oral once  levETIRAcetam 750 milliGRAM(s) Oral two times a day  OLANZapine 5 milliGRAM(s) Oral daily  senna 2 Tablet(s) Oral at bedtime    PRN MEDICATIONS  cloNIDine 0.1 milliGRAM(s) Oral every 8 hours PRN  polyethylene glycol 3350 17 Gram(s) Oral two times a day PRN    VITALS:  T(F): --  HR: --  BP: --  RR: --  SpO2: --    PHYSICAL EXAM  GEN: sitting comfortably; slighly agitated    LABS                        RADIOLOGY

## 2021-04-25 PROCEDURE — 99231 SBSQ HOSP IP/OBS SF/LOW 25: CPT

## 2021-04-25 NOTE — PROGRESS NOTE ADULT - ASSESSMENT
placement issue;  patient continuing hospitalization for social issue  patient non cooperative and does not want any medical care

## 2021-04-26 PROCEDURE — 99231 SBSQ HOSP IP/OBS SF/LOW 25: CPT

## 2021-04-27 PROCEDURE — 99231 SBSQ HOSP IP/OBS SF/LOW 25: CPT

## 2021-04-27 NOTE — PROGRESS NOTE ADULT - ASSESSMENT
patient is non compliant and refuses all medical care.    pending placement , guardianship . Patient continued hospitalization for social issues

## 2021-04-28 PROCEDURE — 99231 SBSQ HOSP IP/OBS SF/LOW 25: CPT

## 2021-04-28 NOTE — PROGRESS NOTE ADULT - ASSESSMENT
61M PMHx seizure disorder, schizophrenia, HTN here with altered mental status.    #Altered mental status, toxic metabolic encephalopathy  possible h/o schizophrenia, suspect psychosis vs. neurocognitive disorder  patient refuses blood draws, medications, vitals  Zyprexa, Zyprexa IM PRN  Psychiatry consult appreciated; this is more likely a neurocognitive d/o and patient does not require IPP admission as per psych reccs   -on 1:1 sit for safety and elopement risk     #tinea pedis  -Resolved s/p clotrimazole cream    #HTN  norvasc 10mg + enalapril 10 mg    #Seizure disorder  Depakote 500mg bid25  Keppra 750mg bid  Ativan PRN for Seizures  -Pt has been refusing, encourage AEDs   #DVT ppx  Lovenox sc    -DC pending case mgmt/SW; pt is placement issue, citizenship, court ordered treatment against objection.  -Court date was 1/28, guardian to be appointed.    Attending Physician Dr. Carrie Lincoln # [id number]

## 2021-04-28 NOTE — PROGRESS NOTE ADULT - SUBJECTIVE AND OBJECTIVE BOX
No 61M PMHx seizure disorder, schizophrenia, HTN here with altered mental status.    Today:  -no acute events notified to me       REVIEW OF SYSTEMS:  Not a reliable historian    Allergies  No Known Allergies    FAMILY HISTORY:  No pertinent family history in first degree relatives  unknown    patient refuses vitals     Vital Signs Last 24 Hrs  T(C): --  T(F): --  HR: --  BP: --  BP(mean): --  RR: --  SpO2: --    PHYSICAL EXAM:  General: NAD, alert, unable to assess orientation  HEENT: NC/AT; PERRL, clear conjunctiva  Neck: supple  Respiratory: good air entry   Cardiovascular: +S1/S2; RRR, Grade 2 LEAH   Abdomen: soft, NT/ND; +BS x4  Extremities: WWP, 2+ peripheral pulses b/l; no LE edema  Skin: normal color and turgor; no rash      MEDICATIONS  (STANDING):  amLODIPine   Tablet 10 milliGRAM(s) Oral daily  AQUAPHOR (petrolatum Ointment) 1 Application(s) Topical daily  chlorhexidine 4% Liquid 1 Application(s) Topical <User Schedule>  diVALproex  milliGRAM(s) Oral two times a day  enalapril 10 milliGRAM(s) Oral daily  enoxaparin Injectable 40 milliGRAM(s) SubCutaneous daily  gabapentin 100 milliGRAM(s) Oral three times a day  hydrALAZINE 25 milliGRAM(s) Oral once  levETIRAcetam 750 milliGRAM(s) Oral two times a day  OLANZapine 5 milliGRAM(s) Oral daily  senna 2 Tablet(s) Oral at bedtime    MEDICATIONS  (PRN):  cloNIDine 0.1 milliGRAM(s) Oral every 8 hours PRN htn  polyethylene glycol 3350 17 Gram(s) Oral two times a day PRN Constipation

## 2021-04-29 PROCEDURE — 99231 SBSQ HOSP IP/OBS SF/LOW 25: CPT

## 2021-04-29 RX ORDER — HALOPERIDOL DECANOATE 100 MG/ML
1 INJECTION INTRAMUSCULAR EVERY 6 HOURS
Refills: 0 | Status: COMPLETED | OUTPATIENT
Start: 2021-04-29 | End: 2021-04-30

## 2021-04-29 NOTE — PROGRESS NOTE ADULT - ASSESSMENT
61M PMHx seizure disorder, schizophrenia, HTN here with altered mental status.    #Altered mental status, toxic metabolic encephalopathy  possible h/o schizophrenia, suspect psychosis vs. neurocognitive disorder  patient refuses blood draws, medications, vitals  Zyprexa, Zyprexa IM PRN  Psychiatry consult appreciated; this is more likely a neurocognitive d/o and patient does not require IPP admission as per psych reccs   -on 1:1 sit for safety and elopement risk     #tinea pedis  -Resolved s/p clotrimazole cream    #HTN  norvasc 10mg + enalapril 10 mg    #Seizure disorder  Depakote 500mg bid25  Keppra 750mg bid  Ativan PRN for Seizures  -Pt has been refusing, encourage AEDs   #DVT ppx  Lovenox sc    -DC pending case mgmt/SW; pt is placement issue, citizenship, court ordered treatment against objection.  -Court date was 1/28, guardian to be appointed.    Attending Physician Dr. Carrie Lincoln # [id number]

## 2021-04-29 NOTE — PROGRESS NOTE ADULT - SUBJECTIVE AND OBJECTIVE BOX
61M PMHx seizure disorder, schizophrenia, HTN here with altered mental status.    Today:  -no acute events notified to me   -no change in current regimen/stable on medical floor awaiting for safe dispo     REVIEW OF SYSTEMS:  Not a reliable historian    Allergies  No Known Allergies    FAMILY HISTORY:  No pertinent family history in first degree relatives  unknown    patient refuses vitals     Vital Signs Last 24 Hrs  T(C): --  T(F): --  HR: --  BP: --  BP(mean): --  RR: --  SpO2: --    PHYSICAL EXAM:  General: NAD, alert, unable to assess orientation  HEENT: NC/AT; PERRL, clear conjunctiva  Neck: supple  Respiratory: good air entry   Cardiovascular: +S1/S2; RRR, Grade 2 LEAH   Abdomen: soft, NT/ND; +BS x4  Extremities: WWP, 2+ peripheral pulses b/l; no LE edema  Skin: normal color and turgor; no rash      MEDICATIONS  (STANDING):  amLODIPine   Tablet 10 milliGRAM(s) Oral daily  AQUAPHOR (petrolatum Ointment) 1 Application(s) Topical daily  chlorhexidine 4% Liquid 1 Application(s) Topical <User Schedule>  diVALproex  milliGRAM(s) Oral two times a day  enalapril 10 milliGRAM(s) Oral daily  enoxaparin Injectable 40 milliGRAM(s) SubCutaneous daily  gabapentin 100 milliGRAM(s) Oral three times a day  hydrALAZINE 25 milliGRAM(s) Oral once  levETIRAcetam 750 milliGRAM(s) Oral two times a day  OLANZapine 5 milliGRAM(s) Oral daily  senna 2 Tablet(s) Oral at bedtime    MEDICATIONS  (PRN):  cloNIDine 0.1 milliGRAM(s) Oral every 8 hours PRN htn  polyethylene glycol 3350 17 Gram(s) Oral two times a day PRN Constipation

## 2021-04-30 PROCEDURE — 99231 SBSQ HOSP IP/OBS SF/LOW 25: CPT

## 2021-04-30 RX ORDER — HALOPERIDOL DECANOATE 100 MG/ML
2 INJECTION INTRAMUSCULAR EVERY 6 HOURS
Refills: 0 | Status: DISCONTINUED | OUTPATIENT
Start: 2021-04-30 | End: 2021-06-01

## 2021-04-30 RX ADMIN — HALOPERIDOL DECANOATE 1 MILLIGRAM(S): 100 INJECTION INTRAMUSCULAR at 09:51

## 2021-04-30 NOTE — PROGRESS NOTE ADULT - SUBJECTIVE AND OBJECTIVE BOX
61M PMHx seizure disorder, schizophrenia, HTN here with altered mental status.    Today:  -pt became agitated yesterday and code grey was called   -medication regimen to be given as ordered and physician needs to be informed if patient is not taking his medications     REVIEW OF SYSTEMS:  Not a reliable historian    Allergies  No Known Allergies    FAMILY HISTORY:  No pertinent family history in first degree relatives  unknown    patient refuses vitals     Vital Signs Last 24 Hrs  T(C): --  T(F): --  HR: --  BP: --  BP(mean): --  RR: --  SpO2: --    PHYSICAL EXAM:  General: NAD, alert, unable to assess orientation  HEENT: NC/AT; PERRL, clear conjunctiva  Neck: supple  Respiratory: good air entry   Cardiovascular: +S1/S2; RRR, Grade 2 LEAH   Abdomen: soft, NT/ND; +BS x4  Extremities: WWP, 2+ peripheral pulses b/l; no LE edema  Skin: normal color and turgor; no rash      MEDICATIONS  (STANDING):  amLODIPine   Tablet 10 milliGRAM(s) Oral daily  AQUAPHOR (petrolatum Ointment) 1 Application(s) Topical daily  chlorhexidine 4% Liquid 1 Application(s) Topical <User Schedule>  diVALproex  milliGRAM(s) Oral two times a day  enalapril 10 milliGRAM(s) Oral daily  enoxaparin Injectable 40 milliGRAM(s) SubCutaneous daily  gabapentin 100 milliGRAM(s) Oral three times a day  hydrALAZINE 25 milliGRAM(s) Oral once  levETIRAcetam 750 milliGRAM(s) Oral two times a day  OLANZapine 5 milliGRAM(s) Oral daily  senna 2 Tablet(s) Oral at bedtime    MEDICATIONS  (PRN):  cloNIDine 0.1 milliGRAM(s) Oral every 8 hours PRN htn  polyethylene glycol 3350 17 Gram(s) Oral two times a day PRN Constipation

## 2021-04-30 NOTE — PROGRESS NOTE ADULT - ASSESSMENT
61M PMHx seizure disorder, schizophrenia, HTN here with altered mental status.    #Altered mental status, toxic metabolic encephalopathy  possible h/o schizophrenia, suspect psychosis vs. neurocognitive disorder  patient refuses blood draws, medications, vitals  Zyprexa, Zyprexa IM PRN  Psychiatry consult appreciated; this is more likely a neurocognitive d/o and patient does not require IPP admission as per psych reccs   -on 1:1 sit for safety and elopement risk     #tinea pedis  -Resolved s/p clotrimazole cream    #HTN  norvasc 10mg + enalapril 10 mg    #Seizure disorder  Depakote 500mg bid25  Keppra 750mg bid  Ativan PRN for Seizures  -Pt has been refusing, encourage AEDs   #DVT ppx  Lovenox sc    -DC pending case mgmt/SW; pt is placement issue, citizenship, court ordered treatment against objection.  -Court date was 1/28, guardian to be appointed.    Attending Physician Dr. Carrie Lincoln # [id number]

## 2021-05-01 PROCEDURE — 99231 SBSQ HOSP IP/OBS SF/LOW 25: CPT

## 2021-05-01 NOTE — PROGRESS NOTE ADULT - ASSESSMENT
61M PMHx seizure disorder, schizophrenia, HTN here with altered mental status.    #Altered mental status, toxic metabolic encephalopathy  possible h/o schizophrenia, suspect psychosis vs. neurocognitive disorder  patient refuses blood draws, medications, vitals  Zyprexa, Zyprexa IM PRN  Psychiatry consult appreciated; this is more likely a neurocognitive d/o and patient does not require IPP admission as per psych reccs   -on 1:1 sit for safety and elopement risk     #tinea pedis  -Resolved s/p clotrimazole cream    #HTN  norvasc 10mg + enalapril 10 mg    #Seizure disorder  Depakote 500mg bid25  Keppra 750mg bid  Ativan PRN for Seizures  ***** medications not to be missed; inform MD on call   #DVT ppx  Lovenox sc    -DC pending case mgmt/SW; pt is placement issue, citizenship, court ordered treatment against objection.  -Court date was 1/28, guardian to be appointed.    Attending Physician Dr. Carrie Lincoln # [id number]

## 2021-05-01 NOTE — PROGRESS NOTE ADULT - SUBJECTIVE AND OBJECTIVE BOX
61M PMHx seizure disorder, schizophrenia, HTN here with altered mental status.    Today:  -no acute events notified to me  -medications needed to be administered   -IM haldol prn for severe agitation     REVIEW OF SYSTEMS:  Not a reliable historian    Allergies  No Known Allergies    FAMILY HISTORY:  No pertinent family history in first degree relatives  unknown    patient refuses vitals     Vital Signs Last 24 Hrs  T(C): --  T(F): --  HR: --  BP: --  BP(mean): --  RR: --  SpO2: --    PHYSICAL EXAM:  General: NAD, alert, unable to assess orientation  HEENT: NC/AT; PERRL, clear conjunctiva  Neck: supple  Respiratory: good air entry   Cardiovascular: +S1/S2; RRR, Grade 2 LEAH   Abdomen: soft, NT/ND; +BS x4  Extremities: WWP, 2+ peripheral pulses b/l; no LE edema  Skin: normal color and turgor; no rash      MEDICATIONS  (STANDING):  amLODIPine   Tablet 10 milliGRAM(s) Oral daily  AQUAPHOR (petrolatum Ointment) 1 Application(s) Topical daily  chlorhexidine 4% Liquid 1 Application(s) Topical <User Schedule>  diVALproex  milliGRAM(s) Oral two times a day  enalapril 10 milliGRAM(s) Oral daily  enoxaparin Injectable 40 milliGRAM(s) SubCutaneous daily  gabapentin 100 milliGRAM(s) Oral three times a day  levETIRAcetam 750 milliGRAM(s) Oral two times a day  OLANZapine 5 milliGRAM(s) Oral daily  senna 2 Tablet(s) Oral at bedtime    MEDICATIONS  (PRN):  cloNIDine 0.1 milliGRAM(s) Oral every 8 hours PRN htn  haloperidol    Injectable 2 milliGRAM(s) IntraMuscular every 6 hours PRN Agitation  polyethylene glycol 3350 17 Gram(s) Oral two times a day PRN Constipation

## 2021-05-02 PROCEDURE — 99231 SBSQ HOSP IP/OBS SF/LOW 25: CPT

## 2021-05-02 NOTE — PROGRESS NOTE ADULT - ASSESSMENT
61M PMHx seizure disorder, schizophrenia, HTN here with altered mental status.    #Altered mental status, toxic metabolic encephalopathy  possible h/o schizophrenia, suspect psychosis vs. neurocognitive disorder  patient refuses blood draws, medications, vitals  Zyprexa, Zyprexa IM PRN  Psychiatry consult appreciated; this is more likely a neurocognitive d/o and patient does not require IPP admission as per psych reccs   -on 1:1 sit for safety and elopement risk     #tinea pedis  -Resolved s/p clotrimazole cream    #HTN  norvasc 10mg + enalapril 10 mg    #Seizure disorder  Depakote 500mg bid25  Keppra 750mg bid  Ativan PRN for Seizures  ***** medications not to be missed; inform MD on call   #DVT ppx  Lovenox sc    -DC pending case mgmt/SW; pt is placement issue, citizenship, court ordered treatment against objection.  -Court date was 1/28, guardian to be appointed.    Attending Physician Dr. Carrie Lincoln # [id number]

## 2021-05-03 PROCEDURE — 99231 SBSQ HOSP IP/OBS SF/LOW 25: CPT

## 2021-05-03 NOTE — PROGRESS NOTE ADULT - ASSESSMENT
61M PMHx seizure disorder, schizophrenia, HTN here with altered mental status.    #Altered mental status, toxic metabolic encephalopathy  possible h/o schizophrenia, suspect psychosis vs. neurocognitive disorder  patient refuses blood draws, medications, vitals  Zyprexa, Zyprexa IM PRN  Psychiatry consult appreciated; this is more likely a neurocognitive d/o and patient does not require IPP admission as per psych reccs   -on 1:1 sit for safety and elopement risk     #tinea pedis  -Resolved s/p clotrimazole cream    #HTN  norvasc 10mg + enalapril 10 mg    #Seizure disorder  Depakote 500mg bid25  Keppra 750mg bid  Ativan PRN for Seizures  ***** medications not to be missed; inform MD on call   #DVT ppx  Lovenox sc    -DC pending case mgmt/SW; pt is placement issue, citizenship, court ordered treatment against objection.  -Court date was 1/28, guardian to be appointed.    Attending Physician Dr. Carrie Lincoln # [id number]

## 2021-05-04 PROCEDURE — 99231 SBSQ HOSP IP/OBS SF/LOW 25: CPT

## 2021-05-04 NOTE — PROGRESS NOTE ADULT - ASSESSMENT
61M PMHx seizure disorder, schizophrenia, HTN here with altered mental status.    #Altered mental status, toxic metabolic encephalopathy  possible h/o schizophrenia, suspect psychosis vs. neurocognitive disorder  patient refuses blood draws, medications, vitals  Zyprexa, Zyprexa IM PRN  Psychiatry consult appreciated; this is more likely a neurocognitive d/o and patient does not require IPP admission as per psych reccs   -on 1:1 sit for safety and elopement risk     #tinea pedis  -Resolved s/p clotrimazole cream    #HTN  norvasc 10mg + enalapril 10 mg    #Seizure disorder  Depakote 500mg bid25  Keppra 750mg bid  Ativan PRN for Seizures  ***** medications not to be missed; inform MD on call   #DVT ppx  Lovenox sc    -DC pending case mgmt/SW; pt is placement issue, citizenship, court ordered treatment against objection.  -Court date was 1/28, guardian to be appointed.    Attending Physician Dr. Carrie Lincoln # [id number]

## 2021-05-05 PROCEDURE — 99222 1ST HOSP IP/OBS MODERATE 55: CPT

## 2021-05-05 NOTE — PROGRESS NOTE ADULT - SUBJECTIVE AND OBJECTIVE BOX
61M PMHx seizure disorder, schizophrenia, HTN here with altered mental status.    Today:  -no acute events notified to me  - Patient denies any pain. resting comfortably    REVIEW OF SYSTEMS:  Not a reliable historian    Allergies  No Known Allergies    FAMILY HISTORY:  No pertinent family history in first degree relatives  unknown    patient refuses vitals     Vital Signs Last 24 Hrs  T(C): 36.6 (04 May 2021 13:55), Max: 36.6 (04 May 2021 13:55)  T(F): 97.9 (04 May 2021 13:55), Max: 97.9 (04 May 2021 13:55)  HR: 95 (04 May 2021 13:55) (95 - 95)  BP: 139/98 (04 May 2021 13:55) (139/98 - 139/98)  BP(mean): --  RR: 16 (04 May 2021 13:55) (16 - 16)  SpO2: --    PHYSICAL EXAM:  General: NAD, alert, unable to assess orientation, wakes to voice  HEENT: NC/AT; PERRL, clear conjunctiva  Neck: supple  Respiratory: good air movement, no increased work of breathing  Cardiovascular: RRR, Grade 2 LEAH   Abdomen: soft, NT/ND; +BS x4  Extremities: WWP, 2+ peripheral pulses b/l; no LE edema  Skin: normal color and turgor; no rash      MEDICATIONS  (STANDING):  amLODIPine   Tablet 10 milliGRAM(s) Oral daily  AQUAPHOR (petrolatum Ointment) 1 Application(s) Topical daily  chlorhexidine 4% Liquid 1 Application(s) Topical <User Schedule>  diVALproex  milliGRAM(s) Oral two times a day  enalapril 10 milliGRAM(s) Oral daily  enoxaparin Injectable 40 milliGRAM(s) SubCutaneous daily  gabapentin 100 milliGRAM(s) Oral three times a day  levETIRAcetam 750 milliGRAM(s) Oral two times a day  OLANZapine 5 milliGRAM(s) Oral daily  senna 2 Tablet(s) Oral at bedtime    MEDICATIONS  (PRN):  cloNIDine 0.1 milliGRAM(s) Oral every 8 hours PRN htn  haloperidol    Injectable 2 milliGRAM(s) IntraMuscular every 6 hours PRN Agitation  polyethylene glycol 3350 17 Gram(s) Oral two times a day PRN Constipation    ASSESSMENT AND PLAN    61M PMHx seizure disorder, schizophrenia, HTN here with altered mental status.    #Altered mental status, toxic metabolic encephalopathy  possible h/o schizophrenia, suspect psychosis vs. neurocognitive disorder  patient refuses blood draws, medications, vitals  Psychiatry consult appreciated; this is more likely a neurocognitive d/o and patient does not require IPP admission as per psych reccs   -on 1:1 sit for safety and elopement risk   -  Olanzapine 5mg    #tinea pedis --Resolved s/p clotrimazole cream    #HTN -- norvasc 10mg + enalapril 10 mg    # Seizure d/o -- Divalproex 500mg bid, gabapentin 100mg tid,  Keppra 750 bid      *** medications not to be missed; inform MD on call ***    #DVT ppx -- lovenox  Code -- Full  Dispo --DC pending case mgmt/SW; pt is placement issue, citizenship, court ordered treatment against objection. Court date was 1/28, guardian to be appointed.

## 2021-05-06 PROCEDURE — 99232 SBSQ HOSP IP/OBS MODERATE 35: CPT

## 2021-05-06 NOTE — PROGRESS NOTE ADULT - SUBJECTIVE AND OBJECTIVE BOX
61M PMHx seizure disorder, schizophrenia, HTN here with altered mental status.    No issues today. Ate his breakfast and asking for more.     REVIEW OF SYSTEMS:  Not a reliable historian    Allergies  No Known Allergies    FAMILY HISTORY:  No pertinent family history in first degree relatives  unknown    patient refuses vitals     Vital Signs Last 24 Hrs -- refused  T(C): --  T(F): --  HR: --  BP: --  BP(mean): --  RR: --  SpO2: --    PHYSICAL EXAM:  General: NAD, alert, unable to assess orientation, sitting up in chair eating  HEENT: NC/AT; PERRL, clear conjunctiva  Neck: supple  Respiratory: good air movement, no increased work of breathing  Cardiovascular: RRR, Grade 2 LEAH   Abdomen: soft, NT/ND; +BS x4  Extremities: no edema    MEDICATIONS  (STANDING):  amLODIPine   Tablet 10 milliGRAM(s) Oral daily  AQUAPHOR (petrolatum Ointment) 1 Application(s) Topical daily  chlorhexidine 4% Liquid 1 Application(s) Topical <User Schedule>  diVALproex  milliGRAM(s) Oral two times a day  enalapril 10 milliGRAM(s) Oral daily  enoxaparin Injectable 40 milliGRAM(s) SubCutaneous daily  gabapentin 100 milliGRAM(s) Oral three times a day  levETIRAcetam 750 milliGRAM(s) Oral two times a day  OLANZapine 5 milliGRAM(s) Oral daily  senna 2 Tablet(s) Oral at bedtime    MEDICATIONS  (PRN):  cloNIDine 0.1 milliGRAM(s) Oral every 8 hours PRN htn  haloperidol    Injectable 2 milliGRAM(s) IntraMuscular every 6 hours PRN Agitation  polyethylene glycol 3350 17 Gram(s) Oral two times a day PRN Constipation        ASSESSMENT AND PLAN    61M PMHx seizure disorder, schizophrenia, HTN here with altered mental status.    #Altered mental status, toxic metabolic encephalopathy  possible h/o schizophrenia, suspect psychosis vs. neurocognitive disorder  patient refuses blood draws, medications, vitals  Psychiatry consult appreciated; this is more likely a neurocognitive d/o and patient does not require IPP admission as per psych reccs   -on 1:1 sit for safety and elopement risk   -  Olanzapine 5mg    #tinea pedis --Resolved s/p clotrimazole cream    #HTN -- norvasc 10mg + enalapril 10 mg    # Seizure d/o -- Divalproex 500mg bid, gabapentin 100mg tid,  Keppra 750 bid      *** medications not to be missed; inform MD on call ***    #DVT ppx -- lovenox  Code -- Full  Dispo --DC pending case mgmt/SW; pt is placement issue, citizenship, court ordered treatment against objection. Court date was 1/28, guardian to be appointed.

## 2021-05-06 NOTE — CHART NOTE - NSCHARTNOTEFT_GEN_A_CORE
MEDICINE  LYNDSEY Lunsford   Spectra: [id number]      Patient seen at bedside resting comfortably.  Routine Covid swab attempted to be obtained - patient refusing to have swab done./  Patient has hx of agitation - and adamantly refusing nasal swab.

## 2021-05-06 NOTE — CHART NOTE - NSCHARTNOTEFT_GEN_A_CORE
RD limited note        MEDICATIONS  (STANDING):  amLODIPine   Tablet 10 milliGRAM(s) Oral daily  AQUAPHOR (petrolatum Ointment) 1 Application(s) Topical daily  chlorhexidine 4% Liquid 1 Application(s) Topical <User Schedule>  diVALproex  milliGRAM(s) Oral two times a day  enalapril 10 milliGRAM(s) Oral daily  enoxaparin Injectable 40 milliGRAM(s) SubCutaneous daily  gabapentin 100 milliGRAM(s) Oral three times a day  levETIRAcetam 750 milliGRAM(s) Oral two times a day  OLANZapine 5 milliGRAM(s) Oral daily  senna 2 Tablet(s) Oral at bedtime    MEDICATIONS  (PRN):  cloNIDine 0.1 milliGRAM(s) Oral every 8 hours PRN htn  haloperidol    Injectable 2 milliGRAM(s) IntraMuscular every 6 hours PRN Agitation  polyethylene glycol 3350 17 Gram(s) Oral two times a day PRN Constipation        Labs- none recorded; pt continues to refuse.    No edema noted; skin WDL    No new wts recorded.     Diet order: DASH/TLC, ensure clear q24h.    Pt is a social admission, pending guardianship appointment. Pt continues to eat relatively well, refuses meals @ times. Po intake consistently ranges from % since previously assessed. Last BM unknown; no GI s/s noted. Pt is on a bowel regimen. There are no further nutrition interventions at this time. Pt remains not at risk. RD to f/u within the next 7 days,

## 2021-05-07 PROCEDURE — 99232 SBSQ HOSP IP/OBS MODERATE 35: CPT

## 2021-05-07 NOTE — PROGRESS NOTE ADULT - SUBJECTIVE AND OBJECTIVE BOX
61M PMHx seizure disorder, schizophrenia, HTN here with altered mental status.    No issues today. Laying in bed reading.     REVIEW OF SYSTEMS:  Not a reliable historian    Allergies  No Known Allergies    FAMILY HISTORY:  No pertinent family history in first degree relatives  unknown    patient refuses vitals     Vital Signs Last 24 Hrs  T(C): --  T(F): --  HR: --  BP: --  BP(mean): --  RR: --  SpO2: --    PHYSICAL EXAM:  Refusing  General: NAD, laying in bed  HEENT: NC/AT; PERRL,   Respiratory: good air movement, no increased work of breathing  Cardiovascular: RRR,  Abdomen: unable to assess  Extremities: no edema    MEDICATIONS  (STANDING):  amLODIPine   Tablet 10 milliGRAM(s) Oral daily  AQUAPHOR (petrolatum Ointment) 1 Application(s) Topical daily  chlorhexidine 4% Liquid 1 Application(s) Topical <User Schedule>  diVALproex  milliGRAM(s) Oral two times a day  enalapril 10 milliGRAM(s) Oral daily  enoxaparin Injectable 40 milliGRAM(s) SubCutaneous daily  gabapentin 100 milliGRAM(s) Oral three times a day  levETIRAcetam 750 milliGRAM(s) Oral two times a day  OLANZapine 5 milliGRAM(s) Oral daily  senna 2 Tablet(s) Oral at bedtime    MEDICATIONS  (PRN):  cloNIDine 0.1 milliGRAM(s) Oral every 8 hours PRN htn  haloperidol    Injectable 2 milliGRAM(s) IntraMuscular every 6 hours PRN Agitation  polyethylene glycol 3350 17 Gram(s) Oral two times a day PRN Constipation          ASSESSMENT AND PLAN    61M PMHx seizure disorder, schizophrenia, HTN here with altered mental status.    #Altered mental status, toxic metabolic encephalopathy  possible h/o schizophrenia, suspect psychosis vs. neurocognitive disorder  patient refuses blood draws, medications, vitals, physical exams  Psychiatry consult appreciated; this is more likely a neurocognitive d/o and patient does not require IPP admission as per psych reccs   -  Olanzapine 5mg    #tinea pedis --Resolved s/p clotrimazole cream    #HTN -- norvasc 10mg + enalapril 10 mg    # Seizure d/o -- Divalproex 500mg bid, gabapentin 100mg tid,  Keppra 750 bid      *** medications not to be missed; inform MD on call ***    #DVT ppx -- lovenox  Code -- Full  Dispo --DC pending case mgmt/SW; pt is placement issue, citizenship, court ordered treatment against objection. Court date was 1/28, guardian to be appointed.

## 2021-05-08 PROCEDURE — 99232 SBSQ HOSP IP/OBS MODERATE 35: CPT

## 2021-05-09 PROCEDURE — 99232 SBSQ HOSP IP/OBS MODERATE 35: CPT

## 2021-05-10 PROCEDURE — 99232 SBSQ HOSP IP/OBS MODERATE 35: CPT

## 2021-05-10 NOTE — PROGRESS NOTE ADULT - SUBJECTIVE AND OBJECTIVE BOX
61M PMHx seizure disorder, schizophrenia, HTN here with altered mental status.    Laying in bed. Sleeping. No issues.    REVIEW OF SYSTEMS:  Not a reliable historian    Allergies  No Known Allergies    FAMILY HISTORY:  No pertinent family history in first degree relatives  unknown    patient refuses vitals     Vital Signs Last 24 Hrs  T(C): --  T(F): --  HR: --  BP: --  BP(mean): --  RR: --  SpO2: --    PHYSICAL EXAM:  Refusing  General: NAD, laying in bed  HEENT: NC/AT; PERRL,   Respiratory: good air movement, no increased work of breathing  Cardiovascular: RRR,  Abdomen: unable to assess  Extremities: no edema    MEDICATIONS  (STANDING):  amLODIPine   Tablet 10 milliGRAM(s) Oral daily  AQUAPHOR (petrolatum Ointment) 1 Application(s) Topical daily  chlorhexidine 4% Liquid 1 Application(s) Topical <User Schedule>  diVALproex  milliGRAM(s) Oral two times a day  enalapril 10 milliGRAM(s) Oral daily  enoxaparin Injectable 40 milliGRAM(s) SubCutaneous daily  gabapentin 100 milliGRAM(s) Oral three times a day  levETIRAcetam 750 milliGRAM(s) Oral two times a day  OLANZapine 5 milliGRAM(s) Oral daily  senna 2 Tablet(s) Oral at bedtime    MEDICATIONS  (PRN):  cloNIDine 0.1 milliGRAM(s) Oral every 8 hours PRN htn  haloperidol    Injectable 2 milliGRAM(s) IntraMuscular every 6 hours PRN Agitation  polyethylene glycol 3350 17 Gram(s) Oral two times a day PRN Constipation                ASSESSMENT AND PLAN    61M PMHx seizure disorder, schizophrenia, HTN here with altered mental status.    #Altered mental status, toxic metabolic encephalopathy  possible h/o schizophrenia, suspect psychosis vs. neurocognitive disorder  patient refuses blood draws, medications, vitals, physical exams  Psychiatry consult appreciated; this is more likely a neurocognitive d/o and patient does not require IPP admission as per psych reccs   -  Olanzapine 5mg    #tinea pedis --Resolved s/p clotrimazole cream    #HTN -- norvasc 10mg + enalapril 10 mg    # Seizure d/o -- Divalproex 500mg bid, gabapentin 100mg tid,  Keppra 750 bid      #DVT ppx -- lovenox  Code -- Full  Dispo --DC pending case mgmt/SW; pt is placement issue, citizenship, court ordered treatment against objection. Court date was 1/28, guardian to be appointed. Pending

## 2021-05-11 PROCEDURE — 99232 SBSQ HOSP IP/OBS MODERATE 35: CPT

## 2021-05-11 NOTE — PROGRESS NOTE ADULT - ASSESSMENT
61M PMHx seizure disorder, schizophrenia, HTN here with altered mental status.    #Altered mental status, toxic metabolic encephalopathy  possible h/o schizophrenia, suspect psychosis vs. neurocognitive disorder  patient refuses blood draws, medications, vitals, physical exams  Psychiatry consult appreciated; this is more likely a neurocognitive d/o and patient does not require IPP admission as per psych reccs   -  Olanzapine 5mg    #tinea pedis --Resolved s/p clotrimazole cream    #HTN -- norvasc 10mg + enalapril 10 mg    # Seizure d/o -- Divalproex 500mg bid, gabapentin 100mg tid,  Keppra 750 bid      #DVT ppx -- lovenox  Code -- Full  Dispo --DC pending case mgmt/SW; pt is placement issue, citizenship, court ordered treatment against objection. Court date was 1/28, guardian to be appointed. Pending

## 2021-05-11 NOTE — PROGRESS NOTE ADULT - SUBJECTIVE AND OBJECTIVE BOX
PARVIZ TORRES  61y  Male      Patient is a 61y old  Male who presents with a chief complaint of mental health marco (10 May 2021 12:23)      INTERVAL HPI/OVERNIGHT EVENTS: no acute events.      REVIEW OF SYSTEMS:  CONSTITUTIONAL: No fever, weight loss, or fatigue  RESPIRATORY: No cough, wheezing, chills or hemoptysis; No shortness of breath  CARDIOVASCULAR: No chest pain, palpitations, dizziness, or leg swelling  GASTROINTESTINAL: No abdominal or epigastric pain. No nausea, vomiting, or hematemesis; No diarrhea or constipation. No melena or hematochezia.  GENITOURINARY: No dysuria, frequency, hematuria, or incontinence  NEUROLOGICAL: No headaches, memory loss, loss of strength, numbness, or tremors  SKIN: No itching, burning, rashes, or lesions   MUSCULOSKELETAL: No joint pain or swelling; No muscle, back, or extremity pain  PSYCHIATRIC: No depression, anxiety, mood swings, or difficulty sleeping  All other review of systems negative    T(C): --  HR: --  BP: --  RR: --  SpO2: --  Wt(kg): --Vital Signs Last 24 Hrs  T(C): --  T(F): --  HR: --  BP: --  BP(mean): --  RR: --  SpO2: --  (PATIENT REFUSED VITALS)      PHYSICAL EXAM:  GENERAL: chronically ill appearing, NAD  PSYCH: no agitation  NERVOUS SYSTEM:  unable to assess   PULMONARY:  unable to assess   CARDIOVASCULAR:  unable to assess   GI:  unable to assess   EXTREMITIES:   unable to assess   LYMPH:  unable to assess   SKIN:  unable to assess     Consultant(s) Notes Reviewed:  [x ] YES  [ ] NO    Discussed with Consultants/Other Providers [ x] YES     LABS  - no labs 5/11        RADIOLOGY & ADDITIONAL TESTS:  - no images 5/11      HEALTH ISSUES - PROBLEM Dx:  Psychosis, unspecified psychosis type  Neurocognitive disorder  Schizophrenia, chronic condition        MEDICATIONS  (STANDING):  amLODIPine   Tablet 10 milliGRAM(s) Oral daily  AQUAPHOR (petrolatum Ointment) 1 Application(s) Topical daily  chlorhexidine 4% Liquid 1 Application(s) Topical <User Schedule>  diVALproex  milliGRAM(s) Oral two times a day  enalapril 10 milliGRAM(s) Oral daily  enoxaparin Injectable 40 milliGRAM(s) SubCutaneous daily  gabapentin 100 milliGRAM(s) Oral three times a day  levETIRAcetam 750 milliGRAM(s) Oral two times a day  OLANZapine 5 milliGRAM(s) Oral daily  senna 2 Tablet(s) Oral at bedtime    MEDICATIONS  (PRN):  cloNIDine 0.1 milliGRAM(s) Oral every 8 hours PRN htn  haloperidol    Injectable 2 milliGRAM(s) IntraMuscular every 6 hours PRN Agitation  polyethylene glycol 3350 17 Gram(s) Oral two times a day PRN Constipation

## 2021-05-12 PROCEDURE — 99231 SBSQ HOSP IP/OBS SF/LOW 25: CPT

## 2021-05-12 NOTE — PROGRESS NOTE BEHAVIORAL HEALTH - SUMMARY
Patient is a 60-year-old, [place], male, with unknown social demographics, or medical history who was reportedly admitted to medicine for workup of AMS in Sep 2020. Previously seen by psychiatry consult team for mental health evaluation and diagnosed with Neurocognitive disorder. Psychiatry reconsulted as pt has been having behavioral issues and also refusing all medications, medical interventions.          Work up done at the time of admission was significant for bilateral frontal rhythmic delta activity c/w toxic-metabolic or structural pathology while CT head was WNL.          Currently on evaluation pt is alert but oriented only to person. He is unable to engage to perform a formal MMSE but he appears to have significant cognitive deficits. He denies any mood symptoms or anxiety. He is delusional believing that he is in his home and his wife is in the next room. Also appears to have disinhibition secondary to dementia. Given the unclear past psychiatric history and significant cognitive deficits, his behavioral issues appear to be secondary to neurocognitive disorder than a primary psychotic disorder. He does not meet criteria for IPP admission.          Currently pt refusing all medications. Primary team is in the process of getting him a legal guardian.   Will recommend behavioral interventions to redirect patient. Use of scheduled medications is limited as pt is refusing to take any medications.        Will recommend to primary team to pursue treatment over objection as pt is not compliant with his medications as well as any interventions. Please reach out to Ms. Tiff Desai's office to pursue treatment over objection.

## 2021-05-12 NOTE — PROGRESS NOTE ADULT - SUBJECTIVE AND OBJECTIVE BOX
PARVIZ TORRES  61y  Male      Patient is a 61y old  Male who presents with a chief complaint of mental health marco (10 May 2021 12:23)      INTERVAL HPI/OVERNIGHT EVENTS:  -no acute events notified     REVIEW OF SYSTEMS:  CONSTITUTIONAL: No fever, weight loss, or fatigue  RESPIRATORY: No cough, wheezing, chills or hemoptysis; No shortness of breath  CARDIOVASCULAR: No chest pain, palpitations, dizziness, or leg swelling  GASTROINTESTINAL: No abdominal or epigastric pain. No nausea, vomiting, or hematemesis; No diarrhea or constipation. No melena or hematochezia.  GENITOURINARY: No dysuria, frequency, hematuria, or incontinence  NEUROLOGICAL: No headaches, memory loss, loss of strength, numbness, or tremors  SKIN: No itching, burning, rashes, or lesions   MUSCULOSKELETAL: No joint pain or swelling; No muscle, back, or extremity pain  PSYCHIATRIC: No depression, anxiety, mood swings, or difficulty sleeping  All other review of systems negative    Vital Signs Last 24 Hrs  T(C): --  T(F): --  HR: --  BP: --  BP(mean): --  RR: --  SpO2: --    -Patient refused vitals       PHYSICAL EXAM:  GENERAL: chronically ill appearing, NAD  PSYCH: no agitation  NERVOUS SYSTEM:  unable to assess   PULMONARY:  unable to assess   CARDIOVASCULAR:  unable to assess   GI:  unable to assess   EXTREMITIES:   unable to assess   LYMPH:  unable to assess   SKIN:  unable to assess     Consultant(s) Notes Reviewed:  [x ] YES  [ ] NO    Discussed with Consultants/Other Providers [ x] YES     LABS  - no labs 5/11        RADIOLOGY & ADDITIONAL TESTS:  - no images 5/11      HEALTH ISSUES - PROBLEM Dx:  Psychosis, unspecified psychosis type  Neurocognitive disorder  Schizophrenia, chronic condition      MEDICATIONS  (STANDING):  amLODIPine   Tablet 10 milliGRAM(s) Oral daily  AQUAPHOR (petrolatum Ointment) 1 Application(s) Topical daily  chlorhexidine 4% Liquid 1 Application(s) Topical <User Schedule>  diVALproex  milliGRAM(s) Oral two times a day  enalapril 10 milliGRAM(s) Oral daily  enoxaparin Injectable 40 milliGRAM(s) SubCutaneous daily  gabapentin 100 milliGRAM(s) Oral three times a day  levETIRAcetam 750 milliGRAM(s) Oral two times a day  OLANZapine 5 milliGRAM(s) Oral daily  senna 2 Tablet(s) Oral at bedtime    MEDICATIONS  (PRN):  cloNIDine 0.1 milliGRAM(s) Oral every 8 hours PRN htn  haloperidol    Injectable 2 milliGRAM(s) IntraMuscular every 6 hours PRN Agitation  polyethylene glycol 3350 17 Gram(s) Oral two times a day PRN Constipation

## 2021-05-12 NOTE — PROGRESS NOTE ADULT - ASSESSMENT
61M PMHx seizure disorder, schizophrenia, HTN here with altered mental status.    #Altered mental status, toxic metabolic encephalopathy  possible h/o schizophrenia, suspect psychosis vs. neurocognitive disorder  patient refuses blood draws, medications, vitals, physical exams  Psychiatry consult appreciated; this is more likely a neurocognitive d/o and patient does not require IPP admission as per psych reccs   -  Olanzapine 5mg    #tinea pedis --Resolved s/p clotrimazole cream    #HTN -- norvasc 10mg + enalapril 10 mg    # Seizure d/o -- Divalproex 500mg bid, gabapentin 100mg tid,  Keppra 750 bid      #DVT ppx -- lovenox  Code -- Full  Dispo --DC pending case mgmt/SW; pt is placement issue, citizenship, court ordered treatment against objection. Court date was 1/28, guardian to be appointed. Pending    Attending Physician Dr. Carrie Lincoln # [id number]

## 2021-05-12 NOTE — PROGRESS NOTE BEHAVIORAL HEALTH - NSBHFUPINTERVALHXFT_PSY_A_CORE
Patient was evaluated bedside. Patient has not received any PRNs over night. He reportedly walked into his opposite room and exposed himself to 2 female patients. He refuses all medications, vital signs.     Pt was evaluated bedside. He is alert but oriented only to person but not oriented to place, time or situation. He reports that he is at home. He reports that he is frustrated that there are all the people walking in his house. He also believes his wife is in the next room. Able to briefly redirect him that he is in the hospital but later on he gets frustrated and states this is his home. He does not believe he needs any medications as he can take care of himself.   He denies feeling depressed, anxious. He denies any AH,VH. He denies any suicidal ideations, homicidal ideations.

## 2021-05-13 PROCEDURE — 99231 SBSQ HOSP IP/OBS SF/LOW 25: CPT

## 2021-05-13 NOTE — CHART NOTE - NSCHARTNOTEFT_GEN_A_CORE
RD limited note        MEDICATIONS  (STANDING):  amLODIPine   Tablet 10 milliGRAM(s) Oral daily  AQUAPHOR (petrolatum Ointment) 1 Application(s) Topical daily  chlorhexidine 4% Liquid 1 Application(s) Topical <User Schedule>  diVALproex  milliGRAM(s) Oral two times a day  enalapril 10 milliGRAM(s) Oral daily  enoxaparin Injectable 40 milliGRAM(s) SubCutaneous daily  gabapentin 100 milliGRAM(s) Oral three times a day  levETIRAcetam 750 milliGRAM(s) Oral two times a day  OLANZapine 5 milliGRAM(s) Oral daily  senna 2 Tablet(s) Oral at bedtime    MEDICATIONS  (PRN):  cloNIDine 0.1 milliGRAM(s) Oral every 8 hours PRN htn  haloperidol    Injectable 2 milliGRAM(s) IntraMuscular every 6 hours PRN Agitation  polyethylene glycol 3350 17 Gram(s) Oral two times a day PRN Constipation      Labs- none recorded; pt continues to refuse.    No edema noted; skin WDL    No new wts recorded.     Diet order: DASH/TLC, ensure clear q24h.    DC pending case mgmt/SW; pt is placement issue, citizenship, court ordered treatment against objection. Court date was 1/28, guardian to be appointed. Pending Pt continues to eat relatively well, refuses meals @ times. Po intake consistently ranges from % since previously assessed. Last BM unknown; no GI s/s noted. Pt is on a bowel regimen. There are no further nutrition interventions at this time. Pt remains not at risk. RD to f/u within the next 7 days,

## 2021-05-13 NOTE — PROGRESS NOTE ADULT - SUBJECTIVE AND OBJECTIVE BOX
PARVIZ TORRES  61y  Male      Patient is a 61y old  Male who presents with a chief complaint of mental health marco (10 May 2021 12:23)      INTERVAL HPI/OVERNIGHT EVENTS:  -no acute events notified   -discussed with Psychiatry in length; will get back to me after re-discussing the case with their Director; for now patient remains on the regular medical floor with current medication regimen   -1:1 constant observation     REVIEW OF SYSTEMS:  -none     Vital Signs Last 24 Hrs  T(C): --  T(F): --  HR: --  BP: --  BP(mean): --  RR: --  SpO2: --    -Patient refused vitals       PHYSICAL EXAM:  GENERAL: chronically ill appearing, NAD  PSYCH: no agitation  NERVOUS SYSTEM:  unable to assess   PULMONARY:  unable to assess   CARDIOVASCULAR:  unable to assess   GI:  unable to assess   EXTREMITIES:   unable to assess   LYMPH:  unable to assess   SKIN:  unable to assess     Consultant(s) Notes Reviewed:  [x ] YES  [ ] NO    Discussed with Consultants/Other Providers [ x] YES     LABS  -no new labs         RADIOLOGY & ADDITIONAL TESTS:  - no new images       HEALTH ISSUES - PROBLEM Dx:  Psychosis, unspecified psychosis type  Neurocognitive disorder  Schizophrenia, chronic condition      MEDICATIONS  (STANDING):  amLODIPine   Tablet 10 milliGRAM(s) Oral daily  AQUAPHOR (petrolatum Ointment) 1 Application(s) Topical daily  chlorhexidine 4% Liquid 1 Application(s) Topical <User Schedule>  diVALproex  milliGRAM(s) Oral two times a day  enalapril 10 milliGRAM(s) Oral daily  enoxaparin Injectable 40 milliGRAM(s) SubCutaneous daily  gabapentin 100 milliGRAM(s) Oral three times a day  levETIRAcetam 750 milliGRAM(s) Oral two times a day  OLANZapine 5 milliGRAM(s) Oral daily  senna 2 Tablet(s) Oral at bedtime    MEDICATIONS  (PRN):  cloNIDine 0.1 milliGRAM(s) Oral every 8 hours PRN htn  haloperidol    Injectable 2 milliGRAM(s) IntraMuscular every 6 hours PRN Agitation  polyethylene glycol 3350 17 Gram(s) Oral two times a day PRN Constipation

## 2021-05-13 NOTE — PROGRESS NOTE ADULT - ASSESSMENT
61M PMHx seizure disorder, schizophrenia, HTN here with altered mental status.    # worsening Dementia   -continue with current medication regimen as per Psych  -1:1 sit/constant observation   -IM haldol for severe agitation/code greys     #tinea pedis --Resolved s/p clotrimazole cream    #HTN -- norvasc 10mg + enalapril 10 mg    # Seizure d/o -- Divalproex 500mg bid, gabapentin 100mg tid,  Keppra 750 bid      #DVT ppx -- lovenox  Code -- Full  Dispo --DC pending case mgmt/SW; pt is placement issue, citizenship, court ordered treatment against objection. Court date was 1/28, guardian to be appointed. Pending    Attending Physician Dr. Carrie Lincoln # [id number]     doctor needs to be informed if medications not given to the patient

## 2021-05-14 PROCEDURE — 99231 SBSQ HOSP IP/OBS SF/LOW 25: CPT

## 2021-05-14 NOTE — PROGRESS NOTE ADULT - ASSESSMENT
61M PMHx seizure disorder, schizophrenia, HTN here with altered mental status.    # worsening Dementia   -continue with current medication regimen as per Psych  -1:1 sit/constant observation   -IM haldol for severe agitation/code greys     #tinea pedis --Resolved s/p clotrimazole cream    #HTN -- norvasc 10mg + enalapril 10 mg    # Seizure d/o -- Divalproex 500mg bid, gabapentin 100mg tid,  Keppra 750 bid      #DVT ppx -- lovenox  Code -- Full  Dispo --DC pending case mgmt/SW; pt is placement issue, citizenship, court ordered treatment against objection. Court date was 1/28, guardian to be appointed. Pending    Attending Physician Dr. Carrie Lincoln # [id number]     doctor needs to be informed if medications not given to the patient

## 2021-05-14 NOTE — PROGRESS NOTE ADULT - SUBJECTIVE AND OBJECTIVE BOX
PARVIZ TORRES  61y  Male      Patient is a 61y old  Male who presents with a chief complaint of mental health marco (10 May 2021 12:23)      INTERVAL HPI/OVERNIGHT EVENTS:  -no acute events notified   -pt will be monitored on 1:1 sit     REVIEW OF SYSTEMS:  -none     Vital Signs Last 24 Hrs  T(C): --  T(F): --  HR: --  BP: --  BP(mean): --  RR: --  SpO2: --    -Patient refused vitals       PHYSICAL EXAM:  GENERAL: chronically ill appearing, NAD  PSYCH: no agitation  NERVOUS SYSTEM:  unable to assess   PULMONARY:  unable to assess   CARDIOVASCULAR:  unable to assess   GI:  unable to assess   EXTREMITIES:   unable to assess   LYMPH:  unable to assess   SKIN:  unable to assess     Consultant(s) Notes Reviewed:  [x ] YES  [ ] NO    Discussed with Consultants/Other Providers [ x] YES     LABS  -no new labs         RADIOLOGY & ADDITIONAL TESTS:  - no new images       HEALTH ISSUES - PROBLEM Dx:  Psychosis, unspecified psychosis type  Neurocognitive disorder  Schizophrenia, chronic condition      MEDICATIONS  (STANDING):  amLODIPine   Tablet 10 milliGRAM(s) Oral daily  AQUAPHOR (petrolatum Ointment) 1 Application(s) Topical daily  chlorhexidine 4% Liquid 1 Application(s) Topical <User Schedule>  diVALproex  milliGRAM(s) Oral two times a day  enalapril 10 milliGRAM(s) Oral daily  enoxaparin Injectable 40 milliGRAM(s) SubCutaneous daily  gabapentin 100 milliGRAM(s) Oral three times a day  levETIRAcetam 750 milliGRAM(s) Oral two times a day  OLANZapine 5 milliGRAM(s) Oral daily  senna 2 Tablet(s) Oral at bedtime    MEDICATIONS  (PRN):  cloNIDine 0.1 milliGRAM(s) Oral every 8 hours PRN htn  haloperidol    Injectable 2 milliGRAM(s) IntraMuscular every 6 hours PRN Agitation  polyethylene glycol 3350 17 Gram(s) Oral two times a day PRN Constipation

## 2021-05-15 PROCEDURE — 99231 SBSQ HOSP IP/OBS SF/LOW 25: CPT

## 2021-05-15 NOTE — PROGRESS NOTE ADULT - ASSESSMENT
61M PMHx seizure disorder, schizophrenia, HTN here with altered mental status.    # worsening Dementia   -continue with current medication regimen as per Psych  -1:1 sit/constant observation   -IM haldol for severe agitation/code greys     #tinea pedis --Resolved s/p clotrimazole cream    #HTN -- norvasc 10mg + enalapril 10 mg    # Seizure d/o -- Divalproex 500mg bid, gabapentin 100mg tid,  Keppra 750 bid      #DVT ppx -- lovenox  Code -- Full  Dispo --DC pending case mgmt/SW; pt is placement issue, citizenship, court ordered treatment against objection. Court date was 1/28, guardian to be appointed. Pending    Attending Physician Dr. Carrie Lincoln # [id number]     doctor needs to be informed if medications not given to the patient

## 2021-05-16 PROCEDURE — 99231 SBSQ HOSP IP/OBS SF/LOW 25: CPT

## 2021-05-16 NOTE — PROGRESS NOTE ADULT - ASSESSMENT
61M PMHx seizure disorder, schizophrenia, HTN here with altered mental status.    # worsening Dementia   -continue with current medication regimen as per Psych  -1:1 sit/constant observation   -IM haldol for severe agitation/code greys     #tinea pedis --Resolved s/p clotrimazole cream    #HTN -- norvasc 10mg + enalapril 10 mg    # Seizure d/o -- Divalproex 500mg bid, gabapentin 100mg tid,  Keppra 750 bid      #DVT ppx -- lovenox  Code -- Full  Dispo --DC pending case mgmt/SW; pt is placement issue, citizenship, court ordered treatment against objection. Court date was 1/28, guardian to be appointed. Pending    Attending Physician Dr. Carrie Lincoln # [id number]     doctor needs to be informed if medications not given to the patient

## 2021-05-17 PROCEDURE — 99231 SBSQ HOSP IP/OBS SF/LOW 25: CPT

## 2021-05-17 NOTE — PROGRESS NOTE ADULT - ASSESSMENT
61M PMHx seizure disorder, schizophrenia, HTN here with altered mental status.    # worsening Dementia   -continue with current medication regimen as per Psych  -1:1 sit/constant observation   -IM haldol for severe agitation/code greys     #tinea pedis --Resolved s/p clotrimazole cream    #HTN -- norvasc 10mg + enalapril 10 mg    # Seizure d/o -- Divalproex 500mg bid, gabapentin 100mg tid,  Keppra 750 bid      #DVT ppx -- lovenox  Code -- Full  Dispo --DC pending case mgmt/SW; pt is placement issue, citizenship, court ordered treatment against objection. Court date was 1/28, guardian to be appointed. Pending    Attending Physician Dr. Carrie Lincoln # [id number]     doctor needs to be informed if medications not given to the patient

## 2021-05-18 PROCEDURE — 99231 SBSQ HOSP IP/OBS SF/LOW 25: CPT

## 2021-05-18 NOTE — PROGRESS NOTE ADULT - ASSESSMENT
61M PMHx seizure disorder, schizophrenia, HTN here with altered mental status.    # worsening Dementia   -continue with current medication regimen as per Psych  -1:1 sit/constant observation   -IM haldol for severe agitation/code greys     #tinea pedis --Resolved s/p clotrimazole cream    #HTN -- norvasc 10mg + enalapril 10 mg    # Seizure d/o -- Divalproex 500mg bid, gabapentin 100mg tid,  Keppra 750 bid      #DVT ppx -- lovenox  Code -- Full  Dispo --DC pending case mgmt/SW; pt is placement issue, citizenship, court ordered treatment against objection. Court date was 1/28, guardian to be appointed. Pending    Attending Physician Dr. Carrie Lincoln # [id number]     doctor needs to be informed if medications not given to the patient

## 2021-05-19 PROCEDURE — 99231 SBSQ HOSP IP/OBS SF/LOW 25: CPT

## 2021-05-19 NOTE — PROGRESS NOTE ADULT - SUBJECTIVE AND OBJECTIVE BOX
61M PMHx seizure disorder, schizophrenia, HTN here with altered mental status.    Today:  Seen at room, no complaints, no overnight events.            REVIEW OF SYSTEMS:  Not a reliable historian.      MEDICATIONS  (STANDING):  amLODIPine   Tablet 10 milliGRAM(s) Oral daily  AQUAPHOR (petrolatum Ointment) 1 Application(s) Topical daily  chlorhexidine 4% Liquid 1 Application(s) Topical <User Schedule>  diVALproex  milliGRAM(s) Oral two times a day  enalapril 10 milliGRAM(s) Oral daily  enoxaparin Injectable 40 milliGRAM(s) SubCutaneous daily  gabapentin 100 milliGRAM(s) Oral three times a day  levETIRAcetam 750 milliGRAM(s) Oral two times a day  OLANZapine 5 milliGRAM(s) Oral daily  senna 2 Tablet(s) Oral at bedtime    MEDICATIONS  (PRN):  cloNIDine 0.1 milliGRAM(s) Oral every 8 hours PRN htn  haloperidol    Injectable 2 milliGRAM(s) IntraMuscular every 6 hours PRN Agitation  polyethylene glycol 3350 17 Gram(s) Oral two times a day PRN Constipation      Allergies  No Known Allergies        FAMILY HISTORY:  No pertinent family history in first degree relatives  unknown        Vital Signs Last 24 Hrs  T(C): --  T(F): --  HR: --  BP: --  BP(mean): --  RR: --  SpO2: --    PHYSICAL EXAM:  General: NAD  Head: NC/AT  Psych: Calm, cooperative

## 2021-05-20 PROCEDURE — 99231 SBSQ HOSP IP/OBS SF/LOW 25: CPT

## 2021-05-20 NOTE — PROGRESS NOTE ADULT - SUBJECTIVE AND OBJECTIVE BOX
61M PMHx seizure disorder, schizophrenia, HTN here with altered mental status.    Today:  Seen in room, calm and cooperative.  No overnight events.        REVIEW OF SYSTEMS:  Not a reliable historian.      MEDICATIONS  (STANDING):  amLODIPine   Tablet 10 milliGRAM(s) Oral daily  AQUAPHOR (petrolatum Ointment) 1 Application(s) Topical daily  chlorhexidine 4% Liquid 1 Application(s) Topical <User Schedule>  diVALproex  milliGRAM(s) Oral two times a day  enalapril 10 milliGRAM(s) Oral daily  enoxaparin Injectable 40 milliGRAM(s) SubCutaneous daily  gabapentin 100 milliGRAM(s) Oral three times a day  levETIRAcetam 750 milliGRAM(s) Oral two times a day  OLANZapine 5 milliGRAM(s) Oral daily  senna 2 Tablet(s) Oral at bedtime    MEDICATIONS  (PRN):  cloNIDine 0.1 milliGRAM(s) Oral every 8 hours PRN htn  haloperidol    Injectable 2 milliGRAM(s) IntraMuscular every 6 hours PRN Agitation  polyethylene glycol 3350 17 Gram(s) Oral two times a day PRN Constipation      Allergies  No Known Allergies        FAMILY HISTORY:  No pertinent family history in first degree relatives  unknown        PHYSICAL EXAM:    GENERAL: NAD, well-groomed, well-developed  HEAD:  Atraumatic, Normocephalic  PSYCH: calm, cooperative

## 2021-05-20 NOTE — PROGRESS NOTE ADULT - ASSESSMENT
Additional Notes: PT HAVING VEIN ABLATION BY VASCULAR SURGEON. Detail Level: Simple Additional Notes: PT HAD RECENT LABS 9-5-18 \\nCMC\\nCBC\\nHEMOGLOBIN A1C\\nTSH \\nMAGNESIUM \\nFERRITIN \\nIRON\\nTIBC\\nVIT B-12\\nUA\\nHOMOCYSTEINE \\nONLY HOMOCYSTEINE LEVEL SLIGHTLY ELEVATED - all other above tests were negative/normal limits 61M PMHx seizure disorder, schizophrenia, HTN here with altered mental status.    # worsening Dementia   -continue with current medication regimen as per Psych  -1:1 sit/constant observation   -IM haldol for severe agitation/code greys     #tinea pedis --Resolved s/p clotrimazole cream    #HTN -- norvasc 10mg + enalapril 10 mg    # Seizure d/o -- Divalproex 500mg bid, gabapentin 100mg tid,  Keppra 750 bid      #DVT ppx -- lovenox  Code -- Full  Dispo --DC pending case mgmt/SW; pt is placement issue, citizenship, court ordered treatment against objection. Court date was 1/28, guardian to be appointed. Pending Additional Notes: Refer to PCP AND GI TO CHECK MEDS. FAVOR LICHENOID DRUG REACTION.  This case also discussed with Dr. Gunderson who agrees with txmt plan.  Will have pt return to clinic in 2 weeks when Dr. CARSON here to evaluate pt as well.

## 2021-05-21 PROCEDURE — 99231 SBSQ HOSP IP/OBS SF/LOW 25: CPT

## 2021-05-21 NOTE — PROGRESS NOTE ADULT - SUBJECTIVE AND OBJECTIVE BOX
61M PMHx seizure disorder, schizophrenia, HTN here with altered mental status.    Today:  Seen at bedside, no overnight events.          REVIEW OF SYSTEMS:  Not a reliable historian.      MEDICATIONS  (STANDING):  amLODIPine   Tablet 10 milliGRAM(s) Oral daily  AQUAPHOR (petrolatum Ointment) 1 Application(s) Topical daily  chlorhexidine 4% Liquid 1 Application(s) Topical <User Schedule>  diVALproex  milliGRAM(s) Oral two times a day  enalapril 10 milliGRAM(s) Oral daily  enoxaparin Injectable 40 milliGRAM(s) SubCutaneous daily  gabapentin 100 milliGRAM(s) Oral three times a day  levETIRAcetam 750 milliGRAM(s) Oral two times a day  OLANZapine 5 milliGRAM(s) Oral daily  senna 2 Tablet(s) Oral at bedtime    MEDICATIONS  (PRN):  cloNIDine 0.1 milliGRAM(s) Oral every 8 hours PRN htn  haloperidol    Injectable 2 milliGRAM(s) IntraMuscular every 6 hours PRN Agitation  polyethylene glycol 3350 17 Gram(s) Oral two times a day PRN Constipation      Allergies  No Known Allergies        FAMILY HISTORY:  No pertinent family history in first degree relatives  unknown        PHYSICAL EXAM:    GENERAL: NAD, well-groomed, well-developed  HEAD:  Atraumatic, Normocephalic  CHEST/LUNG: Clear to percussion bilaterally; No rales, rhonchi, wheezing, or rubs  HEART: Regular rate and rhythm; No murmurs, rubs, or gallops

## 2021-05-21 NOTE — PROGRESS NOTE ADULT - ASSESSMENT
61M PMHx seizure disorder, schizophrenia, HTN here with altered mental status.    # worsening Dementia   -continue with current medication regimen as per Psych  -1:1 sit/constant observation   -IM haldol for severe agitation/code greys     #tinea pedis --Resolved s/p clotrimazole cream    #HTN -- norvasc 10mg + enalapril 10 mg    # Seizure d/o -- Divalproex 500mg bid, gabapentin 100mg tid,  Keppra 750 bid      #DVT ppx -- lovenox  Code -- Full  Dispo --DC pending case mgmt/SW; pt is placement issue, citizenship, court ordered treatment against objection. Court date was 1/28, guardian to be appointed. Pending

## 2021-05-22 PROCEDURE — 99231 SBSQ HOSP IP/OBS SF/LOW 25: CPT

## 2021-05-22 NOTE — PROGRESS NOTE ADULT - SUBJECTIVE AND OBJECTIVE BOX
61M PMHx seizure disorder, schizophrenia, HTN here with altered mental status.    Today:  Seen in room, no overnight events.            REVIEW OF SYSTEMS:  Not a reliable historian.      MEDICATIONS  (STANDING):  amLODIPine   Tablet 10 milliGRAM(s) Oral daily  AQUAPHOR (petrolatum Ointment) 1 Application(s) Topical daily  chlorhexidine 4% Liquid 1 Application(s) Topical <User Schedule>  diVALproex  milliGRAM(s) Oral two times a day  enalapril 10 milliGRAM(s) Oral daily  enoxaparin Injectable 40 milliGRAM(s) SubCutaneous daily  gabapentin 100 milliGRAM(s) Oral three times a day  levETIRAcetam 750 milliGRAM(s) Oral two times a day  OLANZapine 5 milliGRAM(s) Oral daily  senna 2 Tablet(s) Oral at bedtime    MEDICATIONS  (PRN):  cloNIDine 0.1 milliGRAM(s) Oral every 8 hours PRN htn  haloperidol    Injectable 2 milliGRAM(s) IntraMuscular every 6 hours PRN Agitation  polyethylene glycol 3350 17 Gram(s) Oral two times a day PRN Constipation      Allergies  No Known Allergies        FAMILY HISTORY:  No pertinent family history in first degree relatives  unknown          PHYSICAL EXAM:  GENERAL: NAD, well-groomed, well-developed  HEAD:  Atraumatic, Normocephalic

## 2021-05-23 PROCEDURE — 99231 SBSQ HOSP IP/OBS SF/LOW 25: CPT

## 2021-05-23 NOTE — PROGRESS NOTE ADULT - SUBJECTIVE AND OBJECTIVE BOX
61M PMHx seizure disorder, schizophrenia, HTN here with altered mental status.    Today:  Seen at bedside, clam and cooperative.        REVIEW OF SYSTEMS:  Not a reliable historian.      MEDICATIONS  (STANDING):  amLODIPine   Tablet 10 milliGRAM(s) Oral daily  AQUAPHOR (petrolatum Ointment) 1 Application(s) Topical daily  chlorhexidine 4% Liquid 1 Application(s) Topical <User Schedule>  diVALproex  milliGRAM(s) Oral two times a day  enalapril 10 milliGRAM(s) Oral daily  enoxaparin Injectable 40 milliGRAM(s) SubCutaneous daily  gabapentin 100 milliGRAM(s) Oral three times a day  levETIRAcetam 750 milliGRAM(s) Oral two times a day  OLANZapine 5 milliGRAM(s) Oral daily  senna 2 Tablet(s) Oral at bedtime    MEDICATIONS  (PRN):  cloNIDine 0.1 milliGRAM(s) Oral every 8 hours PRN htn  haloperidol    Injectable 2 milliGRAM(s) IntraMuscular every 6 hours PRN Agitation  polyethylene glycol 3350 17 Gram(s) Oral two times a day PRN Constipation      Allergies  No Known Allergies        FAMILY HISTORY:  No pertinent family history in first degree relatives  unknown            PHYSICAL EXAM:  GENERAL: NAD, well-groomed, well-developed  HEAD:  Atraumatic, Normocephalic

## 2021-05-24 PROCEDURE — 99231 SBSQ HOSP IP/OBS SF/LOW 25: CPT

## 2021-05-24 NOTE — PROGRESS NOTE BEHAVIORAL HEALTH - SUMMARY
Patient is a 60-year-old, [place], male, with unknown social demographics, or medical history who was reportedly admitted to medicine for workup of AMS in Sep 2020. Previously seen by psychiatry consult team for mental health evaluation and diagnosed with Neurocognitive disorder. Psychiatry reconsulted as pt has been having behavioral issues and also refusing all medications, medical interventions.          Work up done at the time of admission was significant for bilateral frontal rhythmic delta activity c/w toxic-metabolic or structural pathology while CT head was WNL.          Currently on evaluation pt is alert but oriented only to person. He is unable to engage to perform a formal MMSE but he appears to have significant cognitive deficits. He denies any mood symptoms or anxiety. He is delusional believing that he is in his home and his wife is in the next room. Also appears to have disinhibition secondary to dementia. Given the unclear past psychiatric history and significant cognitive deficits, his behavioral issues appear to be secondary to neurocognitive disorder than a primary psychotic disorder. He does not meet criteria for IPP admission.          Currently pt refusing all medications. Primary team is in the process of getting him a legal guardian.   Will recommend behavioral interventions to redirect patient. Use of scheduled medications is limited as pt is refusing to take any medications.

## 2021-05-24 NOTE — PROGRESS NOTE ADULT - ASSESSMENT
61M PMHx seizure disorder, schizophrenia, HTN here with altered mental status.    # worsening Dementia with psychotic features   -continue with current medication regimen as per Psych  -1:1 sit/constant observation   -IM haldol for severe agitation/code greys     #tinea pedis --Resolved s/p clotrimazole cream    #HTN -- norvasc 10mg + enalapril 10 mg    # Seizure d/o -- Divalproex 500mg bid, gabapentin 100mg tid,  Keppra 750 bid      #DVT ppx -- lovenox  Code -- Full  Dispo --DC pending case mgmt/SW; pt is placement issue, citizenship, court ordered treatment against objection. Court date was 1/28, guardian to be appointed. Pending

## 2021-05-24 NOTE — PROGRESS NOTE BEHAVIORAL HEALTH - SECONDARY DX1
Psychosis, unspecified psychosis type
Psychosis, unspecified psychosis type
Schizophrenia, chronic condition

## 2021-05-24 NOTE — PROGRESS NOTE ADULT - SUBJECTIVE AND OBJECTIVE BOX
61M PMHx seizure disorder, schizophrenia, HTN here with altered mental status.    Today:  Seen in room, calm.  Recently reported by staff to have not been taking meds, entering the rooms of other patients.  Has been talking to people who are not there.  I have not seen him behaving in this manner; this was reported by RN staff and PCAs.          REVIEW OF SYSTEMS:  Not a reliable historian.      MEDICATIONS  (STANDING):  amLODIPine   Tablet 10 milliGRAM(s) Oral daily  AQUAPHOR (petrolatum Ointment) 1 Application(s) Topical daily  chlorhexidine 4% Liquid 1 Application(s) Topical <User Schedule>  diVALproex  milliGRAM(s) Oral two times a day  enalapril 10 milliGRAM(s) Oral daily  enoxaparin Injectable 40 milliGRAM(s) SubCutaneous daily  gabapentin 100 milliGRAM(s) Oral three times a day  levETIRAcetam 750 milliGRAM(s) Oral two times a day  OLANZapine 5 milliGRAM(s) Oral daily  senna 2 Tablet(s) Oral at bedtime    MEDICATIONS  (PRN):  cloNIDine 0.1 milliGRAM(s) Oral every 8 hours PRN htn  haloperidol    Injectable 2 milliGRAM(s) IntraMuscular every 6 hours PRN Agitation  polyethylene glycol 3350 17 Gram(s) Oral two times a day PRN Constipation      Allergies  No Known Allergies        FAMILY HISTORY:  No pertinent family history in first degree relatives  unknown          PHYSICAL EXAM:  GENERAL: NAD, well-groomed, well-developed  Patient asked that I not examine him.

## 2021-05-24 NOTE — PROGRESS NOTE BEHAVIORAL HEALTH - NSBHFUPINTERVALHXFT_PSY_A_CORE
Pt was seen, evaluated, chart reviewed.  As per nursing staff, pt has been refusing all labs, vital signs and medications. Pt has been walking in to other patients rooms today.   On evaluation, pt reports is alert but oriented only to person but not oriented to place, time or situation. Pt is smiling throughout the interview. States he just had lunch earlier. Pt is a poor historian, unable to explain why he doesn't want medications. He denies feeling depressed, anxious.  Denied auditory/visual hallucinations. Denied suicidal/homicidal ideation, intent or plan

## 2021-05-25 PROCEDURE — 99231 SBSQ HOSP IP/OBS SF/LOW 25: CPT

## 2021-05-25 RX ORDER — HALOPERIDOL DECANOATE 100 MG/ML
5 INJECTION INTRAMUSCULAR ONCE
Refills: 0 | Status: COMPLETED | OUTPATIENT
Start: 2021-05-25 | End: 2021-05-25

## 2021-05-25 RX ADMIN — HALOPERIDOL DECANOATE 5 MILLIGRAM(S): 100 INJECTION INTRAMUSCULAR at 23:13

## 2021-05-25 NOTE — PROGRESS NOTE ADULT - SUBJECTIVE AND OBJECTIVE BOX
61M PMHx seizure disorder, schizophrenia, HTN here with altered mental status.    Today:  Seen in room, patient asked me to leave.  When asked if he had any complaints he said "you're in my house, I don't need a doctor".          REVIEW OF SYSTEMS:  Not a reliable historian      MEDICATIONS  (STANDING):  amLODIPine   Tablet 10 milliGRAM(s) Oral daily  AQUAPHOR (petrolatum Ointment) 1 Application(s) Topical daily  chlorhexidine 4% Liquid 1 Application(s) Topical <User Schedule>  diVALproex  milliGRAM(s) Oral two times a day  enalapril 10 milliGRAM(s) Oral daily  enoxaparin Injectable 40 milliGRAM(s) SubCutaneous daily  gabapentin 100 milliGRAM(s) Oral three times a day  levETIRAcetam 750 milliGRAM(s) Oral two times a day  OLANZapine 5 milliGRAM(s) Oral daily  senna 2 Tablet(s) Oral at bedtime    MEDICATIONS  (PRN):  cloNIDine 0.1 milliGRAM(s) Oral every 8 hours PRN htn  haloperidol    Injectable 2 milliGRAM(s) IntraMuscular every 6 hours PRN Agitation  polyethylene glycol 3350 17 Gram(s) Oral two times a day PRN Constipation      Allergies  No Known Allergies        FAMILY HISTORY:  No pertinent family history in first degree relatives  unknown          PHYSICAL EXAM:  GENERAL: NAD, well-groomed, well-developed  Patient would not allow further examination.

## 2021-05-25 NOTE — PROGRESS NOTE ADULT - ASSESSMENT
61M PMHx seizure disorder, schizophrenia, HTN here with altered mental status.    # worsening Dementia with psychotic features   -continue with current medication regimen as per Psych  -IM haldol for severe agitation/code greys   -No contraindication to discharge as per psych consult.    #tinea pedis --Resolved s/p clotrimazole cream    #HTN -- norvasc 10mg + enalapril 10 mg    # Seizure d/o -- Divalproex 500mg bid, gabapentin 100mg tid,  Keppra 750 bid      #DVT ppx -- lovenox  Code -- Full  Dispo --DC pending case mgmt/SW; pt is placement issue, citizenship, court ordered treatment against objection. Court date was 1/28, guardian to be appointed. Pending

## 2021-05-26 PROCEDURE — 99232 SBSQ HOSP IP/OBS MODERATE 35: CPT

## 2021-05-26 NOTE — PROGRESS NOTE ADULT - ASSESSMENT
61M PMHx seizure disorder, schizophrenia, HTN here with altered mental status.    # worsening Dementia with psychotic features   -continue with current medication regimen as per Psych  -IM haldol for severe agitation/code greys   -No contraindication to discharge as per psych consult.    #tinea pedis --Resolved s/p clotrimazole cream    #HTN -- norvasc 10mg + enalapril 10 mg    # Seizure d/o -- Divalproex 500mg bid, gabapentin 100mg tid,  Keppra 750 bid      #DVT ppx -- lovenox  Code -- Full  Dispo --DC pending case mgmt/SW; pt is placement issue, citizenship, court ordered treatment against objection. Court date was 1/28, guardian to be appointed.

## 2021-05-26 NOTE — PROGRESS NOTE ADULT - SUBJECTIVE AND OBJECTIVE BOX
Patient with dementia and psychotic features, not having decision making capacity  Not anxious, or agitated today      Patient seen today at the bedside, was nice but had so much nostalgia about "the wife who would not treat him well"    REVIEW OF SYSTEMS:  Not a reliable historian      MEDICATIONS  (STANDING):  amLODIPine   Tablet 10 milliGRAM(s) Oral daily  AQUAPHOR (petrolatum Ointment) 1 Application(s) Topical daily  chlorhexidine 4% Liquid 1 Application(s) Topical <User Schedule>  diVALproex  milliGRAM(s) Oral two times a day  enalapril 10 milliGRAM(s) Oral daily  enoxaparin Injectable 40 milliGRAM(s) SubCutaneous daily  gabapentin 100 milliGRAM(s) Oral three times a day  levETIRAcetam 750 milliGRAM(s) Oral two times a day  OLANZapine 5 milliGRAM(s) Oral daily  senna 2 Tablet(s) Oral at bedtime    MEDICATIONS  (PRN):  cloNIDine 0.1 milliGRAM(s) Oral every 8 hours PRN htn  haloperidol    Injectable 2 milliGRAM(s) IntraMuscular every 6 hours PRN Agitation  polyethylene glycol 3350 17 Gram(s) Oral two times a day PRN Constipation          Allergies  No Known Allergies        FAMILY HISTORY:  No pertinent family history in first degree relatives  unknown            PHYSICAL EXAM:  GENERAL: NAD, well-groomed, well-developed  Patient would not allow further examination.

## 2021-05-27 PROCEDURE — 99232 SBSQ HOSP IP/OBS MODERATE 35: CPT

## 2021-05-27 NOTE — PROGRESS NOTE ADULT - SUBJECTIVE AND OBJECTIVE BOX
Patient being managed for dementia w/ psychotic features        REVIEW OF SYSTEMS:  Not a reliable historian  No new complaints today; denies pain, no CP or N/V/abd pain         MEDICATIONS  (STANDING):  amLODIPine   Tablet 10 milliGRAM(s) Oral daily  AQUAPHOR (petrolatum Ointment) 1 Application(s) Topical daily  chlorhexidine 4% Liquid 1 Application(s) Topical <User Schedule>  diVALproex  milliGRAM(s) Oral two times a day  enalapril 10 milliGRAM(s) Oral daily  enoxaparin Injectable 40 milliGRAM(s) SubCutaneous daily  gabapentin 100 milliGRAM(s) Oral three times a day  levETIRAcetam 750 milliGRAM(s) Oral two times a day  OLANZapine 5 milliGRAM(s) Oral daily  senna 2 Tablet(s) Oral at bedtime    MEDICATIONS  (PRN):  cloNIDine 0.1 milliGRAM(s) Oral every 8 hours PRN htn  haloperidol    Injectable 2 milliGRAM(s) IntraMuscular every 6 hours PRN Agitation  polyethylene glycol 3350 17 Gram(s) Oral two times a day PRN Constipation  Patient with dementia and psychotic features, not having decision making capacity  Not anxious, or agitated today                    Allergies  No Known Allergies        FAMILY HISTORY:  No pertinent family history in first degree relatives  unknown      Vital Signs Last 24 Hrs  Reviewed    PHYSICAL EXAM:  GENERAL: NAD, well-groomed, well-developed  Patient would not allow further examination.

## 2021-05-27 NOTE — CHART NOTE - NSCHARTNOTEFT_GEN_A_CORE
RD limited note      MEDICATIONS  (STANDING):  amLODIPine   Tablet 10 milliGRAM(s) Oral daily  AQUAPHOR (petrolatum Ointment) 1 Application(s) Topical daily  chlorhexidine 4% Liquid 1 Application(s) Topical <User Schedule>  diVALproex  milliGRAM(s) Oral two times a day  enalapril 10 milliGRAM(s) Oral daily  enoxaparin Injectable 40 milliGRAM(s) SubCutaneous daily  gabapentin 100 milliGRAM(s) Oral three times a day  levETIRAcetam 750 milliGRAM(s) Oral two times a day  OLANZapine 5 milliGRAM(s) Oral daily  senna 2 Tablet(s) Oral at bedtime    MEDICATIONS  (PRN):  cloNIDine 0.1 milliGRAM(s) Oral every 8 hours PRN htn  haloperidol    Injectable 2 milliGRAM(s) IntraMuscular every 6 hours PRN Agitation  polyethylene glycol 3350 17 Gram(s) Oral two times a day PRN Constipation    Labs- none recorded; pt continues to refuse.    No edema noted; skin WDL    No new wts recorded.     Diet order: DASH/TLC, ensure clear q24h.    DC pending case mgmt/SW; pt is placement issue, citizenship, court ordered treatment against objection. Court date was 1/28, guardian to be appointed. Pending Pt continues to eat relatively well, refuses meals @ times. Po intake consistently ranges from % since previously assessed. Last BM unknown; no GI s/s noted. Pt is on a bowel regimen. There are no further nutrition interventions at this time. Pt remains not at risk. RD to f/u within the next 7 days, No

## 2021-05-28 PROCEDURE — 99232 SBSQ HOSP IP/OBS MODERATE 35: CPT

## 2021-05-28 NOTE — PROGRESS NOTE ADULT - SUBJECTIVE AND OBJECTIVE BOX
Patient being managed for dementia w/ psychotic features  No agitation reported overnight       REVIEW OF SYSTEMS:  Not a reliable historian  No new complaints today; denies pain, no CP or N/V/abd pain         MEDICATIONS  (STANDING):  amLODIPine   Tablet 10 milliGRAM(s) Oral daily  AQUAPHOR (petrolatum Ointment) 1 Application(s) Topical daily  chlorhexidine 4% Liquid 1 Application(s) Topical <User Schedule>  diVALproex  milliGRAM(s) Oral two times a day  enalapril 10 milliGRAM(s) Oral daily  enoxaparin Injectable 40 milliGRAM(s) SubCutaneous daily  gabapentin 100 milliGRAM(s) Oral three times a day  levETIRAcetam 750 milliGRAM(s) Oral two times a day  OLANZapine 5 milliGRAM(s) Oral daily  senna 2 Tablet(s) Oral at bedtime    MEDICATIONS  (PRN):  cloNIDine 0.1 milliGRAM(s) Oral every 8 hours PRN htn  haloperidol    Injectable 2 milliGRAM(s) IntraMuscular every 6 hours PRN Agitation  polyethylene glycol 3350 17 Gram(s) Oral two times a day PRN Constipation        Allergies  No Known Allergies        FAMILY HISTORY:  No pertinent family history in first degree relatives  unknown      Vital Signs Last 24 Hrs  Reviewed    PHYSICAL EXAM:  GENERAL: NAD, well-groomed, well-developed  Patient would not allow further examination.

## 2021-05-29 PROCEDURE — 99232 SBSQ HOSP IP/OBS MODERATE 35: CPT

## 2021-05-29 NOTE — PROGRESS NOTE ADULT - SUBJECTIVE AND OBJECTIVE BOX
Patient being managed for dementia with psychotic features.  No agitation overnight       MEDICATIONS  (STANDING):  amLODIPine   Tablet 10 milliGRAM(s) Oral daily  AQUAPHOR (petrolatum Ointment) 1 Application(s) Topical daily  chlorhexidine 4% Liquid 1 Application(s) Topical <User Schedule>  diVALproex  milliGRAM(s) Oral two times a day  enalapril 10 milliGRAM(s) Oral daily  enoxaparin Injectable 40 milliGRAM(s) SubCutaneous daily  gabapentin 100 milliGRAM(s) Oral three times a day  levETIRAcetam 750 milliGRAM(s) Oral two times a day  OLANZapine 5 milliGRAM(s) Oral daily  senna 2 Tablet(s) Oral at bedtime    MEDICATIONS  (PRN):  cloNIDine 0.1 milliGRAM(s) Oral every 8 hours PRN htn  haloperidol    Injectable 2 milliGRAM(s) IntraMuscular every 6 hours PRN Agitation  polyethylene glycol 3350 17 Gram(s) Oral two times a day PRN Constipation  Patient being managed for dementia w/ psychotic features  No agitation reported overnight       REVIEW OF SYSTEMS:  Not a reliable historian  No new complaints today; denies pain, no CP or N/V/abd pain                 Allergies  No Known Allergies        FAMILY HISTORY:  No pertinent family history in first degree relatives  unknown      Vital Signs Last 24 Hrs  Reviewed       PHYSICAL EXAM:  GENERAL: NAD, well-groomed, well-developed  Patient would not allow further examination.     Patient being managed for dementia with psychotic features.  No agitation overnight       MEDICATIONS  (STANDING):  amLODIPine   Tablet 10 milliGRAM(s) Oral daily  AQUAPHOR (petrolatum Ointment) 1 Application(s) Topical daily  chlorhexidine 4% Liquid 1 Application(s) Topical <User Schedule>  diVALproex  milliGRAM(s) Oral two times a day  enalapril 10 milliGRAM(s) Oral daily  enoxaparin Injectable 40 milliGRAM(s) SubCutaneous daily  gabapentin 100 milliGRAM(s) Oral three times a day  levETIRAcetam 750 milliGRAM(s) Oral two times a day  OLANZapine 5 milliGRAM(s) Oral daily  senna 2 Tablet(s) Oral at bedtime.    MEDICATIONS  (PRN):  cloNIDine 0.1 milliGRAM(s) Oral every 8 hours PRN htn  haloperidol    Injectable 2 milliGRAM(s) IntraMuscular every 6 hours PRN Agitation  polyethylene glycol 3350 17 Gram(s) Oral two times a day PRN Constipation  Patient being managed for dementia w/ psychotic features  No agitation reported overnight       REVIEW OF SYSTEMS:  Not a reliable historian  No new complaints today; denies pain, no CP or N/V/abd pain                 Allergies  No Known Allergies        FAMILY HISTORY:  No pertinent family history in first degree relatives  unknown      Vital Signs Last 24 Hrs  Reviewed       PHYSICAL EXAM:  GENERAL: NAD, well-groomed, well-developed  Patient would not allow further examination.

## 2021-05-30 PROCEDURE — 99231 SBSQ HOSP IP/OBS SF/LOW 25: CPT

## 2021-05-30 NOTE — PROGRESS NOTE ADULT - SUBJECTIVE AND OBJECTIVE BOX
Patient being managed for dementia with psychotic features.  No agitation overnight       MEDICATIONS  (STANDING):  amLODIPine   Tablet 10 milliGRAM(s) Oral daily  AQUAPHOR (petrolatum Ointment) 1 Application(s) Topical daily  chlorhexidine 4% Liquid 1 Application(s) Topical <User Schedule>  diVALproex  milliGRAM(s) Oral two times a day  enalapril 10 milliGRAM(s) Oral daily  enoxaparin Injectable 40 milliGRAM(s) SubCutaneous daily  gabapentin 100 milliGRAM(s) Oral three times a day  levETIRAcetam 750 milliGRAM(s) Oral two times a day  OLANZapine 5 milliGRAM(s) Oral daily  senna 2 Tablet(s) Oral at bedtime    MEDICATIONS  (PRN):  cloNIDine 0.1 milliGRAM(s) Oral every 8 hours PRN htn  haloperidol    Injectable 2 milliGRAM(s) IntraMuscular every 6 hours PRN Agitation  polyethylene glycol 3350 17 Gram(s) Oral two times a day PRN Constipation        REVIEW OF SYSTEMS:  Not a reliable historian  No new complaints today; denies pain, no CP or N/V/abd pain                 Allergies  No Known Allergies        FAMILY HISTORY:  No pertinent family history in first degree relatives  unknown      Vital Signs Last 24 Hrs  Reviewed       PHYSICAL EXAM:  GENERAL: NAD, well-groomed, well-developed  Patient would not allow further examination.     Patient being managed for dementia with psychotic features.  No agitation overnight      Patient busy setting up a table by the bedside, wants it spotless clean     MEDICATIONS  (STANDING):  amLODIPine   Tablet 10 milliGRAM(s) Oral daily  AQUAPHOR (petrolatum Ointment) 1 Application(s) Topical daily  chlorhexidine 4% Liquid 1 Application(s) Topical <User Schedule>  diVALproex  milliGRAM(s) Oral two times a day  enalapril 10 milliGRAM(s) Oral daily  enoxaparin Injectable 40 milliGRAM(s) SubCutaneous daily  gabapentin 100 milliGRAM(s) Oral three times a day  levETIRAcetam 750 milliGRAM(s) Oral two times a day  OLANZapine 5 milliGRAM(s) Oral daily  senna 2 Tablet(s) Oral at bedtime    MEDICATIONS  (PRN):  cloNIDine 0.1 milliGRAM(s) Oral every 8 hours PRN htn  haloperidol    Injectable 2 milliGRAM(s) IntraMuscular every 6 hours PRN Agitation  polyethylene glycol 3350 17 Gram(s) Oral two times a day PRN Constipation        REVIEW OF SYSTEMS:  Not a reliable historian  No new complaints today; denies pain, no CP or N/V/abd pain                 Allergies  No Known Allergies        FAMILY HISTORY:  No pertinent family history in first degree relatives  unknown      Vital Signs Last 24 Hrs  Reviewed       PHYSICAL EXAM:  GENERAL: NAD, well-groomed, well-developed  Patient would not allow further examination.

## 2021-05-31 PROCEDURE — 99232 SBSQ HOSP IP/OBS MODERATE 35: CPT

## 2021-05-31 NOTE — PROGRESS NOTE ADULT - SUBJECTIVE AND OBJECTIVE BOX
Patient being managed for dementia with psychotic features.  Had some agitation over the last 24 hours , now calm      Not willing to engage in conversation today     MEDICATIONS  (STANDING):  amLODIPine   Tablet 10 milliGRAM(s) Oral daily  AQUAPHOR (petrolatum Ointment) 1 Application(s) Topical daily  chlorhexidine 4% Liquid 1 Application(s) Topical <User Schedule>  diVALproex  milliGRAM(s) Oral two times a day  enalapril 10 milliGRAM(s) Oral daily  enoxaparin Injectable 40 milliGRAM(s) SubCutaneous daily  gabapentin 100 milliGRAM(s) Oral three times a day  levETIRAcetam 750 milliGRAM(s) Oral two times a day  OLANZapine 5 milliGRAM(s) Oral daily  senna 2 Tablet(s) Oral at bedtime    MEDICATIONS  (PRN):  cloNIDine 0.1 milliGRAM(s) Oral every 8 hours PRN htn  haloperidol    Injectable 2 milliGRAM(s) IntraMuscular every 6 hours PRN Agitation  polyethylene glycol 3350 17 Gram(s) Oral two times a day PRN Constipation        REVIEW OF SYSTEMS:  Not a reliable historian  No new complaints today; denies pain, no CP or N/V/abd pain                 Allergies  No Known Allergies        FAMILY HISTORY:  No pertinent family history in first degree relatives  unknown      Vital Signs Last 24 Hrs  Reviewed       PHYSICAL EXAM:  GENERAL: NAD, well-groomed, well-developed  Patient would not allow further examination.

## 2021-06-01 PROCEDURE — 99231 SBSQ HOSP IP/OBS SF/LOW 25: CPT

## 2021-06-01 RX ORDER — HALOPERIDOL DECANOATE 100 MG/ML
2 INJECTION INTRAMUSCULAR EVERY 6 HOURS
Refills: 0 | Status: DISCONTINUED | OUTPATIENT
Start: 2021-06-01 | End: 2021-10-01

## 2021-06-01 NOTE — PROGRESS NOTE ADULT - SUBJECTIVE AND OBJECTIVE BOX
Patient being managed for dementia with psychotic features.  Had some agitation over the last 24 hours , now calm      Patient seen and examined at bedside; denies any complaints; patient sitting on the chair and asking for "powder". Otherwise appears comfortable.    ROS: limited due to condition; not a reliable historian      Allergies  No Known Allergies        FAMILY HISTORY:  No pertinent family history in first degree relatives  unknown      Vital Signs Last 24 Hrs  Reviewed       PHYSICAL EXAM:  GENERAL: NAD, comfortable  HEENT: AT, NC, MMM, EOMI  CVS: RRR, no M/r/g, no LE edema  Resp: CTAb/l, No w/r/r  Abd: soft, NT, ND  rest of exam refused by patient      MEDICATIONS  (STANDING):  amLODIPine   Tablet 10 milliGRAM(s) Oral daily  AQUAPHOR (petrolatum Ointment) 1 Application(s) Topical daily  chlorhexidine 4% Liquid 1 Application(s) Topical <User Schedule>  diVALproex  milliGRAM(s) Oral two times a day  enalapril 10 milliGRAM(s) Oral daily  enoxaparin Injectable 40 milliGRAM(s) SubCutaneous daily  gabapentin 100 milliGRAM(s) Oral three times a day  levETIRAcetam 750 milliGRAM(s) Oral two times a day  OLANZapine 5 milliGRAM(s) Oral daily  senna 2 Tablet(s) Oral at bedtime    MEDICATIONS  (PRN):  cloNIDine 0.1 milliGRAM(s) Oral every 8 hours PRN htn  haloperidol    Injectable 2 milliGRAM(s) IntraMuscular every 6 hours PRN agitation  polyethylene glycol 3350 17 Gram(s) Oral two times a day PRN Constipation

## 2021-06-02 PROCEDURE — 99232 SBSQ HOSP IP/OBS MODERATE 35: CPT

## 2021-06-02 NOTE — PROGRESS NOTE ADULT - SUBJECTIVE AND OBJECTIVE BOX
CC.  mental health [facility]  HPI.  Patient appears to be comfortable.  Unable to obtain ros/hx.  not a reliable historian  offers no complaints               Vital Signs Last 24 Hrs  T(C): --  T(F): --  HR: --  BP: --  BP(mean): --  RR: --  SpO2: --        PHYSICAL EXAM-  GENERAL: NAD,    HEAD:  Atraumatic, Normocephalic  EYES: EOMI, PERRLA, conjunctiva and sclera clear  NECK: Supple, No JVD, Normal thyroid  NERVOUS SYSTEM:  Alert confused.  agitated at time.  moving all extremities  CHEST/LUNG: Clear to percussion bilaterally; No rales, rhonchi, wheezing, or rubs  HEART: Regular rate and rhythm; No murmurs, rubs, or gallops  ABDOMEN: Soft, Nontender, Nondistended; Bowel sounds present  EXTREMITIES:   No clubbing, cyanosis, or edema  SKIN: No rashes or lesions                                    MEDICATIONS  (STANDING):  amLODIPine   Tablet 10 milliGRAM(s) Oral daily  AQUAPHOR (petrolatum Ointment) 1 Application(s) Topical daily  chlorhexidine 4% Liquid 1 Application(s) Topical <User Schedule>  diVALproex  milliGRAM(s) Oral two times a day  enalapril 10 milliGRAM(s) Oral daily  enoxaparin Injectable 40 milliGRAM(s) SubCutaneous daily  gabapentin 100 milliGRAM(s) Oral three times a day  levETIRAcetam 750 milliGRAM(s) Oral two times a day  OLANZapine 5 milliGRAM(s) Oral daily  senna 2 Tablet(s) Oral at bedtime    MEDICATIONS  (PRN):  cloNIDine 0.1 milliGRAM(s) Oral every 8 hours PRN htn  haloperidol    Injectable 2 milliGRAM(s) IntraMuscular every 6 hours PRN agitation  polyethylene glycol 3350 17 Gram(s) Oral two times a day PRN Constipation          Imaging Personally Reviewed:     [x ] YES  [ ] NO    Consultant(s) Notes Reviewed:  [x ] YES  [ ] NO    Care Discussed with Consultants/Other Providers [x ] YES  [ ] No medical contraindication for discharge

## 2021-06-03 PROCEDURE — 99232 SBSQ HOSP IP/OBS MODERATE 35: CPT

## 2021-06-03 NOTE — CHART NOTE - NSCHARTNOTEFT_GEN_A_CORE
Limited reassessment    MEDICATIONS  (STANDING):  amLODIPine   Tablet 10 milliGRAM(s) Oral daily  AQUAPHOR (petrolatum Ointment) 1 Application(s) Topical daily  chlorhexidine 4% Liquid 1 Application(s) Topical <User Schedule>  diVALproex  milliGRAM(s) Oral two times a day  enalapril 10 milliGRAM(s) Oral daily  enoxaparin Injectable 40 milliGRAM(s) SubCutaneous daily  gabapentin 100 milliGRAM(s) Oral three times a day  levETIRAcetam 750 milliGRAM(s) Oral two times a day  OLANZapine 5 milliGRAM(s) Oral daily  senna 2 Tablet(s) Oral at bedtime    MEDICATIONS  (PRN):  cloNIDine 0.1 milliGRAM(s) Oral every 8 hours PRN htn  haloperidol    Injectable 2 milliGRAM(s) IntraMuscular every 6 hours PRN agitation  polyethylene glycol 3350 17 Gram(s) Oral two times a day PRN Constipation    No labs recorded    No edema noted; skin WDL (6/2)    No new wts.    Diet order: DASH/TLC; Ensure Clear q24h.    Pt is a social admission, pending placement. Pt continues to eat well with a good appetite most of the time. Po intake varies % since previously assessment. No GI s/s noted. No N/V/D/C. There are no further nutrition interventions at this time. Pt remains not at risk. RD to f/u within the next 7 days.

## 2021-06-04 PROCEDURE — 99231 SBSQ HOSP IP/OBS SF/LOW 25: CPT

## 2021-06-05 PROCEDURE — 99231 SBSQ HOSP IP/OBS SF/LOW 25: CPT

## 2021-06-06 PROCEDURE — 99231 SBSQ HOSP IP/OBS SF/LOW 25: CPT

## 2021-06-07 PROCEDURE — 99232 SBSQ HOSP IP/OBS MODERATE 35: CPT

## 2021-06-07 NOTE — PROGRESS NOTE ADULT - ASSESSMENT
61M PMHx seizure disorder, schizophrenia, HTN here with altered mental status.    # worsening Dementia with psychotic features   -continue with current medication regimen as per Psych  -IM haldol for severe agitation/code greys   -No contraindication to discharge as per psych consult.    #tinea pedis --Resolved s/p clotrimazole cream    #HTN -- norvasc 10mg + enalapril 10 mg    # Seizure d/o -- Divalproex 500mg bid, gabapentin 100mg tid,  Keppra 750 bid      #DVT ppx -- lovenox  Code -- Full  Dispo --DC pending case mgmt/SW; pt is placement issue, citizenship, court ordered treatment against objection.  , guardian to be appointed.

## 2021-06-08 PROCEDURE — 99232 SBSQ HOSP IP/OBS MODERATE 35: CPT

## 2021-06-09 PROCEDURE — 99231 SBSQ HOSP IP/OBS SF/LOW 25: CPT

## 2021-06-09 NOTE — PROGRESS NOTE ADULT - ASSESSMENT
61M PMHx seizure disorder, schizophrenia, HTN here with altered mental status    #worsening Dementia with psychotic features   -continue with current medication regimen as per Psych  -IM haldol for severe agitation/code greys   -No contraindication to discharge as per psych consult.    #tinea pedis --Resolved s/p clotrimazole cream    #HTN -- norvasc 10mg + enalapril 10 mg    #Seizure d/o -- Divalproex 500mg bid, gabapentin 100mg tid,  Keppra 750 bid    #DVT ppx -- lovenox  Code -- Full  Dispo --DC pending case mgmt/SW; pt is placement issue, citizenship, court ordered treatment against objection. guardian to be appointed.

## 2021-06-09 NOTE — PROGRESS NOTE ADULT - SUBJECTIVE AND OBJECTIVE BOX
Patient being managed for dementia with psychotic features.  Had some agitation over the last 24 hours , now calm      Patient seen and examined at bedside; denies any complaints; he reports "he is okay". Otherwise appears comfortable.    ROS: limited due to condition; not a reliable historian      Allergies  No Known Allergies        FAMILY HISTORY:  No pertinent family history in first degree relatives  unknown      Vital Signs Last 24 Hrs  Reviewed     Vital Signs Last 24 Hrs  T(C): --  T(F): --  HR: --  BP: --  BP(mean): --  RR: --  SpO2: --  PHYSICAL EXAM:  GENERAL: NAD, comfortable  HEENT: AT, NC, MMM, EOMI  CVS: RRR, no M/r/g, no LE edema  Resp: CTAb/l, No w/r/r  Abd: soft, NT, ND  rest of exam refused by patient      MEDICATIONS  (STANDING):  amLODIPine   Tablet 10 milliGRAM(s) Oral daily  AQUAPHOR (petrolatum Ointment) 1 Application(s) Topical daily  chlorhexidine 4% Liquid 1 Application(s) Topical <User Schedule>  diVALproex  milliGRAM(s) Oral two times a day  enalapril 10 milliGRAM(s) Oral daily  enoxaparin Injectable 40 milliGRAM(s) SubCutaneous daily  gabapentin 100 milliGRAM(s) Oral three times a day  levETIRAcetam 750 milliGRAM(s) Oral two times a day  OLANZapine 5 milliGRAM(s) Oral daily  senna 2 Tablet(s) Oral at bedtime    MEDICATIONS  (PRN):  cloNIDine 0.1 milliGRAM(s) Oral every 8 hours PRN htn  haloperidol    Injectable 2 milliGRAM(s) IntraMuscular every 6 hours PRN agitation  polyethylene glycol 3350 17 Gram(s) Oral two times a day PRN Constipation

## 2021-06-10 PROCEDURE — 99231 SBSQ HOSP IP/OBS SF/LOW 25: CPT

## 2021-06-10 NOTE — CHART NOTE - NSCHARTNOTEFT_GEN_A_CORE
RD limited note     MEDICATIONS  (STANDING):  amLODIPine   Tablet 10 milliGRAM(s) Oral daily  AQUAPHOR (petrolatum Ointment) 1 Application(s) Topical daily  chlorhexidine 4% Liquid 1 Application(s) Topical <User Schedule>  diVALproex  milliGRAM(s) Oral two times a day  enalapril 10 milliGRAM(s) Oral daily  enoxaparin Injectable 40 milliGRAM(s) SubCutaneous daily  gabapentin 100 milliGRAM(s) Oral three times a day  levETIRAcetam 750 milliGRAM(s) Oral two times a day  OLANZapine 5 milliGRAM(s) Oral daily  senna 2 Tablet(s) Oral at bedtime    MEDICATIONS  (PRN):  cloNIDine 0.1 milliGRAM(s) Oral every 8 hours PRN htn  haloperidol    Injectable 2 milliGRAM(s) IntraMuscular every 6 hours PRN agitation  polyethylene glycol 3350 17 Gram(s) Oral two times a day PRN Constipation      No labs recorded    No edema noted; skin WDL (6/2)    No new wts.    Diet order: DASH/TLC; Ensure Clear q24h.    Pt is a social admission, pending placement. Pt continues to eat well with a good appetite most of the time. Po intake varies % since previously assessment. No GI s/s noted. No N/V/D/C. There are no further nutrition interventions at this time. Pt remains not at risk. RD to f/u within the next 7 days.

## 2021-06-10 NOTE — PROGRESS NOTE ADULT - SUBJECTIVE AND OBJECTIVE BOX
Patient being managed for dementia with psychotic features.  Had some agitation over the last 24 hours , now calm      Patient seen and examined at bedside; denies any complaints; he reports "he is okay". Otherwise appears comfortable. He refused physical exam today.    ROS: limited due to condition; not a reliable historian      Allergies  No Known Allergies        FAMILY HISTORY:  No pertinent family history in first degree relatives  unknown      Vital Signs Last 24 Hrs  T(C): --  T(F): --  HR: --  BP: --  BP(mean): --  RR: --  SpO2: --  PHYSICAL EXAM from 6/9/21 (refused exam today):  GENERAL: NAD, comfortable  HEENT: AT, NC, MMM, EOMI  CVS: RRR, no M/r/g, no LE edema  Resp: CTAb/l, No w/r/r  Abd: soft, NT, ND  rest of exam refused by patient      MEDICATIONS  (STANDING):  amLODIPine   Tablet 10 milliGRAM(s) Oral daily  AQUAPHOR (petrolatum Ointment) 1 Application(s) Topical daily  chlorhexidine 4% Liquid 1 Application(s) Topical <User Schedule>  diVALproex  milliGRAM(s) Oral two times a day  enalapril 10 milliGRAM(s) Oral daily  enoxaparin Injectable 40 milliGRAM(s) SubCutaneous daily  gabapentin 100 milliGRAM(s) Oral three times a day  levETIRAcetam 750 milliGRAM(s) Oral two times a day  OLANZapine 5 milliGRAM(s) Oral daily  senna 2 Tablet(s) Oral at bedtime    MEDICATIONS  (PRN):  cloNIDine 0.1 milliGRAM(s) Oral every 8 hours PRN htn  haloperidol    Injectable 2 milliGRAM(s) IntraMuscular every 6 hours PRN agitation  polyethylene glycol 3350 17 Gram(s) Oral two times a day PRN Constipation

## 2021-06-11 PROCEDURE — 99231 SBSQ HOSP IP/OBS SF/LOW 25: CPT

## 2021-06-11 NOTE — PROGRESS NOTE ADULT - SUBJECTIVE AND OBJECTIVE BOX
Patient being managed for dementia with psychotic features.  Had some agitation over the last 24 hours , now calm      Patient seen and examined at bedside; he reports "he is okay". Otherwise he refused to talk and refused physical exam today. More agitated today and cursing loudly in the room.    ROS: limited due to condition; not a reliable historian      Allergies  No Known Allergies        FAMILY HISTORY:  No pertinent family history in first degree relatives  unknown      Vital Signs Last 24 Hrs  T(C): --  T(F): --  HR: --  BP: --  BP(mean): --  RR: --  SpO2: --  PHYSICAL EXAM from 6/9/21 (refused exam today):  GENERAL: NAD, comfortable  HEENT: AT, NC, MMM, EOMI  CVS: RRR, no M/r/g, no LE edema  Resp: CTAb/l, No w/r/r  Abd: soft, NT, ND        MEDICATIONS  (STANDING):  amLODIPine   Tablet 10 milliGRAM(s) Oral daily  AQUAPHOR (petrolatum Ointment) 1 Application(s) Topical daily  chlorhexidine 4% Liquid 1 Application(s) Topical <User Schedule>  diVALproex  milliGRAM(s) Oral two times a day  enalapril 10 milliGRAM(s) Oral daily  enoxaparin Injectable 40 milliGRAM(s) SubCutaneous daily  gabapentin 100 milliGRAM(s) Oral three times a day  levETIRAcetam 750 milliGRAM(s) Oral two times a day  OLANZapine 5 milliGRAM(s) Oral daily  senna 2 Tablet(s) Oral at bedtime    MEDICATIONS  (PRN):  cloNIDine 0.1 milliGRAM(s) Oral every 8 hours PRN htn  haloperidol    Injectable 2 milliGRAM(s) IntraMuscular every 6 hours PRN agitation  polyethylene glycol 3350 17 Gram(s) Oral two times a day PRN Constipation

## 2021-06-12 PROCEDURE — 99231 SBSQ HOSP IP/OBS SF/LOW 25: CPT

## 2021-06-12 NOTE — PROGRESS NOTE ADULT - SUBJECTIVE AND OBJECTIVE BOX
Patient being managed for dementia with psychotic features.      Patient seen and examined at bedside; he reports "he is okay". Otherwise he refused to talk and refused physical exam today. He appears comfortable and less agitated today.    ROS: limited due to condition; not a reliable historian      Allergies  No Known Allergies        FAMILY HISTORY:  No pertinent family history in first degree relatives  unknown      Vital Signs Last 24 Hrs  T(C): --  T(F): --  HR: --  BP: --  BP(mean): --  RR: --  SpO2: --  PHYSICAL EXAM from 6/9/21 (refused exam today):  GENERAL: NAD, comfortable  HEENT: AT, NC, MMM, EOMI  CVS: RRR, no M/r/g, no LE edema  Resp: CTAb/l, No w/r/r  Abd: soft, NT, ND        MEDICATIONS  (STANDING):  amLODIPine   Tablet 10 milliGRAM(s) Oral daily  AQUAPHOR (petrolatum Ointment) 1 Application(s) Topical daily  chlorhexidine 4% Liquid 1 Application(s) Topical <User Schedule>  diVALproex  milliGRAM(s) Oral two times a day  enalapril 10 milliGRAM(s) Oral daily  enoxaparin Injectable 40 milliGRAM(s) SubCutaneous daily  gabapentin 100 milliGRAM(s) Oral three times a day  levETIRAcetam 750 milliGRAM(s) Oral two times a day  OLANZapine 5 milliGRAM(s) Oral daily  senna 2 Tablet(s) Oral at bedtime    MEDICATIONS  (PRN):  cloNIDine 0.1 milliGRAM(s) Oral every 8 hours PRN htn  haloperidol    Injectable 2 milliGRAM(s) IntraMuscular every 6 hours PRN agitation  polyethylene glycol 3350 17 Gram(s) Oral two times a day PRN Constipation

## 2021-06-13 PROCEDURE — 99231 SBSQ HOSP IP/OBS SF/LOW 25: CPT

## 2021-06-13 NOTE — PROGRESS NOTE ADULT - SUBJECTIVE AND OBJECTIVE BOX
Patient being managed for dementia with psychotic features.      Patient seen and examined at bedside; he reports "he is okay". He reports that he wants "milk powder" Otherwise denies any complaints; reports "I am okay, I am okay" when asked further questions.    ROS: limited due to condition; not a reliable historian      Allergies  No Known Allergies        FAMILY HISTORY:  No pertinent family history in first degree relatives  unknown      Vital Signs Last 24 Hrs  T(C): --  T(F): --  HR: --  BP: --  BP(mean): --  RR: --  SpO2: --  PHYSICAL EXAM  GENERAL: NAD, comfortable  HEENT: AT, NC, MMM, EOMI  CVS: RRR, no M/r/g, no LE edema  Resp: CTAb/l, No w/r/r  Abd: soft, NT, ND        MEDICATIONS  (STANDING):  amLODIPine   Tablet 10 milliGRAM(s) Oral daily  AQUAPHOR (petrolatum Ointment) 1 Application(s) Topical daily  chlorhexidine 4% Liquid 1 Application(s) Topical <User Schedule>  diVALproex  milliGRAM(s) Oral two times a day  enalapril 10 milliGRAM(s) Oral daily  enoxaparin Injectable 40 milliGRAM(s) SubCutaneous daily  gabapentin 100 milliGRAM(s) Oral three times a day  levETIRAcetam 750 milliGRAM(s) Oral two times a day  OLANZapine 5 milliGRAM(s) Oral daily  senna 2 Tablet(s) Oral at bedtime    MEDICATIONS  (PRN):  cloNIDine 0.1 milliGRAM(s) Oral every 8 hours PRN htn  haloperidol    Injectable 2 milliGRAM(s) IntraMuscular every 6 hours PRN agitation  polyethylene glycol 3350 17 Gram(s) Oral two times a day PRN Constipation

## 2021-06-14 PROCEDURE — 99231 SBSQ HOSP IP/OBS SF/LOW 25: CPT

## 2021-06-14 NOTE — PROGRESS NOTE ADULT - SUBJECTIVE AND OBJECTIVE BOX
Patient being managed for dementia with psychotic features.      Patient seen and examined at bedside; he reports "he is okay". Otherwise denies any complaints; reports "I am okay, I am okay" when asked further questions. He refused physical exam today saying "there is nothing wrong with me, I am okay, I am okay."    ROS: limited due to condition; not a reliable historian      Allergies  No Known Allergies        FAMILY HISTORY:  No pertinent family history in first degree relatives  unknown      Vital Signs Last 24 Hrs  T(C): --  T(F): --  HR: --  BP: --  BP(mean): --  RR: --  SpO2: --  PHYSICAL EXAM (from 6/13; refused exam today)  GENERAL: NAD, comfortable  HEENT: AT, NC, MMM, EOMI  CVS: RRR, no M/r/g, no LE edema  Resp: CTAb/l, No w/r/r  Abd: soft, NT, ND        MEDICATIONS  (STANDING):  amLODIPine   Tablet 10 milliGRAM(s) Oral daily  AQUAPHOR (petrolatum Ointment) 1 Application(s) Topical daily  chlorhexidine 4% Liquid 1 Application(s) Topical <User Schedule>  diVALproex  milliGRAM(s) Oral two times a day  enalapril 10 milliGRAM(s) Oral daily  enoxaparin Injectable 40 milliGRAM(s) SubCutaneous daily  gabapentin 100 milliGRAM(s) Oral three times a day  levETIRAcetam 750 milliGRAM(s) Oral two times a day  OLANZapine 5 milliGRAM(s) Oral daily  senna 2 Tablet(s) Oral at bedtime    MEDICATIONS  (PRN):  cloNIDine 0.1 milliGRAM(s) Oral every 8 hours PRN htn  haloperidol    Injectable 2 milliGRAM(s) IntraMuscular every 6 hours PRN agitation  polyethylene glycol 3350 17 Gram(s) Oral two times a day PRN Constipation

## 2021-06-15 PROCEDURE — 99233 SBSQ HOSP IP/OBS HIGH 50: CPT

## 2021-06-15 NOTE — PROGRESS NOTE ADULT - SUBJECTIVE AND OBJECTIVE BOX
Patient being managed for dementia with psychotic features.      Patient seen and examined at bedside; he reports "he is okay". Otherwise denies any complaints; reports "I am okay, I am okay" when asked further questions. He refused physical exam today and did not let me enter his room, he got agitated when I tried to enter his room.    ROS: limited due to condition; not a reliable historian      Allergies  No Known Allergies        FAMILY HISTORY:  No pertinent family history in first degree relatives  unknown      Vital Signs Last 24 Hrs  T(C): --  T(F): --  HR: --  BP: --  BP(mean): --  RR: --  SpO2: --  PHYSICAL EXAM (from 6/13; refused exam today)  GENERAL: NAD, comfortable  HEENT: AT, NC, MMM, EOMI  CVS: RRR, no M/r/g, no LE edema  Resp: CTAb/l, No w/r/r  Abd: soft, NT, ND        MEDICATIONS  (STANDING):  amLODIPine   Tablet 10 milliGRAM(s) Oral daily  AQUAPHOR (petrolatum Ointment) 1 Application(s) Topical daily  chlorhexidine 4% Liquid 1 Application(s) Topical <User Schedule>  diVALproex  milliGRAM(s) Oral two times a day  enalapril 10 milliGRAM(s) Oral daily  enoxaparin Injectable 40 milliGRAM(s) SubCutaneous daily  gabapentin 100 milliGRAM(s) Oral three times a day  levETIRAcetam 750 milliGRAM(s) Oral two times a day  OLANZapine 5 milliGRAM(s) Oral daily  senna 2 Tablet(s) Oral at bedtime    MEDICATIONS  (PRN):  cloNIDine 0.1 milliGRAM(s) Oral every 8 hours PRN htn  haloperidol    Injectable 2 milliGRAM(s) IntraMuscular every 6 hours PRN agitation  polyethylene glycol 3350 17 Gram(s) Oral two times a day PRN Constipation

## 2021-06-16 PROCEDURE — 99233 SBSQ HOSP IP/OBS HIGH 50: CPT

## 2021-06-16 NOTE — PROGRESS NOTE ADULT - ASSESSMENT
· Assessment	  61M PMHx seizure disorder, schizophrenia, HTN here with altered mental status    #Dementia with psychotic features, stable   -continue with current medication regimen as per Psych  -IM haldol for severe agitation/code greys   -No contraindication to discharge as per psych consult.  - cont support care    #tinea pedis --Resolved s/p clotrimazole cream    #HTN -- norvasc 10mg + enalapril 10 mg    #Seizure d/o -- Divalproex 500mg bid, gabapentin 100mg tid,  Keppra 750 bid    #DVT ppx -- lovenox  Code -- Full  Dispo --DC pending case mgmt/SW; pt is placement issue, citizenship, court ordered treatment against objection. guardian to be appointed.

## 2021-06-16 NOTE — PROGRESS NOTE ADULT - SUBJECTIVE AND OBJECTIVE BOX
PARVIZ TORRES  61y, Male  Allergy: No Known Allergies      OVERNIGHT EVENTS:    NAEO      PHYSICAL EXAM:   refused      VITALS:  T(F): --  HR: --  BP: --  RR: --  SpO2:      TESTS & MEASUREMENTS:      MEDICATIONS:  MEDICATIONS  (STANDING):  amLODIPine   Tablet 10 milliGRAM(s) Oral daily  AQUAPHOR (petrolatum Ointment) 1 Application(s) Topical daily  chlorhexidine 4% Liquid 1 Application(s) Topical <User Schedule>  diVALproex  milliGRAM(s) Oral two times a day  enalapril 10 milliGRAM(s) Oral daily  enoxaparin Injectable 40 milliGRAM(s) SubCutaneous daily  gabapentin 100 milliGRAM(s) Oral three times a day  levETIRAcetam 750 milliGRAM(s) Oral two times a day  OLANZapine 5 milliGRAM(s) Oral daily  senna 2 Tablet(s) Oral at bedtime    MEDICATIONS  (PRN):  cloNIDine 0.1 milliGRAM(s) Oral every 8 hours PRN htn  haloperidol    Injectable 2 milliGRAM(s) IntraMuscular every 6 hours PRN agitation  polyethylene glycol 3350 17 Gram(s) Oral two times a day PRN Constipation              HEALTH ISSUES - PROBLEM Dx:  Psychosis, unspecified psychosis type    Neurocognitive disorder    Schizophrenia, chronic condition

## 2021-06-17 PROCEDURE — 99233 SBSQ HOSP IP/OBS HIGH 50: CPT

## 2021-06-17 NOTE — CHART NOTE - NSCHARTNOTEFT_GEN_A_CORE
Limited reassessment    MEDICATIONS  (STANDING):  amLODIPine   Tablet 10 milliGRAM(s) Oral daily  AQUAPHOR (petrolatum Ointment) 1 Application(s) Topical daily  chlorhexidine 4% Liquid 1 Application(s) Topical <User Schedule>  diVALproex  milliGRAM(s) Oral two times a day  enalapril 10 milliGRAM(s) Oral daily  enoxaparin Injectable 40 milliGRAM(s) SubCutaneous daily  gabapentin 100 milliGRAM(s) Oral three times a day  levETIRAcetam 750 milliGRAM(s) Oral two times a day  OLANZapine 5 milliGRAM(s) Oral daily  senna 2 Tablet(s) Oral at bedtime    MEDICATIONS  (PRN):  cloNIDine 0.1 milliGRAM(s) Oral every 8 hours PRN htn  haloperidol    Injectable 2 milliGRAM(s) IntraMuscular every 6 hours PRN agitation  polyethylene glycol 3350 17 Gram(s) Oral two times a day PRN Constipation    No labs    No edema noted; skin WDL (6/16)    No new wts    Diet order: DASH/TLC + ensure clear q24h    Pt is a social admission pending placement. Pt po intake is adequate most of the time, some meal refusals noted per EMR; otherwise consistently consumes 100% of meals. No noted N/V/D/C. There are no further nutrition interventions, pt remains not @ risk. RD to f/u within the next 7 days.

## 2021-06-17 NOTE — PROGRESS NOTE ADULT - SUBJECTIVE AND OBJECTIVE BOX
PARVIZ TORRES  61y, Male  Allergy: No Known Allergies      OVERNIGHT EVENTS:      SELENE  Pt is at baseline      PHYSICAL EXAM:   refused      VITALS:  T(F): --  HR: --  BP: --  RR: --  SpO2: --    TESTS & MEASUREMENTS:        MEDICATIONS:  MEDICATIONS  (STANDING):  amLODIPine   Tablet 10 milliGRAM(s) Oral daily  AQUAPHOR (petrolatum Ointment) 1 Application(s) Topical daily  chlorhexidine 4% Liquid 1 Application(s) Topical <User Schedule>  diVALproex  milliGRAM(s) Oral two times a day  enalapril 10 milliGRAM(s) Oral daily  enoxaparin Injectable 40 milliGRAM(s) SubCutaneous daily  gabapentin 100 milliGRAM(s) Oral three times a day  levETIRAcetam 750 milliGRAM(s) Oral two times a day  OLANZapine 5 milliGRAM(s) Oral daily  senna 2 Tablet(s) Oral at bedtime    MEDICATIONS  (PRN):  cloNIDine 0.1 milliGRAM(s) Oral every 8 hours PRN htn  haloperidol    Injectable 2 milliGRAM(s) IntraMuscular every 6 hours PRN agitation  polyethylene glycol 3350 17 Gram(s) Oral two times a day PRN Constipation              HEALTH ISSUES - PROBLEM Dx:  Psychosis, unspecified psychosis type    Neurocognitive disorder    Schizophrenia, chronic condition

## 2021-06-18 PROCEDURE — 99232 SBSQ HOSP IP/OBS MODERATE 35: CPT

## 2021-06-19 PROCEDURE — 99232 SBSQ HOSP IP/OBS MODERATE 35: CPT

## 2021-06-19 RX ORDER — HALOPERIDOL DECANOATE 100 MG/ML
0.5 INJECTION INTRAMUSCULAR ONCE
Refills: 0 | Status: COMPLETED | OUTPATIENT
Start: 2021-06-19 | End: 2021-07-22

## 2021-06-19 RX ADMIN — HALOPERIDOL DECANOATE 2 MILLIGRAM(S): 100 INJECTION INTRAMUSCULAR at 08:00

## 2021-06-19 NOTE — PROGRESS NOTE ADULT - SUBJECTIVE AND OBJECTIVE BOX
PARVIZ TORRES  61y, Male  Allergy: No Known Allergies      OVERNIGHT EVENTS:      Pt trying to get out of building,  mentation at baseline, has no new complaints  stable at baseline      PHYSICAL EXAM:   refused      VITALS:  T(F): --  HR: --  BP: --  RR: --  SpO2: --    TESTS & MEASUREMENTS:          MEDICATIONS:  MEDICATIONS  (STANDING):  amLODIPine   Tablet 10 milliGRAM(s) Oral daily  AQUAPHOR (petrolatum Ointment) 1 Application(s) Topical daily  chlorhexidine 4% Liquid 1 Application(s) Topical <User Schedule>  diVALproex  milliGRAM(s) Oral two times a day  enalapril 10 milliGRAM(s) Oral daily  enoxaparin Injectable 40 milliGRAM(s) SubCutaneous daily  gabapentin 100 milliGRAM(s) Oral three times a day  levETIRAcetam 750 milliGRAM(s) Oral two times a day  OLANZapine 5 milliGRAM(s) Oral daily  senna 2 Tablet(s) Oral at bedtime    MEDICATIONS  (PRN):  cloNIDine 0.1 milliGRAM(s) Oral every 8 hours PRN htn  haloperidol    Injectable 2 milliGRAM(s) IntraMuscular every 6 hours PRN agitation  haloperidol    Injectable 0.5 milliGRAM(s) IntraMuscular once PRN Agitation  polyethylene glycol 3350 17 Gram(s) Oral two times a day PRN Constipation              HEALTH ISSUES - PROBLEM Dx:  Psychosis, unspecified psychosis type    Neurocognitive disorder    Schizophrenia, chronic condition

## 2021-06-20 PROCEDURE — 93010 ELECTROCARDIOGRAM REPORT: CPT | Mod: NC

## 2021-06-20 PROCEDURE — 99232 SBSQ HOSP IP/OBS MODERATE 35: CPT

## 2021-06-20 NOTE — PROGRESS NOTE ADULT - ASSESSMENT
· Assessment	  61M PMHx seizure disorder, schizophrenia, HTN here with altered mental status    #Dementia with psychotic features, stable   -continue with current medication regimen as per Psych  -IM haldol for severe agitation/code greys   -No contraindication to discharge as per psych consult.  - cont support care    #tinea pedis --Resolved s/p clotrimazole cream    #HTN -- norvasc 10mg + enalapril 10 mg    #Seizure d/o -- Divalproex 500mg bid, gabapentin 100mg tid,  Keppra 750 bid    #DVT ppx -- cont  Code -- Full  Dispo --DC pending case mgmt/SW; pt is placement issue, citizenship, court ordered treatment against objection. guardian to be appointed.

## 2021-06-21 PROCEDURE — 99232 SBSQ HOSP IP/OBS MODERATE 35: CPT

## 2021-06-21 NOTE — PROGRESS NOTE ADULT - SUBJECTIVE AND OBJECTIVE BOX
PARVIZ TORRES  61y, Male  Allergy: No Known Allergies      OVERNIGHT EVENTS:      Multi-psy issues with psy meds, EKG QTc <449  mentation at baseline, has no new complaints        PHYSICAL EXAM:   refused    VITALS:  T(F): --  HR: --  BP: --  RR: --  SpO2: --    TESTS & MEASUREMENTS:                                MEDICATIONS:  MEDICATIONS  (STANDING):  amLODIPine   Tablet 10 milliGRAM(s) Oral daily  AQUAPHOR (petrolatum Ointment) 1 Application(s) Topical daily  chlorhexidine 4% Liquid 1 Application(s) Topical <User Schedule>  diVALproex  milliGRAM(s) Oral two times a day  enalapril 10 milliGRAM(s) Oral daily  enoxaparin Injectable 40 milliGRAM(s) SubCutaneous daily  gabapentin 100 milliGRAM(s) Oral three times a day  levETIRAcetam 750 milliGRAM(s) Oral two times a day  OLANZapine 5 milliGRAM(s) Oral daily  senna 2 Tablet(s) Oral at bedtime    MEDICATIONS  (PRN):  cloNIDine 0.1 milliGRAM(s) Oral every 8 hours PRN htn  haloperidol    Injectable 2 milliGRAM(s) IntraMuscular every 6 hours PRN agitation  haloperidol    Injectable 0.5 milliGRAM(s) IntraMuscular once PRN Agitation  polyethylene glycol 3350 17 Gram(s) Oral two times a day PRN Constipation              HEALTH ISSUES - PROBLEM Dx:  Psychosis, unspecified psychosis type    Neurocognitive disorder    Schizophrenia, chronic condition

## 2021-06-21 NOTE — PROGRESS NOTE ADULT - ASSESSMENT
· Assessment	  61M PMHx seizure disorder, schizophrenia, HTN here with altered mental status    #Dementia with psychotic features, stable   -continue with current medication regimen as per Psych,   EKG QTc <449  -IM haldol for severe agitation/code greys   -No contraindication to discharge as per psych consult.  - cont support care    #tinea pedis --Resolved s/p clotrimazole cream    #HTN -- norvasc 10mg + enalapril 10 mg    #Seizure d/o -- Divalproex 500mg bid, gabapentin 100mg tid,  Keppra 750 bid    #DVT ppx -- cont  Code -- Full  Dispo --DC pending case mgmt/SW; pt is placement issue, citizenship, court ordered treatment against objection. guardian to be appointed.

## 2021-06-22 PROCEDURE — 99232 SBSQ HOSP IP/OBS MODERATE 35: CPT

## 2021-06-22 NOTE — PROGRESS NOTE ADULT - SUBJECTIVE AND OBJECTIVE BOX
PARVIZ TORRES  61y, Male  Allergy: No Known Allergies      OVERNIGHT EVENTS:    Pt asking more milk  Multi-psy issues with psy meds, EKG QTc <449  mentation at baseline, has no new complaints      PHYSICAL EXAM:   refused    VITALS:  T(F): --  HR: --  BP: --  RR: --  SpO2: --    TESTS & MEASUREMENTS:                                MEDICATIONS:  MEDICATIONS  (STANDING):  amLODIPine   Tablet 10 milliGRAM(s) Oral daily  AQUAPHOR (petrolatum Ointment) 1 Application(s) Topical daily  chlorhexidine 4% Liquid 1 Application(s) Topical <User Schedule>  diVALproex  milliGRAM(s) Oral two times a day  enalapril 10 milliGRAM(s) Oral daily  enoxaparin Injectable 40 milliGRAM(s) SubCutaneous daily  gabapentin 100 milliGRAM(s) Oral three times a day  levETIRAcetam 750 milliGRAM(s) Oral two times a day  OLANZapine 5 milliGRAM(s) Oral daily  senna 2 Tablet(s) Oral at bedtime    MEDICATIONS  (PRN):  cloNIDine 0.1 milliGRAM(s) Oral every 8 hours PRN htn  haloperidol    Injectable 2 milliGRAM(s) IntraMuscular every 6 hours PRN agitation  haloperidol    Injectable 0.5 milliGRAM(s) IntraMuscular once PRN Agitation  polyethylene glycol 3350 17 Gram(s) Oral two times a day PRN Constipation              HEALTH ISSUES - PROBLEM Dx:  Psychosis, unspecified psychosis type    Neurocognitive disorder    Schizophrenia, chronic condition

## 2021-06-23 PROCEDURE — 99232 SBSQ HOSP IP/OBS MODERATE 35: CPT

## 2021-06-24 PROCEDURE — 99232 SBSQ HOSP IP/OBS MODERATE 35: CPT

## 2021-06-24 NOTE — CHART NOTE - NSCHARTNOTEFT_GEN_A_CORE
Limited reassessment    MEDICATIONS  (STANDING):  amLODIPine   Tablet 10 milliGRAM(s) Oral daily  AQUAPHOR (petrolatum Ointment) 1 Application(s) Topical daily  chlorhexidine 4% Liquid 1 Application(s) Topical <User Schedule>  diVALproex  milliGRAM(s) Oral two times a day  enalapril 10 milliGRAM(s) Oral daily  enoxaparin Injectable 40 milliGRAM(s) SubCutaneous daily  gabapentin 100 milliGRAM(s) Oral three times a day  levETIRAcetam 750 milliGRAM(s) Oral two times a day  OLANZapine 5 milliGRAM(s) Oral daily  senna 2 Tablet(s) Oral at bedtime    MEDICATIONS  (PRN):  cloNIDine 0.1 milliGRAM(s) Oral every 8 hours PRN htn  haloperidol    Injectable 2 milliGRAM(s) IntraMuscular every 6 hours PRN agitation  haloperidol    Injectable 0.5 milliGRAM(s) IntraMuscular once PRN Agitation  polyethylene glycol 3350 17 Gram(s) Oral two times a day PRN Constipation    No labs    No edema noted; skin WDL     No new wts    Diet order: DASH/TLC + ensure clear q24h    Pt is a social admission pending placement. Pt po intake is adequate most of the time, some meal refusals noted per EMR; otherwise consistently consumes 100% of meals. No noted N/V/D/C. There are no further nutrition interventions, pt remains not @ risk. RD to f/u within the next 7 days.

## 2021-06-25 PROCEDURE — 99232 SBSQ HOSP IP/OBS MODERATE 35: CPT

## 2021-06-26 PROCEDURE — 99232 SBSQ HOSP IP/OBS MODERATE 35: CPT

## 2021-06-26 NOTE — PROGRESS NOTE ADULT - SUBJECTIVE AND OBJECTIVE BOX
PARVIZ TORRES  61y, Male  Allergy: No Known Allergies      OVERNIGHT EVENTS:    Pt asking more milk  Multi-psy issues with psy meds, EKG QTc <449  mentation at baseline, has no new complaints      PHYSICAL EXAM:   refused    VITALS:  T(F): 98.3 (06-26-21 @ 13:51), Max: 98.3 (06-26-21 @ 13:51)  HR: 63 (06-26-21 @ 13:51)  BP: 169/89 (06-26-21 @ 13:51) (169/89 - 169/89)  RR: 18 (06-26-21 @ 13:51)  SpO2: --    TESTS & MEASUREMENTS:        MEDICATIONS:  MEDICATIONS  (STANDING):  amLODIPine   Tablet 10 milliGRAM(s) Oral daily  AQUAPHOR (petrolatum Ointment) 1 Application(s) Topical daily  chlorhexidine 4% Liquid 1 Application(s) Topical <User Schedule>  diVALproex  milliGRAM(s) Oral two times a day  enalapril 10 milliGRAM(s) Oral daily  enoxaparin Injectable 40 milliGRAM(s) SubCutaneous daily  gabapentin 100 milliGRAM(s) Oral three times a day  levETIRAcetam 750 milliGRAM(s) Oral two times a day  OLANZapine 5 milliGRAM(s) Oral daily  senna 2 Tablet(s) Oral at bedtime    MEDICATIONS  (PRN):  cloNIDine 0.1 milliGRAM(s) Oral every 8 hours PRN htn  haloperidol    Injectable 2 milliGRAM(s) IntraMuscular every 6 hours PRN agitation  haloperidol    Injectable 0.5 milliGRAM(s) IntraMuscular once PRN Agitation  polyethylene glycol 3350 17 Gram(s) Oral two times a day PRN Constipation              HEALTH ISSUES - PROBLEM Dx:  Psychosis, unspecified psychosis type    Neurocognitive disorder    Schizophrenia, chronic condition

## 2021-06-26 NOTE — PROGRESS NOTE ADULT - ASSESSMENT
· Assessment	  61M PMHx seizure disorder, schizophrenia, HTN here with altered mental status    #Dementia with psychotic features, stable   -continue with current medication regimen as per Psych,   EKG QTc <449  -IM haldol for severe agitation/code greys   -No contraindication to discharge as per psych consult.  - cont support care    #tinea pedis --Resolved s/p clotrimazole cream    #HTN -- norvasc 10mg + enalapril 10 mg    #Seizure d/o -- Divalproex 500mg bid, gabapentin 100mg tid,  Keppra 750 bid    #DVT ppx -- cont  Code -- Full  Dispo --DC pending case mgmt/SW; pt is placement issue, citizenship, court ordered treatment against objection. guardian to be appointed

## 2021-06-27 PROCEDURE — 99232 SBSQ HOSP IP/OBS MODERATE 35: CPT

## 2021-06-27 NOTE — PROGRESS NOTE ADULT - SUBJECTIVE AND OBJECTIVE BOX
PARVIZ TORRES  61y, Male  Allergy: No Known Allergies      OVERNIGHT EVENTS:    Pt asking more food  Multi-psy issues with psy meds, EKG QTc <449  mentation at baseline, has no new complaints      PHYSICAL EXAM:   refused    VITALS:  T(F): 98.3 (06-26-21 @ 13:51), Max: 98.3 (06-26-21 @ 13:51)  HR: 63 (06-26-21 @ 13:51)  BP: 169/89 (06-26-21 @ 13:51) (169/89 - 169/89)  RR: 18 (06-26-21 @ 13:51)  SpO2: --    TESTS & MEASUREMENTS:      MEDICATIONS:  MEDICATIONS  (STANDING):  amLODIPine   Tablet 10 milliGRAM(s) Oral daily  AQUAPHOR (petrolatum Ointment) 1 Application(s) Topical daily  chlorhexidine 4% Liquid 1 Application(s) Topical <User Schedule>  diVALproex  milliGRAM(s) Oral two times a day  enalapril 10 milliGRAM(s) Oral daily  enoxaparin Injectable 40 milliGRAM(s) SubCutaneous daily  gabapentin 100 milliGRAM(s) Oral three times a day  levETIRAcetam 750 milliGRAM(s) Oral two times a day  OLANZapine 5 milliGRAM(s) Oral daily  senna 2 Tablet(s) Oral at bedtime    MEDICATIONS  (PRN):  cloNIDine 0.1 milliGRAM(s) Oral every 8 hours PRN htn  haloperidol    Injectable 2 milliGRAM(s) IntraMuscular every 6 hours PRN agitation  haloperidol    Injectable 0.5 milliGRAM(s) IntraMuscular once PRN Agitation  polyethylene glycol 3350 17 Gram(s) Oral two times a day PRN Constipation              HEALTH ISSUES - PROBLEM Dx:  Psychosis, unspecified psychosis type    Neurocognitive disorder    Schizophrenia, chronic condition

## 2021-06-28 LAB
ANION GAP SERPL CALC-SCNC: 26 MMOL/L — HIGH (ref 7–14)
APTT BLD: 39.4 SEC — HIGH (ref 27–39.2)
BASOPHILS # BLD AUTO: 0.01 K/UL — SIGNIFICANT CHANGE UP (ref 0–0.2)
BASOPHILS NFR BLD AUTO: 0.1 % — SIGNIFICANT CHANGE UP (ref 0–1)
BLD GP AB SCN SERPL QL: SIGNIFICANT CHANGE UP
BUN SERPL-MCNC: 19 MG/DL — SIGNIFICANT CHANGE UP (ref 10–20)
CALCIUM SERPL-MCNC: 10.4 MG/DL — HIGH (ref 8.5–10.1)
CHLORIDE SERPL-SCNC: 99 MMOL/L — SIGNIFICANT CHANGE UP (ref 98–110)
CK MB CFR SERPL CALC: 1.5 NG/ML — SIGNIFICANT CHANGE UP (ref 0.6–6.3)
CK SERPL-CCNC: 78 U/L — SIGNIFICANT CHANGE UP (ref 0–225)
CO2 SERPL-SCNC: 15 MMOL/L — LOW (ref 17–32)
CREAT SERPL-MCNC: 0.8 MG/DL — SIGNIFICANT CHANGE UP (ref 0.7–1.5)
EOSINOPHIL # BLD AUTO: 0.02 K/UL — SIGNIFICANT CHANGE UP (ref 0–0.7)
EOSINOPHIL NFR BLD AUTO: 0.3 % — SIGNIFICANT CHANGE UP (ref 0–8)
GLUCOSE SERPL-MCNC: 116 MG/DL — HIGH (ref 70–99)
HCT VFR BLD CALC: 43.4 % — SIGNIFICANT CHANGE UP (ref 42–52)
HGB BLD-MCNC: 13.5 G/DL — LOW (ref 14–18)
IMM GRANULOCYTES NFR BLD AUTO: 0.4 % — HIGH (ref 0.1–0.3)
INR BLD: 0.98 RATIO — SIGNIFICANT CHANGE UP (ref 0.65–1.3)
LACTATE SERPL-SCNC: 14.7 MMOL/L — CRITICAL HIGH (ref 0.7–2)
LYMPHOCYTES # BLD AUTO: 2.46 K/UL — SIGNIFICANT CHANGE UP (ref 1.2–3.4)
LYMPHOCYTES # BLD AUTO: 36.1 % — SIGNIFICANT CHANGE UP (ref 20.5–51.1)
MAGNESIUM SERPL-MCNC: 2.3 MG/DL — SIGNIFICANT CHANGE UP (ref 1.8–2.4)
MCHC RBC-ENTMCNC: 31.1 G/DL — LOW (ref 32–37)
MCHC RBC-ENTMCNC: 31.1 PG — HIGH (ref 27–31)
MCV RBC AUTO: 100 FL — HIGH (ref 80–94)
MONOCYTES # BLD AUTO: 0.5 K/UL — SIGNIFICANT CHANGE UP (ref 0.1–0.6)
MONOCYTES NFR BLD AUTO: 7.3 % — SIGNIFICANT CHANGE UP (ref 1.7–9.3)
NEUTROPHILS # BLD AUTO: 3.79 K/UL — SIGNIFICANT CHANGE UP (ref 1.4–6.5)
NEUTROPHILS NFR BLD AUTO: 55.8 % — SIGNIFICANT CHANGE UP (ref 42.2–75.2)
NRBC # BLD: 0 /100 WBCS — SIGNIFICANT CHANGE UP (ref 0–0)
PLATELET # BLD AUTO: 254 K/UL — SIGNIFICANT CHANGE UP (ref 130–400)
POTASSIUM SERPL-MCNC: 3.7 MMOL/L — SIGNIFICANT CHANGE UP (ref 3.5–5)
POTASSIUM SERPL-SCNC: 3.7 MMOL/L — SIGNIFICANT CHANGE UP (ref 3.5–5)
PROTHROM AB SERPL-ACNC: 11.3 SEC — SIGNIFICANT CHANGE UP (ref 9.95–12.87)
RBC # BLD: 4.34 M/UL — LOW (ref 4.7–6.1)
RBC # FLD: 12.5 % — SIGNIFICANT CHANGE UP (ref 11.5–14.5)
SODIUM SERPL-SCNC: 140 MMOL/L — SIGNIFICANT CHANGE UP (ref 135–146)
TROPONIN T SERPL-MCNC: <0.01 NG/ML — SIGNIFICANT CHANGE UP
WBC # BLD: 6.81 K/UL — SIGNIFICANT CHANGE UP (ref 4.8–10.8)
WBC # FLD AUTO: 6.81 K/UL — SIGNIFICANT CHANGE UP (ref 4.8–10.8)

## 2021-06-28 PROCEDURE — 99233 SBSQ HOSP IP/OBS HIGH 50: CPT

## 2021-06-28 PROCEDURE — 93010 ELECTROCARDIOGRAM REPORT: CPT | Mod: NC

## 2021-06-28 PROCEDURE — 72125 CT NECK SPINE W/O DYE: CPT | Mod: 26

## 2021-06-28 PROCEDURE — 70450 CT HEAD/BRAIN W/O DYE: CPT | Mod: 26

## 2021-06-28 PROCEDURE — 71045 X-RAY EXAM CHEST 1 VIEW: CPT | Mod: 26

## 2021-06-28 RX ORDER — SODIUM CHLORIDE 9 MG/ML
1000 INJECTION INTRAMUSCULAR; INTRAVENOUS; SUBCUTANEOUS ONCE
Refills: 0 | Status: COMPLETED | OUTPATIENT
Start: 2021-06-28 | End: 2021-06-28

## 2021-06-28 RX ORDER — LEVETIRACETAM 250 MG/1
1000 TABLET, FILM COATED ORAL EVERY 12 HOURS
Refills: 0 | Status: DISCONTINUED | OUTPATIENT
Start: 2021-06-28 | End: 2021-06-28

## 2021-06-28 RX ORDER — LEVETIRACETAM 250 MG/1
1000 TABLET, FILM COATED ORAL EVERY 12 HOURS
Refills: 0 | Status: DISCONTINUED | OUTPATIENT
Start: 2021-06-28 | End: 2021-07-04

## 2021-06-28 RX ORDER — SODIUM CHLORIDE 9 MG/ML
1000 INJECTION INTRAMUSCULAR; INTRAVENOUS; SUBCUTANEOUS
Refills: 0 | Status: DISCONTINUED | OUTPATIENT
Start: 2021-06-28 | End: 2021-07-06

## 2021-06-28 RX ORDER — CEFTRIAXONE 500 MG/1
1000 INJECTION, POWDER, FOR SOLUTION INTRAMUSCULAR; INTRAVENOUS EVERY 12 HOURS
Refills: 0 | Status: COMPLETED | OUTPATIENT
Start: 2021-06-28 | End: 2021-06-29

## 2021-06-28 RX ADMIN — LEVETIRACETAM 400 MILLIGRAM(S): 250 TABLET, FILM COATED ORAL at 18:57

## 2021-06-28 RX ADMIN — LEVETIRACETAM 400 MILLIGRAM(S): 250 TABLET, FILM COATED ORAL at 12:14

## 2021-06-28 RX ADMIN — DIVALPROEX SODIUM 500 MILLIGRAM(S): 500 TABLET, DELAYED RELEASE ORAL at 18:53

## 2021-06-28 RX ADMIN — SODIUM CHLORIDE 100 MILLILITER(S): 9 INJECTION INTRAMUSCULAR; INTRAVENOUS; SUBCUTANEOUS at 22:20

## 2021-06-28 RX ADMIN — SODIUM CHLORIDE 1000 MILLILITER(S): 9 INJECTION INTRAMUSCULAR; INTRAVENOUS; SUBCUTANEOUS at 11:30

## 2021-06-28 RX ADMIN — CEFTRIAXONE 100 MILLIGRAM(S): 500 INJECTION, POWDER, FOR SOLUTION INTRAMUSCULAR; INTRAVENOUS at 21:02

## 2021-06-28 RX ADMIN — Medication 2 MILLIGRAM(S): at 12:19

## 2021-06-28 RX ADMIN — DIVALPROEX SODIUM 500 MILLIGRAM(S): 500 TABLET, DELAYED RELEASE ORAL at 18:57

## 2021-06-28 NOTE — PROGRESS NOTE ADULT - SUBJECTIVE AND OBJECTIVE BOX
PARVIZ TORRES  61y, Male  Allergy: No Known Allergies      OVERNIGHT EVENTS:    rapid response called at 11am 6.28.21 after he was found on floor, hx Seizure, was on keppra 750 bid, but has been refused meds.  + VSS wnl  EKG NSR stable neg STEMI QTc <499 neg heart block   Pending CPK trop mg ca cmp cbc CXR (asp pna?)        PHYSICAL EXAM:    GENERAL: NAD, lying in bed   PULMONARY: Clear to auscultation bilaterally; No wheeze  CARDIOVASCULAR: Regular rate and rhythm; No murmurs  GASTROINTESTINAL: Soft, Nontender, Nondistended  MUSCULOSKELETAL:   No clubbing, cyanosis, or edema  SKIN: No rashes or lesions    VITALS:  T(F): --  HR: --  BP: --  RR: --  SpO2: --    TESTS & MEASUREMENTS:        MEDICATIONS:  MEDICATIONS  (STANDING):  amLODIPine   Tablet 10 milliGRAM(s) Oral daily  AQUAPHOR (petrolatum Ointment) 1 Application(s) Topical daily  chlorhexidine 4% Liquid 1 Application(s) Topical <User Schedule>  diVALproex  milliGRAM(s) Oral two times a day  enalapril 10 milliGRAM(s) Oral daily  enoxaparin Injectable 40 milliGRAM(s) SubCutaneous daily  gabapentin 100 milliGRAM(s) Oral three times a day  levETIRAcetam 750 milliGRAM(s) Oral two times a day  levETIRAcetam  IVPB 1000 milliGRAM(s) IV Intermittent every 12 hours  OLANZapine 5 milliGRAM(s) Oral daily  senna 2 Tablet(s) Oral at bedtime  sodium chloride 0.9% Bolus 1000 milliLiter(s) IV Bolus once    MEDICATIONS  (PRN):  cloNIDine 0.1 milliGRAM(s) Oral every 8 hours PRN htn  haloperidol    Injectable 2 milliGRAM(s) IntraMuscular every 6 hours PRN agitation  haloperidol    Injectable 0.5 milliGRAM(s) IntraMuscular once PRN Agitation  LORazepam   Injectable 2 milliGRAM(s) IV Push once PRN Agitation  polyethylene glycol 3350 17 Gram(s) Oral two times a day PRN Constipation              HEALTH ISSUES - PROBLEM Dx:  Psychosis, unspecified psychosis type    Neurocognitive disorder    Schizophrenia, chronic condition

## 2021-06-28 NOTE — CONSULT NOTE ADULT - SUBJECTIVE AND OBJECTIVE BOX
PARVIZ TORRES     61y     Male    MRN-[id number]                                                           CC:Patient is a 61y old  Male who presents with a chief complaint of mental health marco (28 Jun 2021 11:27)      HPI:  60-year-old, black, male, with unknown social demographics, medical history, or psychiatric history who was reportedly brought in by ambulance "seen walking around screaming at people "The devil will breathe fire on you" as per the ed notes  History obtained from the charts as the patient was not cooperative on my interview, when asked if he have any complains he said no and when asked questions on ROS, he became agitated.       In the ED was given haldol 5 and ativan 2 IV, evaluate  by psychiatry and admitted for to medicine as per their recommendation for workup for AMS, CT head non remarkable (05 Sep 2020 22:29)    Neuro: patient not answering questions. Patient admitted for 296 days, has refused all seizure meds since admission. Patient was rapid response today. Found on ground unresponsive foaming at mouth, bladder incontinent. Patient given keppra 1000 IVPB. Post ictal after found. Currently alert laying in bed.     ROS:  unable to obtain    Social History: unable to obtain    FAMILY HISTORY:  No pertinent family history in first degree relatives  unknown        HEALTH ISSUES - PROBLEM Dx:  Psychosis, unspecified psychosis type    Neurocognitive disorder    Schizophrenia, chronic condition            Vital Signs Last 24 Hrs  T(C): --  T(F): --  HR: 87 (28 Jun 2021 11:15) (87 - 87)  BP: 129/63 (28 Jun 2021 11:15) (129/63 - 129/63)  BP(mean): --  RR: 20 (28 Jun 2021 16:32) (20 - 20)  SpO2: --    Physical Exam:  Constitutional: alert and in no acute distress. not answering questions    Neuro Exam:  unable to perform  spontaneously moves all 4 extremities symmetrically        Allergies    No Known Allergies           Home Medications:  amLODIPine 5 mg oral tablet: 1 tab(s) orally once a day (17 Sep 2020 10:00)  divalproex sodium 500 mg oral delayed release tablet: 1 tab(s) orally 2 times a day (17 Sep 2020 10:00)  levETIRAcetam 750 mg oral tablet: 1 tab(s) orally 2 times a day (17 Sep 2020 10:00)  OLANZapine 5 mg oral tablet: 1 tab(s) orally once a day (17 Sep 2020 10:00)      MEDICATIONS  (STANDING):  amLODIPine   Tablet 10 milliGRAM(s) Oral daily  AQUAPHOR (petrolatum Ointment) 1 Application(s) Topical daily  cefTRIAXone   IVPB 1000 milliGRAM(s) IV Intermittent every 12 hours  chlorhexidine 4% Liquid 1 Application(s) Topical <User Schedule>  diVALproex  milliGRAM(s) Oral two times a day  enalapril 10 milliGRAM(s) Oral daily  enoxaparin Injectable 40 milliGRAM(s) SubCutaneous daily  gabapentin 100 milliGRAM(s) Oral three times a day  levETIRAcetam  IVPB 1000 milliGRAM(s) IV Intermittent every 12 hours  OLANZapine 5 milliGRAM(s) Oral daily  senna 2 Tablet(s) Oral at bedtime  sodium chloride 0.9%. 1000 milliLiter(s) (100 mL/Hr) IV Continuous <Continuous>    MEDICATIONS  (PRN):  cloNIDine 0.1 milliGRAM(s) Oral every 8 hours PRN htn  haloperidol    Injectable 2 milliGRAM(s) IntraMuscular every 6 hours PRN agitation  haloperidol    Injectable 0.5 milliGRAM(s) IntraMuscular once PRN Agitation  polyethylene glycol 3350 17 Gram(s) Oral two times a day PRN Constipation      LABS:                        13.5   6.81  )-----------( 254      ( 28 Jun 2021 11:00 )             43.4     06-28    140  |  99  |  19  ----------------------------<  116<H>  3.7   |  15<L>  |  0.8    Ca    10.4<H>      28 Jun 2021 11:00  Mg     2.3     06-28    TPro  7.7  /  Alb  4.3  /  TBili  0.3  /  DBili  <0.2  /  AST  21  /  ALT  13  /  AlkPhos  60  06-28    PT/INR - ( 28 Jun 2021 11:00 )   PT: 11.30 sec;   INR: 0.98 ratio         PTT - ( 28 Jun 2021 11:00 )  PTT:39.4 sec        Neuro Imaging:  NCHCT: < from: CT Head No Cont (06.28.21 @ 12:44) >    EXAM:  CT BRAIN            PROCEDURE DATE:  06/28/2021            INTERPRETATION:  Clinical History / Reason for exam: Status post fall, seizure    Technique: Noncontrast head CT.  Contiguous unenhanced CT axial images of the head from the base to the vertex with coronal and sagittal reformats.    Comparison: CT head dated 9/5/2020    Findings:    The exam is limited due to patient motion.    There is a left parietal karina hole, unchanged.    The ventricles and cortical sulci are within normal limits for age.    There are stable patchy hypodensities throughout the hemispheric white matter without mass effect compatible with mild chronic microvascular changes.    There are lacunar infarcts within bilateral basal ganglia, not definitively visualized on the prior CT.    There is no acute intracranial hemorrhage, extra-axial fluid collection or midline shift.  Gray white matter differentiation is maintained.    There is a bony defect in the sellar floor greater right. There is subjacent softtissue abnormality within the right sphenoid sinus. There is a posterior nasal septal defect and suggestion of bilateral sphenoidotomies. Findings are similar since the prior exam.    The visualized paranasal sinuses and mastoids are alignment clear.    IMPRESSION:    1.  No evidence of acute intracranial hemorrhage.    2.  Left basal ganglia lacunar infarcts, age indeterminate (not seen on the previous head CT 9/5/2020).    3.  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 Stable abnormal soft tissue within the right sphenoid sinus, similar since the previous study for which sphenoid extension of tumor is not excluded.    4.  Contrast-enhanced MRI of the brain and pituitary may be obtained for further evaluation of the above findings as clinically warranted.              URSZULA LERMA MD; Attending Radiologist  This document has been electronically signed. Jun 28 2021  1:11PM

## 2021-06-28 NOTE — PROGRESS NOTE ADULT - ASSESSMENT
· Assessment	  61M PMHx seizure disorder, schizophrenia, HTN here with altered mental status      rapid response called at 11am 6.28.21 after he was found on floor, hx Seizure, was on keppra 750 bid, but has been refused meds.  + VSS wnl  EKG NSR stable neg STEMI QTc <499 neg heart block   Pending CPK trop mg ca cmp cbc CXR (asp pna?)  start Keppra 1000 bid, CT, EEG, Benzo prn, IVF, neuro       #Dementia with psychotic features, stable   -continue with current medication regimen as per Psych,   EKG QTc <449  -IM haldol for severe agitation/code greys   -No contraindication to discharge as per psych consult.  - cont support care    #tinea pedis --Resolved s/p clotrimazole cream    #HTN -- norvasc 10mg + enalapril 10 mg    #Seizure d/o -- Divalproex 500mg bid, gabapentin 100mg tid,  Keppra 750 bid    #DVT ppx -- cont  Code -- Full  Dispo --DC pending case mgmt/SW; pt is placement issue, citizenship, court ordered treatment against objection. guardian to be appointed

## 2021-06-28 NOTE — CONSULT NOTE ADULT - ASSESSMENT
This is a 61y year old Male with paranoid schizophrenia, dementia, seizure disorder. Has not been compliant with meds since admission 296 days ago. Patient was rapid response today. Found on ground unresponsive foaming at mouth, bladder incontinent. Patient given keppra 1000 IVPB. Post ictal after found. Currently alert laying in bed. CTH with age indeterminate L basal ganglia lacunar infarcts.    Recommend:  - continue home seizure meds  - ativan as seizure abortive prn  - CTH noted, chronic appearing L basal ganglia lacunar infarct  - chronic infarct likely unrelated to seizure  - may attempt to minimize risk factors for CVA with addition of low dose aspirin and statin    seen and examined with Dr. Swanson   This is a 61y year old Male with paranoid schizophrenia, dementia, seizure disorder. Has not been compliant with meds since admission 296 days ago. Patient was rapid response today. Found on ground unresponsive foaming at mouth, bladder incontinent. Patient given keppra 1000 IVPB. Post ictal after found. Currently alert laying in bed. CTH with age chronic L basal ganglia lacunar infarct new since 9/20.    Recommend:  - continue home seizure meds  - ativan as seizure abortive prn  - CTH noted, chronic appearing L basal ganglia lacunar infarct  - chronic infarct likely unrelated to seizure  - may attempt to minimize risk factors for CVA with addition of low dose aspirin and statin    seen and examined with Dr. Swanson

## 2021-06-29 LAB
GLUCOSE BLDC GLUCOMTR-MCNC: 103 MG/DL — HIGH (ref 70–99)
PROCALCITONIN SERPL-MCNC: 0.03 NG/ML — SIGNIFICANT CHANGE UP (ref 0.02–0.1)

## 2021-06-29 PROCEDURE — 99232 SBSQ HOSP IP/OBS MODERATE 35: CPT

## 2021-06-29 RX ADMIN — LEVETIRACETAM 400 MILLIGRAM(S): 250 TABLET, FILM COATED ORAL at 05:09

## 2021-06-29 RX ADMIN — CEFTRIAXONE 100 MILLIGRAM(S): 500 INJECTION, POWDER, FOR SOLUTION INTRAMUSCULAR; INTRAVENOUS at 05:09

## 2021-06-29 NOTE — CHART NOTE - NSCHARTNOTEFT_GEN_A_CORE
pt refused IV cath. by RN and from me.     pt becomes agitated and aggressive     attending notified as well

## 2021-06-29 NOTE — PROGRESS NOTE ADULT - SUBJECTIVE AND OBJECTIVE BOX
PARVIZ TORRES  61y, Male  Allergy: No Known Allergies      OVERNIGHT EVENTS:    NAEO  Pt refused PO and IV meds,   rapid response called at 11am 6.28.21 after he was found on floor, hx Seizure, was on keppra 750 bid,   + VSS wnl  EKG NSR stable neg STEMI QTc <499 neg heart block   Pending CPK trop mg ca cmp cbc CXR (asp pna?)        PHYSICAL EXAM:  refused    VITALS:  T(F): 96.3 (06-28-21 @ 21:17), Max: 96.3 (06-28-21 @ 21:17)  HR: 91 (06-28-21 @ 21:17)  BP: 135/76 (06-28-21 @ 21:17) (135/76 - 135/76)  RR: 20 (06-28-21 @ 21:17)  SpO2: --    TESTS & MEASUREMENTS:      06-28-21 @ 07:01  -  06-29-21 @ 07:00  --------------------------------------------------------  IN: 0 mL / OUT: 900 mL / NET: -900 mL                            13.5   6.81  )-----------( 254      ( 28 Jun 2021 11:00 )             43.4     PT/INR - ( 28 Jun 2021 11:00 )   PT: 11.30 sec;   INR: 0.98 ratio         PTT - ( 28 Jun 2021 11:00 )  PTT:39.4 sec  06-28    140  |  99  |  19  ----------------------------<  116<H>  3.7   |  15<L>  |  0.8    Ca    10.4<H>      28 Jun 2021 11:00  Mg     2.3     06-28    TPro  7.7  /  Alb  4.3  /  TBili  0.3  /  DBili  <0.2  /  AST  21  /  ALT  13  /  AlkPhos  60  06-28    LIVER FUNCTIONS - ( 28 Jun 2021 11:00 )  Alb: 4.3 g/dL / Pro: 7.7 g/dL / ALK PHOS: 60 U/L / ALT: 13 U/L / AST: 21 U/L / GGT: x           CARDIAC MARKERS ( 28 Jun 2021 11:00 )  x     / <0.01 ng/mL / 78 U/L / x     / 1.5 ng/mL                MEDICATIONS:  MEDICATIONS  (STANDING):  amLODIPine   Tablet 10 milliGRAM(s) Oral daily  AQUAPHOR (petrolatum Ointment) 1 Application(s) Topical daily  chlorhexidine 4% Liquid 1 Application(s) Topical <User Schedule>  diVALproex  milliGRAM(s) Oral two times a day  enalapril 10 milliGRAM(s) Oral daily  enoxaparin Injectable 40 milliGRAM(s) SubCutaneous daily  gabapentin 100 milliGRAM(s) Oral three times a day  levETIRAcetam  IVPB 1000 milliGRAM(s) IV Intermittent every 12 hours  OLANZapine 5 milliGRAM(s) Oral daily  senna 2 Tablet(s) Oral at bedtime  sodium chloride 0.9%. 1000 milliLiter(s) (100 mL/Hr) IV Continuous <Continuous>    MEDICATIONS  (PRN):  cloNIDine 0.1 milliGRAM(s) Oral every 8 hours PRN htn  haloperidol    Injectable 2 milliGRAM(s) IntraMuscular every 6 hours PRN agitation  haloperidol    Injectable 0.5 milliGRAM(s) IntraMuscular once PRN Agitation  polyethylene glycol 3350 17 Gram(s) Oral two times a day PRN Constipation              HEALTH ISSUES - PROBLEM Dx:  Psychosis, unspecified psychosis type    Neurocognitive disorder    Schizophrenia, chronic condition

## 2021-06-29 NOTE — PROGRESS NOTE ADULT - ASSESSMENT
· Assessment	  61M PMHx seizure disorder, schizophrenia, HTN here with altered mental status      rapid response called at 11am 6.28.21 after he was found on floor, hx Seizure, was on keppra 750 bid, but has been refused meds.  + VSS wnl  EKG NSR stable neg STEMI QTc <499 neg heart block   Pending CPK trop mg ca cmp cbc CXR (asp pna?)  start Keppra 1000 bid, CT, EEG, Benzo prn, IVF, neuro,   Pt refused treatment       #Dementia with psychotic features, stable   -continue with current medication regimen as per Psych,   EKG QTc <449  -IM haldol for severe agitation/code greys   -No contraindication to discharge as per psych consult.  - cont support care    #tinea pedis --Resolved s/p clotrimazole cream    #HTN -- norvasc 10mg + enalapril 10 mg    #Seizure d/o -- Divalproex 500mg bid, gabapentin 100mg tid,  Keppra 750 bid    #DVT ppx -- cont  Code -- Full  Dispo --DC pending case mgmt/SW; pt is placement issue, citizenship, court ordered treatment against objection. guardian to be appointed

## 2021-06-30 PROCEDURE — 95816 EEG AWAKE AND DROWSY: CPT | Mod: 26

## 2021-06-30 PROCEDURE — 99232 SBSQ HOSP IP/OBS MODERATE 35: CPT

## 2021-06-30 NOTE — PROGRESS NOTE ADULT - SUBJECTIVE AND OBJECTIVE BOX
PARVIZ TORRES  61y, Male  Allergy: No Known Allergies      OVERNIGHT EVENTS:      Normal EEG 6.29.21,   Pt is returned to his baseline, refuses treatment and Labs  NAEO  Pt refused PO and IV meds,   rapid response called at 11am 6.28.21 after he was found on floor, hx Seizure, was on keppra 750 bid,   + VSS wnl  EKG NSR stable neg STEMI QTc <499 neg heart block   CXR: unremarkable         PHYSICAL EXAM:  refused    VITALS:  T(F): --  HR: --  BP: --  RR: --  SpO2: --    TESTS & MEASUREMENTS:      06-28-21 @ 07:01  -  06-29-21 @ 07:00  --------------------------------------------------------  IN: 0 mL / OUT: 900 mL / NET: -900 mL                                MEDICATIONS:  MEDICATIONS  (STANDING):  amLODIPine   Tablet 10 milliGRAM(s) Oral daily  AQUAPHOR (petrolatum Ointment) 1 Application(s) Topical daily  chlorhexidine 4% Liquid 1 Application(s) Topical <User Schedule>  diVALproex  milliGRAM(s) Oral two times a day  enalapril 10 milliGRAM(s) Oral daily  enoxaparin Injectable 40 milliGRAM(s) SubCutaneous daily  gabapentin 100 milliGRAM(s) Oral three times a day  levETIRAcetam  IVPB 1000 milliGRAM(s) IV Intermittent every 12 hours  OLANZapine 5 milliGRAM(s) Oral daily  senna 2 Tablet(s) Oral at bedtime  sodium chloride 0.9%. 1000 milliLiter(s) (100 mL/Hr) IV Continuous <Continuous>    MEDICATIONS  (PRN):  cloNIDine 0.1 milliGRAM(s) Oral every 8 hours PRN htn  haloperidol    Injectable 2 milliGRAM(s) IntraMuscular every 6 hours PRN agitation  haloperidol    Injectable 0.5 milliGRAM(s) IntraMuscular once PRN Agitation  polyethylene glycol 3350 17 Gram(s) Oral two times a day PRN Constipation              HEALTH ISSUES - PROBLEM Dx:  Psychosis, unspecified psychosis type    Neurocognitive disorder    Schizophrenia, chronic condition

## 2021-07-01 PROCEDURE — 99232 SBSQ HOSP IP/OBS MODERATE 35: CPT

## 2021-07-01 NOTE — PROGRESS NOTE ADULT - ASSESSMENT
61M PMHx seizure disorder, schizophrenia, HTN here with altered mental status    rapid response called at 11am 6.28.21 after he was found on floor, hx Seizure, was on keppra 750 bid, but has been refused meds.  + VSS wnl  EKG NSR stable neg STEMI QTc <499 neg heart block   Pending CPK trop mg ca cmp cbc CXR (asp pna?)  start Keppra 1000 bid, CT, EEG, Benzo prn, IVF, neuro,   Pt refused treatment       #Dementia with psychotic features, stable   -continue with current medication regimen as per Psych,   EKG QTc <449  -IM haldol for severe agitation/code greys   -No contraindication to discharge as per psych consult.  - cont support care    #tinea pedis --Resolved s/p clotrimazole cream    #HTN -- norvasc 10mg + enalapril 10 mg    #Seizure d/o -- Divalproex 500mg bid, gabapentin 100mg tid,  Keppra 750 bid    #DVT ppx -- cont  Code -- Full  Dispo --DC pending case mgmt/SW; pt is placement issue, citizenship, court ordered treatment against objection. guardian to be appointed

## 2021-07-01 NOTE — CHART NOTE - NSCHARTNOTEFT_GEN_A_CORE
Limited reassessment    MEDICATIONS  (STANDING):  amLODIPine   Tablet 10 milliGRAM(s) Oral daily  AQUAPHOR (petrolatum Ointment) 1 Application(s) Topical daily  chlorhexidine 4% Liquid 1 Application(s) Topical <User Schedule>  diVALproex  milliGRAM(s) Oral two times a day  enalapril 10 milliGRAM(s) Oral daily  enoxaparin Injectable 40 milliGRAM(s) SubCutaneous daily  gabapentin 100 milliGRAM(s) Oral three times a day  levETIRAcetam  IVPB 1000 milliGRAM(s) IV Intermittent every 12 hours  OLANZapine 5 milliGRAM(s) Oral daily  senna 2 Tablet(s) Oral at bedtime  sodium chloride 0.9%. 1000 milliLiter(s) (100 mL/Hr) IV Continuous <Continuous>    MEDICATIONS  (PRN):  cloNIDine 0.1 milliGRAM(s) Oral every 8 hours PRN htn  haloperidol    Injectable 2 milliGRAM(s) IntraMuscular every 6 hours PRN agitation  haloperidol    Injectable 0.5 milliGRAM(s) IntraMuscular once PRN Agitation  polyethylene glycol 3350 17 Gram(s) Oral two times a day PRN Constipation    (6/28) RBC 4.34, Hgb 13.5, POC glucose 103, glucose 116, Ca 10.4    No edema noted; skin WDL (6/30)     No new wts    Diet order: DASH/TLC, ensure clear q24h    Pt is a social admission pending placement; pt was a rapid response on 6/28 r/t fell on the floor + foaming out of the mouth (hx of seizures) + has been refusing seizure meds throughout the duration of admission. Neuro following-- sp IV keppra. CTH showed chronic appearing L basal ganglia lacunar infarct.   Pt eating well throughout the duration of LOS most of the time. PO intake ranges 0-100% per EMR. No GI s/s of note. There are no further nutrition interventions at this time. Pt remains not at risk, RD to f/u within the next 7 days.

## 2021-07-01 NOTE — PROGRESS NOTE ADULT - SUBJECTIVE AND OBJECTIVE BOX
Patient is a 61y old  Male who presents with a chief complaint of mental health marco (30 Jun 2021 16:34)      SUBJECTIVE / OVERNIGHT EVENTS: Pt denies any complaints. Writes bibles verses and the alphabet on the white board and loose paper. Finished every part of breakfast.   ADDITIONAL REVIEW OF SYSTEMS:  no complaints      MEDICATIONS  (STANDING):  amLODIPine   Tablet 10 milliGRAM(s) Oral daily  AQUAPHOR (petrolatum Ointment) 1 Application(s) Topical daily  chlorhexidine 4% Liquid 1 Application(s) Topical <User Schedule>  diVALproex  milliGRAM(s) Oral two times a day  enalapril 10 milliGRAM(s) Oral daily  enoxaparin Injectable 40 milliGRAM(s) SubCutaneous daily  gabapentin 100 milliGRAM(s) Oral three times a day  levETIRAcetam  IVPB 1000 milliGRAM(s) IV Intermittent every 12 hours  OLANZapine 5 milliGRAM(s) Oral daily  senna 2 Tablet(s) Oral at bedtime  sodium chloride 0.9%. 1000 milliLiter(s) (100 mL/Hr) IV Continuous <Continuous>    MEDICATIONS  (PRN):  cloNIDine 0.1 milliGRAM(s) Oral every 8 hours PRN htn  haloperidol    Injectable 2 milliGRAM(s) IntraMuscular every 6 hours PRN agitation  haloperidol    Injectable 0.5 milliGRAM(s) IntraMuscular once PRN Agitation  polyethylene glycol 3350 17 Gram(s) Oral two times a day PRN Constipation      CAPILLARY BLOOD GLUCOSE        I&O's Summary      PHYSICAL EXAM:  CONSTITUTIONAL: thin  EYES: PERRLA; conjunctiva and sclera clear  ENMT: Moist oral mucosa, no pharyngeal injection or exudates  NECK: Supple, no palpable masses; no thyromegaly  RESPIRATORY: Normal respiratory effort; lungs are clear to auscultation bilaterally  CARDIOVASCULAR: Regular rate and rhythm, normal S1 and S2, no murmur/rub/gallop; No lower extremity edema; Peripheral pulses are 2+ bilaterally  ABDOMEN: Nontender to palpation, normoactive bowel sounds, no rebound/guarding; No hepatosplenomegaly  MUSCULOSKELETAL:  Normal gait; no clubbing or cyanosis of digits; no joint swelling or tenderness to palpation  PSYCH: A+O to person, place, and time; affect appropriate  NEUROLOGY: psychotic features  SKIN: No rashes; no palpable lesions        RADIOLOGY & ADDITIONAL TESTS:  Results Reviewed:   Imaging Personally Reviewed:  Electrocardiogram Personally Reviewed:    COORDINATION OF CARE:  Care Discussed with Consultants/Other Providers [Y/N]:  Prior or Outpatient Records Reviewed [Y/N]:

## 2021-07-02 PROCEDURE — 99232 SBSQ HOSP IP/OBS MODERATE 35: CPT

## 2021-07-02 NOTE — PROGRESS NOTE ADULT - SUBJECTIVE AND OBJECTIVE BOX
Patient is a 61y old  Male who presents with a chief complaint of mental health marco (30 Jun 2021 16:34)      SUBJECTIVE / OVERNIGHT EVENTS: Pt denies any complaints. Slept well. Finished every part of breakfast.   ADDITIONAL REVIEW OF SYSTEMS:  no complaints      MEDICATIONS  (STANDING):  amLODIPine   Tablet 10 milliGRAM(s) Oral daily  AQUAPHOR (petrolatum Ointment) 1 Application(s) Topical daily  chlorhexidine 4% Liquid 1 Application(s) Topical <User Schedule>  diVALproex  milliGRAM(s) Oral two times a day  enalapril 10 milliGRAM(s) Oral daily  enoxaparin Injectable 40 milliGRAM(s) SubCutaneous daily  gabapentin 100 milliGRAM(s) Oral three times a day  levETIRAcetam  IVPB 1000 milliGRAM(s) IV Intermittent every 12 hours  OLANZapine 5 milliGRAM(s) Oral daily  senna 2 Tablet(s) Oral at bedtime  sodium chloride 0.9%. 1000 milliLiter(s) (100 mL/Hr) IV Continuous <Continuous>    MEDICATIONS  (PRN):  cloNIDine 0.1 milliGRAM(s) Oral every 8 hours PRN htn  haloperidol    Injectable 2 milliGRAM(s) IntraMuscular every 6 hours PRN agitation  haloperidol    Injectable 0.5 milliGRAM(s) IntraMuscular once PRN Agitation  polyethylene glycol 3350 17 Gram(s) Oral two times a day PRN Constipation      CAPILLARY BLOOD GLUCOSE        I&O's Summary      PHYSICAL EXAM:  CONSTITUTIONAL: thin  EYES: PERRLA; conjunctiva and sclera clear  ENMT: Moist oral mucosa, no pharyngeal injection or exudates  NECK: Supple, no palpable masses; no thyromegaly  RESPIRATORY: Normal respiratory effort; lungs are clear to auscultation bilaterally  CARDIOVASCULAR: Regular rate and rhythm, normal S1 and S2, no murmur/rub/gallop; No lower extremity edema; Peripheral pulses are 2+ bilaterally  ABDOMEN: Nontender to palpation, normoactive bowel sounds, no rebound/guarding; No hepatosplenomegaly  MUSCULOSKELETAL:  Normal gait; no clubbing or cyanosis of digits; no joint swelling or tenderness to palpation  PSYCH: A+O to person, place, and time; affect appropriate  NEUROLOGY: psychotic features  SKIN: No rashes; no palpable lesions        RADIOLOGY & ADDITIONAL TESTS:  Results Reviewed:   Imaging Personally Reviewed:  Electrocardiogram Personally Reviewed:    COORDINATION OF CARE:  Care Discussed with Consultants/Other Providers [Y/N]:  Prior or Outpatient Records Reviewed [Y/N]:

## 2021-07-03 PROCEDURE — 99231 SBSQ HOSP IP/OBS SF/LOW 25: CPT

## 2021-07-03 NOTE — PROGRESS NOTE ADULT - SUBJECTIVE AND OBJECTIVE BOX
Patient is a 61y old  Male who presents with a chief complaint of mental health marco (02 Jul 2021 07:25)      Patient seen and examined at bedside.  Patient has no complaints.    ALLERGIES:  No Known Allergies    MEDICATIONS:  amLODIPine   Tablet 10 milliGRAM(s) Oral daily  AQUAPHOR (petrolatum Ointment) 1 Application(s) Topical daily  chlorhexidine 4% Liquid 1 Application(s) Topical <User Schedule>  cloNIDine 0.1 milliGRAM(s) Oral every 8 hours PRN  diVALproex  milliGRAM(s) Oral two times a day  enalapril 10 milliGRAM(s) Oral daily  enoxaparin Injectable 40 milliGRAM(s) SubCutaneous daily  gabapentin 100 milliGRAM(s) Oral three times a day  haloperidol    Injectable 2 milliGRAM(s) IntraMuscular every 6 hours PRN  haloperidol    Injectable 0.5 milliGRAM(s) IntraMuscular once PRN  levETIRAcetam  IVPB 1000 milliGRAM(s) IV Intermittent every 12 hours  OLANZapine 5 milliGRAM(s) Oral daily  polyethylene glycol 3350 17 Gram(s) Oral two times a day PRN  senna 2 Tablet(s) Oral at bedtime  sodium chloride 0.9%. 1000 milliLiter(s) IV Continuous <Continuous>    Vital Signs Last 24 Hrs  T(F): --  HR: --  BP: --  RR: --  SpO2: --  I&O's Summary      PHYSICAL EXAM:  General: NAD  ENT: MMM  Neck: Supple, No JVD  Lungs: Clear to auscultation bilaterally  Cardio: RRR, S1/S2, No murmurs  Abdomen: Soft, Nontender, Nondistended; Bowel sounds present  Extremities: No cyanosis, No edema        RADIOLOGY & ADDITIONAL TESTS:    Care Discussed with Consultants/Other Providers:

## 2021-07-04 PROCEDURE — 99232 SBSQ HOSP IP/OBS MODERATE 35: CPT

## 2021-07-04 RX ORDER — LEVETIRACETAM 250 MG/1
1000 TABLET, FILM COATED ORAL
Refills: 0 | Status: DISCONTINUED | OUTPATIENT
Start: 2021-07-04 | End: 2021-09-30

## 2021-07-04 RX ADMIN — LEVETIRACETAM 1000 MILLIGRAM(S): 250 TABLET, FILM COATED ORAL at 17:13

## 2021-07-04 RX ADMIN — Medication 1 APPLICATION(S): at 13:10

## 2021-07-04 RX ADMIN — DIVALPROEX SODIUM 500 MILLIGRAM(S): 500 TABLET, DELAYED RELEASE ORAL at 17:13

## 2021-07-04 NOTE — PROGRESS NOTE ADULT - ASSESSMENT
61M PMHx seizure disorder, schizophrenia, HTN here with altered mental status    rapid response called at 11am 6.28.21 after he was found on floor, hx Seizure, was on keppra 750 bid, but has been refused meds.  + VSS wnl  EKG NSR stable neg STEMI QTc <499 neg heart block   Pending CPK trop mg ca cmp cbc CXR (asp pna?)  Keppra 1000 bid  CT, EEG, Benzo prn, IVF, neuro,   Pt refused treatment       #Dementia with psychotic features, stable   -continue with current medication regimen as per Psych,   EKG QTc <449  -IM haldol for severe agitation/code greys   -No contraindication to discharge as per psych consult.  - cont support care    #tinea pedis --Resolved s/p clotrimazole cream    #HTN -- norvasc 10mg + enalapril 10 mg    #Seizure d/o -- Divalproex 500mg bid, gabapentin 100mg tid,  Keppra 750 bid    #DVT ppx -- cont  Code -- Full  Dispo --DC pending case mgmt/SW; pt is placement issue, citizenship, court ordered treatment against objection. guardian to be appointed

## 2021-07-04 NOTE — CHART NOTE - NSCHARTNOTEFT_GEN_A_CORE
Pt supposed to be on keppra for seizure control, but he is non-compliant with all medications. Pt was rapid response for seizure last week and IV keppra was ordered 2/2 his non-compliance. Now w/o IV access and refusing new IV. Will transition back to PO keppra. Of note, dose was increased from 750mg to 1000mg.

## 2021-07-05 PROCEDURE — 99232 SBSQ HOSP IP/OBS MODERATE 35: CPT

## 2021-07-05 NOTE — PROGRESS NOTE ADULT - ASSESSMENT
61M PMHx seizure disorder, schizophrenia, HTN here with altered mental status.    #seizures  - currently on keppra 1000mg bid  - on/off non-compliant with medications  - EEG noted epileptic activity  - CT, Benzo prn, IVF, neuro,       #Dementia with psychotic features, stable   -continue with current medication regimen as per Psych  - EKG QTc <449  -IM haldol for severe agitation/code greys   -No contraindication to discharge as per psych consult.  - cont support care    #tinea pedis   -Resolved s/p clotrimazole cream    #HTN   - norvasc 10mg + enalapril 10 mg    #Seizure d/o   - Divalproex 500mg bid, gabapentin 100mg tid, Keppra 1000 bid    #DVT ppx -- cont  Code -- Full  Dispo --DC pending case mgmt/SW; pt is placement issue, citizenship, court ordered treatment against objection. guardian to be appointed

## 2021-07-05 NOTE — PROGRESS NOTE ADULT - SUBJECTIVE AND OBJECTIVE BOX
Patient is a 61y old  Male who presents with a chief complaint of mental health marco (03 Jul 2021 17:19)      Patient seen and examined at bedside. Pt was refusing keppra yest but finally took it    ALLERGIES:  No Known Allergies    MEDICATIONS:  amLODIPine   Tablet 10 milliGRAM(s) Oral daily  AQUAPHOR (petrolatum Ointment) 1 Application(s) Topical daily  chlorhexidine 4% Liquid 1 Application(s) Topical <User Schedule>  cloNIDine 0.1 milliGRAM(s) Oral every 8 hours PRN  diVALproex  milliGRAM(s) Oral two times a day  enalapril 10 milliGRAM(s) Oral daily  enoxaparin Injectable 40 milliGRAM(s) SubCutaneous daily  gabapentin 100 milliGRAM(s) Oral three times a day  haloperidol    Injectable 2 milliGRAM(s) IntraMuscular every 6 hours PRN  haloperidol    Injectable 0.5 milliGRAM(s) IntraMuscular once PRN  levETIRAcetam 1000 milliGRAM(s) Oral two times a day  OLANZapine 5 milliGRAM(s) Oral daily  polyethylene glycol 3350 17 Gram(s) Oral two times a day PRN  senna 2 Tablet(s) Oral at bedtime  sodium chloride 0.9%. 1000 milliLiter(s) IV Continuous <Continuous>        PHYSICAL EXAM:  General: NAD  ENT: MMM  Neck: Supple, No JVD  Lungs: Clear to auscultation bilaterally  Cardio: RRR, S1/S2, No murmurs  Abdomen: Soft, Nontender, Nondistended; Bowel sounds present  Extremities: No cyanosis, No edema

## 2021-07-06 PROCEDURE — 99232 SBSQ HOSP IP/OBS MODERATE 35: CPT

## 2021-07-06 NOTE — PROGRESS NOTE ADULT - SUBJECTIVE AND OBJECTIVE BOX
Patient is a 61y old  Male who presents with a chief complaint of mental health marco (03 Jul 2021 17:19)      Patient seen and examined at bedside. No complaints.     ALLERGIES:  No Known Allergies    MEDICATIONS:  amLODIPine   Tablet 10 milliGRAM(s) Oral daily  AQUAPHOR (petrolatum Ointment) 1 Application(s) Topical daily  chlorhexidine 4% Liquid 1 Application(s) Topical <User Schedule>  cloNIDine 0.1 milliGRAM(s) Oral every 8 hours PRN  diVALproex  milliGRAM(s) Oral two times a day  enalapril 10 milliGRAM(s) Oral daily  enoxaparin Injectable 40 milliGRAM(s) SubCutaneous daily  gabapentin 100 milliGRAM(s) Oral three times a day  haloperidol    Injectable 2 milliGRAM(s) IntraMuscular every 6 hours PRN  haloperidol    Injectable 0.5 milliGRAM(s) IntraMuscular once PRN  levETIRAcetam 1000 milliGRAM(s) Oral two times a day  OLANZapine 5 milliGRAM(s) Oral daily  polyethylene glycol 3350 17 Gram(s) Oral two times a day PRN  senna 2 Tablet(s) Oral at bedtime  sodium chloride 0.9%. 1000 milliLiter(s) IV Continuous <Continuous>        PHYSICAL EXAM:  General: NAD  ENT: MMM  Neck: Supple, No JVD  Lungs: Clear to auscultation bilaterally  Cardio: RRR, S1/S2, No murmurs  Abdomen: Soft, Nontender, Nondistended; Bowel sounds present  Extremities: No cyanosis, No edema

## 2021-07-07 PROCEDURE — 99232 SBSQ HOSP IP/OBS MODERATE 35: CPT

## 2021-07-07 NOTE — PROGRESS NOTE ADULT - ASSESSMENT
61M PMHx seizure disorder, schizophrenia, HTN here with altered mental status.    #seizures  - currently on keppra 1000mg bid  - on/off non-compliant with medications  - EEG noted epileptic activity    #Dementia with psychotic features, stable   -continue with current medication regimen as per Psych  - EKG QTc <449  -IM haldol for severe agitation/code greys   -No contraindication to discharge as per psych consult.  - cont support care    #tinea pedis   -Resolved s/p clotrimazole cream    #HTN   - norvasc 10mg + enalapril 10 mg    #Seizure d/o   - Divalproex 500mg bid, gabapentin 100mg tid, Keppra 1000 bid    #DVT ppx -- cont  Code -- Full  Dispo --DC pending case mgmt/SW; pt is placement issue, citizenship, court ordered treatment against objection. guardian to be appointed

## 2021-07-07 NOTE — PROGRESS NOTE ADULT - REASON FOR ADMISSION
Nutrition Follow Up Note  Patient seen for: Nutritional follow up     Source: RN, medical record, CTU team.     Chart reviewed, events noted: 83M PMH: HTN, HLD, Hiatal Hernia/GERD, OA post Lt TKR x1 year ago. P/w concern for increasing redness of left leg and swelling for 2 months. Admitted for likely acute CHF with elevated troponin / NSTEMI.   Pt POD 7 from CABG x3. Post op course c/b Afib. Pt s/p LISA and DCCV    Diet: Consistent CHO, Ensure enlive x2 daily     Patient reports:   Pt seen out of bed to chair. Pt reports a good PO intake post op. Pt has been drinking Ensure Enlive 2x daily, prefers strawberry flavor.   Pt was amenable to education regarding increased nutrient needs to promote post op wound healing. Protein rich foods reviewed and encouraged.     Pt verbalized understanding. Heart healthy diet education reviewed as well.   Pt denies any GI distress at this time.      Daily Weight in k.4 (-02), Weight in k.5 (11-30), Weight in k.5 (11-27), Weight in k.5 (11-26)  % Weight Change    Pertinent Medications: MEDICATIONS  (STANDING):  aMIOdarone    Tablet 400 milliGRAM(s) Oral every 8 hours  apixaban 5 milliGRAM(s) Oral every 12 hours  aspirin enteric coated 81 milliGRAM(s) Oral daily  chlorhexidine 2% Cloths 1 Application(s) Topical <User Schedule>  furosemide   Injectable 20 milliGRAM(s) IV Push every 8 hours  insulin lispro (ADMELOG) corrective regimen sliding scale   SubCutaneous three times a day before meals  pantoprazole    Tablet 40 milliGRAM(s) Oral before breakfast  polyethylene glycol 3350 17 Gram(s) Oral daily    MEDICATIONS  (PRN):  oxyCODONE    IR 5 milliGRAM(s) Oral every 4 hours PRN Moderate Pain (4 - 6)  oxyCODONE    IR 10 milliGRAM(s) Oral every 4 hours PRN Severe Pain (7 - 10)    Pertinent Labs:  @ 00:17: Na 134<L>, BUN 19, Cr 0.90, <H>, K+ 4.0, Phos 1.9<L>, Mg 2.0, Alk Phos 112, ALT/SGPT 489<H>, AST/SGOT 117<H>, HbA1c --    Finger Sticks:      Skin per nursing documentation: intact  Edema: +1 generalized and +2 loi foot edema     Estimated Needs:   [ X] no change since previous assessment: based on pre op standing weight: 69.3kg   Estimated Energy Needs:  · 25-30(manjula/kg)	  · Calculated From (manjula/kg)	1732   · Calculated To (manjula/kg)	2079      Estimated Protein Needs:  · 1.2-1.4 (g pro/kg)	  · Calculated From (g/kg)	83.16   · Calculated To (g/kg)	97.02     Previous Nutrition Diagnosis: Altered nutrition lab values   Nutrition Diagnosis is: deferred at this time    New Nutrition Diagnosis: increased nutrient needs  Related to:  post op wound healing    As evidenced by: POD 7 from sternotomy.       Interventions:     Recommend  1. Change diet to Consistent CHO, Low Na diet   2. Continue Ensure enlive x2 daily   3. Provide food preferences as requested by Pt/family within diet restrictions    4. Encourage PO intake during meal times   5. Increased nutrient needs, and heart healthy education reviewed  6. Trend BG levels, renal indices, LFT's, electrolytes    Monitoring and Evaluation:   Continue to monitor Nutritional intake, Tolerance to diet prescription, weights, labs, skin integrity  RD remains available upon request and will follow up per protocol  Cleo Eddy RD, CDN, [facility] Pager #[phone] mental health marco

## 2021-07-07 NOTE — PROGRESS NOTE ADULT - SUBJECTIVE AND OBJECTIVE BOX
PARVIZ TORRES  61y, Male  Allergy: No Known Allergies    Hospital Day: 305d    Patient seen and examined earlier today. No acute events overnight.    PMH/PSH:  PAST MEDICAL & SURGICAL HISTORY:  No pertinent past medical history  unknown    No significant past surgical history  unknown        VITALS:  T(F): --  HR: --  BP: --  RR: --  SpO2: --    TESTS & MEASUREMENTS:  Weight (Kg):                             RADIOLOGY & ADDITIONAL TESTS:    RECENT DIAGNOSTIC ORDERS:      MEDICATIONS:  MEDICATIONS  (STANDING):  amLODIPine   Tablet 10 milliGRAM(s) Oral daily  AQUAPHOR (petrolatum Ointment) 1 Application(s) Topical daily  chlorhexidine 4% Liquid 1 Application(s) Topical <User Schedule>  diVALproex  milliGRAM(s) Oral two times a day  enalapril 10 milliGRAM(s) Oral daily  enoxaparin Injectable 40 milliGRAM(s) SubCutaneous daily  gabapentin 100 milliGRAM(s) Oral three times a day  levETIRAcetam 1000 milliGRAM(s) Oral two times a day  OLANZapine 5 milliGRAM(s) Oral daily  senna 2 Tablet(s) Oral at bedtime    MEDICATIONS  (PRN):  cloNIDine 0.1 milliGRAM(s) Oral every 8 hours PRN htn  haloperidol    Injectable 2 milliGRAM(s) IntraMuscular every 6 hours PRN agitation  haloperidol    Injectable 0.5 milliGRAM(s) IntraMuscular once PRN Agitation  polyethylene glycol 3350 17 Gram(s) Oral two times a day PRN Constipation      HOME MEDICATIONS:  amLODIPine 5 mg oral tablet (09-17)  divalproex sodium 500 mg oral delayed release tablet (09-17)  levETIRAcetam 750 mg oral tablet (09-17)  OLANZapine 5 mg oral tablet (09-17)      REVIEW OF SYSTEMS:  All other review of systems is negative unless indicated above.     PHYSICAL EXAM:  GENERAL: NAD  HEENT: No Swelling  CHEST/LUNG: Good air entry, No wheezing  HEART: RRR, No murmurs  ABDOMEN: Soft, Bowel sounds present  EXTREMITIES:  No clubbing

## 2021-07-08 PROCEDURE — 99232 SBSQ HOSP IP/OBS MODERATE 35: CPT

## 2021-07-08 NOTE — PROGRESS NOTE ADULT - SUBJECTIVE AND OBJECTIVE BOX
PARVIZ TORRES  61y, Male  Allergy: No Known Allergies    Hospital Day: 306d    Patient seen and examined earlier today. No acute events overnight.    PMH/PSH:  PAST MEDICAL & SURGICAL HISTORY:  No pertinent past medical history  unknown    No significant past surgical history  unknown    VITALS:  T(F): --  HR: --  BP: 166/78 (07-07-21 @ 22:00) (166/78 - 166/78)  RR: --  SpO2: --    TESTS & MEASUREMENTS:  Weight (Kg):     MEDICATIONS:  MEDICATIONS  (STANDING):  amLODIPine   Tablet 10 milliGRAM(s) Oral daily  AQUAPHOR (petrolatum Ointment) 1 Application(s) Topical daily  chlorhexidine 4% Liquid 1 Application(s) Topical <User Schedule>  diVALproex  milliGRAM(s) Oral two times a day  enalapril 10 milliGRAM(s) Oral daily  enoxaparin Injectable 40 milliGRAM(s) SubCutaneous daily  gabapentin 100 milliGRAM(s) Oral three times a day  levETIRAcetam 1000 milliGRAM(s) Oral two times a day  OLANZapine 5 milliGRAM(s) Oral daily  senna 2 Tablet(s) Oral at bedtime    MEDICATIONS  (PRN):  cloNIDine 0.1 milliGRAM(s) Oral every 8 hours PRN htn  haloperidol    Injectable 2 milliGRAM(s) IntraMuscular every 6 hours PRN agitation  haloperidol    Injectable 0.5 milliGRAM(s) IntraMuscular once PRN Agitation  polyethylene glycol 3350 17 Gram(s) Oral two times a day PRN Constipation    HOME MEDICATIONS:  amLODIPine 5 mg oral tablet (09-17)  divalproex sodium 500 mg oral delayed release tablet (09-17)  levETIRAcetam 750 mg oral tablet (09-17)  OLANZapine 5 mg oral tablet (09-17)    REVIEW OF SYSTEMS:  All other review of systems is negative unless indicated above.     PHYSICAL EXAM:  GENERAL: NAD  HEENT: No Swelling  CHEST/LUNG: Good air entry, No wheezing  HEART: RRR, No murmurs  ABDOMEN: Soft, Bowel sounds present  EXTREMITIES:  No clubbing

## 2021-07-08 NOTE — CHART NOTE - NSCHARTNOTEFT_GEN_A_CORE
Limited reassessment    MEDICATIONS  (STANDING):  amLODIPine   Tablet 10 milliGRAM(s) Oral daily  AQUAPHOR (petrolatum Ointment) 1 Application(s) Topical daily  chlorhexidine 4% Liquid 1 Application(s) Topical <User Schedule>  diVALproex  milliGRAM(s) Oral two times a day  enalapril 10 milliGRAM(s) Oral daily  enoxaparin Injectable 40 milliGRAM(s) SubCutaneous daily  gabapentin 100 milliGRAM(s) Oral three times a day  levETIRAcetam 1000 milliGRAM(s) Oral two times a day  OLANZapine 5 milliGRAM(s) Oral daily  senna 2 Tablet(s) Oral at bedtime    MEDICATIONS  (PRN):  cloNIDine 0.1 milliGRAM(s) Oral every 8 hours PRN htn  haloperidol    Injectable 2 milliGRAM(s) IntraMuscular every 6 hours PRN agitation  haloperidol    Injectable 0.5 milliGRAM(s) IntraMuscular once PRN Agitation  polyethylene glycol 3350 17 Gram(s) Oral two times a day PRN Constipation      (6/28) RBC 4.34, Hgb 13.5, POC glucose 103, glucose 116, Ca 10.4    No edema noted; skin WDL     No new wts    Diet order: DASH/TLC, ensure clear q24h    Pt is a social admission pending placement; pt was a rapid response on 6/28 r/t fell on the floor + has been refusing seizure meds throughout the duration of admission. Neuro following-- sp IV keppra. CTH showed chronic appearing L basal ganglia lacunar infarct.   Pt eating well throughout the duration of LOS most of the time. PO intake ranges 0-100% per EMR. No GI s/s of note. There are no further nutrition interventions at this time. Pt remains not at risk, RD to f/u within the next 7 days.

## 2021-07-09 PROCEDURE — 99232 SBSQ HOSP IP/OBS MODERATE 35: CPT

## 2021-07-09 NOTE — PROGRESS NOTE ADULT - SUBJECTIVE AND OBJECTIVE BOX
PARVIZ TORRES  61y, Male  Allergy: No Known Allergies    Hospital Day: 307d    Patient seen and examined earlier today. No acute events overnight.    PMH/PSH:  PAST MEDICAL & SURGICAL HISTORY:  No pertinent past medical history  unknown    No significant past surgical history  unknown    VITALS:  T(F): --  HR: --  BP: --  RR: --  SpO2: --    TESTS & MEASUREMENTS:  Weight (Kg):     MEDICATIONS:  MEDICATIONS  (STANDING):  amLODIPine   Tablet 10 milliGRAM(s) Oral daily  AQUAPHOR (petrolatum Ointment) 1 Application(s) Topical daily  chlorhexidine 4% Liquid 1 Application(s) Topical <User Schedule>  diVALproex  milliGRAM(s) Oral two times a day  enalapril 10 milliGRAM(s) Oral daily  enoxaparin Injectable 40 milliGRAM(s) SubCutaneous daily  gabapentin 100 milliGRAM(s) Oral three times a day  levETIRAcetam 1000 milliGRAM(s) Oral two times a day  OLANZapine 5 milliGRAM(s) Oral daily  senna 2 Tablet(s) Oral at bedtime    MEDICATIONS  (PRN):  cloNIDine 0.1 milliGRAM(s) Oral every 8 hours PRN htn  haloperidol    Injectable 2 milliGRAM(s) IntraMuscular every 6 hours PRN agitation  haloperidol    Injectable 0.5 milliGRAM(s) IntraMuscular once PRN Agitation  polyethylene glycol 3350 17 Gram(s) Oral two times a day PRN Constipation    HOME MEDICATIONS:  amLODIPine 5 mg oral tablet (09-17)  divalproex sodium 500 mg oral delayed release tablet (09-17)  levETIRAcetam 750 mg oral tablet (09-17)  OLANZapine 5 mg oral tablet (09-17)    REVIEW OF SYSTEMS:  All other review of systems is negative unless indicated above.     PHYSICAL EXAM:  GENERAL: NAD  HEENT: No Swelling  CHEST/LUNG: Good air entry, No wheezing  HEART: RRR, No murmurs  ABDOMEN: Soft, Bowel sounds present  EXTREMITIES:  No clubbing

## 2021-07-10 PROCEDURE — 99231 SBSQ HOSP IP/OBS SF/LOW 25: CPT

## 2021-07-10 NOTE — PROGRESS NOTE ADULT - SUBJECTIVE AND OBJECTIVE BOX
PARVIZ TORRES  61y, Male  Allergy: No Known Allergies    Hospital Day: 308d    Patient seen and examined earlier today. No acute events overnight.    PMH/PSH:  PAST MEDICAL & SURGICAL HISTORY:  No pertinent past medical history  unknown    No significant past surgical history  unknown    VITALS:  T(F): --  HR: --  BP: --  RR: --  SpO2: --    TESTS & MEASUREMENTS:  Weight (Kg):     MEDICATIONS:  MEDICATIONS  (STANDING):  amLODIPine   Tablet 10 milliGRAM(s) Oral daily  AQUAPHOR (petrolatum Ointment) 1 Application(s) Topical daily  chlorhexidine 4% Liquid 1 Application(s) Topical <User Schedule>  diVALproex  milliGRAM(s) Oral two times a day  enalapril 10 milliGRAM(s) Oral daily  enoxaparin Injectable 40 milliGRAM(s) SubCutaneous daily  gabapentin 100 milliGRAM(s) Oral three times a day  levETIRAcetam 1000 milliGRAM(s) Oral two times a day  OLANZapine 5 milliGRAM(s) Oral daily  senna 2 Tablet(s) Oral at bedtime    MEDICATIONS  (PRN):  cloNIDine 0.1 milliGRAM(s) Oral every 8 hours PRN htn  haloperidol    Injectable 2 milliGRAM(s) IntraMuscular every 6 hours PRN agitation  haloperidol    Injectable 0.5 milliGRAM(s) IntraMuscular once PRN Agitation  polyethylene glycol 3350 17 Gram(s) Oral two times a day PRN Constipation    HOME MEDICATIONS:  amLODIPine 5 mg oral tablet (09-17)  divalproex sodium 500 mg oral delayed release tablet (09-17)  levETIRAcetam 750 mg oral tablet (09-17)  OLANZapine 5 mg oral tablet (09-17)    REVIEW OF SYSTEMS:  All other review of systems is negative unless indicated above.     PHYSICAL EXAM:  GENERAL: NAD  HEENT: No Swelling  CHEST/LUNG: Good air entry, No wheezing  HEART: RRR, No murmurs  ABDOMEN: Soft, Bowel sounds present  EXTREMITIES:  No clubbing

## 2021-07-11 PROCEDURE — 99231 SBSQ HOSP IP/OBS SF/LOW 25: CPT

## 2021-07-11 NOTE — PROGRESS NOTE ADULT - SUBJECTIVE AND OBJECTIVE BOX
PARVIZ TORRES  61y, Male  Allergy: No Known Allergies    Hospital Day: 309d    Patient seen and examined earlier today. No acute events overnight.    PMH/PSH:  PAST MEDICAL & SURGICAL HISTORY:  No pertinent past medical history  unknown    No significant past surgical history  unknown    VITALS:  T(F): --  HR: --  BP: --  RR: --  SpO2: --    TESTS & MEASUREMENTS:  Weight (Kg):     MEDICATIONS:  MEDICATIONS  (STANDING):  amLODIPine   Tablet 10 milliGRAM(s) Oral daily  AQUAPHOR (petrolatum Ointment) 1 Application(s) Topical daily  chlorhexidine 4% Liquid 1 Application(s) Topical <User Schedule>  diVALproex  milliGRAM(s) Oral two times a day  enalapril 10 milliGRAM(s) Oral daily  enoxaparin Injectable 40 milliGRAM(s) SubCutaneous daily  gabapentin 100 milliGRAM(s) Oral three times a day  levETIRAcetam 1000 milliGRAM(s) Oral two times a day  OLANZapine 5 milliGRAM(s) Oral daily  senna 2 Tablet(s) Oral at bedtime    MEDICATIONS  (PRN):  cloNIDine 0.1 milliGRAM(s) Oral every 8 hours PRN htn  haloperidol    Injectable 2 milliGRAM(s) IntraMuscular every 6 hours PRN agitation  haloperidol    Injectable 0.5 milliGRAM(s) IntraMuscular once PRN Agitation  polyethylene glycol 3350 17 Gram(s) Oral two times a day PRN Constipation    HOME MEDICATIONS:  amLODIPine 5 mg oral tablet (09-17)  divalproex sodium 500 mg oral delayed release tablet (09-17)  levETIRAcetam 750 mg oral tablet (09-17)  OLANZapine 5 mg oral tablet (09-17)    REVIEW OF SYSTEMS:  All other review of systems is negative unless indicated above.     PHYSICAL EXAM:  GENERAL: NAD  HEENT: No Swelling  CHEST/LUNG: Good air entry, No wheezing  HEART: RRR, No murmurs  ABDOMEN: Soft, Bowel sounds present  EXTREMITIES:  No clubbing

## 2021-07-12 PROCEDURE — 99231 SBSQ HOSP IP/OBS SF/LOW 25: CPT

## 2021-07-12 NOTE — PROGRESS NOTE ADULT - SUBJECTIVE AND OBJECTIVE BOX
PARVIZ TORRES  61y, Male  Allergy: No Known Allergies    Hospital Day: 310d    Patient seen and examined earlier today. No acute events overnight.    PMH/PSH:  PAST MEDICAL & SURGICAL HISTORY:  No pertinent past medical history  unknown    No significant past surgical history  unknown    VITALS:  T(F): --  HR: --  BP: --  RR: --  SpO2: --    TESTS & MEASUREMENTS:  Weight (Kg):     MEDICATIONS:  MEDICATIONS  (STANDING):  amLODIPine   Tablet 10 milliGRAM(s) Oral daily  AQUAPHOR (petrolatum Ointment) 1 Application(s) Topical daily  chlorhexidine 4% Liquid 1 Application(s) Topical <User Schedule>  diVALproex  milliGRAM(s) Oral two times a day  enalapril 10 milliGRAM(s) Oral daily  enoxaparin Injectable 40 milliGRAM(s) SubCutaneous daily  gabapentin 100 milliGRAM(s) Oral three times a day  levETIRAcetam 1000 milliGRAM(s) Oral two times a day  OLANZapine 5 milliGRAM(s) Oral daily  senna 2 Tablet(s) Oral at bedtime    MEDICATIONS  (PRN):  cloNIDine 0.1 milliGRAM(s) Oral every 8 hours PRN htn  haloperidol    Injectable 2 milliGRAM(s) IntraMuscular every 6 hours PRN agitation  haloperidol    Injectable 0.5 milliGRAM(s) IntraMuscular once PRN Agitation  polyethylene glycol 3350 17 Gram(s) Oral two times a day PRN Constipation    HOME MEDICATIONS:  amLODIPine 5 mg oral tablet (09-17)  divalproex sodium 500 mg oral delayed release tablet (09-17)  levETIRAcetam 750 mg oral tablet (09-17)  OLANZapine 5 mg oral tablet (09-17)    REVIEW OF SYSTEMS:  All other review of systems is negative unless indicated above.     PHYSICAL EXAM:  GENERAL: NAD  HEENT: No Swelling  CHEST/LUNG: Good air entry, No wheezing  HEART: RRR, No murmurs  ABDOMEN: Soft, Bowel sounds present  EXTREMITIES:  No clubbing

## 2021-07-13 PROCEDURE — 99231 SBSQ HOSP IP/OBS SF/LOW 25: CPT

## 2021-07-13 NOTE — PROGRESS NOTE ADULT - SUBJECTIVE AND OBJECTIVE BOX
PARVIZ TORRES  61y, Male  Allergy: No Known Allergies    Hospital Day: 311d    Patient seen and examined earlier today. No acute events overnight.    PMH/PSH:  PAST MEDICAL & SURGICAL HISTORY:  No pertinent past medical history  unknown    No significant past surgical history  unknown    VITALS:  T(F): --  HR: --  BP: --  RR: --  SpO2: --    TESTS & MEASUREMENTS:  Weight (Kg):     MEDICATIONS:  MEDICATIONS  (STANDING):  amLODIPine   Tablet 10 milliGRAM(s) Oral daily  AQUAPHOR (petrolatum Ointment) 1 Application(s) Topical daily  chlorhexidine 4% Liquid 1 Application(s) Topical <User Schedule>  diVALproex  milliGRAM(s) Oral two times a day  enalapril 10 milliGRAM(s) Oral daily  enoxaparin Injectable 40 milliGRAM(s) SubCutaneous daily  gabapentin 100 milliGRAM(s) Oral three times a day  levETIRAcetam 1000 milliGRAM(s) Oral two times a day  OLANZapine 5 milliGRAM(s) Oral daily  senna 2 Tablet(s) Oral at bedtime    MEDICATIONS  (PRN):  cloNIDine 0.1 milliGRAM(s) Oral every 8 hours PRN htn  haloperidol    Injectable 2 milliGRAM(s) IntraMuscular every 6 hours PRN agitation  haloperidol    Injectable 0.5 milliGRAM(s) IntraMuscular once PRN Agitation  polyethylene glycol 3350 17 Gram(s) Oral two times a day PRN Constipation    HOME MEDICATIONS:  amLODIPine 5 mg oral tablet (09-17)  divalproex sodium 500 mg oral delayed release tablet (09-17)  levETIRAcetam 750 mg oral tablet (09-17)  OLANZapine 5 mg oral tablet (09-17)    REVIEW OF SYSTEMS:  All other review of systems is negative unless indicated above.     PHYSICAL EXAM:  GENERAL: NAD  HEENT: No Swelling  CHEST/LUNG: Good air entry, No wheezing  HEART: RRR, No murmurs  ABDOMEN: Soft, Bowel sounds present  EXTREMITIES:  No clubbing

## 2021-07-14 PROCEDURE — 99232 SBSQ HOSP IP/OBS MODERATE 35: CPT

## 2021-07-14 NOTE — PROGRESS NOTE ADULT - SUBJECTIVE AND OBJECTIVE BOX
CC.  Mental health marco  HPI.  Patient seen and examined earlier today. No acute events overnight.      Constitutional: No fever, fatigue or weight loss.  Skin: No rash.  Eyes: No recent vision problems or eye pain.  ENT: No congestion, ear pain, or sore throat.  Endocrine: No thyroid problems.  Cardiovascular: No chest pain or palpation.  Respiratory: No cough, shortness of breath, congestion, or wheezing.  Gastrointestinal: No abdominal pain, nausea, vomiting, or diarrhea.  Genitourinary: No dysuria.  Musculoskeletal: No joint swelling.  Neurologic: No headache.      Vital Signs Last 24 Hrs  T(C): --  T(F): --  HR: --  BP: --  BP(mean): --  RR: --  SpO2: --        PHYSICAL EXAM-  GENERAL: NAD,    HEAD:  Atraumatic, Normocephalic  EYES: EOMI, PERRLA, conjunctiva and sclera clear  NECK: Supple, No JVD, Normal thyroid  NERVOUS SYSTEM:  Alert Moving all extremities  CHEST/LUNG: Clear to percussion bilaterally; No rales, rhonchi, wheezing, or rubs  HEART: Regular rate and rhythm; No murmurs, rubs, or gallops  ABDOMEN: Soft, Nontender, Nondistended; Bowel sounds present  EXTREMITIES:    No clubbing, cyanosis, or edema  SKIN: No rashes or lesions          MEDICATIONS  (STANDING):  amLODIPine   Tablet 10 milliGRAM(s) Oral daily  AQUAPHOR (petrolatum Ointment) 1 Application(s) Topical daily  chlorhexidine 4% Liquid 1 Application(s) Topical <User Schedule>  diVALproex  milliGRAM(s) Oral two times a day  enalapril 10 milliGRAM(s) Oral daily  enoxaparin Injectable 40 milliGRAM(s) SubCutaneous daily  gabapentin 100 milliGRAM(s) Oral three times a day  levETIRAcetam 1000 milliGRAM(s) Oral two times a day  OLANZapine 5 milliGRAM(s) Oral daily  senna 2 Tablet(s) Oral at bedtime    MEDICATIONS  (PRN):  cloNIDine 0.1 milliGRAM(s) Oral every 8 hours PRN htn  haloperidol    Injectable 2 milliGRAM(s) IntraMuscular every 6 hours PRN agitation  haloperidol    Injectable 0.5 milliGRAM(s) IntraMuscular once PRN Agitation  polyethylene glycol 3350 17 Gram(s) Oral two times a day PRN Constipation        Imaging Personally Reviewed:     [x ] YES  [ ] NO    Consultant(s) Notes Reviewed:  [x ] YES  [ ] NO    Care Discussed with Consultants/Other Providers [x ] YES  [ ] No medical contraindication for discharge

## 2021-07-15 PROCEDURE — 99231 SBSQ HOSP IP/OBS SF/LOW 25: CPT

## 2021-07-15 NOTE — CHART NOTE - NSCHARTNOTEFT_GEN_A_CORE
Limited reassessment    MEDICATIONS  (STANDING):  amLODIPine   Tablet 10 milliGRAM(s) Oral daily  AQUAPHOR (petrolatum Ointment) 1 Application(s) Topical daily  chlorhexidine 4% Liquid 1 Application(s) Topical <User Schedule>  diVALproex  milliGRAM(s) Oral two times a day  enalapril 10 milliGRAM(s) Oral daily  enoxaparin Injectable 40 milliGRAM(s) SubCutaneous daily  gabapentin 100 milliGRAM(s) Oral three times a day  levETIRAcetam 1000 milliGRAM(s) Oral two times a day  OLANZapine 5 milliGRAM(s) Oral daily  senna 2 Tablet(s) Oral at bedtime    MEDICATIONS  (PRN):  cloNIDine 0.1 milliGRAM(s) Oral every 8 hours PRN htn  haloperidol    Injectable 2 milliGRAM(s) IntraMuscular every 6 hours PRN agitation  haloperidol    Injectable 0.5 milliGRAM(s) IntraMuscular once PRN Agitation  polyethylene glycol 3350 17 Gram(s) Oral two times a day PRN Constipation        (6/28) RBC 4.34, Hgb 13.5, POC glucose 103, glucose 116, Ca 10.4 -- no new labs     No edema noted; skin WDL     No new wts    Diet order: DASH/TLC, ensure clear q24h    Pt is a social admission pending placement; pt was a rapid response on 6/28 r/t fell on the floor + has been refusing seizure meds throughout the duration of admission. Neuro following-- sp IV keppra. CTH showed chronic appearing L basal ganglia lacunar infarct.   Pt eating well throughout the duration of LOS most of the time. PO intake ranges 0-100% per EMR. No GI s/s of note. There are no further nutrition interventions at this time. Pt remains not at risk, if RDN needed consult nutrition [phone]

## 2021-07-16 PROCEDURE — 99231 SBSQ HOSP IP/OBS SF/LOW 25: CPT

## 2021-07-17 PROCEDURE — 99231 SBSQ HOSP IP/OBS SF/LOW 25: CPT

## 2021-07-18 PROCEDURE — 99231 SBSQ HOSP IP/OBS SF/LOW 25: CPT

## 2021-07-19 PROCEDURE — 99231 SBSQ HOSP IP/OBS SF/LOW 25: CPT

## 2021-07-20 PROCEDURE — 99231 SBSQ HOSP IP/OBS SF/LOW 25: CPT

## 2021-07-21 PROCEDURE — 99231 SBSQ HOSP IP/OBS SF/LOW 25: CPT

## 2021-07-21 NOTE — PROGRESS NOTE ADULT - SUBJECTIVE AND OBJECTIVE BOX
PARVIZ TORRES  61y, Male  Allergy: No Known Allergies    Hospital Day: 319d    Patient seen and examined earlier today. No acute events overnight.    PMH/PSH:  PAST MEDICAL & SURGICAL HISTORY:  No pertinent past medical history  unknown    No significant past surgical history  unknown    VITALS:  T(F): --  HR: --  BP: --  RR: --  SpO2: --    TESTS & MEASUREMENTS:  Weight (Kg):     MEDICATIONS:  MEDICATIONS  (STANDING):  amLODIPine   Tablet 10 milliGRAM(s) Oral daily  AQUAPHOR (petrolatum Ointment) 1 Application(s) Topical daily  chlorhexidine 4% Liquid 1 Application(s) Topical <User Schedule>  diVALproex  milliGRAM(s) Oral two times a day  enalapril 10 milliGRAM(s) Oral daily  enoxaparin Injectable 40 milliGRAM(s) SubCutaneous daily  gabapentin 100 milliGRAM(s) Oral three times a day  levETIRAcetam 1000 milliGRAM(s) Oral two times a day  OLANZapine 5 milliGRAM(s) Oral daily  senna 2 Tablet(s) Oral at bedtime    MEDICATIONS  (PRN):  cloNIDine 0.1 milliGRAM(s) Oral every 8 hours PRN htn  haloperidol    Injectable 2 milliGRAM(s) IntraMuscular every 6 hours PRN agitation  haloperidol    Injectable 0.5 milliGRAM(s) IntraMuscular once PRN Agitation  polyethylene glycol 3350 17 Gram(s) Oral two times a day PRN Constipation    HOME MEDICATIONS:  amLODIPine 5 mg oral tablet (09-17)  divalproex sodium 500 mg oral delayed release tablet (09-17)  levETIRAcetam 750 mg oral tablet (09-17)  OLANZapine 5 mg oral tablet (09-17)    REVIEW OF SYSTEMS:  All other review of systems is negative unless indicated above.     PHYSICAL EXAM:  GENERAL: NAD  HEENT: No Swelling  CHEST/LUNG: Good air entry, No wheezing  HEART: RRR, No murmurs  ABDOMEN: Soft, Bowel sounds present  EXTREMITIES:  No clubbing

## 2021-07-22 PROCEDURE — 99231 SBSQ HOSP IP/OBS SF/LOW 25: CPT

## 2021-07-22 RX ADMIN — HALOPERIDOL DECANOATE 0.5 MILLIGRAM(S): 100 INJECTION INTRAMUSCULAR at 02:33

## 2021-07-22 RX ADMIN — OLANZAPINE 5 MILLIGRAM(S): 15 TABLET, FILM COATED ORAL at 13:22

## 2021-07-22 RX ADMIN — Medication 1 APPLICATION(S): at 12:40

## 2021-07-22 NOTE — PROGRESS NOTE ADULT - SUBJECTIVE AND OBJECTIVE BOX
PARVIZ TORRES  61y, Male  Allergy: No Known Allergies    Hospital Day: 320d    Patient seen and examined earlier today. No acute events overnight.    PMH/PSH:  PAST MEDICAL & SURGICAL HISTORY:  No pertinent past medical history  unknown    No significant past surgical history  unknown    VITALS:  T(F): --  HR: --  BP: --  RR: --  SpO2: --    TESTS & MEASUREMENTS:  Weight (Kg):     MEDICATIONS:  MEDICATIONS  (STANDING):  amLODIPine   Tablet 10 milliGRAM(s) Oral daily  AQUAPHOR (petrolatum Ointment) 1 Application(s) Topical daily  chlorhexidine 4% Liquid 1 Application(s) Topical <User Schedule>  diVALproex  milliGRAM(s) Oral two times a day  enalapril 10 milliGRAM(s) Oral daily  enoxaparin Injectable 40 milliGRAM(s) SubCutaneous daily  gabapentin 100 milliGRAM(s) Oral three times a day  levETIRAcetam 1000 milliGRAM(s) Oral two times a day  OLANZapine 5 milliGRAM(s) Oral daily  senna 2 Tablet(s) Oral at bedtime    MEDICATIONS  (PRN):  cloNIDine 0.1 milliGRAM(s) Oral every 8 hours PRN htn  haloperidol    Injectable 2 milliGRAM(s) IntraMuscular every 6 hours PRN agitation  polyethylene glycol 3350 17 Gram(s) Oral two times a day PRN Constipation    HOME MEDICATIONS:  amLODIPine 5 mg oral tablet (09-17)  divalproex sodium 500 mg oral delayed release tablet (09-17)  levETIRAcetam 750 mg oral tablet (09-17)  OLANZapine 5 mg oral tablet (09-17)    REVIEW OF SYSTEMS:  All other review of systems is negative unless indicated above.     PHYSICAL EXAM:  GENERAL: NAD  HEENT: No Swelling  CHEST/LUNG: Good air entry, No wheezing  HEART: RRR, No murmurs  ABDOMEN: Soft, Bowel sounds present  EXTREMITIES:  No clubbing

## 2021-07-23 PROCEDURE — 99231 SBSQ HOSP IP/OBS SF/LOW 25: CPT

## 2021-07-23 NOTE — PROGRESS NOTE ADULT - SUBJECTIVE AND OBJECTIVE BOX
PARVIZ TORRES  61y, Male  Allergy: No Known Allergies    Hospital Day: 321d    Patient seen and examined earlier today. No acute events overnight.    PMH/PSH:  PAST MEDICAL & SURGICAL HISTORY:  No pertinent past medical history  unknown    No significant past surgical history  unknown    VITALS:  T(F): --  HR: --  BP: --  RR: --  SpO2: --    TESTS & MEASUREMENTS:  Weight (Kg):       MEDICATIONS:  MEDICATIONS  (STANDING):  amLODIPine   Tablet 10 milliGRAM(s) Oral daily  AQUAPHOR (petrolatum Ointment) 1 Application(s) Topical daily  chlorhexidine 4% Liquid 1 Application(s) Topical <User Schedule>  diVALproex  milliGRAM(s) Oral two times a day  enalapril 10 milliGRAM(s) Oral daily  enoxaparin Injectable 40 milliGRAM(s) SubCutaneous daily  gabapentin 100 milliGRAM(s) Oral three times a day  levETIRAcetam 1000 milliGRAM(s) Oral two times a day  OLANZapine 5 milliGRAM(s) Oral daily  senna 2 Tablet(s) Oral at bedtime    MEDICATIONS  (PRN):  cloNIDine 0.1 milliGRAM(s) Oral every 8 hours PRN htn  haloperidol    Injectable 2 milliGRAM(s) IntraMuscular every 6 hours PRN agitation  polyethylene glycol 3350 17 Gram(s) Oral two times a day PRN Constipation    HOME MEDICATIONS:  amLODIPine 5 mg oral tablet (09-17)  divalproex sodium 500 mg oral delayed release tablet (09-17)  levETIRAcetam 750 mg oral tablet (09-17)  OLANZapine 5 mg oral tablet (09-17)    REVIEW OF SYSTEMS:  All other review of systems is negative unless indicated above.     PHYSICAL EXAM:  GENERAL: NAD  HEENT: No Swelling  CHEST/LUNG: Good air entry, No wheezing  HEART: RRR, No murmurs  ABDOMEN: Soft, Bowel sounds present  EXTREMITIES:  No clubbing

## 2021-07-24 PROCEDURE — 99231 SBSQ HOSP IP/OBS SF/LOW 25: CPT

## 2021-07-24 RX ADMIN — GABAPENTIN 100 MILLIGRAM(S): 400 CAPSULE ORAL at 16:52

## 2021-07-24 RX ADMIN — DIVALPROEX SODIUM 500 MILLIGRAM(S): 500 TABLET, DELAYED RELEASE ORAL at 18:10

## 2021-07-24 RX ADMIN — LEVETIRACETAM 1000 MILLIGRAM(S): 250 TABLET, FILM COATED ORAL at 18:11

## 2021-07-24 NOTE — PROGRESS NOTE ADULT - SUBJECTIVE AND OBJECTIVE BOX
PARVIZ TORRES  61y, Male  Allergy: No Known Allergies    Hospital Day: 322d    Patient seen and examined earlier today. No acute events overnight.    PMH/PSH:  PAST MEDICAL & SURGICAL HISTORY:  No pertinent past medical history  unknown    No significant past surgical history  unknown    VITALS:  T(F): --  HR: --  BP: --  RR: --  SpO2: --    TESTS & MEASUREMENTS:  Weight (Kg):     MEDICATIONS:  MEDICATIONS  (STANDING):  amLODIPine   Tablet 10 milliGRAM(s) Oral daily  AQUAPHOR (petrolatum Ointment) 1 Application(s) Topical daily  chlorhexidine 4% Liquid 1 Application(s) Topical <User Schedule>  diVALproex  milliGRAM(s) Oral two times a day  enalapril 10 milliGRAM(s) Oral daily  enoxaparin Injectable 40 milliGRAM(s) SubCutaneous daily  gabapentin 100 milliGRAM(s) Oral three times a day  levETIRAcetam 1000 milliGRAM(s) Oral two times a day  OLANZapine 5 milliGRAM(s) Oral daily  senna 2 Tablet(s) Oral at bedtime    MEDICATIONS  (PRN):  cloNIDine 0.1 milliGRAM(s) Oral every 8 hours PRN htn  haloperidol    Injectable 2 milliGRAM(s) IntraMuscular every 6 hours PRN agitation  polyethylene glycol 3350 17 Gram(s) Oral two times a day PRN Constipation    HOME MEDICATIONS:  amLODIPine 5 mg oral tablet (09-17)  divalproex sodium 500 mg oral delayed release tablet (09-17)  levETIRAcetam 750 mg oral tablet (09-17)  OLANZapine 5 mg oral tablet (09-17)    REVIEW OF SYSTEMS:  All other review of systems is negative unless indicated above.     PHYSICAL EXAM:  GENERAL: NAD  HEENT: No Swelling  CHEST/LUNG: Good air entry, No wheezing  HEART: RRR, No murmurs  ABDOMEN: Soft, Bowel sounds present  EXTREMITIES:  No clubbing

## 2021-07-25 PROCEDURE — 99231 SBSQ HOSP IP/OBS SF/LOW 25: CPT

## 2021-07-25 NOTE — PROGRESS NOTE ADULT - SUBJECTIVE AND OBJECTIVE BOX
PARVIZ TORRES  61y, Male  Allergy: No Known Allergies    Hospital Day: 323d    Patient seen and examined earlier today. No acute events overnight.    PMH/PSH:  PAST MEDICAL & SURGICAL HISTORY:  No pertinent past medical history  unknown    No significant past surgical history  unknown    VITALS:  T(F): --  HR: --  BP: --  RR: --  SpO2: --    TESTS & MEASUREMENTS:  Weight (Kg):     MEDICATIONS:  MEDICATIONS  (STANDING):  amLODIPine   Tablet 10 milliGRAM(s) Oral daily  AQUAPHOR (petrolatum Ointment) 1 Application(s) Topical daily  chlorhexidine 4% Liquid 1 Application(s) Topical <User Schedule>  diVALproex  milliGRAM(s) Oral two times a day  enalapril 10 milliGRAM(s) Oral daily  enoxaparin Injectable 40 milliGRAM(s) SubCutaneous daily  gabapentin 100 milliGRAM(s) Oral three times a day  levETIRAcetam 1000 milliGRAM(s) Oral two times a day  OLANZapine 5 milliGRAM(s) Oral daily  senna 2 Tablet(s) Oral at bedtime    MEDICATIONS  (PRN):  cloNIDine 0.1 milliGRAM(s) Oral every 8 hours PRN htn  haloperidol    Injectable 2 milliGRAM(s) IntraMuscular every 6 hours PRN agitation  polyethylene glycol 3350 17 Gram(s) Oral two times a day PRN Constipation    HOME MEDICATIONS:  amLODIPine 5 mg oral tablet (09-17)  divalproex sodium 500 mg oral delayed release tablet (09-17)  levETIRAcetam 750 mg oral tablet (09-17)  OLANZapine 5 mg oral tablet (09-17)    REVIEW OF SYSTEMS:  All other review of systems is negative unless indicated above.     PHYSICAL EXAM:  GENERAL: NAD  HEENT: No Swelling  CHEST/LUNG: Good air entry, No wheezing  HEART: RRR, No murmurs  ABDOMEN: Soft, Bowel sounds present  EXTREMITIES:  No clubbing

## 2021-07-26 PROCEDURE — 99231 SBSQ HOSP IP/OBS SF/LOW 25: CPT

## 2021-07-26 NOTE — PROGRESS NOTE ADULT - SUBJECTIVE AND OBJECTIVE BOX
PARVIZ TORRES  61y, Male  Allergy: No Known Allergies    Hospital Day: 324d    Patient seen and examined earlier today. No acute events overnight.    PMH/PSH:  PAST MEDICAL & SURGICAL HISTORY:  No pertinent past medical history  unknown    No significant past surgical history  unknown    VITALS:  T(F): --  HR: --  BP: --  RR: --  SpO2: --    TESTS & MEASUREMENTS:  Weight (Kg):     MEDICATIONS:  MEDICATIONS  (STANDING):  amLODIPine   Tablet 10 milliGRAM(s) Oral daily  AQUAPHOR (petrolatum Ointment) 1 Application(s) Topical daily  chlorhexidine 4% Liquid 1 Application(s) Topical <User Schedule>  diVALproex  milliGRAM(s) Oral two times a day  enalapril 10 milliGRAM(s) Oral daily  enoxaparin Injectable 40 milliGRAM(s) SubCutaneous daily  gabapentin 100 milliGRAM(s) Oral three times a day  levETIRAcetam 1000 milliGRAM(s) Oral two times a day  OLANZapine 5 milliGRAM(s) Oral daily  senna 2 Tablet(s) Oral at bedtime    MEDICATIONS  (PRN):  cloNIDine 0.1 milliGRAM(s) Oral every 8 hours PRN htn  haloperidol    Injectable 2 milliGRAM(s) IntraMuscular every 6 hours PRN agitation  polyethylene glycol 3350 17 Gram(s) Oral two times a day PRN Constipation    HOME MEDICATIONS:  amLODIPine 5 mg oral tablet (09-17)  divalproex sodium 500 mg oral delayed release tablet (09-17)  levETIRAcetam 750 mg oral tablet (09-17)  OLANZapine 5 mg oral tablet (09-17)    REVIEW OF SYSTEMS:  All other review of systems is negative unless indicated above.     PHYSICAL EXAM:  GENERAL: NAD  HEENT: No Swelling  CHEST/LUNG: Good air entry, No wheezing  HEART: RRR, No murmurs  ABDOMEN: Soft, Bowel sounds present  EXTREMITIES:  No clubbing

## 2021-07-26 NOTE — PROGRESS NOTE ADULT - REASON FOR ADMISSION
Aleksandra Rendon discharged to home accompanied by daughter.   Patient provided with the following educational materials upon discharge:  abd pain FYWB.   Valuables and belongings sent with patient.   discharge summary, discharge instructions, medications and follow up appointments reviewed with patient.  Patient verbalized understanding   mental health marco

## 2021-07-27 PROCEDURE — 99231 SBSQ HOSP IP/OBS SF/LOW 25: CPT

## 2021-07-27 NOTE — PROGRESS NOTE ADULT - SUBJECTIVE AND OBJECTIVE BOX
MELISSA PARVIZ  61y Male    CHIEF COMPLAINT:    Patient is a 61y old  Male who presents with a chief complaint of mental health marco (26 Jul 2021 10:56)      INTERVAL HPI/OVERNIGHT EVENTS:    Patient seen and examined. No acute events overnight. Ambulating around room     ROS: All other systems are negative.    Vital Signs:    T(F): --  HR: --  BP: --  RR: --  SpO2: --  I&O's Summary    PHYSICAL EXAM:    GENERAL:  NAD  SKIN: No rashes or lesions  HEENT: Atraumatic. Normocephalic.   NECK: Supple  PULMONARY: CTA B/L  CVS: Normal S1, S2. Rate and Rhythm are regular  ABDOMEN/GI: Soft, Nontender, Nondistended  MSK:  No clubbing or cyanosis   NEUROLOGIC: Moves all extremities  PSYCH: Awake and alert     Consultant(s) Notes Reviewed:  [x ] YES  [ ] NO  Care Discussed with Consultants/Other Providers [ x] YES  [ ] NO    LABS: --    RADIOLOGY & ADDITIONAL TESTS:--  Imaging or report Personally Reviewed:  [x] YES  [ ] NO  EKG reviewed: [x] YES  [ ] NO    Medications:  Standing  amLODIPine   Tablet 10 milliGRAM(s) Oral daily  AQUAPHOR (petrolatum Ointment) 1 Application(s) Topical daily  chlorhexidine 4% Liquid 1 Application(s) Topical <User Schedule>  diVALproex  milliGRAM(s) Oral two times a day  enalapril 10 milliGRAM(s) Oral daily  enoxaparin Injectable 40 milliGRAM(s) SubCutaneous daily  gabapentin 100 milliGRAM(s) Oral three times a day  levETIRAcetam 1000 milliGRAM(s) Oral two times a day  OLANZapine 5 milliGRAM(s) Oral daily  senna 2 Tablet(s) Oral at bedtime    PRN Meds  cloNIDine 0.1 milliGRAM(s) Oral every 8 hours PRN  haloperidol    Injectable 2 milliGRAM(s) IntraMuscular every 6 hours PRN  polyethylene glycol 3350 17 Gram(s) Oral two times a day PRN

## 2021-07-27 NOTE — PROGRESS NOTE ADULT - ASSESSMENT
61M PMHx seizure disorder, schizophrenia, HTN here with altered mental status. Admitted 1 year ago, currently stable, pending citizenship/placement/guardianship . Noncompliant with meds and VS at times     #seizures  - currently on keppra 1000mg bid and divalproex  - on/off non-compliant with medications  - EEG noted epileptic activity    #Dementia with psychotic features, stable   -continue with current medication regimen as per Psych  - EKG QTc <449  -IM haldol for severe agitation/code greys   -No contraindication to discharge as per psych consult.  - cont support care    #tinea pedis   -Resolved s/p clotrimazole cream    #HTN   - norvasc 10mg + enalapril 10 mg    #DVT ppx -- cont  Code -- Full  Dispo --DC pending case mgmt/SW; pt is placement issue, citizenship, court ordered treatment against objection. guardian to be appointed

## 2021-07-28 PROCEDURE — 99231 SBSQ HOSP IP/OBS SF/LOW 25: CPT

## 2021-07-28 NOTE — PROGRESS NOTE ADULT - SUBJECTIVE AND OBJECTIVE BOX
Dodie Eaton MD  Hospitalist   Spectra: [phone]    Patient is a 61y old  Male who presents with a chief complaint of mental health marco (27 Jul 2021 13:31)      INTERVAL HPI/OVERNIGHT EVENTS: no acute overnight events noted   patient was seen and examined - comfortably sitting in chair - in good spritis     REVIEW OF SYSTEMS: negative  Vital Signs Last 24 Hrs  T(C): --  T(F): --  HR: --  BP: --  BP(mean): --  RR: --  SpO2: --    PHYSICAL EXAM:   NAD; Normocephalic;   LUNGS - no wheezing  HEART: S1 S2+   ABDOMEN: Soft, Nontender, non distended  EXTREMITIES: no cyanosis; no edema  NERVOUS SYSTEM:  Awake, no focal neuro deficits appreciated    LABS:              CAPILLARY BLOOD GLUCOSE          Medications:  MEDICATIONS  (STANDING):  amLODIPine   Tablet 10 milliGRAM(s) Oral daily  AQUAPHOR (petrolatum Ointment) 1 Application(s) Topical daily  chlorhexidine 4% Liquid 1 Application(s) Topical <User Schedule>  diVALproex  milliGRAM(s) Oral two times a day  enalapril 10 milliGRAM(s) Oral daily  enoxaparin Injectable 40 milliGRAM(s) SubCutaneous daily  gabapentin 100 milliGRAM(s) Oral three times a day  levETIRAcetam 1000 milliGRAM(s) Oral two times a day  OLANZapine 5 milliGRAM(s) Oral daily  senna 2 Tablet(s) Oral at bedtime    MEDICATIONS  (PRN):  cloNIDine 0.1 milliGRAM(s) Oral every 8 hours PRN htn  haloperidol    Injectable 2 milliGRAM(s) IntraMuscular every 6 hours PRN agitation  polyethylene glycol 3350 17 Gram(s) Oral two times a day PRN Constipation

## 2021-07-28 NOTE — PROGRESS NOTE ADULT - ASSESSMENT
61M PMHx seizure disorder, schizophrenia, HTN here with altered mental status. Admitted 1 year ago, currently stable, pending citizenship/placement/guardianship . Noncompliant with meds and VS at times     # seizures  - currently on keppra 1000mg bid and divalproex  - on/off non-compliant with medications  - EEG noted epileptic activity    # Dementia with psychotic features, stable   -continue with current medication regimen as per Psych  - EKG QTc <449  -IM haldol for severe agitation/code greys   -No contraindication to discharge as per psych consult.  - cont support care    #tinea pedis   -Resolved s/p clotrimazole cream    #HTN   - norvasc 10mg + enalapril 10 mg    #DVT ppx -- cont  Code -- Full  Dispo --DC pending case mgmt/SW; pt is placement issue, citizenship, court ordered treatment against objection. guardian to be appointed

## 2021-07-29 PROCEDURE — 99231 SBSQ HOSP IP/OBS SF/LOW 25: CPT

## 2021-07-29 RX ORDER — HALOPERIDOL DECANOATE 100 MG/ML
2 INJECTION INTRAMUSCULAR EVERY 6 HOURS
Refills: 0 | Status: DISCONTINUED | OUTPATIENT
Start: 2021-07-29 | End: 2021-10-01

## 2021-07-29 NOTE — PROGRESS NOTE ADULT - SUBJECTIVE AND OBJECTIVE BOX
Patient is a 61y old  Male who presents with a chief complaint of mental health marco (28 Jul 2021 11:10)      INTERVAL HPI/OVERNIGHT EVENTS: no acute overnight events noted   patient is comfortable in chair     REVIEW OF SYSTEMS: negative  Vital Signs Last 24 Hrs  T(C): --  T(F): --  HR: --  BP: --  BP(mean): --  RR: --  SpO2: --    PHYSICAL EXAM:   NAD; Normocephalic;   LUNGS - no wheezing  HEART: S1 S2+   ABDOMEN: Soft, Nontender, non distended  EXTREMITIES: no cyanosis; no edema  NERVOUS SYSTEM:  Awake and alert; no focal neuro deficits appreciated    LABS:              CAPILLARY BLOOD GLUCOSE          Medications:  MEDICATIONS  (STANDING):  amLODIPine   Tablet 10 milliGRAM(s) Oral daily  AQUAPHOR (petrolatum Ointment) 1 Application(s) Topical daily  chlorhexidine 4% Liquid 1 Application(s) Topical <User Schedule>  diVALproex  milliGRAM(s) Oral two times a day  enalapril 10 milliGRAM(s) Oral daily  enoxaparin Injectable 40 milliGRAM(s) SubCutaneous daily  gabapentin 100 milliGRAM(s) Oral three times a day  levETIRAcetam 1000 milliGRAM(s) Oral two times a day  OLANZapine 5 milliGRAM(s) Oral daily  senna 2 Tablet(s) Oral at bedtime    MEDICATIONS  (PRN):  cloNIDine 0.1 milliGRAM(s) Oral every 8 hours PRN htn  haloperidol    Injectable 2 milliGRAM(s) IntraMuscular every 6 hours PRN agitation  polyethylene glycol 3350 17 Gram(s) Oral two times a day PRN Constipation

## 2021-07-30 PROCEDURE — 99231 SBSQ HOSP IP/OBS SF/LOW 25: CPT

## 2021-07-30 NOTE — PROGRESS NOTE ADULT - SUBJECTIVE AND OBJECTIVE BOX
Patient is a 61y old  Male who presents with a chief complaint of mental health marco (29 Jul 2021 13:49)      INTERVAL HPI/OVERNIGHT EVENTS: no acute overnight events noted     REVIEW OF SYSTEMS: negative  Vital Signs Last 24 Hrs  T(C): --  T(F): --  HR: --  BP: --  BP(mean): --  RR: --  SpO2: --    PHYSICAL EXAM:   NAD; Normocephalic;   LUNGS - no wheezing  HEART: S1 S2+   ABDOMEN: Soft, Nontender, non distended  EXTREMITIES: no cyanosis; no edema  NERVOUS SYSTEM:  Awake and alert; no focal neuro deficits appreciated    LABS:              CAPILLARY BLOOD GLUCOSE          Medications:  MEDICATIONS  (STANDING):  amLODIPine   Tablet 10 milliGRAM(s) Oral daily  AQUAPHOR (petrolatum Ointment) 1 Application(s) Topical daily  chlorhexidine 4% Liquid 1 Application(s) Topical <User Schedule>  diVALproex  milliGRAM(s) Oral two times a day  enalapril 10 milliGRAM(s) Oral daily  enoxaparin Injectable 40 milliGRAM(s) SubCutaneous daily  gabapentin 100 milliGRAM(s) Oral three times a day  levETIRAcetam 1000 milliGRAM(s) Oral two times a day  OLANZapine 5 milliGRAM(s) Oral daily  senna 2 Tablet(s) Oral at bedtime    MEDICATIONS  (PRN):  cloNIDine 0.1 milliGRAM(s) Oral every 8 hours PRN htn  haloperidol    Injectable 2 milliGRAM(s) IntraMuscular every 6 hours PRN agitation  haloperidol    Injectable 2 milliGRAM(s) IntraMuscular every 6 hours PRN agitation  polyethylene glycol 3350 17 Gram(s) Oral two times a day PRN Constipation

## 2021-07-31 PROCEDURE — 99231 SBSQ HOSP IP/OBS SF/LOW 25: CPT

## 2021-07-31 NOTE — PROGRESS NOTE ADULT - SUBJECTIVE AND OBJECTIVE BOX
Patient is a 61y old  Male who presents with a chief complaint of mental health marco (30 Jul 2021 15:27)      INTERVAL HPI/OVERNIGHT EVENTS: no acute overnight events noted     REVIEW OF SYSTEMS: negative  Vital Signs Last 24 Hrs  T(C): --  T(F): --  HR: --  BP: --  BP(mean): --  RR: --  SpO2: --      PHYSICAL EXAM:   NAD; Normocephalic;   LUNGS - no wheezing  HEART: S1 S2+   ABDOMEN: Soft, Nontender, non distended  EXTREMITIES: no cyanosis; no edema  NERVOUS SYSTEM:  Awake and alert; no focal neuro deficits appreciated    LABS:    xx        Medications:  MEDICATIONS  (STANDING):  amLODIPine   Tablet 10 milliGRAM(s) Oral daily  AQUAPHOR (petrolatum Ointment) 1 Application(s) Topical daily  chlorhexidine 4% Liquid 1 Application(s) Topical <User Schedule>  diVALproex  milliGRAM(s) Oral two times a day  enalapril 10 milliGRAM(s) Oral daily  enoxaparin Injectable 40 milliGRAM(s) SubCutaneous daily  gabapentin 100 milliGRAM(s) Oral three times a day  levETIRAcetam 1000 milliGRAM(s) Oral two times a day  OLANZapine 5 milliGRAM(s) Oral daily  senna 2 Tablet(s) Oral at bedtime    MEDICATIONS  (PRN):  cloNIDine 0.1 milliGRAM(s) Oral every 8 hours PRN htn  haloperidol    Injectable 2 milliGRAM(s) IntraMuscular every 6 hours PRN agitation  haloperidol    Injectable 2 milliGRAM(s) IntraMuscular every 6 hours PRN agitation  polyethylene glycol 3350 17 Gram(s) Oral two times a day PRN Constipation

## 2021-08-01 PROCEDURE — 99231 SBSQ HOSP IP/OBS SF/LOW 25: CPT

## 2021-08-01 NOTE — PROGRESS NOTE ADULT - SUBJECTIVE AND OBJECTIVE BOX
Patient is a 61y old  Male who presents with a chief complaint of mental health marco (31 Jul 2021 18:02)      INTERVAL HPI/OVERNIGHT EVENTS: no acute overnight events noted     REVIEW OF SYSTEMS: negative  Vital Signs Last 24 Hrs  T(C): --  T(F): --  HR: --  BP: --  BP(mean): --  RR: --  SpO2: --    PHYSICAL EXAM:   NAD; Normocephalic;   LUNGS - no wheezing  HEART: S1 S2+   ABDOMEN: Soft, Nontender, non distended  EXTREMITIES: no cyanosis; no edema  NERVOUS SYSTEM:  Awake and alert; no focal neuro deficits appreciated    LABS:              CAPILLARY BLOOD GLUCOSE          Medications:  MEDICATIONS  (STANDING):  amLODIPine   Tablet 10 milliGRAM(s) Oral daily  AQUAPHOR (petrolatum Ointment) 1 Application(s) Topical daily  chlorhexidine 4% Liquid 1 Application(s) Topical <User Schedule>  diVALproex  milliGRAM(s) Oral two times a day  enalapril 10 milliGRAM(s) Oral daily  enoxaparin Injectable 40 milliGRAM(s) SubCutaneous daily  gabapentin 100 milliGRAM(s) Oral three times a day  levETIRAcetam 1000 milliGRAM(s) Oral two times a day  OLANZapine 5 milliGRAM(s) Oral daily  senna 2 Tablet(s) Oral at bedtime    MEDICATIONS  (PRN):  cloNIDine 0.1 milliGRAM(s) Oral every 8 hours PRN htn  haloperidol    Injectable 2 milliGRAM(s) IntraMuscular every 6 hours PRN agitation  haloperidol    Injectable 2 milliGRAM(s) IntraMuscular every 6 hours PRN agitation  polyethylene glycol 3350 17 Gram(s) Oral two times a day PRN Constipation

## 2021-08-02 PROCEDURE — 99231 SBSQ HOSP IP/OBS SF/LOW 25: CPT

## 2021-08-02 NOTE — PROGRESS NOTE ADULT - SUBJECTIVE AND OBJECTIVE BOX
Patient is a 61y old  Male who presents with a chief complaint of mental health marco (01 Aug 2021 08:56)      INTERVAL HPI/OVERNIGHT EVENTS: no acute overnight events noted.   Patient seen and examined at bedside.     REVIEW OF SYSTEMS: negative  Vital Signs Last 24 Hrs  T(C): --  T(F): --  HR: --  BP: --  BP(mean): --  RR: --  SpO2: --    PHYSICAL EXAM:   NAD; Normocephalic;   LUNGS - no wheezing  HEART: S1 S2+   ABDOMEN: Soft, Nontender, non distended  EXTREMITIES: no cyanosis; no edema  NERVOUS SYSTEM:  Awake and alert; no focal neuro deficits appreciated    LABS:              CAPILLARY BLOOD GLUCOSE          Medications:  MEDICATIONS  (STANDING):  amLODIPine   Tablet 10 milliGRAM(s) Oral daily  AQUAPHOR (petrolatum Ointment) 1 Application(s) Topical daily  chlorhexidine 4% Liquid 1 Application(s) Topical <User Schedule>  diVALproex  milliGRAM(s) Oral two times a day  enalapril 10 milliGRAM(s) Oral daily  enoxaparin Injectable 40 milliGRAM(s) SubCutaneous daily  gabapentin 100 milliGRAM(s) Oral three times a day  levETIRAcetam 1000 milliGRAM(s) Oral two times a day  OLANZapine 5 milliGRAM(s) Oral daily  senna 2 Tablet(s) Oral at bedtime    MEDICATIONS  (PRN):  cloNIDine 0.1 milliGRAM(s) Oral every 8 hours PRN htn  haloperidol    Injectable 2 milliGRAM(s) IntraMuscular every 6 hours PRN agitation  haloperidol    Injectable 2 milliGRAM(s) IntraMuscular every 6 hours PRN agitation  polyethylene glycol 3350 17 Gram(s) Oral two times a day PRN Constipation

## 2021-08-03 PROCEDURE — 99231 SBSQ HOSP IP/OBS SF/LOW 25: CPT

## 2021-08-03 NOTE — PROGRESS NOTE ADULT - TIME BILLING
Direct patient care.
For clinical care, patient education and care coordination.
direct patient care
Direct patient care.
For clinical care, patient education and care coordination.
Direct patient care.
For clinical care, patient education and care coordination.
direct pt care
direct pt care
Direct patient care.
direct pt care
Direct patient care.
For clinical care, patient education and care coordination.
direct pt care

## 2021-08-03 NOTE — PROGRESS NOTE ADULT - SUBJECTIVE AND OBJECTIVE BOX
Patient is a 61y old  Male who presents with a chief complaint of mental health marco (02 Aug 2021 08:43)      INTERVAL HPI/OVERNIGHT EVENTS: no acute overnight events noted.   Patient seen and examined at bedside.     REVIEW OF SYSTEMS: negative  Vital Signs Last 24 Hrs  T(C): --  T(F): --  HR: --  BP: --  BP(mean): --  RR: --  SpO2: --    PHYSICAL EXAM:   NAD; Normocephalic;   LUNGS - no wheezing  HEART: S1 S2+   ABDOMEN: Soft, Nontender, non distended  EXTREMITIES: no cyanosis; no edema  NERVOUS SYSTEM:  Awake and alert; no focal neuro deficits appreciated    LABS:              CAPILLARY BLOOD GLUCOSE          Medications:  MEDICATIONS  (STANDING):  amLODIPine   Tablet 10 milliGRAM(s) Oral daily  AQUAPHOR (petrolatum Ointment) 1 Application(s) Topical daily  chlorhexidine 4% Liquid 1 Application(s) Topical <User Schedule>  diVALproex  milliGRAM(s) Oral two times a day  enalapril 10 milliGRAM(s) Oral daily  enoxaparin Injectable 40 milliGRAM(s) SubCutaneous daily  gabapentin 100 milliGRAM(s) Oral three times a day  levETIRAcetam 1000 milliGRAM(s) Oral two times a day  OLANZapine 5 milliGRAM(s) Oral daily  senna 2 Tablet(s) Oral at bedtime    MEDICATIONS  (PRN):  cloNIDine 0.1 milliGRAM(s) Oral every 8 hours PRN htn  haloperidol    Injectable 2 milliGRAM(s) IntraMuscular every 6 hours PRN agitation  haloperidol    Injectable 2 milliGRAM(s) IntraMuscular every 6 hours PRN agitation  polyethylene glycol 3350 17 Gram(s) Oral two times a day PRN Constipation

## 2021-08-03 NOTE — PROGRESS NOTE ADULT - TIME-BASED
35
30
30
35
30
35
30

## 2021-08-04 PROCEDURE — 99231 SBSQ HOSP IP/OBS SF/LOW 25: CPT

## 2021-08-04 NOTE — PROGRESS NOTE ADULT - SUBJECTIVE AND OBJECTIVE BOX
PARVIZ TORRES  61y, Male  Allergy: No Known Allergies    Hospital Day: 333d    Patient seen and examined earlier today. No acute events overnight.    PMH/PSH:  PAST MEDICAL & SURGICAL HISTORY:  No pertinent past medical history  unknown    No significant past surgical history  unknown        VITALS:  T(F): --  HR: --  BP: --  RR: --  SpO2: --    TESTS & MEASUREMENTS:  Weight (Kg):                             RADIOLOGY & ADDITIONAL TESTS:    RECENT DIAGNOSTIC ORDERS:      MEDICATIONS:  MEDICATIONS  (STANDING):  amLODIPine   Tablet 10 milliGRAM(s) Oral daily  AQUAPHOR (petrolatum Ointment) 1 Application(s) Topical daily  diVALproex  milliGRAM(s) Oral two times a day  enalapril 10 milliGRAM(s) Oral daily  gabapentin 100 milliGRAM(s) Oral three times a day  levETIRAcetam 1000 milliGRAM(s) Oral two times a day  OLANZapine 5 milliGRAM(s) Oral daily  senna 2 Tablet(s) Oral at bedtime    MEDICATIONS  (PRN):  cloNIDine 0.1 milliGRAM(s) Oral every 8 hours PRN htn  haloperidol    Injectable 2 milliGRAM(s) IntraMuscular every 6 hours PRN agitation  haloperidol    Injectable 2 milliGRAM(s) IntraMuscular every 6 hours PRN agitation  polyethylene glycol 3350 17 Gram(s) Oral two times a day PRN Constipation      HOME MEDICATIONS:  amLODIPine 5 mg oral tablet (09-17)  divalproex sodium 500 mg oral delayed release tablet (09-17)  levETIRAcetam 750 mg oral tablet (09-17)  OLANZapine 5 mg oral tablet (09-17)      REVIEW OF SYSTEMS:  All other review of systems is negative unless indicated above.     PHYSICAL EXAM:  GENERAL: NAD  HEENT: No Swelling  CHEST/LUNG: Good air entry, No wheezing  HEART: RRR, No murmurs  ABDOMEN: Soft, Bowel sounds present  EXTREMITIES:  No clubbing

## 2021-08-04 NOTE — PROGRESS NOTE ADULT - REASON FOR ADMISSION
Patient is a 74y old  Female who presents with a chief complaint of fall, right knee pain (06 Apr 2019 13:33)      SUBJECTIVE / OVERNIGHT EVENTS:    MEDICATIONS  (STANDING):  amiodarone    Tablet 200 milliGRAM(s) Oral daily  buDESOnide 160 MICROgram(s)/formoterol 4.5 MICROgram(s) Inhaler 2 Puff(s) Inhalation two times a day  carvedilol 6.25 milliGRAM(s) Oral every 12 hours  dextrose 5%. 1000 milliLiter(s) (50 mL/Hr) IV Continuous <Continuous>  dextrose 50% Injectable 12.5 Gram(s) IV Push once  dextrose 50% Injectable 25 Gram(s) IV Push once  dextrose 50% Injectable 25 Gram(s) IV Push once  docusate sodium 100 milliGRAM(s) Oral three times a day  DULoxetine 60 milliGRAM(s) Oral daily  insulin glargine Injectable (LANTUS) 42 Unit(s) SubCutaneous at bedtime  insulin lispro (HumaLOG) corrective regimen sliding scale   SubCutaneous three times a day before meals  insulin lispro (HumaLOG) corrective regimen sliding scale   SubCutaneous at bedtime  insulin lispro Injectable (HumaLOG) 15 Unit(s) SubCutaneous three times a day before meals  isosorbide   mononitrate ER Tablet (IMDUR) 30 milliGRAM(s) Oral daily  levothyroxine 188 MICROGram(s) Oral daily  mesalamine DR (24-Hour) Tablet 2.4 Gram(s) Oral daily  metolazone 2.5 milliGRAM(s) Oral <User Schedule>  norethindrone acetate 5 milliGRAM(s) Oral daily  pantoprazole    Tablet 40 milliGRAM(s) Oral before breakfast  senna 2 Tablet(s) Oral at bedtime  spironolactone 25 milliGRAM(s) Oral daily  tiotropium 18 MICROgram(s) Capsule 1 Capsule(s) Inhalation daily  torsemide 50 milliGRAM(s) Oral daily  vancomycin  IVPB 750 milliGRAM(s) IV Intermittent every 24 hours    MEDICATIONS  (PRN):  acetaminophen   Tablet .. 650 milliGRAM(s) Oral every 6 hours PRN Mild Pain (1 - 3), Moderate Pain (4 - 6)  dextrose 40% Gel 15 Gram(s) Oral once PRN Blood Glucose LESS THAN 70 milliGRAM(s)/deciliter  glucagon  Injectable 1 milliGRAM(s) IntraMuscular once PRN Glucose LESS THAN 70 milligrams/deciliter      Vital Signs Last 24 Hrs  T(F): 98 (04-06-19 @ 20:00), Max: 99.3 (04-05-19 @ 21:09)  HR: 101 (04-06-19 @ 20:00) (99 - 101)  BP: 137/79 (04-06-19 @ 20:00) (124/71 - 137/79)  RR: 18 (04-06-19 @ 20:00) (18 - 18)  SpO2: 99% (04-06-19 @ 20:00) (98% - 100%)  Telemetry:   CAPILLARY BLOOD GLUCOSE      POCT Blood Glucose.: 266 mg/dL (06 Apr 2019 16:48)  POCT Blood Glucose.: 375 mg/dL (06 Apr 2019 12:06)  POCT Blood Glucose.: 218 mg/dL (06 Apr 2019 08:00)  POCT Blood Glucose.: 239 mg/dL (05 Apr 2019 21:44)    I&O's Summary    05 Apr 2019 07:01  -  06 Apr 2019 07:00  --------------------------------------------------------  IN: 360 mL / OUT: 1750 mL / NET: -1390 mL    06 Apr 2019 07:01  -  06 Apr 2019 20:44  --------------------------------------------------------  IN: 240 mL / OUT: 600 mL / NET: -360 mL        PHYSICAL EXAM:  GENERAL: NAD, well-developed  HEAD:  Atraumatic, Normocephalic  EYES: EOMI, PERRLA, conjunctiva and sclera clear  NECK: Supple, No JVD  CHEST/LUNG: Clear to auscultation bilaterally; No wheeze  HEART: Regular rate and rhythm; No murmurs, rubs, or gallops  ABDOMEN: Soft, Nontender, Nondistended; Bowel sounds present  EXTREMITIES:  2+ Peripheral Pulses, No clubbing, cyanosis, or edema  PSYCH: AAOx3  NEUROLOGY: non-focal  SKIN: No rashes or lesions    LABS:                        10.0   12.2  )-----------( 201      ( 05 Apr 2019 06:40 )             29.0     04-06    137  |  92<L>  |  71<H>  ----------------------------<  229<H>  3.2<L>   |  31  |  1.99<H>    Ca    10.0      06 Apr 2019 07:37  Mg     1.9     04-05                RADIOLOGY & ADDITIONAL TESTS:    Imaging Personally Reviewed:    Consultant(s) Notes Reviewed:      Care Discussed with Consultants/Other Providers: mental health marco

## 2021-08-05 PROCEDURE — 99231 SBSQ HOSP IP/OBS SF/LOW 25: CPT

## 2021-08-05 NOTE — PROGRESS NOTE ADULT - SUBJECTIVE AND OBJECTIVE BOX
PARVIZ TORRES  61y, Male  Allergy: No Known Allergies    Hospital Day: 334d    Patient seen and examined earlier today. No acute events overnight.    PMH/PSH:  PAST MEDICAL & SURGICAL HISTORY:  No pertinent past medical history  unknown    No significant past surgical history  unknown    VITALS:  T(F): --  HR: --  BP: --  RR: --  SpO2: --    TESTS & MEASUREMENTS:  Weight (Kg):     MEDICATIONS:  MEDICATIONS  (STANDING):  amLODIPine   Tablet 10 milliGRAM(s) Oral daily  AQUAPHOR (petrolatum Ointment) 1 Application(s) Topical daily  diVALproex  milliGRAM(s) Oral two times a day  enalapril 10 milliGRAM(s) Oral daily  gabapentin 100 milliGRAM(s) Oral three times a day  levETIRAcetam 1000 milliGRAM(s) Oral two times a day  OLANZapine 5 milliGRAM(s) Oral daily  senna 2 Tablet(s) Oral at bedtime    MEDICATIONS  (PRN):  cloNIDine 0.1 milliGRAM(s) Oral every 8 hours PRN htn  haloperidol    Injectable 2 milliGRAM(s) IntraMuscular every 6 hours PRN agitation  haloperidol    Injectable 2 milliGRAM(s) IntraMuscular every 6 hours PRN agitation  polyethylene glycol 3350 17 Gram(s) Oral two times a day PRN Constipation    HOME MEDICATIONS:  amLODIPine 5 mg oral tablet (09-17)  divalproex sodium 500 mg oral delayed release tablet (09-17)  levETIRAcetam 750 mg oral tablet (09-17)  OLANZapine 5 mg oral tablet (09-17)    REVIEW OF SYSTEMS:  All other review of systems is negative unless indicated above.     PHYSICAL EXAM:  GENERAL: NAD  HEENT: No Swelling  CHEST/LUNG: Good air entry, No wheezing  HEART: RRR, No murmurs  ABDOMEN: Soft, Bowel sounds present  EXTREMITIES:  No clubbing

## 2021-08-06 PROCEDURE — 99231 SBSQ HOSP IP/OBS SF/LOW 25: CPT

## 2021-08-06 NOTE — PROGRESS NOTE ADULT - SUBJECTIVE AND OBJECTIVE BOX
PARVIZ TORRES  61y, Male  Allergy: No Known Allergies    Hospital Day: 335d    Patient seen and examined earlier today. No acute events overnight.    PMH/PSH:  PAST MEDICAL & SURGICAL HISTORY:  No pertinent past medical history  unknown    No significant past surgical history  unknown    VITALS:  T(F): 97.5 (08-05-21 @ 14:06), Max: 97.5 (08-05-21 @ 14:06)  HR: 79 (08-05-21 @ 14:06)  BP: 154/79 (08-05-21 @ 14:06) (154/79 - 154/79)  RR: 16 (08-05-21 @ 14:06)  SpO2: --    TESTS & MEASUREMENTS:  Weight (Kg):     MEDICATIONS:  MEDICATIONS  (STANDING):  amLODIPine   Tablet 10 milliGRAM(s) Oral daily  AQUAPHOR (petrolatum Ointment) 1 Application(s) Topical daily  diVALproex  milliGRAM(s) Oral two times a day  enalapril 10 milliGRAM(s) Oral daily  gabapentin 100 milliGRAM(s) Oral three times a day  levETIRAcetam 1000 milliGRAM(s) Oral two times a day  OLANZapine 5 milliGRAM(s) Oral daily  senna 2 Tablet(s) Oral at bedtime    MEDICATIONS  (PRN):  cloNIDine 0.1 milliGRAM(s) Oral every 8 hours PRN htn  haloperidol    Injectable 2 milliGRAM(s) IntraMuscular every 6 hours PRN agitation  haloperidol    Injectable 2 milliGRAM(s) IntraMuscular every 6 hours PRN agitation  polyethylene glycol 3350 17 Gram(s) Oral two times a day PRN Constipation    HOME MEDICATIONS:  amLODIPine 5 mg oral tablet (09-17)  divalproex sodium 500 mg oral delayed release tablet (09-17)  levETIRAcetam 750 mg oral tablet (09-17)  OLANZapine 5 mg oral tablet (09-17)    REVIEW OF SYSTEMS:  All other review of systems is negative unless indicated above.     PHYSICAL EXAM:  GENERAL: NAD  HEENT: No Swelling  CHEST/LUNG: Good air entry, No wheezing  HEART: RRR, No murmurs  ABDOMEN: Soft, Bowel sounds present  EXTREMITIES:  No clubbing

## 2021-08-07 PROCEDURE — 99231 SBSQ HOSP IP/OBS SF/LOW 25: CPT

## 2021-08-07 NOTE — PROGRESS NOTE ADULT - SUBJECTIVE AND OBJECTIVE BOX
PARVIZ TORRES  61y, Male  Allergy: No Known Allergies    Hospital Day: 336d    Patient seen and examined earlier today. No acute events overnight.    PMH/PSH:  PAST MEDICAL & SURGICAL HISTORY:  No pertinent past medical history  unknown    No significant past surgical history  unknown    VITALS:  T(F): --  HR: --  BP: --  RR: --  SpO2: --    TESTS & MEASUREMENTS:  Weight (Kg):     MEDICATIONS:  MEDICATIONS  (STANDING):  amLODIPine   Tablet 10 milliGRAM(s) Oral daily  AQUAPHOR (petrolatum Ointment) 1 Application(s) Topical daily  diVALproex  milliGRAM(s) Oral two times a day  enalapril 10 milliGRAM(s) Oral daily  gabapentin 100 milliGRAM(s) Oral three times a day  levETIRAcetam 1000 milliGRAM(s) Oral two times a day  OLANZapine 5 milliGRAM(s) Oral daily  senna 2 Tablet(s) Oral at bedtime    MEDICATIONS  (PRN):  cloNIDine 0.1 milliGRAM(s) Oral every 8 hours PRN htn  haloperidol    Injectable 2 milliGRAM(s) IntraMuscular every 6 hours PRN agitation  haloperidol    Injectable 2 milliGRAM(s) IntraMuscular every 6 hours PRN agitation  polyethylene glycol 3350 17 Gram(s) Oral two times a day PRN Constipation    HOME MEDICATIONS:  amLODIPine 5 mg oral tablet (09-17)  divalproex sodium 500 mg oral delayed release tablet (09-17)  levETIRAcetam 750 mg oral tablet (09-17)  OLANZapine 5 mg oral tablet (09-17)    REVIEW OF SYSTEMS:  All other review of systems is negative unless indicated above.     PHYSICAL EXAM:  GENERAL: NAD  HEENT: No Swelling  CHEST/LUNG: Good air entry, No wheezing  HEART: RRR, No murmurs  ABDOMEN: Soft, Bowel sounds present  EXTREMITIES:  No clubbing

## 2021-08-08 PROCEDURE — 99231 SBSQ HOSP IP/OBS SF/LOW 25: CPT

## 2021-08-08 NOTE — PROGRESS NOTE ADULT - SUBJECTIVE AND OBJECTIVE BOX
PARVIZ TORRES  61y, Male  Allergy: No Known Allergies    Hospital Day: 337d    Patient seen and examined earlier today. No acute events overnight.    PMH/PSH:  PAST MEDICAL & SURGICAL HISTORY:  No pertinent past medical history  unknown    No significant past surgical history  unknown    VITALS:  T(F): --  HR: --  BP: --  RR: --  SpO2: --    TESTS & MEASUREMENTS:  Weight (Kg):     MEDICATIONS:  MEDICATIONS  (STANDING):  amLODIPine   Tablet 10 milliGRAM(s) Oral daily  AQUAPHOR (petrolatum Ointment) 1 Application(s) Topical daily  diVALproex  milliGRAM(s) Oral two times a day  enalapril 10 milliGRAM(s) Oral daily  gabapentin 100 milliGRAM(s) Oral three times a day  levETIRAcetam 1000 milliGRAM(s) Oral two times a day  OLANZapine 5 milliGRAM(s) Oral daily  senna 2 Tablet(s) Oral at bedtime    MEDICATIONS  (PRN):  cloNIDine 0.1 milliGRAM(s) Oral every 8 hours PRN htn  haloperidol    Injectable 2 milliGRAM(s) IntraMuscular every 6 hours PRN agitation  haloperidol    Injectable 2 milliGRAM(s) IntraMuscular every 6 hours PRN agitation  polyethylene glycol 3350 17 Gram(s) Oral two times a day PRN Constipation    HOME MEDICATIONS:  amLODIPine 5 mg oral tablet (09-17)  divalproex sodium 500 mg oral delayed release tablet (09-17)  levETIRAcetam 750 mg oral tablet (09-17)  OLANZapine 5 mg oral tablet (09-17)    REVIEW OF SYSTEMS:  All other review of systems is negative unless indicated above.     PHYSICAL EXAM:  GENERAL: NAD  HEENT: No Swelling  CHEST/LUNG: Good air entry, No wheezing  HEART: RRR, No murmurs  ABDOMEN: Soft, Bowel sounds present  EXTREMITIES:  No clubbing

## 2021-08-09 PROCEDURE — 99231 SBSQ HOSP IP/OBS SF/LOW 25: CPT

## 2021-08-09 NOTE — PROGRESS NOTE ADULT - SUBJECTIVE AND OBJECTIVE BOX
PARVIZ TORRES  61y, Male  Allergy: No Known Allergies    Hospital Day: 338d    Patient seen and examined earlier today. No acute events overnight.    PMH/PSH:  PAST MEDICAL & SURGICAL HISTORY:  No pertinent past medical history  unknown    No significant past surgical history  unknown    VITALS:  T(F): --  HR: --  BP: --  RR: --  SpO2: --    TESTS & MEASUREMENTS:  Weight (Kg):     MEDICATIONS:  MEDICATIONS  (STANDING):  amLODIPine   Tablet 10 milliGRAM(s) Oral daily  AQUAPHOR (petrolatum Ointment) 1 Application(s) Topical daily  diVALproex  milliGRAM(s) Oral two times a day  enalapril 10 milliGRAM(s) Oral daily  gabapentin 100 milliGRAM(s) Oral three times a day  levETIRAcetam 1000 milliGRAM(s) Oral two times a day  OLANZapine 5 milliGRAM(s) Oral daily  senna 2 Tablet(s) Oral at bedtime    MEDICATIONS  (PRN):  cloNIDine 0.1 milliGRAM(s) Oral every 8 hours PRN htn  haloperidol    Injectable 2 milliGRAM(s) IntraMuscular every 6 hours PRN agitation  haloperidol    Injectable 2 milliGRAM(s) IntraMuscular every 6 hours PRN agitation  polyethylene glycol 3350 17 Gram(s) Oral two times a day PRN Constipation    HOME MEDICATIONS:  amLODIPine 5 mg oral tablet (09-17)  divalproex sodium 500 mg oral delayed release tablet (09-17)  levETIRAcetam 750 mg oral tablet (09-17)  OLANZapine 5 mg oral tablet (09-17)    REVIEW OF SYSTEMS:  All other review of systems is negative unless indicated above.     PHYSICAL EXAM:  GENERAL: NAD  HEENT: No Swelling  CHEST/LUNG: Good air entry, No wheezing  HEART: RRR, No murmurs  ABDOMEN: Soft, Bowel sounds present  EXTREMITIES:  No clubbing

## 2021-08-09 NOTE — PROGRESS NOTE ADULT - NSICDXPILOT_GEN_ALL_CORE
[place]ow
[place]ley [place]
Alt[place]
Gordon
Gran[place]
Inglis
Lea[place]
Lin[place]anteno
Mckenna
[place]co
Philo
[place]o
[place]
Arabi
Atl[place]
Bel[place]
Cecil
Cla[place]amo[place]
Linden[place]
Lost [place]
Maine
Man[place]aur[place]
Mcclellan
Mon[place]ullan
[place]
Orchard
Parad[place]
Squirrel [place]
Sum[place]
Wy[place]
Blooms[place]
Seneca
F[place]
Reno
Buford
Coon [place]
Fayette [place]ac[place]
Harts[place]
Leips[place]
Trail [place]
Way[place]

## 2021-08-10 PROCEDURE — 99231 SBSQ HOSP IP/OBS SF/LOW 25: CPT

## 2021-08-10 NOTE — PROGRESS NOTE ADULT - SUBJECTIVE AND OBJECTIVE BOX
PARVIZ TORRES  61y, Male  Allergy: No Known Allergies    Hospital Day: 339d    Patient seen and examined earlier today. No acute events overnight.    PMH/PSH:  PAST MEDICAL & SURGICAL HISTORY:  No pertinent past medical history  unknown    No significant past surgical history  unknown        VITALS:  T(F): --  HR: --  BP: --  RR: --  SpO2: --    TESTS & MEASUREMENTS:  Weight (Kg):                             RADIOLOGY & ADDITIONAL TESTS:    RECENT DIAGNOSTIC ORDERS:      MEDICATIONS:  MEDICATIONS  (STANDING):  amLODIPine   Tablet 10 milliGRAM(s) Oral daily  AQUAPHOR (petrolatum Ointment) 1 Application(s) Topical daily  diVALproex  milliGRAM(s) Oral two times a day  enalapril 10 milliGRAM(s) Oral daily  gabapentin 100 milliGRAM(s) Oral three times a day  levETIRAcetam 1000 milliGRAM(s) Oral two times a day  OLANZapine 5 milliGRAM(s) Oral daily  senna 2 Tablet(s) Oral at bedtime    MEDICATIONS  (PRN):  cloNIDine 0.1 milliGRAM(s) Oral every 8 hours PRN htn  haloperidol    Injectable 2 milliGRAM(s) IntraMuscular every 6 hours PRN agitation  haloperidol    Injectable 2 milliGRAM(s) IntraMuscular every 6 hours PRN agitation  polyethylene glycol 3350 17 Gram(s) Oral two times a day PRN Constipation      HOME MEDICATIONS:  amLODIPine 5 mg oral tablet (09-17)  divalproex sodium 500 mg oral delayed release tablet (09-17)  levETIRAcetam 750 mg oral tablet (09-17)  OLANZapine 5 mg oral tablet (09-17)      REVIEW OF SYSTEMS:  All other review of systems is negative unless indicated above.     PHYSICAL EXAM:  GENERAL: NAD  HEENT: No Swelling  CHEST/LUNG: Good air entry, No wheezing  HEART: RRR, No murmurs  ABDOMEN: Soft, Bowel sounds present  EXTREMITIES:  No clubbing

## 2021-08-11 PROCEDURE — 99231 SBSQ HOSP IP/OBS SF/LOW 25: CPT

## 2021-08-11 NOTE — PROGRESS NOTE ADULT - SUBJECTIVE AND OBJECTIVE BOX
PARVIZ TORRES  61y, Male  Allergy: No Known Allergies    Hospital Day: 340d    Patient seen and examined earlier today. No acute events overnight.    PMH/PSH:  PAST MEDICAL & SURGICAL HISTORY:  No pertinent past medical history  unknown    No significant past surgical history  unknown        VITALS:  T(F): --  HR: --  BP: --  RR: --  SpO2: --    TESTS & MEASUREMENTS:  Weight (Kg):                             RADIOLOGY & ADDITIONAL TESTS:    RECENT DIAGNOSTIC ORDERS:      MEDICATIONS:  MEDICATIONS  (STANDING):  amLODIPine   Tablet 10 milliGRAM(s) Oral daily  AQUAPHOR (petrolatum Ointment) 1 Application(s) Topical daily  diVALproex  milliGRAM(s) Oral two times a day  enalapril 10 milliGRAM(s) Oral daily  gabapentin 100 milliGRAM(s) Oral three times a day  levETIRAcetam 1000 milliGRAM(s) Oral two times a day  OLANZapine 5 milliGRAM(s) Oral daily  senna 2 Tablet(s) Oral at bedtime    MEDICATIONS  (PRN):  cloNIDine 0.1 milliGRAM(s) Oral every 8 hours PRN htn  haloperidol    Injectable 2 milliGRAM(s) IntraMuscular every 6 hours PRN agitation  haloperidol    Injectable 2 milliGRAM(s) IntraMuscular every 6 hours PRN agitation  polyethylene glycol 3350 17 Gram(s) Oral two times a day PRN Constipation      HOME MEDICATIONS:  amLODIPine 5 mg oral tablet (09-17)  divalproex sodium 500 mg oral delayed release tablet (09-17)  levETIRAcetam 750 mg oral tablet (09-17)  OLANZapine 5 mg oral tablet (09-17)      REVIEW OF SYSTEMS:  All other review of systems is negative unless indicated above.     PHYSICAL EXAM:  GENERAL: NAD  HEENT: No Swelling  CHEST/LUNG: Good air entry, No wheezing  HEART: RRR, No murmurs  ABDOMEN: Soft, Bowel sounds present  EXTREMITIES:  No clubbing

## 2021-08-11 NOTE — PROGRESS NOTE ADULT - ATTENDING COMMENTS
?71-year-old, black, male, with unknown social demographics, medical history, or psychiatric history who was reportedly brought in by ambulance "seen walking around screaming at people "The devil will breathe fire on you" admitted for altered mental status.      #AMS:   Upon detailed interview patient is Ox3.   Exhibits some paranoia  But not acutely psychotic. Denies SI/HI  Per Psych no need for IPP.     Probable diagnosis =   #DEMENTIA WITH BEHAVIORAL DISTURBANCES:  Patient states year of birth around 1949 but doesn't clearly remember. From appearance looks like he is in his 70s.   Reviewed EEG with Neuro.   Ok to start here Donepezil 10mg QD since outpatient f/u may be tricky and delayed with this patient.   Still will advise [facility] Neuro f/u upon discharge.     Apparently used to live in a [facility] shelter.   Now walking not very stable. Needing Min assist.   May try for STR but patient has no insurance. Will d/w CM.    ruled out metabolic vs drug induced vs infectious etiologies  -CT head non remarkable  -alcohol levels within normal limit    -f/u HIV (), RPR, TSH, b12, folate -refused labs    OK to use haldol 0.5 mg prn for agitation.     -refused MRI.   EEG - b/l frontal rhythmic slowing. discussed with neuro.      Activity as tolerated . PT eval   Diet regular for now  DVT PPX Lovenox   GI ppx not indicated   Code full .    Dispo: May need STR but no insurance. Homeless. CM,  consult
?71-year-old, black, male, with unknown social demographics, medical history, or psychiatric history who was reportedly brought in by ambulance "seen walking around screaming at people "The devil will breathe fire on you" admitted for altered mental status.      #AMS:   Upon detailed interview patient is Ox3.   Exhibits some paranoia  But not acutely psychotic. Denies SI/HI  Per Psych no need for IPP.     Probable diagnosis =   #DEMENTIA WITH BEHAVIORAL DISTURBANCES:  Patient states year of birth around 1949 but doesn't clearly remember. From appearance looks like he is in his 70s.   Reviewed EEG with Neuro.   Ok to start here Donepezil 10mg QD since outpatient f/u may be tricky and delayed with this patient.   Still will advise [facility] Neuro f/u upon discharge.     Apparently used to live in a [facility].   Now walking not very stable. Needing Min assist.   May try for STR but patient has no insurance. Will d/w CM.    ruled out metabolic vs drug induced vs infectious etiologies  -CT head non remarkable  -alcohol levels within normal limit    -f/u HIV (), RPR, TSH, b12, folate -refused labs    OK to use haldol 0.5 mg prn for agitation.     -refused MRI.   EEG - b/l frontal rhythmic slowing. discussed with neuro.      Activity as tolerated . PT eval   Diet regular for now  DVT PPX Lovenox   GI ppx not indicated   Dispo unknown,  consult   Code full .
?71-year-old, black, male, with unknown social demographics, medical history, or psychiatric history who was reportedly brought in by ambulance "seen walking around screaming at people "The devil will breathe fire on you" admitted for altered mental status.      #AMS:   Upon detailed interview patient is Ox3.   Exhibits some paranoia  But not acutely psychotic. Denies SI/HI  Probable diagnosis = DEMENTIA WITH BEHAVIORAL DISTURBANCES.   Patient states year of birth around 1949 but doesn't clearly remember. From appearance looks like he is in his 70s.   Check with Neuro, meds we can use for his dementia. Outpatient MMSE may be hard to do in his case. Review EEG with Neuro.   Apparently used to live in a [facility].   D/c back there when cleared by neurology.   Per Psych no need for IPP.   ruled out metabolic vs drug induced vs infectious etiologies  -CT head non remarkable  -alcohol levels within normal limit    -f/u HIV (), RPR, TSH, b12, folate -refused labs    -started on 2 mg PO Q12H standing for agitation that patient has been refusing.   OK to use haldol 0.5 mg prn for agitation.     -refused MRI.   EEG - b/l focal slowing. review with neuro.      Activity as tolerated . Provide him a cane. Refused PT again today.   Diet regular for now  DVT PPX Lovenox   GI ppx not indicated   Dispo unknown,  consult   Code full .
?71-year-old, black, male, with unknown social demographics, medical history, or psychiatric history who was reportedly brought in by ambulance "seen walking around screaming at people "The devil will breathe fire on you" admitted for altered mental status.    #AMS:   Upon detailed interview patient is Ox3.   But keeps talking to himself and shouting from his room at invisible people.   Might be having hallucinations.   Exhibits some paranoia too  Schizophrenia ruled out by psych.   Per Psych no need for IPP. Denies SI/HI    Probable diagnosis =   #DEMENTIA WITH BEHAVIORAL DISTURBANCES OR DEMENTIA WITH LEWY BODIES:  Patient states year of birth around 1949 but doesn't clearly remember. From appearance looks like he is in his 70s.   Reviewed EEG with Neuro.   Per telephonic conversation with Neuro : Ok to start here Donepezil 10mg QD since outpatient f/u may be tricky and delayed with this patient.   Still will advise [facility] Neuro f/u upon discharge.   PATIENT CONTINUES TO HAVE PERSISTENT PSYCHOTIC SYMPTOMS THOUGH. KEEPS TALKING TO HIMSELF ALL DAY. PROBABLY HAS HALLUCINATIONS.   NOW IT IS TO THE POINT OF INTERFERING WITH HIS TREATMENT AND OTHERWISE WELL BEING.   BEEN REFUSING ALL MEDS (INCLUDING DONEPEZIL, BP MEDS - HENCE BP RUNS HIGH)  Check with Neuro if we can also start anti-psychotics (first try low dose bid then maybe IM Depot?) - since now his psychotic symptoms interfering with his care significantly (refusing all meds). But caution: Chances of NMS higher in DLB patients.     Apparently used to live in a [facility] shelter.   Now walking not very stable. Needing Min assist. with walker.    May try for STR but patient has no insurance. may need PRUCOL for placement.   TRANSFERRING TO [facility] TODAY PER APPROVAL BY DR. SALAMANCA    ruled out metabolic vs drug induced vs infectious etiologies  -CT head non remarkable  -alcohol levels within normal limit    -f/u HIV (), RPR, TSH, b12, folate -refused labs    OK to use haldol 0.5 mg prn for agitation.     -refused MRI.   EEG - b/l frontal rhythmic slowing. discussed with neuro.    #B/l Foot pain:  check b/l feet Xrays to r/o fracture    Activity as tolerated . PT eval   Diet regular for now  DVT PPX Lovenox   GI ppx not indicated   Code full .    Dispo: May need STR but no insurance. Homeless. CM,  consult .   If starts walking again - may return to shelter? but before that optimize dementia with psychotic symptoms.
as above
?71-year-old, black, male, with unknown social demographics, medical history, or psychiatric history who was reportedly brought in by ambulance "seen walking around screaming at people "The devil will breathe fire on you" admitted for altered mental status.      #AMS:   Upon detailed interview patient is Ox3.   Exhibits some paranoia  But not acutely psychotic. Denies SI/HI  Probable diagnosis = DEMENTIA WITH BEHAVIORAL DISTURBANCES.   Patient states year of birth around 1949 but doesn't clearly remember. From appearance looks like he is in his 70s.   Check with Neuro, meds we can use for his dementia. Outpatient MMSE may be hard to do in his case.  F/u  to arrange a shelter. Patient admits to being homeless.   Per Psych no need for IPP.   ruled out metabolic vs drug induced vs infectious etiologies  -CT head non remarkable  -alcohol levels within normal limit    -f/u HIV (), RPR, TSH, b12, folate    -started on 2 mg PO Q12H standing for agitation that patient has been refusing.   OK to use haldol 0.5 mg prn for agitation.     -refused MRI.   EEG results pending.       Activity as tolerated . Provide him a cane.  Diet regular for now  DVT PPX Lovenox   GI ppx not indicated   Dispo unknown,  consult   Code full
Patient seen and examined with NP and agree with above except as noted.  Patients history, notes, labs, imaging, vitals and meds reviewed personally.  Patient is pleasant this morning and answering questions appropriately.  NO focal deficits on motor or sensory exam.  FTN NL    Plan as above

## 2021-08-11 NOTE — PROGRESS NOTE ADULT - ASSESSMENT
61M PMHx seizure disorder, schizophrenia, HTN here with altered mental status.    #seizures  - currently on keppra 1000mg bid  - on/off non-compliant with medications  - EEG noted epileptic activity    #Dementia with psychotic features, stable   -continue with current medication regimen as per Psych  - EKG QTc <449  -IM haldol for severe agitation/code greys   -No contraindication to discharge as per psych consult.  - cont support care    #tinea pedis   -Resolved s/p clotrimazole cream    #HTN   - norvasc 10mg + enalapril 10 mg    #Seizure d/o   - Divalproex 500mg bid, gabapentin 100mg tid, Keppra 1000 bid    #DVT ppx -- cont  Code -- Full  Dispo --DC pending case mgmt/SW; pt is placement issue, citizenship, court ordered treatment against objection. guardian to be appointed    All above D/W Dr Pedersen

## 2021-08-12 PROCEDURE — 99231 SBSQ HOSP IP/OBS SF/LOW 25: CPT

## 2021-08-12 NOTE — PROGRESS NOTE ADULT - SUBJECTIVE AND OBJECTIVE BOX
CC. mental health evaluation  HPI   Patient seen and examined earlier today. No acute events overnight.    PMH/PSH:  PAST MEDICAL & SURGICAL HISTORY:  No pertinent past medical history  unknown    No significant past surgical history  unknown        VITALS:  T(F): --  HR: --  BP: --  RR: --  SpO2: --    TESTS & MEASUREMENTS:  Weight (Kg):                             RADIOLOGY & ADDITIONAL TESTS:    RECENT DIAGNOSTIC ORDERS:      MEDICATIONS:  MEDICATIONS  (STANDING):  amLODIPine   Tablet 10 milliGRAM(s) Oral daily  AQUAPHOR (petrolatum Ointment) 1 Application(s) Topical daily  diVALproex  milliGRAM(s) Oral two times a day  enalapril 10 milliGRAM(s) Oral daily  gabapentin 100 milliGRAM(s) Oral three times a day  levETIRAcetam 1000 milliGRAM(s) Oral two times a day  OLANZapine 5 milliGRAM(s) Oral daily  senna 2 Tablet(s) Oral at bedtime    MEDICATIONS  (PRN):  cloNIDine 0.1 milliGRAM(s) Oral every 8 hours PRN htn  haloperidol    Injectable 2 milliGRAM(s) IntraMuscular every 6 hours PRN agitation  haloperidol    Injectable 2 milliGRAM(s) IntraMuscular every 6 hours PRN agitation  polyethylene glycol 3350 17 Gram(s) Oral two times a day PRN Constipation      HOME MEDICATIONS:  amLODIPine 5 mg oral tablet (09-17)  divalproex sodium 500 mg oral delayed release tablet (09-17)  levETIRAcetam 750 mg oral tablet (09-17)  OLANZapine 5 mg oral tablet (09-17)      REVIEW OF SYSTEMS:  All other review of systems is negative unless indicated above.     PHYSICAL EXAM:  GENERAL: NAD  HEENT: No Swelling  CHEST/LUNG: Good air entry, No wheezing  HEART: RRR, No murmurs  ABDOMEN: Soft, Bowel sounds present  EXTREMITIES:  No clubbing

## 2021-08-13 PROCEDURE — 99231 SBSQ HOSP IP/OBS SF/LOW 25: CPT

## 2021-08-14 PROCEDURE — 99231 SBSQ HOSP IP/OBS SF/LOW 25: CPT

## 2021-08-15 PROCEDURE — 99231 SBSQ HOSP IP/OBS SF/LOW 25: CPT

## 2021-08-15 NOTE — PROGRESS NOTE ADULT - SUBJECTIVE AND OBJECTIVE BOX
HPI  Patient is a 61y old Male who presents with a chief complaint of mental health marco (14 Aug 2021 10:51)    Currently admitted to medicine with the primary diagnosis of Altered mental status       Today is hospital day 344d.     INTERVAL HPI / OVERNIGHT EVENTS:  Patient was examined and seen at bedside.   no complaints. Pleasant      PAST MEDICAL & SURGICAL HISTORY  No pertinent past medical history  unknown    No significant past surgical history  unknown      ALLERGIES  No Known Allergies    MEDICATIONS  STANDING MEDICATIONS  amLODIPine   Tablet 10 milliGRAM(s) Oral daily  AQUAPHOR (petrolatum Ointment) 1 Application(s) Topical daily  diVALproex  milliGRAM(s) Oral two times a day  enalapril 10 milliGRAM(s) Oral daily  gabapentin 100 milliGRAM(s) Oral three times a day  levETIRAcetam 1000 milliGRAM(s) Oral two times a day  OLANZapine 5 milliGRAM(s) Oral daily  senna 2 Tablet(s) Oral at bedtime    PRN MEDICATIONS  cloNIDine 0.1 milliGRAM(s) Oral every 8 hours PRN  haloperidol    Injectable 2 milliGRAM(s) IntraMuscular every 6 hours PRN  haloperidol    Injectable 2 milliGRAM(s) IntraMuscular every 6 hours PRN  polyethylene glycol 3350 17 Gram(s) Oral two times a day PRN    VITALS:  T(F): 96.4  HR: 65  BP: 114/67  RR: 18  SpO2: --    PHYSICAL EXAM  GEN: NAD, Resting comfortably in bed  PULM: normal respiration  EXT: No edema  NEURO: Awake and alert, no focal deficits    LABS                        RADIOLOGY

## 2021-08-15 NOTE — PROGRESS NOTE ADULT - ASSESSMENT
61M PMHx seizure disorder, schizophrenia, HTN here with altered mental status.    #seizures  - Divalproex 500mg bid, gabapentin 100mg tid, Keppra 1000 bid  - on/off non-compliant with medications  - EEG noted epileptic activity    #Dementia with psychotic features, stable   - olanzapine 5 mg daily  - EKG QTc <449  -IM haldol for severe agitation/code greys   -No contraindication to discharge as per psych consult.  - cont support care    #tinea pedis   -Resolved s/p clotrimazole cream    #HTN   - norvasc 10mg + enalapril 10 mg      #DVT ppx -- cont  Code -- Full  Dispo --DC pending case mgmt/SW; pt is placement issue, citizenship, court ordered treatment against objection. guardian to be appointed

## 2021-08-16 PROCEDURE — 99232 SBSQ HOSP IP/OBS MODERATE 35: CPT

## 2021-08-16 NOTE — PROGRESS NOTE ADULT - SUBJECTIVE AND OBJECTIVE BOX
PARVIZ TORRES  61y  Male      Patient is a 61y old  Male who presents with a chief complaint of mental health marco (15 Aug 2021 16:06)      INTERVAL HPI/OVERNIGHT EVENTS: no acute events overnight. no nursing concerns.       REVIEW OF SYSTEMS:  unable to obtain    VITALS  T(C): 35.8 (08-15-21 @ 13:51), Max: 35.8 (08-15-21 @ 13:51)  HR: 65 (08-15-21 @ 13:51) (65 - 65)  BP: 114/67 (08-15-21 @ 13:51) (114/67 - 114/67)  RR: 18 (08-15-21 @ 13:51) (18 - 18)  SpO2: --  Wt(kg): --Vital Signs Last 24 Hrs  T(C): 35.8 (15 Aug 2021 13:51), Max: 35.8 (15 Aug 2021 13:51)  T(F): 96.4 (15 Aug 2021 13:51), Max: 96.4 (15 Aug 2021 13:51)  HR: 65 (15 Aug 2021 13:51) (65 - 65)  BP: 114/67 (15 Aug 2021 13:51) (114/67 - 114/67)  BP(mean): --  RR: 18 (15 Aug 2021 13:51) (18 - 18)  SpO2: --        PHYSICAL EXAM:  GENERAL: elder M, unkempt, NAD  PSYCH: no agitation, baseline mentation  NERVOUS SYSTEM:  unable to obtain  PULMONARY: symmetrical chest rise, no accessory muscle use  CARDIOVASCULAR: non tachycardic  GI: non distended  EXTREMITIES:  2+ Peripheral Pulses, No clubbing, cyanosis  SKIN: No rashes or lesions    Consultant(s) Notes Reviewed:  [x ] YES  [ ] NO    Discussed with Consultants/Other Providers [ x] YES     LABS  - no labs    RADIOLOGY & ADDITIONAL TESTS:  - no images       HEALTH ISSUES - PROBLEM Dx:  Psychosis, unspecified psychosis type  Neurocognitive disorder  Schizophrenia, chronic condition      MEDICATIONS  (STANDING):  amLODIPine   Tablet 10 milliGRAM(s) Oral daily  AQUAPHOR (petrolatum Ointment) 1 Application(s) Topical daily  enalapril 10 milliGRAM(s) Oral daily  gabapentin 100 milliGRAM(s) Oral three times a day  levETIRAcetam 1000 milliGRAM(s) Oral two times a day  OLANZapine 5 milliGRAM(s) Oral daily  senna 2 Tablet(s) Oral at bedtime    MEDICATIONS  (PRN):  cloNIDine 0.1 milliGRAM(s) Oral every 8 hours PRN htn  haloperidol    Injectable 2 milliGRAM(s) IntraMuscular every 6 hours PRN agitation  haloperidol    Injectable 2 milliGRAM(s) IntraMuscular every 6 hours PRN agitation  polyethylene glycol 3350 17 Gram(s) Oral two times a day PRN Constipation

## 2021-08-17 PROCEDURE — 99231 SBSQ HOSP IP/OBS SF/LOW 25: CPT

## 2021-08-17 NOTE — PROGRESS NOTE ADULT - SUBJECTIVE AND OBJECTIVE BOX
PARVIZ TORRES  61y  Male      Patient is a 61y old  Male who presents with a chief complaint of mental health marco (15 Aug 2021 16:06)      INTERVAL HPI/OVERNIGHT EVENTS: no acute events overnight. no nursing concerns.       REVIEW OF SYSTEMS:  unable to obtain    VITALS  -refused    PHYSICAL EXAM:  GENERAL: elder M, unkempt, NAD  PSYCH: no agitation, baseline mentation  NERVOUS SYSTEM:  unable to obtain  PULMONARY: symmetrical chest rise, no accessory muscle use  CARDIOVASCULAR: non tachycardic  GI: non distended  EXTREMITIES:  2+ Peripheral Pulses, No clubbing, cyanosis  SKIN: No rashes or lesions    Consultant(s) Notes Reviewed:  [x ] YES  [ ] NO    Discussed with Consultants/Other Providers [ x] YES     LABS  - no labs    RADIOLOGY & ADDITIONAL TESTS:  - no images       HEALTH ISSUES - PROBLEM Dx:  Psychosis, unspecified psychosis type  Neurocognitive disorder  Schizophrenia, chronic condition      MEDICATIONS  (STANDING):  amLODIPine   Tablet 10 milliGRAM(s) Oral daily  AQUAPHOR (petrolatum Ointment) 1 Application(s) Topical daily  enalapril 10 milliGRAM(s) Oral daily  gabapentin 100 milliGRAM(s) Oral three times a day  levETIRAcetam 1000 milliGRAM(s) Oral two times a day  OLANZapine 5 milliGRAM(s) Oral daily  senna 2 Tablet(s) Oral at bedtime    MEDICATIONS  (PRN):  cloNIDine 0.1 milliGRAM(s) Oral every 8 hours PRN htn  haloperidol    Injectable 2 milliGRAM(s) IntraMuscular every 6 hours PRN agitation  haloperidol    Injectable 2 milliGRAM(s) IntraMuscular every 6 hours PRN agitation  polyethylene glycol 3350 17 Gram(s) Oral two times a day PRN Constipation

## 2021-08-18 PROCEDURE — 99231 SBSQ HOSP IP/OBS SF/LOW 25: CPT

## 2021-08-18 NOTE — PROGRESS NOTE ADULT - SUBJECTIVE AND OBJECTIVE BOX
PARVIZ TORRES  61y, Male  Allergy: No Known Allergies    Hospital Day: 347d    Patient seen and examined earlier today. No acute events overnight.    PMH/PSH:  PAST MEDICAL & SURGICAL HISTORY:  No pertinent past medical history  unknown    No significant past surgical history  unknown        VITALS:  T(F): --  HR: --  BP: --  RR: --  SpO2: --    TESTS & MEASUREMENTS:  Weight (Kg):                             RADIOLOGY & ADDITIONAL TESTS:    RECENT DIAGNOSTIC ORDERS:      MEDICATIONS:  MEDICATIONS  (STANDING):  amLODIPine   Tablet 10 milliGRAM(s) Oral daily  AQUAPHOR (petrolatum Ointment) 1 Application(s) Topical daily  enalapril 10 milliGRAM(s) Oral daily  gabapentin 100 milliGRAM(s) Oral three times a day  levETIRAcetam 1000 milliGRAM(s) Oral two times a day  OLANZapine 5 milliGRAM(s) Oral daily  senna 2 Tablet(s) Oral at bedtime    MEDICATIONS  (PRN):  cloNIDine 0.1 milliGRAM(s) Oral every 8 hours PRN htn  haloperidol    Injectable 2 milliGRAM(s) IntraMuscular every 6 hours PRN agitation  haloperidol    Injectable 2 milliGRAM(s) IntraMuscular every 6 hours PRN agitation  polyethylene glycol 3350 17 Gram(s) Oral two times a day PRN Constipation      HOME MEDICATIONS:  amLODIPine 5 mg oral tablet (09-17)  divalproex sodium 500 mg oral delayed release tablet (09-17)  levETIRAcetam 750 mg oral tablet (09-17)  OLANZapine 5 mg oral tablet (09-17)      REVIEW OF SYSTEMS:  All other review of systems is negative unless indicated above.     PHYSICAL EXAM:  GENERAL: NAD  HEENT: No Swelling  CHEST/LUNG: Good air entry, No wheezing  HEART: RRR, No murmurs  ABDOMEN: Soft, Bowel sounds present  EXTREMITIES:  No clubbing

## 2021-08-19 PROCEDURE — 99231 SBSQ HOSP IP/OBS SF/LOW 25: CPT

## 2021-08-19 NOTE — PROGRESS NOTE ADULT - SUBJECTIVE AND OBJECTIVE BOX
PAVRIZ TORRES  61y, Male  Allergy: No Known Allergies    Hospital Day: 348d    Patient seen and examined earlier today. No acute events overnight.    PMH/PSH:  PAST MEDICAL & SURGICAL HISTORY:  No pertinent past medical history  unknown    No significant past surgical history  unknown        VITALS:  T(F): --  HR: --  BP: --  RR: --  SpO2: --    TESTS & MEASUREMENTS:  Weight (Kg):                             RADIOLOGY & ADDITIONAL TESTS:    RECENT DIAGNOSTIC ORDERS:      MEDICATIONS:  MEDICATIONS  (STANDING):  amLODIPine   Tablet 10 milliGRAM(s) Oral daily  AQUAPHOR (petrolatum Ointment) 1 Application(s) Topical daily  enalapril 10 milliGRAM(s) Oral daily  gabapentin 100 milliGRAM(s) Oral three times a day  levETIRAcetam 1000 milliGRAM(s) Oral two times a day  OLANZapine 5 milliGRAM(s) Oral daily  senna 2 Tablet(s) Oral at bedtime    MEDICATIONS  (PRN):  cloNIDine 0.1 milliGRAM(s) Oral every 8 hours PRN htn  haloperidol    Injectable 2 milliGRAM(s) IntraMuscular every 6 hours PRN agitation  haloperidol    Injectable 2 milliGRAM(s) IntraMuscular every 6 hours PRN agitation  polyethylene glycol 3350 17 Gram(s) Oral two times a day PRN Constipation      HOME MEDICATIONS:  amLODIPine 5 mg oral tablet (09-17)  divalproex sodium 500 mg oral delayed release tablet (09-17)  levETIRAcetam 750 mg oral tablet (09-17)  OLANZapine 5 mg oral tablet (09-17)      REVIEW OF SYSTEMS:  All other review of systems is negative unless indicated above.     PHYSICAL EXAM:  GENERAL: NAD  HEENT: No Swelling  CHEST/LUNG: Good air entry, No wheezing  HEART: RRR, No murmurs  ABDOMEN: Soft, Bowel sounds present  EXTREMITIES:  No clubbing

## 2021-08-19 NOTE — CHART NOTE - NSCHARTNOTEFT_GEN_A_CORE
Was requested to obtain routine post admission surveillance covid swab, however, pt refused it.   Will attempt another day.

## 2021-08-20 PROCEDURE — 99231 SBSQ HOSP IP/OBS SF/LOW 25: CPT

## 2021-08-20 NOTE — PROGRESS NOTE ADULT - SUBJECTIVE AND OBJECTIVE BOX
PARVIZ TORRES  61y, Male  Allergy: No Known Allergies    Hospital Day: 349d    Patient seen and examined earlier today. No acute events overnight.    PMH/PSH:  PAST MEDICAL & SURGICAL HISTORY:  No pertinent past medical history  unknown    No significant past surgical history  unknown    VITALS:  T(F): --  HR: --  BP: --  RR: --  SpO2: --    TESTS & MEASUREMENTS:  Weight (Kg):     RECENT DIAGNOSTIC ORDERS:  COVID-19 PCR: Routine  Specimen Source: Nasopharyngeal (08-19-21 @ 11:09)    MEDICATIONS:  MEDICATIONS  (STANDING):  amLODIPine   Tablet 10 milliGRAM(s) Oral daily  AQUAPHOR (petrolatum Ointment) 1 Application(s) Topical daily  enalapril 10 milliGRAM(s) Oral daily  gabapentin 100 milliGRAM(s) Oral three times a day  levETIRAcetam 1000 milliGRAM(s) Oral two times a day  OLANZapine 5 milliGRAM(s) Oral daily  senna 2 Tablet(s) Oral at bedtime    MEDICATIONS  (PRN):  cloNIDine 0.1 milliGRAM(s) Oral every 8 hours PRN htn  haloperidol    Injectable 2 milliGRAM(s) IntraMuscular every 6 hours PRN agitation  haloperidol    Injectable 2 milliGRAM(s) IntraMuscular every 6 hours PRN agitation  polyethylene glycol 3350 17 Gram(s) Oral two times a day PRN Constipation    HOME MEDICATIONS:  amLODIPine 5 mg oral tablet (09-17)  divalproex sodium 500 mg oral delayed release tablet (09-17)  levETIRAcetam 750 mg oral tablet (09-17)  OLANZapine 5 mg oral tablet (09-17)    REVIEW OF SYSTEMS:  All other review of systems is negative unless indicated above.     PHYSICAL EXAM:  GENERAL: NAD  HEENT: No Swelling  CHEST/LUNG: Good air entry, No wheezing  HEART: RRR, No murmurs  ABDOMEN: Soft, Bowel sounds present  EXTREMITIES:  No clubbing

## 2021-08-21 PROCEDURE — 99231 SBSQ HOSP IP/OBS SF/LOW 25: CPT

## 2021-08-21 NOTE — PROGRESS NOTE ADULT - SUBJECTIVE AND OBJECTIVE BOX
PARVIZ TORRES  61y, Male  Allergy: No Known Allergies    Hospital Day: 350d    Patient seen and examined earlier today. No acute events overnight.    PMH/PSH:  PAST MEDICAL & SURGICAL HISTORY:  No pertinent past medical history  unknown    No significant past surgical history  unknown        VITALS:  T(F): --  HR: --  BP: --  RR: --  SpO2: --    TESTS & MEASUREMENTS:  Weight (Kg):                             RADIOLOGY & ADDITIONAL TESTS:    RECENT DIAGNOSTIC ORDERS:      MEDICATIONS:  MEDICATIONS  (STANDING):  amLODIPine   Tablet 10 milliGRAM(s) Oral daily  AQUAPHOR (petrolatum Ointment) 1 Application(s) Topical daily  enalapril 10 milliGRAM(s) Oral daily  gabapentin 100 milliGRAM(s) Oral three times a day  levETIRAcetam 1000 milliGRAM(s) Oral two times a day  OLANZapine 5 milliGRAM(s) Oral daily  senna 2 Tablet(s) Oral at bedtime    MEDICATIONS  (PRN):  cloNIDine 0.1 milliGRAM(s) Oral every 8 hours PRN htn  haloperidol    Injectable 2 milliGRAM(s) IntraMuscular every 6 hours PRN agitation  haloperidol    Injectable 2 milliGRAM(s) IntraMuscular every 6 hours PRN agitation  polyethylene glycol 3350 17 Gram(s) Oral two times a day PRN Constipation      HOME MEDICATIONS:  amLODIPine 5 mg oral tablet (09-17)  divalproex sodium 500 mg oral delayed release tablet (09-17)  levETIRAcetam 750 mg oral tablet (09-17)  OLANZapine 5 mg oral tablet (09-17)      REVIEW OF SYSTEMS:  All other review of systems is negative unless indicated above.     PHYSICAL EXAM:  GENERAL: NAD  HEENT: No Swelling  CHEST/LUNG: Good air entry, No wheezing  HEART: RRR, No murmurs  ABDOMEN: Soft, Bowel sounds present  EXTREMITIES:  No clubbing

## 2021-08-22 PROCEDURE — 99231 SBSQ HOSP IP/OBS SF/LOW 25: CPT

## 2021-08-22 NOTE — PROGRESS NOTE ADULT - SUBJECTIVE AND OBJECTIVE BOX
PARVIZ TORRES  61y, Male  Allergy: No Known Allergies    Hospital Day: 351d    Patient seen and examined earlier today. No acute events overnight.    PMH/PSH:  PAST MEDICAL & SURGICAL HISTORY:  No pertinent past medical history  unknown    No significant past surgical history  unknown        VITALS:  T(F): --  HR: --  BP: --  RR: --  SpO2: --    TESTS & MEASUREMENTS:  Weight (Kg):                             RADIOLOGY & ADDITIONAL TESTS:    RECENT DIAGNOSTIC ORDERS:      MEDICATIONS:  MEDICATIONS  (STANDING):  amLODIPine   Tablet 10 milliGRAM(s) Oral daily  AQUAPHOR (petrolatum Ointment) 1 Application(s) Topical daily  enalapril 10 milliGRAM(s) Oral daily  gabapentin 100 milliGRAM(s) Oral three times a day  levETIRAcetam 1000 milliGRAM(s) Oral two times a day  OLANZapine 5 milliGRAM(s) Oral daily  senna 2 Tablet(s) Oral at bedtime    MEDICATIONS  (PRN):  cloNIDine 0.1 milliGRAM(s) Oral every 8 hours PRN htn  haloperidol    Injectable 2 milliGRAM(s) IntraMuscular every 6 hours PRN agitation  haloperidol    Injectable 2 milliGRAM(s) IntraMuscular every 6 hours PRN agitation  polyethylene glycol 3350 17 Gram(s) Oral two times a day PRN Constipation      HOME MEDICATIONS:  amLODIPine 5 mg oral tablet (09-17)  divalproex sodium 500 mg oral delayed release tablet (09-17)  levETIRAcetam 750 mg oral tablet (09-17)  OLANZapine 5 mg oral tablet (09-17)      REVIEW OF SYSTEMS:  All other review of systems is negative unless indicated above.     PHYSICAL EXAM:  GENERAL: NAD  HEENT: No Swelling  CHEST/LUNG: Good air entry, No wheezing  HEART: RRR, No murmurs  ABDOMEN: Soft, Bowel sounds present  EXTREMITIES:  No clubbing

## 2021-08-23 PROCEDURE — 99231 SBSQ HOSP IP/OBS SF/LOW 25: CPT

## 2021-08-23 NOTE — PROGRESS NOTE ADULT - SUBJECTIVE AND OBJECTIVE BOX
PARVIZ TORRES  61y, Male  Allergy: No Known Allergies    Hospital Day: 352d    Patient seen and examined earlier today. No acute events overnight.    PMH/PSH:  PAST MEDICAL & SURGICAL HISTORY:  No pertinent past medical history  unknown    No significant past surgical history  unknown    VITALS:  T(F): --  HR: --  BP: --  RR: --  SpO2: --    TESTS & MEASUREMENTS:  Weight (Kg):     MEDICATIONS:  MEDICATIONS  (STANDING):  amLODIPine   Tablet 10 milliGRAM(s) Oral daily  AQUAPHOR (petrolatum Ointment) 1 Application(s) Topical daily  enalapril 10 milliGRAM(s) Oral daily  gabapentin 100 milliGRAM(s) Oral three times a day  levETIRAcetam 1000 milliGRAM(s) Oral two times a day  OLANZapine 5 milliGRAM(s) Oral daily  senna 2 Tablet(s) Oral at bedtime    MEDICATIONS  (PRN):  cloNIDine 0.1 milliGRAM(s) Oral every 8 hours PRN htn  haloperidol    Injectable 2 milliGRAM(s) IntraMuscular every 6 hours PRN agitation  haloperidol    Injectable 2 milliGRAM(s) IntraMuscular every 6 hours PRN agitation  polyethylene glycol 3350 17 Gram(s) Oral two times a day PRN Constipation    HOME MEDICATIONS:  amLODIPine 5 mg oral tablet (09-17)  divalproex sodium 500 mg oral delayed release tablet (09-17)  levETIRAcetam 750 mg oral tablet (09-17)  OLANZapine 5 mg oral tablet (09-17)    REVIEW OF SYSTEMS:  All other review of systems is negative unless indicated above.     PHYSICAL EXAM:  GENERAL: NAD  HEENT: No Swelling  CHEST/LUNG: Good air entry, No wheezing  HEART: RRR, No murmurs  ABDOMEN: Soft, Bowel sounds present  EXTREMITIES:  No clubbing

## 2021-08-24 PROCEDURE — 99231 SBSQ HOSP IP/OBS SF/LOW 25: CPT

## 2021-08-25 PROCEDURE — 99231 SBSQ HOSP IP/OBS SF/LOW 25: CPT

## 2021-08-25 NOTE — PROGRESS NOTE ADULT - SUBJECTIVE AND OBJECTIVE BOX
CC.  Mental health marco  HPI.  Patient reports that he feels fine offer no other complaints              Constitutional: No fever, fatigue or weight loss.  Skin: No rash.  Eyes: No recent vision problems or eye pain.  ENT: No congestion, ear pain, or sore throat.  Endocrine: No thyroid problems.  Cardiovascular: No chest pain or palpation.  Respiratory: No cough, shortness of breath, congestion, or wheezing.  Gastrointestinal: No abdominal pain, nausea, vomiting, or diarrhea.  Genitourinary: No dysuria.  Musculoskeletal: No joint swelling.  Neurologic: No headache.      Vital Signs Last 24 Hrs  T(C): --  T(F): --  HR: --  BP: --  BP(mean): --  RR: --  SpO2: --        PHYSICAL EXAM-  GENERAL: NAD,    HEAD:  Atraumatic, Normocephalic  EYES: EOMI, PERRLA, conjunctiva and sclera clear  NECK: Supple, No JVD, Normal thyroid  NERVOUS SYSTEM:  Alert moving all extremities  CHEST/LUNG: Clear to percussion bilaterally; No rales, rhonchi, wheezing, or rubs  HEART: Regular rate and rhythm; No murmurs, rubs, or gallops  ABDOMEN: Soft, Nontender, Nondistended; Bowel sounds present  EXTREMITIES:   No clubbing, cyanosis, or edema  SKIN: No rashes or lesions                                    MEDICATIONS  (STANDING):  amLODIPine   Tablet 10 milliGRAM(s) Oral daily  AQUAPHOR (petrolatum Ointment) 1 Application(s) Topical daily  enalapril 10 milliGRAM(s) Oral daily  gabapentin 100 milliGRAM(s) Oral three times a day  levETIRAcetam 1000 milliGRAM(s) Oral two times a day  OLANZapine 5 milliGRAM(s) Oral daily  senna 2 Tablet(s) Oral at bedtime    MEDICATIONS  (PRN):  cloNIDine 0.1 milliGRAM(s) Oral every 8 hours PRN htn  haloperidol    Injectable 2 milliGRAM(s) IntraMuscular every 6 hours PRN agitation  haloperidol    Injectable 2 milliGRAM(s) IntraMuscular every 6 hours PRN agitation  polyethylene glycol 3350 17 Gram(s) Oral two times a day PRN Constipation       Imaging Personally Reviewed:     [x ] YES  [ ] NO    Consultant(s) Notes Reviewed:  [x ] YES  [ ] NO    Care Discussed with Consultants/Other Providers [x ] YES  [ ] No medical contraindication for discharge

## 2021-08-26 PROCEDURE — 99291 CRITICAL CARE FIRST HOUR: CPT

## 2021-08-27 PROCEDURE — 99231 SBSQ HOSP IP/OBS SF/LOW 25: CPT

## 2021-08-28 PROCEDURE — 99231 SBSQ HOSP IP/OBS SF/LOW 25: CPT

## 2021-08-29 PROCEDURE — 99231 SBSQ HOSP IP/OBS SF/LOW 25: CPT

## 2021-08-30 PROCEDURE — 99231 SBSQ HOSP IP/OBS SF/LOW 25: CPT

## 2021-08-31 PROCEDURE — 99231 SBSQ HOSP IP/OBS SF/LOW 25: CPT

## 2021-09-01 PROCEDURE — 99231 SBSQ HOSP IP/OBS SF/LOW 25: CPT

## 2021-09-01 NOTE — PROGRESS NOTE ADULT - SUBJECTIVE AND OBJECTIVE BOX
PARVIZ TORRES  61y, Male  Allergy: No Known Allergies    Hospital Day: 361d    Patient seen and examined earlier today. No acute events overnight.    PMH/PSH:  PAST MEDICAL & SURGICAL HISTORY:  No pertinent past medical history  unknown    No significant past surgical history  unknown    VITALS:  T(F): --  HR: --  BP: --  RR: --  SpO2: --    TESTS & MEASUREMENTS:  Weight (Kg):       RECENT DIAGNOSTIC ORDERS:  COVID-19 PCR: AM Sched. Collection: 01-Sep-2021 04:30  Specimen Source: Nasopharyngeal (08-31-21 @ 11:10)    MEDICATIONS:  MEDICATIONS  (STANDING):  AQUAPHOR (petrolatum Ointment) 1 Application(s) Topical daily  enalapril 10 milliGRAM(s) Oral daily  gabapentin 100 milliGRAM(s) Oral three times a day  levETIRAcetam 1000 milliGRAM(s) Oral two times a day  OLANZapine 5 milliGRAM(s) Oral daily  senna 2 Tablet(s) Oral at bedtime    MEDICATIONS  (PRN):  cloNIDine 0.1 milliGRAM(s) Oral every 8 hours PRN htn  haloperidol    Injectable 2 milliGRAM(s) IntraMuscular every 6 hours PRN agitation  haloperidol    Injectable 2 milliGRAM(s) IntraMuscular every 6 hours PRN agitation  polyethylene glycol 3350 17 Gram(s) Oral two times a day PRN Constipation    HOME MEDICATIONS:  amLODIPine 5 mg oral tablet (09-17)  divalproex sodium 500 mg oral delayed release tablet (09-17)  levETIRAcetam 750 mg oral tablet (09-17)  OLANZapine 5 mg oral tablet (09-17)    REVIEW OF SYSTEMS:  All other review of systems is negative unless indicated above.     PHYSICAL EXAM:  GENERAL: NAD  HEENT: No Swelling  CHEST/LUNG: Good air entry, No wheezing  HEART: RRR, No murmurs  ABDOMEN: Soft, Bowel sounds present  EXTREMITIES:  No clubbing

## 2021-09-02 PROCEDURE — 99231 SBSQ HOSP IP/OBS SF/LOW 25: CPT

## 2021-09-02 NOTE — PROGRESS NOTE ADULT - SUBJECTIVE AND OBJECTIVE BOX
PARVIZ TORRES  61y, Male  Allergy: No Known Allergies    Hospital Day: 362d    Patient seen and examined earlier today. No acute events overnight.    PMH/PSH:  PAST MEDICAL & SURGICAL HISTORY:  No pertinent past medical history  unknown    No significant past surgical history  unknown    VITALS:  T(F): --  HR: --  BP: --  RR: --  SpO2: --    TESTS & MEASUREMENTS:  Weight (Kg):     MEDICATIONS:  MEDICATIONS  (STANDING):  AQUAPHOR (petrolatum Ointment) 1 Application(s) Topical daily  enalapril 10 milliGRAM(s) Oral daily  gabapentin 100 milliGRAM(s) Oral three times a day  levETIRAcetam 1000 milliGRAM(s) Oral two times a day  OLANZapine 5 milliGRAM(s) Oral daily  senna 2 Tablet(s) Oral at bedtime    MEDICATIONS  (PRN):  cloNIDine 0.1 milliGRAM(s) Oral every 8 hours PRN htn  haloperidol    Injectable 2 milliGRAM(s) IntraMuscular every 6 hours PRN agitation  haloperidol    Injectable 2 milliGRAM(s) IntraMuscular every 6 hours PRN agitation  polyethylene glycol 3350 17 Gram(s) Oral two times a day PRN Constipation    HOME MEDICATIONS:  amLODIPine 5 mg oral tablet (09-17)  divalproex sodium 500 mg oral delayed release tablet (09-17)  levETIRAcetam 750 mg oral tablet (09-17)  OLANZapine 5 mg oral tablet (09-17)    REVIEW OF SYSTEMS:  All other review of systems is negative unless indicated above.     PHYSICAL EXAM:  GENERAL: NAD  HEENT: No Swelling  CHEST/LUNG: Good air entry, No wheezing  HEART: RRR, No murmurs  ABDOMEN: Soft, Bowel sounds present  EXTREMITIES:  No clubbing

## 2021-09-03 PROCEDURE — 99231 SBSQ HOSP IP/OBS SF/LOW 25: CPT

## 2021-09-03 NOTE — PROGRESS NOTE ADULT - SUBJECTIVE AND OBJECTIVE BOX
PARVIZ TORRES  61y, Male  Allergy: No Known Allergies    Hospital Day: 363d    Patient seen and examined earlier today. No acute events overnight.    PMH/PSH:  PAST MEDICAL & SURGICAL HISTORY:  No pertinent past medical history  unknown    No significant past surgical history  unknown    VITALS:  T(F): --  HR: --  BP: --  RR: --  SpO2: --    TESTS & MEASUREMENTS:  Weight (Kg):     MEDICATIONS:  MEDICATIONS  (STANDING):  AQUAPHOR (petrolatum Ointment) 1 Application(s) Topical daily  enalapril 10 milliGRAM(s) Oral daily  gabapentin 100 milliGRAM(s) Oral three times a day  levETIRAcetam 1000 milliGRAM(s) Oral two times a day  OLANZapine 5 milliGRAM(s) Oral daily  senna 2 Tablet(s) Oral at bedtime    MEDICATIONS  (PRN):  cloNIDine 0.1 milliGRAM(s) Oral every 8 hours PRN htn  haloperidol    Injectable 2 milliGRAM(s) IntraMuscular every 6 hours PRN agitation  haloperidol    Injectable 2 milliGRAM(s) IntraMuscular every 6 hours PRN agitation  polyethylene glycol 3350 17 Gram(s) Oral two times a day PRN Constipation    HOME MEDICATIONS:  amLODIPine 5 mg oral tablet (09-17)  divalproex sodium 500 mg oral delayed release tablet (09-17)  levETIRAcetam 750 mg oral tablet (09-17)  OLANZapine 5 mg oral tablet (09-17)    REVIEW OF SYSTEMS:  All other review of systems is negative unless indicated above.     PHYSICAL EXAM:  GENERAL: NAD  HEENT: No Swelling  CHEST/LUNG: Good air entry, No wheezing  HEART: RRR, No murmurs  ABDOMEN: Soft, Bowel sounds present  EXTREMITIES:  No clubbing

## 2021-09-04 PROCEDURE — 99231 SBSQ HOSP IP/OBS SF/LOW 25: CPT

## 2021-09-04 NOTE — PROGRESS NOTE ADULT - SUBJECTIVE AND OBJECTIVE BOX
PARVIZ TORRES  61y, Male  Allergy: No Known Allergies    Hospital Day: 364d    Patient seen and examined earlier today. No acute events overnight.    PMH/PSH:  PAST MEDICAL & SURGICAL HISTORY:  No pertinent past medical history  unknown    No significant past surgical history  unknown        VITALS:  T(F): --  HR: --  BP: --  RR: --  SpO2: --    TESTS & MEASUREMENTS:  Weight (Kg):                             RADIOLOGY & ADDITIONAL TESTS:    RECENT DIAGNOSTIC ORDERS:      MEDICATIONS:  MEDICATIONS  (STANDING):  AQUAPHOR (petrolatum Ointment) 1 Application(s) Topical daily  enalapril 10 milliGRAM(s) Oral daily  gabapentin 100 milliGRAM(s) Oral three times a day  levETIRAcetam 1000 milliGRAM(s) Oral two times a day  OLANZapine 5 milliGRAM(s) Oral daily    MEDICATIONS  (PRN):  cloNIDine 0.1 milliGRAM(s) Oral every 8 hours PRN htn  haloperidol    Injectable 2 milliGRAM(s) IntraMuscular every 6 hours PRN agitation  haloperidol    Injectable 2 milliGRAM(s) IntraMuscular every 6 hours PRN agitation  polyethylene glycol 3350 17 Gram(s) Oral two times a day PRN Constipation    HOME MEDICATIONS:  amLODIPine 5 mg oral tablet (09-17)  divalproex sodium 500 mg oral delayed release tablet (09-17)  levETIRAcetam 750 mg oral tablet (09-17)  OLANZapine 5 mg oral tablet (09-17)    REVIEW OF SYSTEMS:  All other review of systems is negative unless indicated above.     PHYSICAL EXAM:  GENERAL: NAD  HEENT: No Swelling  CHEST/LUNG: Good air entry, No wheezing  HEART: RRR, No murmurs  ABDOMEN: Soft, Bowel sounds present  EXTREMITIES:  No clubbing

## 2021-09-05 PROCEDURE — 99231 SBSQ HOSP IP/OBS SF/LOW 25: CPT

## 2021-09-05 NOTE — PROGRESS NOTE ADULT - SUBJECTIVE AND OBJECTIVE BOX
PARVIZ TORRES  61y, Male  Allergy: No Known Allergies    Hospital Day: 365d    Patient seen and examined earlier today. No acute events overnight.    PMH/PSH:  PAST MEDICAL & SURGICAL HISTORY:  No pertinent past medical history  unknown    No significant past surgical history  unknown    VITALS:  T(F): --  HR: --  BP: --  RR: --  SpO2: --    TESTS & MEASUREMENTS:  Weight (Kg):     MEDICATIONS:  MEDICATIONS  (STANDING):  AQUAPHOR (petrolatum Ointment) 1 Application(s) Topical daily  enalapril 10 milliGRAM(s) Oral daily  gabapentin 100 milliGRAM(s) Oral three times a day  levETIRAcetam 1000 milliGRAM(s) Oral two times a day  OLANZapine 5 milliGRAM(s) Oral daily    MEDICATIONS  (PRN):  cloNIDine 0.1 milliGRAM(s) Oral every 8 hours PRN htn  haloperidol    Injectable 2 milliGRAM(s) IntraMuscular every 6 hours PRN agitation  haloperidol    Injectable 2 milliGRAM(s) IntraMuscular every 6 hours PRN agitation  polyethylene glycol 3350 17 Gram(s) Oral two times a day PRN Constipation    HOME MEDICATIONS:  amLODIPine 5 mg oral tablet (09-17)  divalproex sodium 500 mg oral delayed release tablet (09-17)  levETIRAcetam 750 mg oral tablet (09-17)  OLANZapine 5 mg oral tablet (09-17)    REVIEW OF SYSTEMS:  All other review of systems is negative unless indicated above.     PHYSICAL EXAM:  GENERAL: NAD  HEENT: No Swelling  CHEST/LUNG: Good air entry, No wheezing  HEART: RRR, No murmurs  ABDOMEN: Soft, Bowel sounds present  EXTREMITIES:  No clubbing

## 2021-09-06 PROCEDURE — 99231 SBSQ HOSP IP/OBS SF/LOW 25: CPT

## 2021-09-06 NOTE — PROGRESS NOTE ADULT - SUBJECTIVE AND OBJECTIVE BOX
PARVIZ TORRES  61y, Male  Allergy: No Known Allergies    Hospital Day: 366d    Patient seen and examined earlier today. No acute events overnight.    PMH/PSH:  PAST MEDICAL & SURGICAL HISTORY:  No pertinent past medical history  unknown    No significant past surgical history  unknown    VITALS:  T(F): --  HR: --  BP: --  RR: --  SpO2: --    TESTS & MEASUREMENTS:  Weight (Kg):     MEDICATIONS:  MEDICATIONS  (STANDING):  AQUAPHOR (petrolatum Ointment) 1 Application(s) Topical daily  enalapril 10 milliGRAM(s) Oral daily  gabapentin 100 milliGRAM(s) Oral three times a day  levETIRAcetam 1000 milliGRAM(s) Oral two times a day  OLANZapine 5 milliGRAM(s) Oral daily    MEDICATIONS  (PRN):  cloNIDine 0.1 milliGRAM(s) Oral every 8 hours PRN htn  haloperidol    Injectable 2 milliGRAM(s) IntraMuscular every 6 hours PRN agitation  haloperidol    Injectable 2 milliGRAM(s) IntraMuscular every 6 hours PRN agitation  polyethylene glycol 3350 17 Gram(s) Oral two times a day PRN Constipation    HOME MEDICATIONS:  amLODIPine 5 mg oral tablet (09-17)  divalproex sodium 500 mg oral delayed release tablet (09-17)  levETIRAcetam 750 mg oral tablet (09-17)  OLANZapine 5 mg oral tablet (09-17)    REVIEW OF SYSTEMS:  All other review of systems is negative unless indicated above.     PHYSICAL EXAM:  GENERAL: NAD  HEENT: No Swelling  CHEST/LUNG: Good air entry, No wheezing  HEART: RRR, No murmurs  ABDOMEN: Soft, Bowel sounds present  EXTREMITIES:  No clubbing       Nursing home

## 2021-09-07 PROCEDURE — 99231 SBSQ HOSP IP/OBS SF/LOW 25: CPT

## 2021-09-07 NOTE — PROGRESS NOTE ADULT - SUBJECTIVE AND OBJECTIVE BOX
PARVIZ TORRES  61y, Male  Allergy: No Known Allergies    Hospital Day: 367d    Patient seen and examined earlier today. No acute events overnight.    PMH/PSH:  PAST MEDICAL & SURGICAL HISTORY:  No pertinent past medical history  unknown    No significant past surgical history  unknown        VITALS:  T(F): --  HR: --  BP: --  RR: --  SpO2: --    TESTS & MEASUREMENTS:  Weight (Kg):                             RADIOLOGY & ADDITIONAL TESTS:    RECENT DIAGNOSTIC ORDERS:      MEDICATIONS:  MEDICATIONS  (STANDING):  AQUAPHOR (petrolatum Ointment) 1 Application(s) Topical daily  enalapril 10 milliGRAM(s) Oral daily  gabapentin 100 milliGRAM(s) Oral three times a day  levETIRAcetam 1000 milliGRAM(s) Oral two times a day  OLANZapine 5 milliGRAM(s) Oral daily    MEDICATIONS  (PRN):  cloNIDine 0.1 milliGRAM(s) Oral every 8 hours PRN htn  haloperidol    Injectable 2 milliGRAM(s) IntraMuscular every 6 hours PRN agitation  haloperidol    Injectable 2 milliGRAM(s) IntraMuscular every 6 hours PRN agitation  polyethylene glycol 3350 17 Gram(s) Oral two times a day PRN Constipation      HOME MEDICATIONS:  amLODIPine 5 mg oral tablet (09-17)  divalproex sodium 500 mg oral delayed release tablet (09-17)  levETIRAcetam 750 mg oral tablet (09-17)  OLANZapine 5 mg oral tablet (09-17)      REVIEW OF SYSTEMS:  All other review of systems is negative unless indicated above.     PHYSICAL EXAM:  GENERAL: NAD  HEENT: No Swelling  CHEST/LUNG: Good air entry, No wheezing  HEART: RRR, No murmurs  ABDOMEN: Soft, Bowel sounds present  EXTREMITIES:  No clubbing

## 2021-09-07 NOTE — ED PROVIDER NOTE - OBJECTIVE STATEMENT
60 y.o. M unknown Psych history BIBA seen walking around screaming at people "The devil will breathe fire on you" denies any suicidal or homocidal ideation. No fever chills no abdominal pain.
decreased vincent/decreased step length/decreased stride length

## 2021-09-08 PROCEDURE — 99231 SBSQ HOSP IP/OBS SF/LOW 25: CPT

## 2021-09-08 NOTE — PROGRESS NOTE ADULT - SUBJECTIVE AND OBJECTIVE BOX
CC.  mental health marco  HPI.  Patient offers no other complaints    Constitutional: No fever, fatigue or weight loss.  Skin: No rash.  Eyes: No recent vision problems or eye pain.  ENT: No congestion, ear pain, or sore throat.  Endocrine: No thyroid problems.  Cardiovascular: No chest pain or palpation.  Respiratory: No cough, shortness of breath, congestion, or wheezing.  Gastrointestinal: No abdominal pain, nausea, vomiting, or diarrhea.  Genitourinary: No dysuria.  Musculoskeletal: No joint swelling.  Neurologic: No headache.               Vital Signs Last 24 Hrs  T(C): --  T(F): --  HR: --  BP: --  BP(mean): --  RR: --  SpO2: --        PHYSICAL EXAM-  GENERAL: NAD,    HEAD:  Atraumatic, Normocephalic  EYES: EOMI, PERRLA, conjunctiva and sclera clear  NECK: Supple, No JVD, Normal thyroid  NERVOUS SYSTEM:  Alert moving all extremities  CHEST/LUNG: Clear to percussion bilaterally; No rales, rhonchi, wheezing, or rubs  HEART: Regular rate and rhythm; No murmurs, rubs, or gallops  ABDOMEN: Soft, Nontender, Nondistended; Bowel sounds present  EXTREMITIES:   No clubbing, cyanosis, or edema  SKIN: No rashes or lesions                                    MEDICATIONS  (STANDING):  AQUAPHOR (petrolatum Ointment) 1 Application(s) Topical daily  enalapril 10 milliGRAM(s) Oral daily  gabapentin 100 milliGRAM(s) Oral three times a day  levETIRAcetam 1000 milliGRAM(s) Oral two times a day  OLANZapine 5 milliGRAM(s) Oral daily    MEDICATIONS  (PRN):  cloNIDine 0.1 milliGRAM(s) Oral every 8 hours PRN htn  haloperidol    Injectable 2 milliGRAM(s) IntraMuscular every 6 hours PRN agitation  haloperidol    Injectable 2 milliGRAM(s) IntraMuscular every 6 hours PRN agitation  polyethylene glycol 3350 17 Gram(s) Oral two times a day PRN Constipation      Imaging Personally Reviewed:     [x ] YES  [ ] NO    Consultant(s) Notes Reviewed:  [x ] YES  [ ] NO    Care Discussed with Consultants/Other Providers [x ] YES  [ ] No medical contraindication for discharge

## 2021-09-09 PROCEDURE — 99232 SBSQ HOSP IP/OBS MODERATE 35: CPT

## 2021-09-10 PROCEDURE — 99231 SBSQ HOSP IP/OBS SF/LOW 25: CPT

## 2021-09-10 NOTE — CHART NOTE - NSCHARTNOTEFT_GEN_A_CORE
Was asked do routine COVID swab th ept for postadmission surveillance.  Pt was seen at bedside and pt refused the swab.

## 2021-09-11 PROCEDURE — 99231 SBSQ HOSP IP/OBS SF/LOW 25: CPT

## 2021-09-12 PROCEDURE — 99231 SBSQ HOSP IP/OBS SF/LOW 25: CPT

## 2021-09-13 PROCEDURE — 99231 SBSQ HOSP IP/OBS SF/LOW 25: CPT

## 2021-09-14 PROCEDURE — 99231 SBSQ HOSP IP/OBS SF/LOW 25: CPT

## 2021-09-14 NOTE — PROGRESS NOTE ADULT - SUBJECTIVE AND OBJECTIVE BOX
CC.  mental health marco  HPI.  Patient offers no other complaints    Constitutional: No fever, fatigue or weight loss.  Skin: No rash.  Eyes: No recent vision problems or eye pain.  ENT: No congestion, ear pain, or sore throat.  Endocrine: No thyroid problems.  Cardiovascular: No chest pain or palpation.  Respiratory: No cough, shortness of breath, congestion, or wheezing.  Gastrointestinal: No abdominal pain, nausea, vomiting, or diarrhea.  Genitourinary: No dysuria.  Musculoskeletal: No joint swelling.  Neurologic: No headache.               Vital Signs Last 24 Hrs  T(C): --  T(F): --  HR: --  BP: --  BP(mean): --  RR: --  SpO2: --        PHYSICAL EXAM-  GENERAL: NAD,    HEAD:  Atraumatic, Normocephalic  EYES: EOMI, PERRLA, conjunctiva and sclera clear  NECK: Supple, No JVD, Normal thyroid  NERVOUS SYSTEM:  Alert moving all extremities  CHEST/LUNG: Clear to percussion bilaterally; No rales, rhonchi, wheezing, or rubs  HEART: Regular rate and rhythm; No murmurs, rubs, or gallops  ABDOMEN: Soft, Nontender, Nondistended; Bowel sounds present  EXTREMITIES:   No clubbing, cyanosis, or edema  SKIN: No rashes or lesions                                    MEDICATIONS  (STANDING):  AQUAPHOR (petrolatum Ointment) 1 Application(s) Topical daily  enalapril 10 milliGRAM(s) Oral daily  gabapentin 100 milliGRAM(s) Oral three times a day  levETIRAcetam 1000 milliGRAM(s) Oral two times a day  OLANZapine 5 milliGRAM(s) Oral daily    MEDICATIONS  (PRN):  cloNIDine 0.1 milliGRAM(s) Oral every 8 hours PRN htn  haloperidol    Injectable 2 milliGRAM(s) IntraMuscular every 6 hours PRN agitation  haloperidol    Injectable 2 milliGRAM(s) IntraMuscular every 6 hours PRN agitation  polyethylene glycol 3350 17 Gram(s) Oral two times a day PRN Constipation      Imaging Personally Reviewed:     [x ] YES  [ ] NO    Consultant(s) Notes Reviewed:  [x ] YES  [ ] NO    Care Discussed with Consultants/Other Providers [x ] YES  [ ] No medical contraindication for discharge Include Location In Plan?: No Detail Level: Generalized

## 2021-09-15 PROCEDURE — 99231 SBSQ HOSP IP/OBS SF/LOW 25: CPT

## 2021-09-15 NOTE — PROGRESS NOTE ADULT - SUBJECTIVE AND OBJECTIVE BOX
PARVIZ TORRES  61y, Male  Allergy: No Known Allergies    Hospital Day: 375d    Patient seen and examined earlier today. No acute events overnight.    PMH/PSH:  PAST MEDICAL & SURGICAL HISTORY:  No pertinent past medical history  unknown    No significant past surgical history  unknown    VITALS:  T(F): --  HR: --  BP: --  RR: --  SpO2: --    TESTS & MEASUREMENTS:  Weight (Kg):     MEDICATIONS:  MEDICATIONS  (STANDING):  AQUAPHOR (petrolatum Ointment) 1 Application(s) Topical daily  enalapril 10 milliGRAM(s) Oral daily  levETIRAcetam 1000 milliGRAM(s) Oral two times a day  OLANZapine 5 milliGRAM(s) Oral daily    MEDICATIONS  (PRN):  cloNIDine 0.1 milliGRAM(s) Oral every 8 hours PRN htn  haloperidol    Injectable 2 milliGRAM(s) IntraMuscular every 6 hours PRN agitation  haloperidol    Injectable 2 milliGRAM(s) IntraMuscular every 6 hours PRN agitation  polyethylene glycol 3350 17 Gram(s) Oral two times a day PRN Constipation      HOME MEDICATIONS:  amLODIPine 5 mg oral tablet (09-17)  divalproex sodium 500 mg oral delayed release tablet (09-17)  levETIRAcetam 750 mg oral tablet (09-17)  OLANZapine 5 mg oral tablet (09-17)      REVIEW OF SYSTEMS:  All other review of systems is negative unless indicated above.     PHYSICAL EXAM:  GENERAL: NAD  HEENT: No Swelling  CHEST/LUNG: Good air entry, No wheezing  HEART: RRR, No murmurs  ABDOMEN: Soft, Bowel sounds present  EXTREMITIES:  No clubbing

## 2021-09-16 PROCEDURE — 99231 SBSQ HOSP IP/OBS SF/LOW 25: CPT

## 2021-09-16 NOTE — PROGRESS NOTE ADULT - SUBJECTIVE AND OBJECTIVE BOX
PARVIZ TORRES  61y, Male  Allergy: No Known Allergies    Hospital Day: 376d    Patient seen and examined earlier today. No acute events overnight.    PMH/PSH:  PAST MEDICAL & SURGICAL HISTORY:  No pertinent past medical history  unknown    No significant past surgical history  unknown    VITALS:  T(F): 97 (09-15-21 @ 22:39), Max: 97 (09-15-21 @ 22:39)  HR: 63 (09-15-21 @ 22:39)  BP: 153/97 (09-15-21 @ 22:39) (153/97 - 153/97)  RR: --  SpO2: --    TESTS & MEASUREMENTS:  Weight (Kg):     MEDICATIONS:  MEDICATIONS  (STANDING):  AQUAPHOR (petrolatum Ointment) 1 Application(s) Topical daily  enalapril 10 milliGRAM(s) Oral daily  levETIRAcetam 1000 milliGRAM(s) Oral two times a day  OLANZapine 5 milliGRAM(s) Oral daily    MEDICATIONS  (PRN):  cloNIDine 0.1 milliGRAM(s) Oral every 8 hours PRN htn  haloperidol    Injectable 2 milliGRAM(s) IntraMuscular every 6 hours PRN agitation  haloperidol    Injectable 2 milliGRAM(s) IntraMuscular every 6 hours PRN agitation  polyethylene glycol 3350 17 Gram(s) Oral two times a day PRN Constipation    HOME MEDICATIONS:  amLODIPine 5 mg oral tablet (09-17)  divalproex sodium 500 mg oral delayed release tablet (09-17)  levETIRAcetam 750 mg oral tablet (09-17)  OLANZapine 5 mg oral tablet (09-17)      REVIEW OF SYSTEMS:  All other review of systems is negative unless indicated above.     PHYSICAL EXAM:  GENERAL: NAD  HEENT: No Swelling  CHEST/LUNG: Good air entry, No wheezing  HEART: RRR, No murmurs  ABDOMEN: Soft, Bowel sounds present  EXTREMITIES:  No clubbing

## 2021-09-17 PROCEDURE — 99231 SBSQ HOSP IP/OBS SF/LOW 25: CPT

## 2021-09-17 NOTE — PROGRESS NOTE ADULT - SUBJECTIVE AND OBJECTIVE BOX
PARVIZ TORRES  61y, Male  Allergy: No Known Allergies    Hospital Day: 377d    Patient seen and examined earlier today. No acute events overnight.    PMH/PSH:  PAST MEDICAL & SURGICAL HISTORY:  No pertinent past medical history  unknown    No significant past surgical history  unknown    VITALS:  T(F): --  HR: --  BP: --  RR: --  SpO2: --    TESTS & MEASUREMENTS:  Weight (Kg):     MEDICATIONS:  MEDICATIONS  (STANDING):  AQUAPHOR (petrolatum Ointment) 1 Application(s) Topical daily  enalapril 10 milliGRAM(s) Oral daily  levETIRAcetam 1000 milliGRAM(s) Oral two times a day  OLANZapine 5 milliGRAM(s) Oral daily    MEDICATIONS  (PRN):  cloNIDine 0.1 milliGRAM(s) Oral every 8 hours PRN htn  haloperidol    Injectable 2 milliGRAM(s) IntraMuscular every 6 hours PRN agitation  haloperidol    Injectable 2 milliGRAM(s) IntraMuscular every 6 hours PRN agitation  polyethylene glycol 3350 17 Gram(s) Oral two times a day PRN Constipation      HOME MEDICATIONS:  amLODIPine 5 mg oral tablet (09-17)  divalproex sodium 500 mg oral delayed release tablet (09-17)  levETIRAcetam 750 mg oral tablet (09-17)  OLANZapine 5 mg oral tablet (09-17)      REVIEW OF SYSTEMS:  All other review of systems is negative unless indicated above.     PHYSICAL EXAM:  GENERAL: NAD  HEENT: No Swelling  CHEST/LUNG: Good air entry, No wheezing  HEART: RRR, No murmurs  ABDOMEN: Soft, Bowel sounds present  EXTREMITIES:  No clubbing

## 2021-09-18 LAB — SARS-COV-2 RNA SPEC QL NAA+PROBE: SIGNIFICANT CHANGE UP

## 2021-09-18 PROCEDURE — 99231 SBSQ HOSP IP/OBS SF/LOW 25: CPT

## 2021-09-18 NOTE — PROGRESS NOTE ADULT - SUBJECTIVE AND OBJECTIVE BOX
PARVIZ TORRES  61y, Male  Allergy: No Known Allergies    Hospital Day: 378d    Patient seen and examined earlier today. No acute events overnight.    PMH/PSH:  PAST MEDICAL & SURGICAL HISTORY:  No pertinent past medical history  unknown    No significant past surgical history  unknown    VITALS:  T(F): --  HR: --  BP: --  RR: --  SpO2: --    TESTS & MEASUREMENTS:  Weight (Kg):     MEDICATIONS:  MEDICATIONS  (STANDING):  AQUAPHOR (petrolatum Ointment) 1 Application(s) Topical daily  enalapril 10 milliGRAM(s) Oral daily  levETIRAcetam 1000 milliGRAM(s) Oral two times a day  OLANZapine 5 milliGRAM(s) Oral daily    MEDICATIONS  (PRN):  cloNIDine 0.1 milliGRAM(s) Oral every 8 hours PRN htn  haloperidol    Injectable 2 milliGRAM(s) IntraMuscular every 6 hours PRN agitation  haloperidol    Injectable 2 milliGRAM(s) IntraMuscular every 6 hours PRN agitation  polyethylene glycol 3350 17 Gram(s) Oral two times a day PRN Constipation      HOME MEDICATIONS:  amLODIPine 5 mg oral tablet (09-17)  divalproex sodium 500 mg oral delayed release tablet (09-17)  levETIRAcetam 750 mg oral tablet (09-17)  OLANZapine 5 mg oral tablet (09-17)      REVIEW OF SYSTEMS:  All other review of systems is negative unless indicated above.     PHYSICAL EXAM:  GENERAL: NAD  HEENT: No Swelling  CHEST/LUNG: Good air entry, No wheezing  HEART: RRR, No murmurs  ABDOMEN: Soft, Bowel sounds present  EXTREMITIES:  No clubbing

## 2021-09-18 NOTE — CHART NOTE - NSCHARTNOTEFT_GEN_A_CORE
pt was seen in bed alert, comfortable, NAD  Covid swabs taken from b/l nostrils   pt tolerated procedure well  fup results  monitor vss   monitor pt

## 2021-09-19 PROCEDURE — 99231 SBSQ HOSP IP/OBS SF/LOW 25: CPT

## 2021-09-19 NOTE — PROGRESS NOTE ADULT - SUBJECTIVE AND OBJECTIVE BOX
PARVIZ TORRES  61y, Male  Allergy: No Known Allergies    Hospital Day: 379d    Patient seen and examined earlier today. No acute events overnight.    PMH/PSH:  PAST MEDICAL & SURGICAL HISTORY:  No pertinent past medical history  unknown    No significant past surgical history  unknown    VITALS:  T(F): --  HR: --  BP: --  RR: --  SpO2: --    TESTS & MEASUREMENTS:  Weight (Kg):     MEDICATIONS:  MEDICATIONS  (STANDING):  AQUAPHOR (petrolatum Ointment) 1 Application(s) Topical daily  enalapril 10 milliGRAM(s) Oral daily  levETIRAcetam 1000 milliGRAM(s) Oral two times a day  OLANZapine 5 milliGRAM(s) Oral daily    MEDICATIONS  (PRN):  cloNIDine 0.1 milliGRAM(s) Oral every 8 hours PRN htn  haloperidol    Injectable 2 milliGRAM(s) IntraMuscular every 6 hours PRN agitation  haloperidol    Injectable 2 milliGRAM(s) IntraMuscular every 6 hours PRN agitation  polyethylene glycol 3350 17 Gram(s) Oral two times a day PRN Constipation      HOME MEDICATIONS:  amLODIPine 5 mg oral tablet (09-17)  divalproex sodium 500 mg oral delayed release tablet (09-17)  levETIRAcetam 750 mg oral tablet (09-17)  OLANZapine 5 mg oral tablet (09-17)      REVIEW OF SYSTEMS:  All other review of systems is negative unless indicated above.     PHYSICAL EXAM:  GENERAL: NAD  HEENT: No Swelling  CHEST/LUNG: Good air entry, No wheezing  HEART: RRR, No murmurs  ABDOMEN: Soft, Bowel sounds present  EXTREMITIES:  No clubbing

## 2021-09-20 PROCEDURE — 99231 SBSQ HOSP IP/OBS SF/LOW 25: CPT

## 2021-09-20 NOTE — PROGRESS NOTE ADULT - SUBJECTIVE AND OBJECTIVE BOX
PARVIZ TORRES  61y, Male  Allergy: No Known Allergies    Hospital Day: 380d    Patient seen and examined earlier today. No acute events overnight.    PMH/PSH:  PAST MEDICAL & SURGICAL HISTORY:  No pertinent past medical history  unknown    No significant past surgical history  unknown        VITALS:  T(F): --  HR: --  BP: --  RR: --  SpO2: --    TESTS & MEASUREMENTS:  Weight (Kg):                             RADIOLOGY & ADDITIONAL TESTS:    RECENT DIAGNOSTIC ORDERS:      MEDICATIONS:  MEDICATIONS  (STANDING):  AQUAPHOR (petrolatum Ointment) 1 Application(s) Topical daily  enalapril 10 milliGRAM(s) Oral daily  levETIRAcetam 1000 milliGRAM(s) Oral two times a day  OLANZapine 5 milliGRAM(s) Oral daily    MEDICATIONS  (PRN):  cloNIDine 0.1 milliGRAM(s) Oral every 8 hours PRN htn  haloperidol    Injectable 2 milliGRAM(s) IntraMuscular every 6 hours PRN agitation  haloperidol    Injectable 2 milliGRAM(s) IntraMuscular every 6 hours PRN agitation  polyethylene glycol 3350 17 Gram(s) Oral two times a day PRN Constipation      HOME MEDICATIONS:  amLODIPine 5 mg oral tablet (09-17)  divalproex sodium 500 mg oral delayed release tablet (09-17)  levETIRAcetam 750 mg oral tablet (09-17)  OLANZapine 5 mg oral tablet (09-17)      REVIEW OF SYSTEMS:  All other review of systems is negative unless indicated above.     PHYSICAL EXAM:  GENERAL: NAD  HEENT: No Swelling  CHEST/LUNG: Good air entry, No wheezing  HEART: RRR, No murmurs  ABDOMEN: Soft, Bowel sounds present  EXTREMITIES:  No clubbing

## 2021-09-21 PROCEDURE — 99231 SBSQ HOSP IP/OBS SF/LOW 25: CPT

## 2021-09-21 NOTE — PROGRESS NOTE ADULT - SUBJECTIVE AND OBJECTIVE BOX
PARVIZ TORRES  61y, Male  Allergy: No Known Allergies    Hospital Day: 381d    Patient seen and examined earlier today. No acute events overnight.    PMH/PSH:  PAST MEDICAL & SURGICAL HISTORY:  No pertinent past medical history  unknown    No significant past surgical history  unknown        VITALS:  T(F): --  HR: --  BP: --  RR: --  SpO2: --    TESTS & MEASUREMENTS:  Weight (Kg):                             RADIOLOGY & ADDITIONAL TESTS:    RECENT DIAGNOSTIC ORDERS:      MEDICATIONS:  MEDICATIONS  (STANDING):  AQUAPHOR (petrolatum Ointment) 1 Application(s) Topical daily  enalapril 10 milliGRAM(s) Oral daily  levETIRAcetam 1000 milliGRAM(s) Oral two times a day  OLANZapine 5 milliGRAM(s) Oral daily    MEDICATIONS  (PRN):  cloNIDine 0.1 milliGRAM(s) Oral every 8 hours PRN htn  haloperidol    Injectable 2 milliGRAM(s) IntraMuscular every 6 hours PRN agitation  haloperidol    Injectable 2 milliGRAM(s) IntraMuscular every 6 hours PRN agitation  polyethylene glycol 3350 17 Gram(s) Oral two times a day PRN Constipation      HOME MEDICATIONS:  amLODIPine 5 mg oral tablet (09-17)  divalproex sodium 500 mg oral delayed release tablet (09-17)  levETIRAcetam 750 mg oral tablet (09-17)  OLANZapine 5 mg oral tablet (09-17)      REVIEW OF SYSTEMS:  All other review of systems is negative unless indicated above.     PHYSICAL EXAM:  GENERAL: NAD  HEENT: No Swelling  CHEST/LUNG: Good air entry, No wheezing  HEART: RRR, No murmurs  ABDOMEN: Soft, Bowel sounds present  EXTREMITIES:  No clubbing

## 2021-09-22 PROCEDURE — 99231 SBSQ HOSP IP/OBS SF/LOW 25: CPT

## 2021-09-22 NOTE — PROGRESS NOTE ADULT - SUBJECTIVE AND OBJECTIVE BOX
APRVIZ TORRES  61y, Male  Allergy: No Known Allergies    Hospital Day: 381d    Patient seen and examined earlier today. No acute events overnight.    PMH/PSH:  PAST MEDICAL & SURGICAL HISTORY:  No pertinent past medical history  unknown    No significant past surgical history  unknown                                    RADIOLOGY & ADDITIONAL TESTS:    RECENT DIAGNOSTIC ORDERS:      MEDICATIONS:  MEDICATIONS  (STANDING):  AQUAPHOR (petrolatum Ointment) 1 Application(s) Topical daily  enalapril 10 milliGRAM(s) Oral daily  levETIRAcetam 1000 milliGRAM(s) Oral two times a day  OLANZapine 5 milliGRAM(s) Oral daily    MEDICATIONS  (PRN):  cloNIDine 0.1 milliGRAM(s) Oral every 8 hours PRN htn  haloperidol    Injectable 2 milliGRAM(s) IntraMuscular every 6 hours PRN agitation  haloperidol    Injectable 2 milliGRAM(s) IntraMuscular every 6 hours PRN agitation  polyethylene glycol 3350 17 Gram(s) Oral two times a day PRN Constipation      HOME MEDICATIONS:  amLODIPine 5 mg oral tablet (09-17)  divalproex sodium 500 mg oral delayed release tablet (09-17)  levETIRAcetam 750 mg oral tablet (09-17)  OLANZapine 5 mg oral tablet (09-17)      REVIEW OF SYSTEMS:  All other review of systems is negative unless indicated above.     PHYSICAL EXAM:  GENERAL: NAD  HEENT: No Swelling  CHEST/LUNG: Good air entry, No wheezing  HEART: RRR, No murmurs  ABDOMEN: Soft, Bowel sounds present  EXTREMITIES:  No clubbing

## 2021-09-23 PROCEDURE — 99231 SBSQ HOSP IP/OBS SF/LOW 25: CPT

## 2021-09-23 NOTE — PROGRESS NOTE ADULT - ASSESSMENT
61M PMHx seizure disorder, schizophrenia, HTN here with altered mental status.    #seizures  - Divalproex 500mg bid, gabapentin 100mg tid, Keppra 1000 bid  - on/off non-compliant with medications  - EEG noted epileptic activity    #Dementia with psychotic features, stable   - olanzapine 5 mg daily  - EKG QTc <449  -IM haldol for severe agitation/code greys   -No contraindication to discharge as per psych consult.  - cont support care    #tinea pedis   -Resolved s/p clotrimazole cream    #HTN   - norvasc 10mg + enalapril 10 mg    #DVT ppx -- cont  Code -- Full  Dispo --DC pending case mgmt/SW; pt is placement issue, citizenship, court ordered treatment against objection. guardian to be appointed 61M PMHx seizure disorder, schizophrenia, HTN here with altered mental status.    Previous A/P   #seizures  - Divalproex 500mg bid, gabapentin 100mg tid, Keppra 1000 bid  - on/off non-compliant with medications  - EEG noted epileptic activity    #Dementia with psychotic features, stable   - olanzapine 5 mg daily  - EKG QTc <449  -IM haldol for severe agitation/code greys   -No contraindication to discharge as per psych consult.  - cont support care    #tinea pedis   -Resolved s/p clotrimazole cream    #HTN   - norvasc 10mg + enalapril 10 mg    #DVT ppx -- cont  Code -- Full  Dispo --DC pending case mgmt/SW; pt is placement issue, citizenship, court ordered treatment against objection. guardian to be appointed

## 2021-09-23 NOTE — PROGRESS NOTE ADULT - SUBJECTIVE AND OBJECTIVE BOX
PARVIZ TORRES  61y, Male  Allergy: No Known Allergies    Hospital Day: 381d    Patient seen and examined earlier today. No acute events overnight.    PMH/PSH:  PAST MEDICAL & SURGICAL HISTORY:  No pertinent past medical history  unknown    No significant past surgical history  unknown                                    RADIOLOGY & ADDITIONAL TESTS:    RECENT DIAGNOSTIC ORDERS:      MEDICATIONS  (STANDING):  AQUAPHOR (petrolatum Ointment) 1 Application(s) Topical daily  enalapril 10 milliGRAM(s) Oral daily  levETIRAcetam 1000 milliGRAM(s) Oral two times a day  OLANZapine 5 milliGRAM(s) Oral daily    MEDICATIONS  (PRN):  cloNIDine 0.1 milliGRAM(s) Oral every 8 hours PRN htn  haloperidol    Injectable 2 milliGRAM(s) IntraMuscular every 6 hours PRN agitation  haloperidol    Injectable 2 milliGRAM(s) IntraMuscular every 6 hours PRN agitation  polyethylene glycol 3350 17 Gram(s) Oral two times a day PRN Constipation        REVIEW OF SYSTEMS:  All other review of systems is negative unless indicated above.     PHYSICAL EXAM:  GENERAL: NAD  HEENT: No Swelling  CHEST/LUNG: Good air entry, No wheezing  HEART: RRR, No murmurs  ABDOMEN: Soft, Bowel sounds present  EXTREMITIES:  No clubbing         PARVIZ TORRES  61y, Male  Allergy: No Known Allergies    Hospital Day: 381d    Refused to be seen     PMH/PSH:  PAST MEDICAL & SURGICAL HISTORY:  No pertinent past medical history  unknown    No significant past surgical history  unknown                                    RADIOLOGY & ADDITIONAL TESTS:    RECENT DIAGNOSTIC ORDERS:      MEDICATIONS  (STANDING):  AQUAPHOR (petrolatum Ointment) 1 Application(s) Topical daily  enalapril 10 milliGRAM(s) Oral daily  levETIRAcetam 1000 milliGRAM(s) Oral two times a day  OLANZapine 5 milliGRAM(s) Oral daily    MEDICATIONS  (PRN):  cloNIDine 0.1 milliGRAM(s) Oral every 8 hours PRN htn  haloperidol    Injectable 2 milliGRAM(s) IntraMuscular every 6 hours PRN agitation  haloperidol    Injectable 2 milliGRAM(s) IntraMuscular every 6 hours PRN agitation  polyethylene glycol 3350 17 Gram(s) Oral two times a day PRN Constipation      Refused exam today     PHYSICAL EXAM: previous   GENERAL: NAD  HEENT: No Swelling  CHEST/LUNG: Good air entry, No wheezing  HEART: RRR, No murmurs  ABDOMEN: Soft, Bowel sounds present  EXTREMITIES:  No clubbing

## 2021-09-24 PROCEDURE — 99231 SBSQ HOSP IP/OBS SF/LOW 25: CPT

## 2021-09-24 NOTE — PROGRESS NOTE ADULT - ASSESSMENT
61M PMHx seizure disorder, schizophrenia, HTN here with altered mental status.    Previous A/P   #seizures  - Divalproex 500mg bid, gabapentin 100mg tid, Keppra 1000 bid  - on/off non-compliant with medications  - EEG noted epileptic activity    #Dementia with psychotic features, stable   - olanzapine 5 mg daily  - EKG QTc <449  -IM haldol for severe agitation/code greys   -No contraindication to discharge as per psych consult.  - cont support care    #tinea pedis   -Resolved s/p clotrimazole cream    #HTN   - norvasc 10mg + enalapril 10 mg    #DVT ppx -- cont  Code -- Full  Dispo --DC pending case mgmt/SW; pt is placement issue, citizenship, court ordered treatment against objection. guardian to be appointed

## 2021-09-24 NOTE — PROGRESS NOTE ADULT - SUBJECTIVE AND OBJECTIVE BOX
PARVIZ TORRES  61y, Male  Allergy: No Known Allergies    Hospital Day: 381d    Refused to be seen     PMH/PSH:  PAST MEDICAL & SURGICAL HISTORY:  No pertinent past medical history  unknown    No significant past surgical history  unknown                                    RADIOLOGY & ADDITIONAL TESTS:    RECENT DIAGNOSTIC ORDERS:      MEDICATIONS  (STANDING):  AQUAPHOR (petrolatum Ointment) 1 Application(s) Topical daily  enalapril 10 milliGRAM(s) Oral daily  levETIRAcetam 1000 milliGRAM(s) Oral two times a day  OLANZapine 5 milliGRAM(s) Oral daily    MEDICATIONS  (PRN):  cloNIDine 0.1 milliGRAM(s) Oral every 8 hours PRN htn  haloperidol    Injectable 2 milliGRAM(s) IntraMuscular every 6 hours PRN agitation  haloperidol    Injectable 2 milliGRAM(s) IntraMuscular every 6 hours PRN agitation  polyethylene glycol 3350 17 Gram(s) Oral two times a day PRN Constipation      Refused exam today     PHYSICAL EXAM: previous   GENERAL: NAD  HEENT: No Swelling  CHEST/LUNG: Good air entry, No wheezing  HEART: RRR, No murmurs  ABDOMEN: Soft, Bowel sounds present  EXTREMITIES:  No clubbing

## 2021-09-25 PROCEDURE — 99231 SBSQ HOSP IP/OBS SF/LOW 25: CPT

## 2021-09-25 NOTE — PROGRESS NOTE ADULT - ASSESSMENT
61M PMHx seizure disorder, schizophrenia, HTN here with altered mental status.    Previous A/P   #seizures  - Divalproex 500mg bid, gabapentin 100mg tid, Keppra 1000 bid  - on/off non-compliant with medications  - EEG noted epileptic activity    #Dementia with psychotic features, stable   - olanzapine 5 mg daily  - EKG QTc <449  -IM haldol for severe agitation/code greys   -No contraindication to discharge as per psych consult.  - cont support care    #tinea pedis   -Resolved s/p clotrimazole cream    #HTN    enalapril 10 mg    #DVT ppx -- cont  Code -- Full  Dispo --DC pending case mgmt/SW; pt is placement issue, citizenship, court ordered treatment against objection. guardian to be appointed

## 2021-09-26 PROCEDURE — 99231 SBSQ HOSP IP/OBS SF/LOW 25: CPT

## 2021-09-26 NOTE — PROGRESS NOTE ADULT - SUBJECTIVE AND OBJECTIVE BOX
PARIVZ TORRES  61y, Male  Allergy: No Known Allergies    Hospital Day: 381d    Refused to be seen     PMH/PSH:  PAST MEDICAL & SURGICAL HISTORY:  No pertinent past medical history  unknown    No significant past surgical history  unknown                                    RADIOLOGY & ADDITIONAL TESTS:    RECENT DIAGNOSTIC ORDERS:      MEDICATIONS  (STANDING):  AQUAPHOR (petrolatum Ointment) 1 Application(s) Topical daily  enalapril 10 milliGRAM(s) Oral daily  levETIRAcetam 1000 milliGRAM(s) Oral two times a day  OLANZapine 5 milliGRAM(s) Oral daily    MEDICATIONS  (PRN):  cloNIDine 0.1 milliGRAM(s) Oral every 8 hours PRN htn  haloperidol    Injectable 2 milliGRAM(s) IntraMuscular every 6 hours PRN agitation  haloperidol    Injectable 2 milliGRAM(s) IntraMuscular every 6 hours PRN agitation  polyethylene glycol 3350 17 Gram(s) Oral two times a day PRN Constipation      Refused exam today     PHYSICAL EXAM: previous   GENERAL: NAD  HEENT: No Swelling  CHEST/LUNG: Good air entry, No wheezing  HEART: RRR, No murmurs  ABDOMEN: Soft, Bowel sounds present  EXTREMITIES:  No clubbing

## 2021-09-27 PROCEDURE — 99231 SBSQ HOSP IP/OBS SF/LOW 25: CPT

## 2021-09-28 PROCEDURE — 99231 SBSQ HOSP IP/OBS SF/LOW 25: CPT

## 2021-09-29 PROCEDURE — 99231 SBSQ HOSP IP/OBS SF/LOW 25: CPT

## 2021-09-30 LAB
ALBUMIN SERPL ELPH-MCNC: 4.3 G/DL — SIGNIFICANT CHANGE UP (ref 3.5–5.2)
ALP SERPL-CCNC: 72 U/L — SIGNIFICANT CHANGE UP (ref 30–115)
ALT FLD-CCNC: 19 U/L — SIGNIFICANT CHANGE UP (ref 0–41)
ANION GAP SERPL CALC-SCNC: 29 MMOL/L — HIGH (ref 7–14)
AST SERPL-CCNC: 30 U/L — SIGNIFICANT CHANGE UP (ref 0–41)
BILIRUB SERPL-MCNC: 0.6 MG/DL — SIGNIFICANT CHANGE UP (ref 0.2–1.2)
BUN SERPL-MCNC: 13 MG/DL — SIGNIFICANT CHANGE UP (ref 10–20)
CALCIUM SERPL-MCNC: 10.4 MG/DL — HIGH (ref 8.5–10.1)
CHLORIDE SERPL-SCNC: 100 MMOL/L — SIGNIFICANT CHANGE UP (ref 98–110)
CO2 SERPL-SCNC: 14 MMOL/L — LOW (ref 17–32)
CREAT SERPL-MCNC: 1 MG/DL — SIGNIFICANT CHANGE UP (ref 0.7–1.5)
GLUCOSE BLDC GLUCOMTR-MCNC: 137 MG/DL — HIGH (ref 70–99)
GLUCOSE SERPL-MCNC: 148 MG/DL — HIGH (ref 70–99)
HCT VFR BLD CALC: 48.8 % — SIGNIFICANT CHANGE UP (ref 42–52)
HGB BLD-MCNC: 15.4 G/DL — SIGNIFICANT CHANGE UP (ref 14–18)
MAGNESIUM SERPL-MCNC: 2.3 MG/DL — SIGNIFICANT CHANGE UP (ref 1.8–2.4)
MCHC RBC-ENTMCNC: 30.3 PG — SIGNIFICANT CHANGE UP (ref 27–31)
MCHC RBC-ENTMCNC: 31.6 G/DL — LOW (ref 32–37)
MCV RBC AUTO: 95.9 FL — HIGH (ref 80–94)
NRBC # BLD: 0 /100 WBCS — SIGNIFICANT CHANGE UP (ref 0–0)
PLATELET # BLD AUTO: 178 K/UL — SIGNIFICANT CHANGE UP (ref 130–400)
POTASSIUM SERPL-MCNC: 4.2 MMOL/L — SIGNIFICANT CHANGE UP (ref 3.5–5)
POTASSIUM SERPL-SCNC: 4.2 MMOL/L — SIGNIFICANT CHANGE UP (ref 3.5–5)
PROT SERPL-MCNC: 7.8 G/DL — SIGNIFICANT CHANGE UP (ref 6–8)
RBC # BLD: 5.09 M/UL — SIGNIFICANT CHANGE UP (ref 4.7–6.1)
RBC # FLD: 12.1 % — SIGNIFICANT CHANGE UP (ref 11.5–14.5)
SODIUM SERPL-SCNC: 143 MMOL/L — SIGNIFICANT CHANGE UP (ref 135–146)
WBC # BLD: 5.81 K/UL — SIGNIFICANT CHANGE UP (ref 4.8–10.8)
WBC # FLD AUTO: 5.81 K/UL — SIGNIFICANT CHANGE UP (ref 4.8–10.8)

## 2021-09-30 PROCEDURE — 99232 SBSQ HOSP IP/OBS MODERATE 35: CPT

## 2021-09-30 PROCEDURE — 99221 1ST HOSP IP/OBS SF/LOW 40: CPT

## 2021-09-30 PROCEDURE — 70450 CT HEAD/BRAIN W/O DYE: CPT | Mod: 26

## 2021-09-30 RX ORDER — DIVALPROEX SODIUM 500 MG/1
500 TABLET, DELAYED RELEASE ORAL
Refills: 0 | Status: ACTIVE | OUTPATIENT
Start: 2021-09-30 | End: 2022-08-29

## 2021-09-30 RX ORDER — LEVETIRACETAM 250 MG/1
1000 TABLET, FILM COATED ORAL ONCE
Refills: 0 | Status: COMPLETED | OUTPATIENT
Start: 2021-09-30 | End: 2021-09-30

## 2021-09-30 RX ORDER — LEVETIRACETAM 250 MG/1
1000 TABLET, FILM COATED ORAL EVERY 12 HOURS
Refills: 0 | Status: DISCONTINUED | OUTPATIENT
Start: 2021-09-30 | End: 2021-10-01

## 2021-09-30 RX ORDER — SODIUM CHLORIDE 9 MG/ML
1000 INJECTION INTRAMUSCULAR; INTRAVENOUS; SUBCUTANEOUS
Refills: 0 | Status: DISCONTINUED | OUTPATIENT
Start: 2021-09-30 | End: 2021-10-01

## 2021-09-30 RX ADMIN — SODIUM CHLORIDE 75 MILLILITER(S): 9 INJECTION INTRAMUSCULAR; INTRAVENOUS; SUBCUTANEOUS at 07:31

## 2021-09-30 RX ADMIN — LEVETIRACETAM 400 MILLIGRAM(S): 250 TABLET, FILM COATED ORAL at 07:27

## 2021-09-30 NOTE — PROGRESS NOTE BEHAVIORAL HEALTH - OTHER
did not assess diosrganized, content difficult to assess tries to cooperate, but speech at times is incomprehensible

## 2021-09-30 NOTE — PROGRESS NOTE BEHAVIORAL HEALTH - PROBLEM SELECTOR PLAN 1
Pt has no capacity to refuse Tx,    Continue Olanzapine 5mg HS for agitation    In case of acute agitation and refusal of PO meds recommend Zyprexa 5mg IM, can give up to 30mg in 24 hours if tolerated, do not combine with benzo's, monitor HgbA1c, lipids and QTc. Recommend to avoid Haldol due to potential for EPS.

## 2021-09-30 NOTE — CONSULT NOTE ADULT - SUBJECTIVE AND OBJECTIVE BOX
Neurology consult note    Name  PARVIZ TORRES    A 62 y/o  male, with pmhx of  paranoid schizophrenia, dementia, seizure disorder. Has not been compliant with meds since admission , neurology was reconsulted for seizure. Pt  was rapid response last night, as patient had witnessed (by his security sitter) seizure activity and then collapsed unresponsive to the ground. lactic acid 16. Pt reports does not want to take his seizure meds because there is nothing wrong with  his head. Pt informed of risk of having seizure again if does not take his seizure meds.  Pt reports fell last night because he miscalculated his steps. PT denies headache, blurry vision, nausea.       Vital Signs Last 24 Hrs    HR: 90 (30 Sep 2021 06:28) (90 - 90)  BP: 139/79 (30 Sep 2021 06:28) (139/79 - 139/79)  ICU Vital Signs Last 24 Hrs    Neurological Exam:   Mental status: Awake, alert.  Pt lying in bed, exam limited, pt not following command fully.   Cranial nerves: no nystagmus, extraocular muscles intact   Motor:  Normal bulk and tone, strength 5/5 in bilateral upper and lower extremities.   strength 5/5.    Sensation: Intact to light touch      Medications  AQUAPHOR (petrolatum Ointment) 1 Application(s) Topical daily  cloNIDine 0.1 milliGRAM(s) Oral every 8 hours PRN  enalapril 10 milliGRAM(s) Oral daily  haloperidol    Injectable 2 milliGRAM(s) IntraMuscular every 6 hours PRN  haloperidol    Injectable 2 milliGRAM(s) IntraMuscular every 6 hours PRN  levETIRAcetam 1000 milliGRAM(s) Oral two times a day  OLANZapine 5 milliGRAM(s) Oral daily  polyethylene glycol 3350 17 Gram(s) Oral two times a day PRN  sodium chloride 0.9%. 1000 milliLiter(s) IV Continuous <Continuous>      Lab                        15.4   5.81  )-----------( 178      ( 30 Sep 2021 06:05 )             48.8     09-30    143  |  100  |  13  ----------------------------<  148<H>  4.2   |  14<L>  |  1.0    Ca    10.4<H>      30 Sep 2021 06:05  Mg     2.3     09-30    TPro  7.8  /  Alb  4.3  /  TBili  0.6  /  DBili  x   /  AST  30  /  ALT  19  /  AlkPhos  72  09-30    CAPILLARY BLOOD GLUCOSE      POCT Blood Glucose.: 137 mg/dL (30 Sep 2021 06:00)    LIVER FUNCTIONS - ( 30 Sep 2021 06:05 )  Alb: 4.3 g/dL / Pro: 7.8 g/dL / ALK PHOS: 72 U/L / ALT: 19 U/L / AST: 30 U/L / GGT: x               Radiology  < from: CT Head No Cont (09.30.21 @ 07:16) >  IMPRESSION:    No acute intracranial pathology.    Stable bony defect along the floor of the sella possibly reflecting prior transsphenoidal surgical changes. Stable soft tissue density within the right sphenoid sinus abutting the bone defect could reflect retention cyst versus polyp. However, recommend further characterization with contrast-enhanced MRI to exclude cephalocele or mass.    Stable mild chronic microvascular ischemic changes.      --- End of Report ---      ERIC GARCIA MD; Attending Radiologist  This document has been electronically signed. Sep 30 2021  8:56AM    < end of copied text >

## 2021-09-30 NOTE — PROGRESS NOTE BEHAVIORAL HEALTH - NSBHCONSULTMEDSEVERE_PSY_A_CORE FT
for severe agitation may continue PRN haldol 2 mg po q12 with escalation to IM if patient danger to self/others, with close monitoring of qtc to ensure less than 500ms
Zyprexa 5 mg PO/ IM BID PRN for agitation/ acute aggression not responding to behavioral interventions.

## 2021-09-30 NOTE — PROGRESS NOTE BEHAVIORAL HEALTH - NSBHCHARTREVIEWINVESTIGATE_PSY_A_CORE FT
< from: 12 Lead ECG (09.05.20 @ 10:18) >    QTC Calculation(Bezet) 378 ms    < end of copied text >
EXAM:  CT BRAIN          PROCEDURE DATE:  09/30/2021      INTERPRETATION:  CLINICAL INDICATION: Seizure    TECHNIQUE: CT of the head was performed without the administration of intravenous contrast.    COMPARISON: CT head dated 6/20/2021.    FINDINGS:    Again noted is bony defect along the floor of the sella possibly reflecting prior transsphenoidal surgical changes. There is stable 1.7 cm soft tissue density within the right sphenoid sinus abutting the bone defect.    There is stable linear chronic infarct in the left basal ganglia.    There is prominence of the sulci, sylvian fissures, and ventricles, reflecting stable mild diffuse parenchymal volume loss.    There are scattered patchy low attenuations in the bilateral periventricular cerebral white matter consistent with stable mild chronic microvascular ischemic changes.    There is no evidence of acute territorial infarct or intracranial hemorrhage. There is no midline shift.    There is no evidence of hydrocephalus. There are no extra-axial fluid collections.    The visualized intraorbital contents are normal. The imaged portions of the paranasal sinuses are aerated.The mastoid air cells are aerated. Partially visualized parotid glands are normal.    There is a stable left parietal karina hole defect. There is stable right parietal subgaleal lipoma measuring 0.7 cm in thickness.    IMPRESSION:    No acute intracranial pathology.    Stable bony defect along the floor of the sella possibly reflecting prior transsphenoidal surgical changes. Stable soft tissue density within the right sphenoid sinus abutting the bone defect could reflect retention cyst versus polyp. However, recommend further characterization with contrast-enhanced MRI to exclude cephalocele or mass.    Stable mild chronic microvascular ischemic changes.
< from: 12 Lead ECG (09.05.20 @ 10:18) >    Ventricular Rate 70 BPM    Atrial Rate 70 BPM    P-R Interval 134 ms    QRS Duration 86 ms    Q-T Interval 350 ms    QTC Calculation(Bezet) 378 ms    P Axis 49 degrees    R Axis 41 degrees    T Axis 76 degrees    Diagnosis Line Normal sinus rhythm  Nonspecific T wave abnormality  Abnormal ECG    Confirmed by Naseem Mendez ([id number]) on 9/5/2020 4:04:15 PM    < end of copied text >

## 2021-09-30 NOTE — PROGRESS NOTE BEHAVIORAL HEALTH - SUMMARY
Mr. Chris is a 60-year-old, black, male, with unknown social demographics, medical history, or psychiatric history who was reportedly initially was brought in by ambulance "seen walking around screaming at people "The devil will breathe fire on you""; psychiatry consulted due to agitation earlier today and refusal to take anticonvulsant.    Patient presents with profound cognitive deficits, has no insight and no understanding of his medical situation. Patient has no capacity to refuse treatment of seizure disorder.

## 2021-09-30 NOTE — PROGRESS NOTE BEHAVIORAL HEALTH - BODY HABITUS
Average build
Malnourished
Well nourished
Malnourished
Underweight
Well nourished
Underweight
Underweight

## 2021-09-30 NOTE — CHART NOTE - NSCHARTNOTEFT_GEN_A_CORE
Called to rapid response as patient had witnessed (by his security sitter) seizure activity and then collapsed unresponsive to the ground. Stat ABG remarkable for an apparent metabolic acidosis (LA is 16). Patient has documented seizure history and has been refusing seizure meds. Awaiting results of stat blood work but in meanwhile will order stat CT, neurology consult and give IV dose of Keppra. Called to rapid response as patient had witnessed (by his security sitter) seizure activity and then collapsed unresponsive to the ground. Stat ABG remarkable for an apparent metabolic acidosis (LA is 16). Patient has documented seizure history and has been refusing seizure meds.  Of concern is both eyes are deviating to the left. Awaiting results of stat blood work but in meanwhile will order stat CT, neurology consult and give IV dose of Keppra. Called to rapid response as patient had witnessed (by his security sitter) seizure activity and then collapsed unresponsive to the ground. Stat ABG remarkable for an apparent metabolic acidosis (LA is 16). Patient has documented seizure history and has been refusing seizure meds.  Awaiting results of stat blood work but in meanwhile will order stat CT, neurology consult and give IV dose of Keppra. Actually patient is now waking up and answering some simple questions appropriately.

## 2021-09-30 NOTE — PROGRESS NOTE BEHAVIORAL HEALTH - RISK ASSESSMENT
Impulsive behaviors, neurocognitive disorder, poor insight elevate his chronic risk for violence but his acute risk for violence is low.
Patient is not endorsing active/passive SI/ intent or plan. Patient is at chronic risk given male sex, homelessness, poor cognitive functioning. This risk is mitigated by no SI, intent or plan, no access to means, no known prior SA, no acute mood disorder, high spirituality.
Impulsive behaviors, neurocognitive disorder, poor insight elevate his chronic risk for violence but his acute risk for violence is low.
unable to get full risk assessment, but currently risk factors include altered mental status with psychotic symptoms intermittently present.

## 2021-09-30 NOTE — CONSULT NOTE ADULT - ATTENDING COMMENTS
Patient with breakthrough seizure secondary to noncompliance with seizure meds.  Incidental left BG lacune identified not seen on CTH from 9/20.  This does appear chronic however due to enlargement of frontal horn of left lateral ventricle.  Secondary stroke prevention measures are recommended to the degree they can be implemented given patients poor compliance.
I have personally seen and examined this patient.  I have fully participated in the care of this patient.  I have reviewed all pertinent clinical information, including history, physical exam, plan and note. Recommend Jose MRI and REEG checking for any evidence of wernicke encephalopathy.   I have reviewed all pertinent clinical information and reviewed all relevant imaging and diagnostic studies personally.  Recommendations as above.  Agree with above assessment except as noted.
I have personally seen and examined this patient on 9/30 I have fully participated in the care of this patient.  I have reviewed all pertinent clinical information, including history, physical exam, plan and note.  Patient with history of epilepsy and non compliance noted to have seizure overnight. Currently alert and oriented with no focal deficit. He is paranoid and does not believe he needs to be on any medication. Patient needs to be on at least on seizure medication. Unclear whether patient been on Depakote or not. Continue Keppra 1000 mg BID for now. Could be given as IV while patient is in the hospital but compliance after discharge would be an issue.   I have reviewed all pertinent clinical information and reviewed all relevant imaging and diagnostic studies personally.  Recommendations as above.  Agree with above assessment except as noted.

## 2021-09-30 NOTE — PROGRESS NOTE BEHAVIORAL HEALTH - NSBHFUPINTERVALCCFT_PSY_A_CORE
"You can come in."
"I want oat"
"I'm fine"
"ok"
"I'm ok"
"I was stranded outside, when the police found out I was homeless they brought me to the hospital."
"I need to use the restroom."
"hi"
"I am OK"
“I do not need a psychiatric doctor. I need a medical doctor.”

## 2021-09-30 NOTE — PROGRESS NOTE BEHAVIORAL HEALTH - NS ED [facility] REVIEW OF ED CHART VITAL SIGNS REVIEWED
Yes
Yes
None available
None available
Yes
None available
Yes
None available
Yes
None available
right normal/left normal

## 2021-09-30 NOTE — PROGRESS NOTE BEHAVIORAL HEALTH - NSBHCONSULTMEDS_PSY_A_CORE FT
discontinue standing haldol
If pt taking PO medications, can give Zyprexa 5 mg QHS  Pt can probably benefit from a mood stabilizer - Depakote/Depakene 500 mg BID which can come in liquid form/sprinkles.
If pt taking PO medications, can give Zyprexa 5 mg QHS
If pt taking PO medications, can give Zyprexa 5 mg QHS

## 2021-09-30 NOTE — PROGRESS NOTE BEHAVIORAL HEALTH - NSBHATTESTSEENBY_PSY_A_CORE
Attending Psychiatrist supervising NP/Trainee, meeting pt...
attending Psychiatrist without NP/Trainee
Trainee with telephonic supervision from Attending Psychiatrist
attending Psychiatrist without NP/Trainee
Trainee with telephonic supervision from Attending Psychiatrist

## 2021-09-30 NOTE — PROGRESS NOTE BEHAVIORAL HEALTH - NSBHPTASSESSDT_PSY_A_CORE
01-Dec-2020 18:41
06-Sep-2020 19:26
14-Dec-2020 19:54
28-Oct-2020 20:18
12-May-2021 18:33
21-Dec-2020 20:01
08-Sep-2020 16:20
29-Sep-2020 19:56
24-May-2021 15:07
30-Sep-2021 16:14

## 2021-09-30 NOTE — PROGRESS NOTE BEHAVIORAL HEALTH - NSBHCONSULTOBSREASON_PSY_A_CORE FT
as per nursing
risk for unintentional harm, elopement
Risk for unintentional harm due to dementia; As per decision of primary team.
Risk for unintentional harm due to dementia; As per decision of primary team.
risk for unintentional harm, elopement
risk for unintentional harm, elopement
Risk for unintentional harm due to dementia; As per decision of primary team.
defer to nursing.

## 2021-09-30 NOTE — PROGRESS NOTE BEHAVIORAL HEALTH - NSBHCHARTREVIEWLAB_PSY_A_CORE FT
12.4   3.75  )-----------( 246      ( 07 Sep 2020 07:55 )             37.9    09-07    139  |  105  |  7<L>  ----------------------------<  84  3.6   |  27  |  0.7    Ca    9.5      07 Sep 2020 07:55  Mg     1.8     09-07    TPro  6.0  /  Alb  3.4<L>  /  TBili  0.9  /  DBili  x   /  AST  24  /  ALT  16  /  AlkPhos  58  09-07
15.4   5.81  )-----------( 178      ( 30 Sep 2021 06:05 )             48.8   09-30    143  |  100  |  13  ----------------------------<  148<H>  4.2   |  14<L>  |  1.0    Ca    10.4<H>      30 Sep 2021 06:05  Mg     2.3     09-30    TPro  7.8  /  Alb  4.3  /  TBili  0.6  /  DBili  x   /  AST  30  /  ALT  19  /  AlkPhos  72  09-30
12.9   4.19  )-----------( 204      ( 05 Sep 2020 06:00 )             40.7     09-05    143  |  106  |  21<H>  ----------------------------<  74  4.3   |  24  |  0.8    Ca    9.7      05 Sep 2020 06:00

## 2021-09-30 NOTE — PROGRESS NOTE ADULT - ASSESSMENT
61M PMHx seizure disorder, schizophrenia, HTN here with altered mental status.      #Break through SZ 9/30   S/P IV keppra  Doesnt want to take meds as usual   -Refusing  PO Divalproex 500mg bid, gabapentin 100mg tid, Keppra 1000 bid  - on/off non-compliant with medications  Seen by neuro 9/30---  Needs psych to see for delusional state       previous A/P   #Dementia with psychotic features, has delusions   - olanzapine 5 mg daily  - EKG QTc <449  -IM haldol for severe agitation/code greys   -No contraindication to discharge as per psych consult.  - cont support care    #tinea pedis   -Resolved s/p clotrimazole cream    #HTN    enalapril 10 mg    #DVT ppx -- cont  Code -- Full  Dispo --DC pending case mgmt/SW; pt is placement issue, citizenship, court ordered treatment against objection. guardian to be appointed

## 2021-09-30 NOTE — PROGRESS NOTE BEHAVIORAL HEALTH - NSBHCONSFOLLOWNEEDS_PSY_A_CORE
no psychiatric contraindications to discharge
Patient needs further psychiatric safety assessment prior to discharge

## 2021-09-30 NOTE — PROGRESS NOTE ADULT - SUBJECTIVE AND OBJECTIVE BOX
PARVIZ TORRES  61y, Male  Allergy: No Known Allergies    Hospital Day: 381d    S  He  had a SZ this am   Doesnt want to talk, says doesnt need meds for SZ   Doesnt want to be examined         PMH/PSH:  PAST MEDICAL & SURGICAL HISTORY:  No pertinent past medical history  unknown    No significant past surgical history  unknown                                    MEDICATIONS  (STANDING):  AQUAPHOR (petrolatum Ointment) 1 Application(s) Topical daily  enalapril 10 milliGRAM(s) Oral daily  levETIRAcetam 1000 milliGRAM(s) Oral two times a day  OLANZapine 5 milliGRAM(s) Oral daily  sodium chloride 0.9%. 1000 milliLiter(s) (75 mL/Hr) IV Continuous <Continuous>    MEDICATIONS  (PRN):  cloNIDine 0.1 milliGRAM(s) Oral every 8 hours PRN htn  haloperidol    Injectable 2 milliGRAM(s) IntraMuscular every 6 hours PRN agitation  haloperidol    Injectable 2 milliGRAM(s) IntraMuscular every 6 hours PRN agitation  polyethylene glycol 3350 17 Gram(s) Oral two times a day PRN Constipation        Refused exam today     PHYSICAL EXAM: previous   GENERAL: NAD  HEENT: No Swelling  CHEST/LUNG: Good air entry, No wheezing  HEART: RRR, No murmurs  ABDOMEN: Soft, Bowel sounds present  EXTREMITIES:  No clubbing

## 2021-09-30 NOTE — PROGRESS NOTE BEHAVIORAL HEALTH - NSBHCONSULTFOLLOW_PSY_A_CORE
Signing off - call with questions…
yes
Signing off - call with questions…
Signing off - call with questions…

## 2021-09-30 NOTE — PROGRESS NOTE BEHAVIORAL HEALTH - PRIMARY DX
Neurocognitive disorder
Psychosis, unspecified psychosis type

## 2021-09-30 NOTE — PROGRESS NOTE BEHAVIORAL HEALTH - NSBHCHARTREVIEWVS_PSY_A_CORE FT
ICU Vital Signs Last 24 Hrs  T(C): 36.5 (28 Oct 2020 13:08), Max: 36.5 (28 Oct 2020 13:08)  T(F): 97.7 (28 Oct 2020 13:08), Max: 97.7 (28 Oct 2020 13:08)  HR: 69 (28 Oct 2020 13:08) (67 - 69)  BP: 153/94 (28 Oct 2020 13:08) (153/94 - 166/90)  BP(mean): --  ABP: --  ABP(mean): --  RR: 18 (28 Oct 2020 13:08) (18 - 18)  SpO2: --
Patient refusing vitals
ICU Vital Signs Last 24 Hrs  T(C): 36.4 (08 Sep 2020 13:49), Max: 36.4 (08 Sep 2020 13:49)  T(F): 97.6 (08 Sep 2020 13:49), Max: 97.6 (08 Sep 2020 13:49)  HR: 77 (08 Sep 2020 13:49) (72 - 77)  BP: 137/74 (08 Sep 2020 13:49) (137/74 - 175/89)  BP(mean): --  ABP: --  ABP(mean): --  RR: 18 (08 Sep 2020 13:49) (18 - 18)  SpO2: 98% (07 Sep 2020 19:35) (98% - 98%)
Vital Signs Last 24 Hrs  T(C): --  T(F): --  HR: 90 (30 Sep 2021 06:28) (90 - 90)  BP: 139/79 (30 Sep 2021 06:28) (139/79 - 139/79)  BP(mean): --  RR: --  SpO2: --
Vital Signs Last 24 Hrs  T(C): 36.4 (06 Sep 2020 14:13), Max: 36.4 (06 Sep 2020 06:28)  T(F): 97.6 (06 Sep 2020 14:13), Max: 97.6 (06 Sep 2020 14:13)  HR: 68 (06 Sep 2020 19:30) (68 - 80)  BP: 142/74 (06 Sep 2020 14:13) (142/74 - 175/84)  BP(mean): --  RR: 18 (06 Sep 2020 19:30) (18 - 20)  SpO2: 95% (06 Sep 2020 19:30) (95% - 100%)

## 2021-09-30 NOTE — PROGRESS NOTE BEHAVIORAL HEALTH - NSBHCONSULTFOLLOWAFTERCARE_PSY_A_CORE FT
please defer to SW for high level of care/placement
As per primary team
Deferred to Primary team.
Deferred to Primary team. Likely d/c to SNF.
As per primary team
Deferred to Primary team.
As per primary team

## 2021-09-30 NOTE — CONSULT NOTE ADULT - ASSESSMENT
A 62 y/o  male, with pmhx of  paranoid schizophrenia, dementia, seizure disorder , neurology reconsulted for breakthrough seizure secondary to noncompliance with seizure meds.  Pt  was rapid response last night, as patient was witnessed (by his security sitter) seizure activity and then collapsed unresponsive to the ground. lactic acid 16. Pt reports does not want to take his seizure meds because there is nothing wrong with  his head. Patient given keppra 1000 IVPB. CT head Stable bony defect along the floor of the sella possibly reflecting prior transsphenoidal surgical changes seen in prior images. Stable mild chronic microvascular ischemic changes.      Plan:     -consider giving  IV keppra since pt refusing PO   -clarify pt's seizure home seizure meds, documented on daily notes pt on Depakote if that's the case restart Depakote  -fall precaution   -seizure perception

## 2021-09-30 NOTE — PROGRESS NOTE BEHAVIORAL HEALTH - NSBHFUPTYPE_PSY_A_CORE
Consult Follow Up
Inpatient
Consult Follow Up

## 2021-09-30 NOTE — PROGRESS NOTE BEHAVIORAL HEALTH - NSBHFUPINTERVALHXFT_PSY_A_CORE
Pt was seen, evaluated, chart reviewed.  As per nursing staff, pt has been refusing antiepileptic medications, stating that nothing is wrong with him and at times refusing to talk to staff.    On evaluation, pt was seen resting in bed, alert  but oriented only to his name, and not oriented to place, time or situation. At times, he was speaking in almost incomprehensible whisper, spelling words letter by letter. Patient denies any psychiatric or medical problems. He states that he is at his house and other people here are renting from him. He states this is "the house he bought" and he wants to "see his account". He denies having a seizure and has no understanding of his medical situation. Pt is a poor historian, unable to explain why he doesn't want medications. He denies feeling depressed, anxious.  Denied auditory/visual hallucinations. Denied suicidal/homicidal ideation, intent or plan.    MEDICATIONS  (STANDING):  AQUAPHOR (petrolatum Ointment) 1 Application(s) Topical daily  diVALproex  milliGRAM(s) Oral two times a day  enalapril 10 milliGRAM(s) Oral daily  levETIRAcetam  IVPB 1000 milliGRAM(s) IV Intermittent every 12 hours  OLANZapine 5 milliGRAM(s) Oral daily  sodium chloride 0.9%. 1000 milliLiter(s) (75 mL/Hr) IV Continuous <Continuous>    MEDICATIONS  (PRN):  cloNIDine 0.1 milliGRAM(s) Oral every 8 hours PRN htn  haloperidol    Injectable 2 milliGRAM(s) IntraMuscular every 6 hours PRN agitation  haloperidol    Injectable 2 milliGRAM(s) IntraMuscular every 6 hours PRN agitation  polyethylene glycol 3350 17 Gram(s) Oral two times a day PRN Constipation

## 2021-09-30 NOTE — PROGRESS NOTE BEHAVIORAL HEALTH - NSBHFUPREASONCONS_PSY_A_CORE
dementia/agitation
agitation
delerium/psychosis
agitation
dementia/capacity/agitation

## 2021-09-30 NOTE — PROGRESS NOTE BEHAVIORAL HEALTH - NSBHCONSULTMEDPRNREASON_PSY_A_CORE
severe agitation...
severe agitation...
agitation...
severe agitation...
severe agitation...
agitation...

## 2021-09-30 NOTE — PROGRESS NOTE BEHAVIORAL HEALTH - NSBHFUPVIOL_PSY_A_CORE
none known
unable to assess
none known
yes
none known

## 2021-09-30 NOTE — PROGRESS NOTE BEHAVIORAL HEALTH - NS ED BHA MSE GENERAL APPEARANCE
No deformities present
Well developed
No deformities present
Well developed
No deformities present
Well developed

## 2021-10-01 PROCEDURE — 99232 SBSQ HOSP IP/OBS MODERATE 35: CPT

## 2021-10-01 RX ORDER — OLANZAPINE 15 MG/1
5 TABLET, FILM COATED ORAL ONCE
Refills: 0 | Status: DISCONTINUED | OUTPATIENT
Start: 2021-10-01 | End: 2021-10-01

## 2021-10-01 RX ORDER — LEVETIRACETAM 250 MG/1
1000 TABLET, FILM COATED ORAL
Refills: 0 | Status: ACTIVE | OUTPATIENT
Start: 2021-10-01 | End: 2022-08-30

## 2021-10-01 RX ORDER — OLANZAPINE 15 MG/1
5 TABLET, FILM COATED ORAL ONCE
Refills: 0 | Status: COMPLETED | OUTPATIENT
Start: 2021-10-01 | End: 2021-11-26

## 2021-10-01 NOTE — PROGRESS NOTE ADULT - SUBJECTIVE AND OBJECTIVE BOX
MELISSAGAGANLE  61y, Male  Allergy: No Known Allergies    Hospital Day: 381d    S  Pt is screaming   Doesnt want to talk, again says doesnt need meds for SZ   Doesnt want to be examined         PMH/PSH:  PAST MEDICAL & SURGICAL HISTORY:  No pertinent past medical history  unknown    No significant past surgical history  unknown                              MEDICATIONS  (STANDING):  AQUAPHOR (petrolatum Ointment) 1 Application(s) Topical daily  diVALproex  milliGRAM(s) Oral two times a day  enalapril 10 milliGRAM(s) Oral daily  levETIRAcetam  Solution 1000 milliGRAM(s) Oral two times a day  OLANZapine 5 milliGRAM(s) Oral daily    MEDICATIONS  (PRN):  cloNIDine 0.1 milliGRAM(s) Oral every 8 hours PRN htn  OLANZapine Injectable 5 milliGRAM(s) IntraMuscular once PRN agitation  polyethylene glycol 3350 17 Gram(s) Oral two times a day PRN Constipation        Refused exam today     PHYSICAL EXAM: previous   GENERAL: NAD  HEENT: No Swelling  CHEST/LUNG: Good air entry, No wheezing  HEART: RRR, No murmurs  ABDOMEN: Soft, Bowel sounds present  EXTREMITIES:  No clubbing

## 2021-10-01 NOTE — PROGRESS NOTE ADULT - ASSESSMENT
61M PMHx seizure disorder, schizophrenia, HTN here with altered mental status.      #Break through SZ 9/30   S/P IV keppra 9/30   Doesnt want to take meds as usual then pulled IV out   -Refusing  PO Divalproex 500mg bid, gabapentin 100mg tid, Keppra 1000 bid  - on/off non-compliant with medications  Seen by neuro 9/30--        #Dementia w Neurocognitive disorder.   seen by Psych 9/30   - Continue olanzapine 5 mg IM PRN     - cont support care    #tinea pedis   -Resolved s/p clotrimazole cream    #HTN    enalapril 10 mg    #DVT ppx -- cont  Code -- Full  Dispo --DC pending case mgmt/SW; pt is placement issue, citizenship, court ordered treatment against objection. guardian to be appointed        If patient has SZ then will need IM ativan

## 2021-10-02 PROCEDURE — 99231 SBSQ HOSP IP/OBS SF/LOW 25: CPT

## 2021-10-02 NOTE — PROGRESS NOTE ADULT - SUBJECTIVE AND OBJECTIVE BOX
PARVIZ TORRES  61y, Male  Allergy: No Known Allergies    Hospital Day: 381d    S  Pt refused to be examined         PMH/PSH:  PAST MEDICAL & SURGICAL HISTORY:  No pertinent past medical history  unknown    No significant past surgical history  unknown                              MEDICATIONS  (STANDING):  AQUAPHOR (petrolatum Ointment) 1 Application(s) Topical daily  diVALproex  milliGRAM(s) Oral two times a day  enalapril 10 milliGRAM(s) Oral daily  levETIRAcetam  Solution 1000 milliGRAM(s) Oral two times a day  OLANZapine 5 milliGRAM(s) Oral daily    MEDICATIONS  (PRN):  cloNIDine 0.1 milliGRAM(s) Oral every 8 hours PRN htn  OLANZapine Injectable 5 milliGRAM(s) IntraMuscular once PRN agitation  polyethylene glycol 3350 17 Gram(s) Oral two times a day PRN Constipation        Refused exam today     PHYSICAL EXAM: previous   GENERAL: NAD  HEENT: No Swelling  CHEST/LUNG: Good air entry, No wheezing  HEART: RRR, No murmurs  ABDOMEN: Soft, Bowel sounds present  EXTREMITIES:  No clubbing

## 2021-10-03 PROCEDURE — 99231 SBSQ HOSP IP/OBS SF/LOW 25: CPT

## 2021-10-03 NOTE — PROGRESS NOTE ADULT - ASSESSMENT
61M PMHx seizure disorder, schizophrenia, HTN here with altered mental status.      #Break through SZ 9/30   S/P IV keppra 9/30 seen by neuro   Doesnt want to take meds as usual,  then pulled IV out   -Refusing  PO Divalproex 500mg bid, gabapentin 100mg tid, Keppra 1000 bid          #Dementia w Neurocognitive disorder.   seen by Psych again 9/30   - Continue olanzapine 5 mg IM PRN   cont support care        #HTN    Refusing meds---enalapril 10 mg    #DVT ppx -- cont  Code -- Full  Dispo --DC pending case mgmt/SW; pt is placement issue, citizenship, court ordered treatment against objection. guardian to be appointed        If patient has SZ then will need IM ativan

## 2021-10-03 NOTE — PROGRESS NOTE ADULT - SUBJECTIVE AND OBJECTIVE BOX
PARVIZ TORRES  61y, Male  Allergy: No Known Allergies    Hospital Day: 381d    S  Pt refused to be examined         PMH/PSH:  PAST MEDICAL & SURGICAL HISTORY:  No pertinent past medical history  unknown    No significant past surgical history  unknown                              MEDICATIONS  (STANDING):  AQUAPHOR (petrolatum Ointment) 1 Application(s) Topical daily  diVALproex  milliGRAM(s) Oral two times a day  enalapril 10 milliGRAM(s) Oral daily  levETIRAcetam  Solution 1000 milliGRAM(s) Oral two times a day  OLANZapine 5 milliGRAM(s) Oral daily    MEDICATIONS  (PRN):  cloNIDine 0.1 milliGRAM(s) Oral every 8 hours PRN htn  OLANZapine Injectable 5 milliGRAM(s) IntraMuscular once PRN agitation  polyethylene glycol 3350 17 Gram(s) Oral two times a day PRN Constipation        Refused exam

## 2021-10-04 PROCEDURE — 99231 SBSQ HOSP IP/OBS SF/LOW 25: CPT

## 2021-10-04 NOTE — PROGRESS NOTE ADULT - ASSESSMENT
61M PMHx seizure disorder, schizophrenia, HTN here with altered mental status.      #Break through SZ 9/30   S/P IV keppra 9/30   patient continues to be non compliant with medications  Refusing  PO Divalproex 500mg bid, gabapentin 100mg tid, Keppra 1000 bid    #Dementia w Neurocognitive disorder.   seen by Psych again 9/30   Continue olanzapine 5 mg at HS; zyprexa PRN    #HTN   Refusing meds-enalapril 10 mg    #DVT ppx --patient using  Code -- Full  Dispo --DC pending case mgmt/SW; pt is placement issue, citizenship, court ordered treatment against objection. guardian to be appointed

## 2021-10-04 NOTE — PROGRESS NOTE ADULT - ASSESSMENT
60M PMHx seizure disorder, schizophrenia, HTN here with altered mental status.    #Altered mental status, toxic metabolic encephalopathy  possible h/o schizophrenia, suspect psychosis vs. neurocognitive disorder  Still refusing blood draws, medications, vitals  Zyprexa, Zyprexa IM PRN    #tinea pedis  -Resolved s/p clotrimazole cream    #HTN  norvasc 10mg + enalapril 10 mg    #Seizure disorder  depakote 500mg bid25  keppra 750mg bid  Ativan PRN for Seizures  -Pt has been refusing, might benefit from  treatment against objection as he has been refusing AEDs.    #DVT ppx  lovenox sq    -DC pending case mgmt/SW; pt is placement issue, citizenship, court ordered treatment against objection.  -Court date was 1/28, guardian to be appointed. Alert and oriented to person, place and time

## 2021-10-04 NOTE — PROGRESS NOTE ADULT - SUBJECTIVE AND OBJECTIVE BOX
HPI  Patient is a 61y old Male who presents with a chief complaint of mental health marco (03 Oct 2021 13:01)    Currently admitted to medicine with the primary diagnosis of Altered mental status       Today is hospital day 394d.     INTERVAL HPI / OVERNIGHT EVENTS:  Patient was examined and seen at bedside. resting in bed  states he is feeling fine. states there is no problem  When i asked about the seizure medications he states he is good with that and asked me to leave        PAST MEDICAL & SURGICAL HISTORY  No pertinent past medical history  unknown    No significant past surgical history  unknown      ALLERGIES  No Known Allergies    MEDICATIONS  STANDING MEDICATIONS  AQUAPHOR (petrolatum Ointment) 1 Application(s) Topical daily  diVALproex  milliGRAM(s) Oral two times a day  enalapril 10 milliGRAM(s) Oral daily  levETIRAcetam  Solution 1000 milliGRAM(s) Oral two times a day  OLANZapine 5 milliGRAM(s) Oral daily    PRN MEDICATIONS  cloNIDine 0.1 milliGRAM(s) Oral every 8 hours PRN  OLANZapine Injectable 5 milliGRAM(s) IntraMuscular once PRN  polyethylene glycol 3350 17 Gram(s) Oral two times a day PRN    VITALS:  T(F): 98.3  HR: 72  BP: 152/63  RR: 18  SpO2: --    PHYSICAL EXAM  GEN: NAD, Resting comfortably in bed  NEURO: Awake and alert    LABS                        RADIOLOGY

## 2021-10-05 PROCEDURE — 99231 SBSQ HOSP IP/OBS SF/LOW 25: CPT

## 2021-10-05 NOTE — PROGRESS NOTE ADULT - SUBJECTIVE AND OBJECTIVE BOX
CC.  mental health marco CHRISTENSEN.  Patient was examined and seen at bedside. resting in bed  states he is feeling fine. states there is no problem    asked me to leave the room.  refuses exam           MEDICATIONS  (STANDING):  AQUAPHOR (petrolatum Ointment) 1 Application(s) Topical daily  diVALproex  milliGRAM(s) Oral two times a day  enalapril 10 milliGRAM(s) Oral daily  levETIRAcetam  Solution 1000 milliGRAM(s) Oral two times a day  OLANZapine 5 milliGRAM(s) Oral daily    MEDICATIONS  (PRN):  cloNIDine 0.1 milliGRAM(s) Oral every 8 hours PRN htn  OLANZapine Injectable 5 milliGRAM(s) IntraMuscular once PRN agitation  polyethylene glycol 3350 17 Gram(s) Oral two times a day PRN Constipation

## 2021-10-06 PROCEDURE — 99232 SBSQ HOSP IP/OBS MODERATE 35: CPT

## 2021-10-07 PROCEDURE — 99232 SBSQ HOSP IP/OBS MODERATE 35: CPT

## 2021-10-08 PROCEDURE — 99231 SBSQ HOSP IP/OBS SF/LOW 25: CPT

## 2021-10-09 PROCEDURE — 99231 SBSQ HOSP IP/OBS SF/LOW 25: CPT

## 2021-10-10 PROCEDURE — 99231 SBSQ HOSP IP/OBS SF/LOW 25: CPT

## 2021-10-10 RX ADMIN — Medication 1 APPLICATION(S): at 10:12

## 2021-10-11 PROCEDURE — 99231 SBSQ HOSP IP/OBS SF/LOW 25: CPT

## 2021-10-12 PROCEDURE — 99231 SBSQ HOSP IP/OBS SF/LOW 25: CPT

## 2021-10-13 NOTE — PROGRESS NOTE ADULT - SUBJECTIVE AND OBJECTIVE BOX
Pt refuses to be seen            MEDICATIONS  (STANDING):  AQUAPHOR (petrolatum Ointment) 1 Application(s) Topical daily  diVALproex  milliGRAM(s) Oral two times a day  enalapril 10 milliGRAM(s) Oral daily  levETIRAcetam  Solution 1000 milliGRAM(s) Oral two times a day  OLANZapine 5 milliGRAM(s) Oral daily    MEDICATIONS  (PRN):  cloNIDine 0.1 milliGRAM(s) Oral every 8 hours PRN htn  OLANZapine Injectable 5 milliGRAM(s) IntraMuscular once PRN agitation  polyethylene glycol 3350 17 Gram(s) Oral two times a day PRN Constipation

## 2021-10-14 NOTE — PROGRESS NOTE ADULT - SUBJECTIVE AND OBJECTIVE BOX
Pt refuses to be seen  refuses meds          MEDICATIONS  (STANDING):  AQUAPHOR (petrolatum Ointment) 1 Application(s) Topical daily  diVALproex  milliGRAM(s) Oral two times a day  enalapril 10 milliGRAM(s) Oral daily  levETIRAcetam  Solution 1000 milliGRAM(s) Oral two times a day  OLANZapine 5 milliGRAM(s) Oral daily    MEDICATIONS  (PRN):  cloNIDine 0.1 milliGRAM(s) Oral every 8 hours PRN htn  OLANZapine Injectable 5 milliGRAM(s) IntraMuscular once PRN agitation  polyethylene glycol 3350 17 Gram(s) Oral two times a day PRN Constipation

## 2021-10-14 NOTE — PROGRESS NOTE ADULT - ASSESSMENT
61M PMHx seizure disorder, schizophrenia, HTN here with altered mental status.      #Break through SZ 9/30   S/P IV keppra 9/30   patient continues to be non compliant with medications  Refusing  PO Divalproex 500mg bid, gabapentin 100mg tid, Keppra 1000 bid    #Dementia w Neurocognitive disorder.   seen by Psych again 9/30   Continue olanzapine 5 mg at HS; zyprexa PRN    #HTN   Refusing meds-enalapril 10 mg        Dispo --DC pending case mgmt/SW; pt is placement issue, citizenship, court ordered treatment against objection. guardian to be appointed

## 2021-10-15 PROCEDURE — 99231 SBSQ HOSP IP/OBS SF/LOW 25: CPT

## 2021-10-15 RX ADMIN — Medication 1 APPLICATION(S): at 12:19

## 2021-10-15 NOTE — CHART NOTE - NSCHARTNOTEFT_GEN_A_CORE
Registered Dietitian Limited Note:      Pt is 61 year old male with hx of seizure disorder, schizophrenia, HTN, dementia non-compliant with meds. Remains on a DASH/TLC diet with ensure clear 240 ml daily tolerating well consumes ~50-75% of meals. skin intact. no new weights available, will request. Recent labs: 9/30/21 gluc 148H. Pertinent meds include: keppra, zyprexa, depakote, vasotec, catapress, miralax. pt admitted since september of 2020, d/c pending placement/guardianship. Recommend to maintain on present diet order, RD to f/u as needed.

## 2021-10-16 PROCEDURE — 99231 SBSQ HOSP IP/OBS SF/LOW 25: CPT

## 2021-10-17 PROCEDURE — 99231 SBSQ HOSP IP/OBS SF/LOW 25: CPT

## 2021-10-18 PROCEDURE — 99231 SBSQ HOSP IP/OBS SF/LOW 25: CPT

## 2021-10-19 PROCEDURE — 99231 SBSQ HOSP IP/OBS SF/LOW 25: CPT

## 2021-10-20 PROCEDURE — 99231 SBSQ HOSP IP/OBS SF/LOW 25: CPT

## 2021-10-22 PROCEDURE — 99231 SBSQ HOSP IP/OBS SF/LOW 25: CPT

## 2021-10-22 RX ADMIN — LEVETIRACETAM 1000 MILLIGRAM(S): 250 TABLET, FILM COATED ORAL at 05:03

## 2021-10-22 NOTE — PROGRESS NOTE ADULT - ASSESSMENT
61M PMHx seizure disorder, schizophrenia, HTN here with altered mental status.    #Break through SZ 9/30   -S/P IV keppra 9/30   -patient continues to be non compliant with medications  -Refusing  PO Divalproex 500mg bid, gabapentin 100mg tid, Keppra 1000 bid    #Dementia w Neurocognitive disorder.   -seen by Psych again 9/30   -Continue olanzapine 5 mg at HS; zyprexa PRN    #HTN   -Refusing meds-enalapril 10 mg    Dispo --DC pending case mgmt/SW; pt is placement issue, citizenship, court ordered treatment against objection. guardian to be appointed

## 2021-10-22 NOTE — PROGRESS NOTE ADULT - SUBJECTIVE AND OBJECTIVE BOX
Pt seen and examined at bedside; patient sitting on the chair. HE has no complaints; history limited due to underlying condition.    ROS: limited     PE:   Gen: NAD  HEENT: AT, NC, EOMI, MMM  Resp: CTAB/l, no w/r/r  CVS: RRR, no m/r/g  Abd: soft, NT, NT  Neuro, awake and alert       MEDICATIONS  (STANDING):  AQUAPHOR (petrolatum Ointment) 1 Application(s) Topical daily  diVALproex  milliGRAM(s) Oral two times a day  levETIRAcetam  Solution 1000 milliGRAM(s) Oral two times a day  OLANZapine 5 milliGRAM(s) Oral daily    MEDICATIONS  (PRN):  cloNIDine 0.1 milliGRAM(s) Oral every 8 hours PRN htn  OLANZapine Injectable 5 milliGRAM(s) IntraMuscular once PRN agitation  polyethylene glycol 3350 17 Gram(s) Oral two times a day PRN Constipation

## 2021-10-23 PROCEDURE — 99231 SBSQ HOSP IP/OBS SF/LOW 25: CPT

## 2021-10-23 NOTE — PROGRESS NOTE ADULT - SUBJECTIVE AND OBJECTIVE BOX
Pt seen and examined at bedside; patient sitting on the chair. He has no complaints; reports "he is okay." history limited due to underlying condition.    ROS: limited     PE: (refused today; below is the exam from 10/22/2021)  Gen: NAD  HEENT: AT, NC, EOMI, MMM  Resp: CTAB/l, no w/r/r  CVS: RRR, no m/r/g  Abd: soft, NT, NT  Neuro, awake and alert       MEDICATIONS  (STANDING):  AQUAPHOR (petrolatum Ointment) 1 Application(s) Topical daily  diVALproex  milliGRAM(s) Oral two times a day  levETIRAcetam  Solution 1000 milliGRAM(s) Oral two times a day  OLANZapine 5 milliGRAM(s) Oral daily    MEDICATIONS  (PRN):  cloNIDine 0.1 milliGRAM(s) Oral every 8 hours PRN htn  OLANZapine Injectable 5 milliGRAM(s) IntraMuscular once PRN agitation  polyethylene glycol 3350 17 Gram(s) Oral two times a day PRN Constipation

## 2021-10-24 PROCEDURE — 99231 SBSQ HOSP IP/OBS SF/LOW 25: CPT

## 2021-10-24 NOTE — PROGRESS NOTE ADULT - SUBJECTIVE AND OBJECTIVE BOX
Pt seen and examined at bedside; patient sitting on his bed and talking to himself. He has no complaints; reports "he is okay." history limited due to underlying condition.    ROS: limited     PE: (refused today; below is the exam from 10/22/2021)  Gen: NAD  HEENT: AT, NC, EOMI, MMM  Resp: CTAB/l, no w/r/r  CVS: RRR, no m/r/g  Abd: soft, NT, NT  Neuro, awake and alert       MEDICATIONS  (STANDING):  AQUAPHOR (petrolatum Ointment) 1 Application(s) Topical daily  diVALproex  milliGRAM(s) Oral two times a day  levETIRAcetam  Solution 1000 milliGRAM(s) Oral two times a day  OLANZapine 5 milliGRAM(s) Oral daily    MEDICATIONS  (PRN):  cloNIDine 0.1 milliGRAM(s) Oral every 8 hours PRN htn  OLANZapine Injectable 5 milliGRAM(s) IntraMuscular once PRN agitation  polyethylene glycol 3350 17 Gram(s) Oral two times a day PRN Constipation

## 2021-10-25 PROCEDURE — 99231 SBSQ HOSP IP/OBS SF/LOW 25: CPT

## 2021-10-25 NOTE — PROGRESS NOTE ADULT - SUBJECTIVE AND OBJECTIVE BOX
Pt seen and examined at bedside; patient sitting on his bed and talking to himself. He has no complaints    ROS: limited     PE: refused   Gen: NAD  ambulating     ICU Vital Signs Last 24 Hrs  T(C): --  T(F): --  HR: --  BP: --  BP(mean): --  ABP: --  ABP(mean): --  RR: --  SpO2: --         MEDICATIONS  (STANDING):  AQUAPHOR (petrolatum Ointment) 1 Application(s) Topical daily  diVALproex  milliGRAM(s) Oral two times a day  levETIRAcetam  Solution 1000 milliGRAM(s) Oral two times a day  OLANZapine 5 milliGRAM(s) Oral daily    MEDICATIONS  (PRN):  cloNIDine 0.1 milliGRAM(s) Oral every 8 hours PRN htn  OLANZapine Injectable 5 milliGRAM(s) IntraMuscular once PRN agitation  polyethylene glycol 3350 17 Gram(s) Oral two times a day PRN Constipation

## 2021-10-26 PROCEDURE — 99231 SBSQ HOSP IP/OBS SF/LOW 25: CPT

## 2021-10-27 PROCEDURE — 99232 SBSQ HOSP IP/OBS MODERATE 35: CPT

## 2021-10-27 NOTE — PROGRESS NOTE ADULT - SUBJECTIVE AND OBJECTIVE BOX
Pt seen and examined at bedside; patient sitting on his bed and talking to himself. He has no complaints- asking for an update today - no change in status     ROS: limited     PE: refused   Gen: NAD  ambulating     ICU Vital Signs Last 24 Hrs  T(C): --  T(F): --  HR: --  BP: --  BP(mean): --  ABP: --  ABP(mean): --  RR: --  SpO2: --         MEDICATIONS  (STANDING):  AQUAPHOR (petrolatum Ointment) 1 Application(s) Topical daily  diVALproex  milliGRAM(s) Oral two times a day  levETIRAcetam  Solution 1000 milliGRAM(s) Oral two times a day  OLANZapine 5 milliGRAM(s) Oral daily    MEDICATIONS  (PRN):  cloNIDine 0.1 milliGRAM(s) Oral every 8 hours PRN htn  OLANZapine Injectable 5 milliGRAM(s) IntraMuscular once PRN agitation  polyethylene glycol 3350 17 Gram(s) Oral two times a day PRN Constipation

## 2021-10-28 PROCEDURE — 99231 SBSQ HOSP IP/OBS SF/LOW 25: CPT

## 2021-10-28 NOTE — PROGRESS NOTE ADULT - SUBJECTIVE AND OBJECTIVE BOX
Pt seen and examined at bedside; patient sitting on his bed and talking to himself. He has no complaints- asking for an update today - no change in status  - asking me for money today    ROS: limited     PE: refused   Gen: NAD  ambulating   Vital Signs Last 24 Hrs  T(C): --  T(F): --  HR: --  BP: --  BP(mean): --  RR: --  SpO2: --         MEDICATIONS  (STANDING):  AQUAPHOR (petrolatum Ointment) 1 Application(s) Topical daily  diVALproex  milliGRAM(s) Oral two times a day  levETIRAcetam  Solution 1000 milliGRAM(s) Oral two times a day  OLANZapine 5 milliGRAM(s) Oral daily    MEDICATIONS  (PRN):  cloNIDine 0.1 milliGRAM(s) Oral every 8 hours PRN htn  OLANZapine Injectable 5 milliGRAM(s) IntraMuscular once PRN agitation  polyethylene glycol 3350 17 Gram(s) Oral two times a day PRN Constipation

## 2021-10-29 PROCEDURE — 99232 SBSQ HOSP IP/OBS MODERATE 35: CPT

## 2021-10-29 NOTE — CHART NOTE - NSCHARTNOTEFT_GEN_A_CORE
Limited Registered Dietitian Follow up Note:      Pt is 61 year old male with hx of seizure disorder, schizophrenia, HTN, dementia non-compliant with meds. Remains on a DASH/TLC diet with ensure clear 240 ml daily tolerating well consumes ~50-75% of meals. skin intact. no new weights available, will request. Recent labs: 9/30/21 gluc 148H. Pertinent meds include: keppra, zyprexa, depakote, vasotec, catapress, miralax. pt admitted since september of 2020, d/c pending placement/guardianship. Recommend to maintain on present diet order, RD to f/u as needed.

## 2021-10-29 NOTE — PROGRESS NOTE ADULT - SUBJECTIVE AND OBJECTIVE BOX
Pt seen and examined at bedside; patient sitting on his bed and talking to himself. He has no complaints- asking for an update today - no change in status      ROS: limited     PE: refused   Gen: NAD  ambulating   Vital Signs Last 24 Hrs  T(C): --  T(F): --  HR: --  BP: --  BP(mean): --  RR: --  SpO2: --         MEDICATIONS  (STANDING):  AQUAPHOR (petrolatum Ointment) 1 Application(s) Topical daily  diVALproex  milliGRAM(s) Oral two times a day  levETIRAcetam  Solution 1000 milliGRAM(s) Oral two times a day  OLANZapine 5 milliGRAM(s) Oral daily    MEDICATIONS  (PRN):  cloNIDine 0.1 milliGRAM(s) Oral every 8 hours PRN htn  OLANZapine Injectable 5 milliGRAM(s) IntraMuscular once PRN agitation  polyethylene glycol 3350 17 Gram(s) Oral two times a day PRN Constipation

## 2021-10-30 PROCEDURE — 99231 SBSQ HOSP IP/OBS SF/LOW 25: CPT

## 2021-10-30 NOTE — PROGRESS NOTE ADULT - ASSESSMENT
61M PMHx seizure disorder, schizophrenia, HTN here with altered mental status.    #Break through SZ 9/30   -S/P IV keppra 9/30   -patient is non compliant with medications  -PO Divalproex 500mg bid, gabapentin 100mg tid, Keppra 1000 bid    #Dementia w Neurocognitive disorder.   -seen by Psych again 9/30   -Continue olanzapine 5 mg at HS; zyprexa PRN    #HTN   -Refusing meds-enalapril 10 mg    Dispo --DC pending case mgmt/SW; pt is placement issue, citizenship, court ordered treatment against objection. guardian to be appointed

## 2021-10-30 NOTE — PROGRESS NOTE ADULT - SUBJECTIVE AND OBJECTIVE BOX
PARVIZ TORRES  61y, Male  Allergy: No Known Allergies    Hospital Day: 420d    Patient seen and examined. No acute events overnight    PMH/PSH:  PAST MEDICAL & SURGICAL HISTORY:  No pertinent past medical history  unknown    No significant past surgical history  unknown    VITALS:  T(F): --  HR: --  BP: --  RR: --  SpO2: --    TESTS & MEASUREMENTS:  Weight (Kg):     MEDICATIONS:  MEDICATIONS  (STANDING):  diVALproex  milliGRAM(s) Oral two times a day  levETIRAcetam  Solution 1000 milliGRAM(s) Oral two times a day  OLANZapine 5 milliGRAM(s) Oral daily    MEDICATIONS  (PRN):  cloNIDine 0.1 milliGRAM(s) Oral every 8 hours PRN htn  OLANZapine Injectable 5 milliGRAM(s) IntraMuscular once PRN agitation  polyethylene glycol 3350 17 Gram(s) Oral two times a day PRN Constipation    HOME MEDICATIONS:  amLODIPine 5 mg oral tablet (09-17)  divalproex sodium 500 mg oral delayed release tablet (09-17)  levETIRAcetam 750 mg oral tablet (09-17)  OLANZapine 5 mg oral tablet (09-17)    REVIEW OF SYSTEMS:  All other review of systems is negative unless indicated above.     PHYSICAL EXAM:  PHYSICAL EXAM:  GENERAL: NAD, well-developed  HEAD:  Atraumatic, Normocephalic  NECK: Supple, No JVD  CHEST/LUNG: Clear to auscultation bilaterally; No wheeze  HEART: Regular rate and rhythm; No murmurs, rubs, or gallops  ABDOMEN: Soft, Nontender, Nondistended; Bowel sounds present  EXTREMITIES:  2+ Peripheral Pulses, No clubbing, cyanosis, or edema  PSYCH: AAOx3  SKIN: No rashes or lesions         PARVIZ TORRES  61y, Male  Allergy: No Known Allergies    Hospital Day: 420d    Patient seen and examined. No acute events overnight    PMH/PSH:  PAST MEDICAL & SURGICAL HISTORY:  No pertinent past medical history  unknown    No significant past surgical history  unknown    VITALS:  T(F): --  HR: --  BP: --  RR: --  SpO2: --    TESTS & MEASUREMENTS:  Weight (Kg):     MEDICATIONS:  MEDICATIONS  (STANDING):  diVALproex  milliGRAM(s) Oral two times a day  levETIRAcetam  Solution 1000 milliGRAM(s) Oral two times a day  OLANZapine 5 milliGRAM(s) Oral daily    MEDICATIONS  (PRN):  cloNIDine 0.1 milliGRAM(s) Oral every 8 hours PRN htn  OLANZapine Injectable 5 milliGRAM(s) IntraMuscular once PRN agitation  polyethylene glycol 3350 17 Gram(s) Oral two times a day PRN Constipation    HOME MEDICATIONS:  amLODIPine 5 mg oral tablet (09-17)  divalproex sodium 500 mg oral delayed release tablet (09-17)  levETIRAcetam 750 mg oral tablet (09-17)  OLANZapine 5 mg oral tablet (09-17)    REVIEW OF SYSTEMS:  All other review of systems is negative unless indicated above.     PHYSICAL EXAM:  PHYSICAL EXAM:  GENERAL: NAD, well-developed  HEAD:  Atraumatic, Normocephalic  NECK: Supple, No JVD  CHEST/LUNG: Clear to auscultation bilaterally; No wheeze  HEART: Regular rate and rhythm; No murmurs, rubs, or gallops  ABDOMEN: Soft, Nontender, Nondistended; Bowel sounds present  EXTREMITIES:  2+ Peripheral Pulses, No clubbing, cyanosis, or edema  PSYCH: AAOx1  SKIN: No rashes or lesions

## 2021-10-31 PROCEDURE — 99231 SBSQ HOSP IP/OBS SF/LOW 25: CPT

## 2021-11-01 PROCEDURE — 99231 SBSQ HOSP IP/OBS SF/LOW 25: CPT

## 2021-11-01 NOTE — PROGRESS NOTE ADULT - SUBJECTIVE AND OBJECTIVE BOX
PARVIZ TORRES  61y, Male  Allergy: No Known Allergies    Hospital Day: 422d    Patient seen and examined. No acute events overnight    PMH/PSH:  PAST MEDICAL & SURGICAL HISTORY:  No pertinent past medical history  unknown    No significant past surgical history  unknown    VITALS:  T(F): --  HR: --  BP: --  RR: --  SpO2: --    TESTS & MEASUREMENTS:  Weight (Kg):     MEDICATIONS:  MEDICATIONS  (STANDING):  diVALproex  milliGRAM(s) Oral two times a day  levETIRAcetam  Solution 1000 milliGRAM(s) Oral two times a day  OLANZapine 5 milliGRAM(s) Oral daily    MEDICATIONS  (PRN):  cloNIDine 0.1 milliGRAM(s) Oral every 8 hours PRN htn  OLANZapine Injectable 5 milliGRAM(s) IntraMuscular once PRN agitation  polyethylene glycol 3350 17 Gram(s) Oral two times a day PRN Constipation    HOME MEDICATIONS:  amLODIPine 5 mg oral tablet (09-17)  divalproex sodium 500 mg oral delayed release tablet (09-17)  levETIRAcetam 750 mg oral tablet (09-17)  OLANZapine 5 mg oral tablet (09-17)    REVIEW OF SYSTEMS:  All other review of systems is negative unless indicated above.     PHYSICAL EXAM:  PHYSICAL EXAM:  GENERAL: NAD, well-developed  HEAD:  Atraumatic, Normocephalic  NECK: Supple, No JVD  CHEST/LUNG: Clear to auscultation bilaterally; No wheeze  HEART: Regular rate and rhythm; No murmurs, rubs, or gallops  ABDOMEN: Soft, Nontender, Nondistended; Bowel sounds present  EXTREMITIES:  2+ Peripheral Pulses, No clubbing, cyanosis, or edema  PSYCH: AAOx3  SKIN: No rashes or lesions         PARVIZ TORRES  61y, Male  Allergy: No Known Allergies    Hospital Day: 422d    Patient seen and examined. No acute events overnight    PMH/PSH:  PAST MEDICAL & SURGICAL HISTORY:  No pertinent past medical history  unknown    No significant past surgical history  unknown    VITALS:  T(F): --  HR: --  BP: --  RR: --  SpO2: --    TESTS & MEASUREMENTS:  Weight (Kg):     MEDICATIONS:  MEDICATIONS  (STANDING):  diVALproex  milliGRAM(s) Oral two times a day  levETIRAcetam  Solution 1000 milliGRAM(s) Oral two times a day  OLANZapine 5 milliGRAM(s) Oral daily    MEDICATIONS  (PRN):  cloNIDine 0.1 milliGRAM(s) Oral every 8 hours PRN htn  OLANZapine Injectable 5 milliGRAM(s) IntraMuscular once PRN agitation  polyethylene glycol 3350 17 Gram(s) Oral two times a day PRN Constipation    HOME MEDICATIONS:  amLODIPine 5 mg oral tablet (09-17)  divalproex sodium 500 mg oral delayed release tablet (09-17)  levETIRAcetam 750 mg oral tablet (09-17)  OLANZapine 5 mg oral tablet (09-17)    REVIEW OF SYSTEMS:  All other review of systems is negative unless indicated above.     PHYSICAL EXAM:  PHYSICAL EXAM:  GENERAL: NAD, well-developed  HEAD:  Atraumatic, Normocephalic  NECK: Supple, No JVD  CHEST/LUNG: Clear to auscultation bilaterally; No wheeze  HEART: Regular rate and rhythm; No murmurs, rubs, or gallops  ABDOMEN: Soft, Nontender, Nondistended; Bowel sounds present  EXTREMITIES:  2+ Peripheral Pulses, No clubbing, cyanosis, or edema  PSYCH: AAOx1  SKIN: No rashes or lesions

## 2021-11-02 PROCEDURE — 99231 SBSQ HOSP IP/OBS SF/LOW 25: CPT

## 2021-11-02 NOTE — PROGRESS NOTE ADULT - SUBJECTIVE AND OBJECTIVE BOX
PARIVZ TORRES  61y, Male  Allergy: No Known Allergies    Hospital Day: 423d    Patient seen and examined. No acute events overnight    PMH/PSH:  PAST MEDICAL & SURGICAL HISTORY:  No pertinent past medical history  unknown    No significant past surgical history  unknown        VITALS:  T(F): --  HR: --  BP: --  RR: --  SpO2: --    TESTS & MEASUREMENTS:  Weight (Kg):                             RADIOLOGY & ADDITIONAL TESTS:    RECENT DIAGNOSTIC ORDERS:      MEDICATIONS:  MEDICATIONS  (STANDING):  diVALproex  milliGRAM(s) Oral two times a day  levETIRAcetam  Solution 1000 milliGRAM(s) Oral two times a day  OLANZapine 5 milliGRAM(s) Oral daily    MEDICATIONS  (PRN):  cloNIDine 0.1 milliGRAM(s) Oral every 8 hours PRN htn  OLANZapine Injectable 5 milliGRAM(s) IntraMuscular once PRN agitation  polyethylene glycol 3350 17 Gram(s) Oral two times a day PRN Constipation      HOME MEDICATIONS:  amLODIPine 5 mg oral tablet (09-17)  divalproex sodium 500 mg oral delayed release tablet (09-17)  levETIRAcetam 750 mg oral tablet (09-17)  OLANZapine 5 mg oral tablet (09-17)      REVIEW OF SYSTEMS:  All other review of systems is negative unless indicated above.     PHYSICAL EXAM:  PHYSICAL EXAM:  GENERAL: NAD, well-developed  HEAD:  Atraumatic, Normocephalic  NECK: Supple, No JVD  CHEST/LUNG: Clear to auscultation bilaterally; No wheeze  HEART: Regular rate and rhythm; No murmurs, rubs, or gallops  ABDOMEN: Soft, Nontender, Nondistended; Bowel sounds present  EXTREMITIES:  2+ Peripheral Pulses, No clubbing, cyanosis, or edema  PSYCH: AAOx3  SKIN: No rashes or lesions

## 2021-11-03 PROCEDURE — 99232 SBSQ HOSP IP/OBS MODERATE 35: CPT

## 2021-11-03 NOTE — PROGRESS NOTE ADULT - SUBJECTIVE AND OBJECTIVE BOX
HPI  Patient is a 61y old Male who presents with a chief complaint of mental health marco (02 Nov 2021 12:11)    Currently admitted to medicine with the primary diagnosis of Altered mental status       Today is hospital day 424d.     INTERVAL HPI / OVERNIGHT EVENTS:  Patient was examined and seen at bedside  patient is pleasant      PAST MEDICAL & SURGICAL HISTORY  No pertinent past medical history  unknown    No significant past surgical history  unknown      ALLERGIES  No Known Allergies    MEDICATIONS  STANDING MEDICATIONS  diVALproex  milliGRAM(s) Oral two times a day  levETIRAcetam  Solution 1000 milliGRAM(s) Oral two times a day  OLANZapine 5 milliGRAM(s) Oral daily    PRN MEDICATIONS  cloNIDine 0.1 milliGRAM(s) Oral every 8 hours PRN  OLANZapine Injectable 5 milliGRAM(s) IntraMuscular once PRN  polyethylene glycol 3350 17 Gram(s) Oral two times a day PRN    VITALS:  T(F): --  HR: --  BP: --  RR: --  SpO2: --    PHYSICAL EXAM  GEN: pleasant and comfortable  normal respiration    LABS                        RADIOLOGY

## 2021-11-03 NOTE — PROGRESS NOTE ADULT - ASSESSMENT
61M PMHx seizure disorder, schizophrenia, HTN here with altered mental status.    # non compliance     #Break through SZ 9/30   -patient is non compliant with medications  -PO Divalproex 500mg bid, , Keppra 1000 bid    #Dementia w Neurocognitive disorder.   -seen by Psych again 9/30   -Continue olanzapine 5 mg at HS; zyprexa PRN    #HTN   -Refusing meds; no vital sings documented    Dispo --DC pending case mgmt/SW; pt is placement issue, citizenship, court ordered treatment against objection. guardian to be appointed

## 2021-11-04 PROCEDURE — 99231 SBSQ HOSP IP/OBS SF/LOW 25: CPT

## 2021-11-04 NOTE — PROGRESS NOTE ADULT - ASSESSMENT
Continue monitoring as patient allows.    61M PMHx seizure disorder, schizophrenia, HTN here with altered mental status.    # non compliance     #Break through SZ 9/30   -patient is non compliant with medications  -PO Divalproex 500mg bid, , Keppra 1000 bid    #Dementia w Neurocognitive disorder.   -seen by Psych again 9/30   -Continue olanzapine 5 mg at HS; zyprexa PRN    #HTN   -Refusing meds; no vital sings documented    Dispo --DC pending case mgmt/SW; pt is placement issue, citizenship, court ordered treatment against objection. guardian to be appointed

## 2021-11-04 NOTE — PROGRESS NOTE ADULT - SUBJECTIVE AND OBJECTIVE BOX
Patient continues to be non compliant  no issues reported per staff.   patient is awake and resting. Denies any complaints  No examination performed.

## 2021-11-05 PROCEDURE — 99231 SBSQ HOSP IP/OBS SF/LOW 25: CPT

## 2021-11-05 NOTE — PROGRESS NOTE ADULT - ASSESSMENT
Continue monitoring and treatement as patient allows.    61M PMHx seizure disorder, schizophrenia, HTN here with altered mental status.    # non compliance     #Break through SZ 9/30   -patient is non compliant with medications  -PO Divalproex 500mg bid, , Keppra 1000 bid    #Dementia w Neurocognitive disorder.   -seen by Psych again 9/30   -Continue olanzapine 5 mg at HS; zyprexa PRN    #HTN   -Refusing meds; no vital sings documented    Dispo --DC pending case mgmt/SW; pt is placement issue, citizenship, court ordered treatment against objection. guardian to be appointed

## 2021-11-05 NOTE — PROGRESS NOTE ADULT - SUBJECTIVE AND OBJECTIVE BOX
Patient continues to be non compliant  no issues reported per staff.   patient is awake and resting. Denies any complaints  D/w patient regarding medication non compliance and risk of seizure episode. patient stated he is working now; and he does not  have seizures now. he do not want medications    No examination performed.

## 2021-11-06 PROCEDURE — 99231 SBSQ HOSP IP/OBS SF/LOW 25: CPT

## 2021-11-06 NOTE — PROGRESS NOTE ADULT - SUBJECTIVE AND OBJECTIVE BOX
[place]  OPERATIVE REPORT    Name:  Sundeep Morse  MR#:  [id number]  :  1948  ACCOUNT #:  [de-identified]  DATE OF SERVICE:  05/15/2019    PREOPERATIVE DIAGNOSIS:  Rotator cuff tear with painful AC joint arthritis. POSTOPERATIVE DIAGNOSES:  1. Rotator cuff tear. 2.  Biceps tendon longitudinal tear, partial.  3.  AC joint arthritis    PROCEDURES PERFORMED:  1. Open rotator cuff repair. 2.  Open distal clavicle excision. 3.  Open acromioplasty. 4.  Open biceps tenotomy. SURGEON:  Justino Linares MD    ASSISTANT:  .    ANESTHESIA:  .    COMPLICATIONS:  none. SPECIMENS REMOVED:  None. IMPLANTS:  .    ESTIMATED BLOOD LOSS:  50 mL. FINDINGS:  His joint surfaces looked normal for age. His tear extended from the bicipital groove to a point about 28 mm posterior to that. His biceps tendon had substantial longitudinal tearing, but was still in continuity. Over 80% of the tendon was torn. At the completion of the tenotomy all tendon was out of joint, and at the completion of rotator cuff repair, an excellent watertight apposition of tendon to bone was accomplished. PROCEDURE IN DETAIL  In the operating room, he was anesthetized. In the beach-chair position his left shoulder was prepped and draped in a sterile fashion. An incision was made that began over the dorsum of the distal clavicle and extended over the anterior lateral acromion through the subcutaneous fat layer. A blunt dissection exposed the capsular ligaments of the left AC joint. I subperiosteally elevated them with cautery and used a power saw to transect the distal clavicle. The distal clavicle measured 29 x 23 mm, the cut surface was 36 x 12 mm and 20 mm of bone was removed. The capsular ligaments were reapproximated with short runs of 0 Vicryl. The interval between the anterior and middle third of the deltoid was subperiosteally elevated off the acromion.   Portions of the subacromial bursa were removed and a power saw was used to create a flat undersurface of acromion. The tear was discerned and the distal stump was removed with a knife. The biceps tendon was transected after pulling it into the joint, at the level of the transverse humeral ligament and then the insertion to the labrum was transected. A bur was used to create a bare surface of bleeding bone at the normal footprint insertion of the cuff. Three 5.5 suture anchors were placed there with the arm at the side and the forearm across the abdomen. These were used to suture the tendon back to the edge of the footprint with knots tied on top. Next, multiple simple interrupted Ethilon sutures were placed lateral to that creating a watertight double row repair. After thorough irrigation, the deltoid was reapproximated with transosseous Ethibond sutures. The superficial deltoid fascia was closed with running Vicryl. The subcu was closed with interrupted Vicryl and Monocryl, subcuticular Monocryl and surgical glue on the skin. His blood loss was estimated at 50 ml and there were no specimens sent. We placed him in a shoulder immobilizer when we were done. An additional procedure was injection of the biceps tendon of the left side. After the left shoulder surgery was complete, the skin over the bicipital tendon at the transverse humeral ligament of the right shoulder was prepped with alcohol and I injected 40 mg of Depo-Medrol. A Band-Aid dressing was applied.         MD EDUARD [id number]  D:  05/15/2019 17:48  T:  05/15/2019 17:55  JOB #:  [id number] no new complaints by patient; he was about to eat breakfast at time of arrival  no issues reported per staff.     No examination performed.

## 2021-11-07 PROCEDURE — 99231 SBSQ HOSP IP/OBS SF/LOW 25: CPT

## 2021-11-07 NOTE — PROGRESS NOTE ADULT - ASSESSMENT
Continue monitoring and treatement as patient allows.    61M PMHx seizure disorder, schizophrenia, HTN here with altered mental status.    # non compliance     #Break through SZ 9/30   -patient is non compliant with medications  -PO Divalproex 500mg bid, , Keppra 1000 bid    #Dementia w Neurocognitive disorder.   -seen by Psych again 9/30   -Continue olanzapine 5 mg at HS; zyprexa PRN    #HTN   -Refusing meds; no vital sings documented    Dispo --pt is placement issue, citizenship, court ordered treatment against objection. guardian to be appointed

## 2021-11-07 NOTE — PROGRESS NOTE ADULT - SUBJECTIVE AND OBJECTIVE BOX
Patient talking to himself. do not want me to get in to the room  no issues reported per staff.     No examination performed.

## 2021-11-08 PROCEDURE — 99231 SBSQ HOSP IP/OBS SF/LOW 25: CPT

## 2021-11-08 NOTE — PROGRESS NOTE ADULT - SUBJECTIVE AND OBJECTIVE BOX
Patient lying in bed comfortable. states the breakfast was not good. Asking whether i have any papers for him to sign. Asking whether i have his card.  no issues reported per staff.     No examination performed.

## 2021-11-09 PROCEDURE — 99231 SBSQ HOSP IP/OBS SF/LOW 25: CPT

## 2021-11-10 PROCEDURE — 99231 SBSQ HOSP IP/OBS SF/LOW 25: CPT

## 2021-11-11 PROCEDURE — 99231 SBSQ HOSP IP/OBS SF/LOW 25: CPT

## 2021-11-11 NOTE — PROGRESS NOTE ADULT - SUBJECTIVE AND OBJECTIVE BOX
HPI  Patient is a 61y old Male who presents with a chief complaint of mental health marco (10 Nov 2021 12:40)    Currently admitted to medicine with the primary diagnosis of Altered mental status       Today is hospital day 432d.     INTERVAL HPI / OVERNIGHT EVENTS:  Patient was seen and examined at bedside  no complaints  asking whether I have anything for him. informed patient that we can give medication and then he stated he is good and he needs nothing      PAST MEDICAL & SURGICAL HISTORY  No pertinent past medical history  unknown    No significant past surgical history  unknown      ALLERGIES  No Known Allergies    MEDICATIONS  STANDING MEDICATIONS  diVALproex  milliGRAM(s) Oral two times a day  levETIRAcetam  Solution 1000 milliGRAM(s) Oral two times a day  OLANZapine 5 milliGRAM(s) Oral daily    PRN MEDICATIONS  cloNIDine 0.1 milliGRAM(s) Oral every 8 hours PRN  OLANZapine Injectable 5 milliGRAM(s) IntraMuscular once PRN  polyethylene glycol 3350 17 Gram(s) Oral two times a day PRN    VITALS:  T(F): --  HR: --  BP: --  RR: --  SpO2: --    PHYSICAL EXAM  comfortable; no distress      LABS                        RADIOLOGY

## 2021-11-12 PROCEDURE — 99231 SBSQ HOSP IP/OBS SF/LOW 25: CPT

## 2021-11-12 NOTE — PROGRESS NOTE ADULT - SUBJECTIVE AND OBJECTIVE BOX
HPI  Patient is a 61y old Male who presents with a chief complaint of mental health marco (10 Nov 2021 12:40)    Currently admitted to medicine with the primary diagnosis of Altered mental status       Today is hospital day 433d.     INTERVAL HPI / OVERNIGHT EVENTS:  Patient was seen and examined at bedside. He has no complaints. He reports "he is Okay:      PAST MEDICAL & SURGICAL HISTORY  No pertinent past medical history  unknown    No significant past surgical history  unknown      ALLERGIES  No Known Allergies    MEDICATIONS  STANDING MEDICATIONS  diVALproex  milliGRAM(s) Oral two times a day  levETIRAcetam  Solution 1000 milliGRAM(s) Oral two times a day  OLANZapine 5 milliGRAM(s) Oral daily    PRN MEDICATIONS  cloNIDine 0.1 milliGRAM(s) Oral every 8 hours PRN  OLANZapine Injectable 5 milliGRAM(s) IntraMuscular once PRN  polyethylene glycol 3350 17 Gram(s) Oral two times a day PRN    VITALS:  T(F): --  HR: --  BP: --  RR: --  SpO2: --    PHYSICAL EXAM  comfortable; no distress; refused physical exam (says "he is okay.")      LABS                        RADIOLOGY

## 2021-11-13 PROCEDURE — 99231 SBSQ HOSP IP/OBS SF/LOW 25: CPT

## 2021-11-13 NOTE — PROGRESS NOTE ADULT - SUBJECTIVE AND OBJECTIVE BOX
HPI  Patient is a 61y old Male who presents with a chief complaint of mental health marco (10 Nov 2021 12:40)    Currently admitted to medicine with the primary diagnosis of Altered mental status       Today is hospital day 434d.     INTERVAL HPI / OVERNIGHT EVENTS:  Patient was seen and examined at bedside. He has no complaints. He reports "he is Okay" No acute events overnight      PAST MEDICAL & SURGICAL HISTORY  No pertinent past medical history  unknown    No significant past surgical history  unknown      ALLERGIES  No Known Allergies    MEDICATIONS  STANDING MEDICATIONS  diVALproex  milliGRAM(s) Oral two times a day  levETIRAcetam  Solution 1000 milliGRAM(s) Oral two times a day  OLANZapine 5 milliGRAM(s) Oral daily    PRN MEDICATIONS  cloNIDine 0.1 milliGRAM(s) Oral every 8 hours PRN  OLANZapine Injectable 5 milliGRAM(s) IntraMuscular once PRN  polyethylene glycol 3350 17 Gram(s) Oral two times a day PRN    VITALS:  T(F): --  HR: --  BP: --  RR: --  SpO2: --    PHYSICAL EXAM  comfortable; no distress; refused physical exam (says "he is okay.")      LABS                        RADIOLOGY

## 2021-11-14 PROCEDURE — 99231 SBSQ HOSP IP/OBS SF/LOW 25: CPT

## 2021-11-14 NOTE — PROGRESS NOTE ADULT - SUBJECTIVE AND OBJECTIVE BOX
HPI  Patient is a 61y old Male who presents with a chief complaint of mental health marco (10 Nov 2021 12:40)    Currently admitted to medicine with the primary diagnosis of Altered mental status       Today is hospital day 434d.     INTERVAL HPI / OVERNIGHT EVENTS:  Patient was seen and examined at bedside. He has no complaints. He reports "he is Okay" No acute events overnight      PAST MEDICAL & SURGICAL HISTORY  No pertinent past medical history  unknown    No significant past surgical history  unknown      ALLERGIES  No Known Allergies    MEDICATIONS  MEDICATIONS  (STANDING):  diVALproex  milliGRAM(s) Oral two times a day  levETIRAcetam  Solution 1000 milliGRAM(s) Oral two times a day  OLANZapine 5 milliGRAM(s) Oral daily    MEDICATIONS  (PRN):  cloNIDine 0.1 milliGRAM(s) Oral every 8 hours PRN htn  OLANZapine Injectable 5 milliGRAM(s) IntraMuscular once PRN agitation  polyethylene glycol 3350 17 Gram(s) Oral two times a day PRN Constipation      VITALS:  T(F): --  HR: --  BP: --  RR: --  SpO2: --    PHYSICAL EXAM  comfortable; no distress; refused physical exam (says "he is okay.")      LABS                        RADIOLOGY

## 2021-11-15 NOTE — PROGRESS NOTE ADULT - ASSESSMENT
61M PMHx seizure disorder, schizophrenia, HTN here with altered mental status.      #Break through SZ 9/30 2/2 noncompliance   -patient is non compliant with medications  -PO Divalproex 500mg bid, , Keppra 1000 bid  - LA elevated likely 2/2 seizure, will repeat     #Dementia w Neurocognitive disorder.   -seen by Psych again 9/30   -Continue olanzapine 5 mg at HS; zyprexa PRN    #HTN   -Refusing meds; no vital sings documented    Dispo --pt is placement issue, citizenship, court ordered treatment against objection. guardian to be appointed      Lauren Melendez DO

## 2021-11-15 NOTE — PROGRESS NOTE ADULT - SUBJECTIVE AND OBJECTIVE BOX
PARVIZ TORRES  61y, Male  Allergy: No Known Allergies    Hospital Day: 436d    Patient seen and examined earlier today. No complaints.     PMH/PSH:  PAST MEDICAL & SURGICAL HISTORY:  No pertinent past medical history  unknown    No significant past surgical history  unknown        LAST 24-Hr EVENTS:    VITALS:  T(F): --  HR: --  BP: --  RR: --  SpO2: --        TESTS & MEASUREMENTS:  Weight (Kg):                                       RADIOLOGY, ECG, & ADDITIONAL TESTS:      RECENT DIAGNOSTIC ORDERS:  Lactate, Blood: AM Sched. Collection: 16-Nov-2021 04:30 (11-15-21 @ 13:26)  Basic Metabolic Panel: AM Sched. Collection: 16-Nov-2021 04:30 (11-15-21 @ 13:26)  Complete Blood Count: AM Sched. Collection: 16-Nov-2021 04:30 (11-15-21 @ 13:26)      MEDICATIONS:  MEDICATIONS  (STANDING):  diVALproex  milliGRAM(s) Oral two times a day  levETIRAcetam  Solution 1000 milliGRAM(s) Oral two times a day  OLANZapine 5 milliGRAM(s) Oral daily    MEDICATIONS  (PRN):  cloNIDine 0.1 milliGRAM(s) Oral every 8 hours PRN htn  OLANZapine Injectable 5 milliGRAM(s) IntraMuscular once PRN agitation  polyethylene glycol 3350 17 Gram(s) Oral two times a day PRN Constipation      HOME MEDICATIONS:  amLODIPine 5 mg oral tablet (09-17)  divalproex sodium 500 mg oral delayed release tablet (09-17)  levETIRAcetam 750 mg oral tablet (09-17)  OLANZapine 5 mg oral tablet (09-17)      PHYSICAL EXAM:  GENERAL: A&O , NAD  HNENT: EOMI, PERRLA    NECK: No LAD/swelling  CHEST/LUNG:  breathing comfortably, no increased WOB   HEART: regular pulses   ABDOMEN: Soft, NT, ND  EXTREMITIES:  Warm well perfused, no edema

## 2021-11-16 NOTE — PROGRESS NOTE ADULT - ASSESSMENT
61M PMHx seizure disorder, schizophrenia, HTN here with altered mental status.      #Break through SZ 9/30 2/2 noncompliance   -patient is non compliant with medications  -PO Divalproex 500mg bid, , Keppra 1000 bid  - LA elevated likely 2/2 seizure, will repeat ( not collected, suspect pt refused labs )     #Dementia w Neurocognitive disorder.   -seen by Psych again 9/30   -Continue olanzapine 5 mg at HS; zyprexa PRN    #HTN   -Refusing meds; no vital sings documented    Dispo --pt is placement issue, citizenship, court ordered treatment against objection. guardian to be appointed      Lauren Melendez,

## 2021-11-16 NOTE — PROGRESS NOTE ADULT - SUBJECTIVE AND OBJECTIVE BOX
PARVIZ TORRES  61y, Male  Allergy: No Known Allergies    Hospital Day: 437d    Patient seen and examined earlier today. Feeling well no complaints.     PMH/PSH:  PAST MEDICAL & SURGICAL HISTORY:  No pertinent past medical history  unknown    No significant past surgical history  unknown        LAST 24-Hr EVENTS:    VITALS:  T(F): --  HR: --  BP: --  RR: --  SpO2: --        TESTS & MEASUREMENTS:  Weight (Kg):                                       RADIOLOGY, ECG, & ADDITIONAL TESTS:      RECENT DIAGNOSTIC ORDERS:      MEDICATIONS:  MEDICATIONS  (STANDING):  diVALproex  milliGRAM(s) Oral two times a day  levETIRAcetam  Solution 1000 milliGRAM(s) Oral two times a day  OLANZapine 5 milliGRAM(s) Oral daily    MEDICATIONS  (PRN):  cloNIDine 0.1 milliGRAM(s) Oral every 8 hours PRN htn  OLANZapine Injectable 5 milliGRAM(s) IntraMuscular once PRN agitation  polyethylene glycol 3350 17 Gram(s) Oral two times a day PRN Constipation      HOME MEDICATIONS:  amLODIPine 5 mg oral tablet (09-17)  divalproex sodium 500 mg oral delayed release tablet (09-17)  levETIRAcetam 750 mg oral tablet (09-17)  OLANZapine 5 mg oral tablet (09-17)      PHYSICAL EXAM:  Refused exam today

## 2021-11-17 PROCEDURE — 99232 SBSQ HOSP IP/OBS MODERATE 35: CPT

## 2021-11-17 NOTE — PROGRESS NOTE ADULT - ASSESSMENT
61M PMHx seizure disorder, schizophrenia, HTN here with altered mental status.      #Break through SZ 9/30 2/2 noncompliance   -patient is non compliant with medications  -PO Divalproex 500mg bid, , Keppra 1000 bid      #Dementia w Neurocognitive disorder.   -seen by Psych again 9/30   -Continue olanzapine 5 mg at HS; zyprexa PRN    #HTN   -Refusing meds; no vital sings documented    Dispo --pt is placement issue, citizenship, court ordered treatment against objection. guardian to be appointed

## 2021-11-18 PROCEDURE — 99232 SBSQ HOSP IP/OBS MODERATE 35: CPT

## 2021-11-18 NOTE — PROGRESS NOTE ADULT - SUBJECTIVE AND OBJECTIVE BOX
PARVIZ TORRES  61y, Male  Allergy: No Known Allergies    Patient seen and examined earlier today. Feeling well no complaints.     PMH/PSH:  PAST MEDICAL & SURGICAL HISTORY:  No pertinent past medical history  unknown    No significant past surgical history  unknown        LAST 24-Hr EVENTS:    VITALS:  T(F): --  HR: --  BP: --  RR: --  SpO2: --        TESTS & MEASUREMENTS:  Weight (Kg):                                       RADIOLOGY, ECG, & ADDITIONAL TESTS:      RECENT DIAGNOSTIC ORDERS:      MEDICATIONS:  MEDICATIONS  (STANDING):  diVALproex  milliGRAM(s) Oral two times a day  levETIRAcetam  Solution 1000 milliGRAM(s) Oral two times a day  OLANZapine 5 milliGRAM(s) Oral daily    MEDICATIONS  (PRN):  cloNIDine 0.1 milliGRAM(s) Oral every 8 hours PRN htn  OLANZapine Injectable 5 milliGRAM(s) IntraMuscular once PRN agitation  polyethylene glycol 3350 17 Gram(s) Oral two times a day PRN Constipation      HOME MEDICATIONS:  amLODIPine 5 mg oral tablet (09-17)  divalproex sodium 500 mg oral delayed release tablet (09-17)  levETIRAcetam 750 mg oral tablet (09-17)  OLANZapine 5 mg oral tablet (09-17)      PHYSICAL EXAM:  Refused exam today

## 2021-11-19 PROCEDURE — 99232 SBSQ HOSP IP/OBS MODERATE 35: CPT

## 2021-11-20 PROCEDURE — 99231 SBSQ HOSP IP/OBS SF/LOW 25: CPT

## 2021-11-20 NOTE — PROGRESS NOTE ADULT - SUBJECTIVE AND OBJECTIVE BOX
S: patient eating breakfast  denies any complaints  asking whether i brought anything for him    O/E: stable; comfortable  normal respiration

## 2021-11-20 NOTE — PROGRESS NOTE ADULT - ASSESSMENT
61M PMHx seizure disorder, schizophrenia, HTN ; awaiting disposition plans      #Break through SZ 9/30 2/2 noncompliance   -patient is non compliant with medications  -PO Divalproex 500mg bid, , Keppra 1000 bid    #Dementia w Neurocognitive disorder.   -Continue olanzapine 5 mg at HS; zyprexa PRN    #HTN   -Refusing meds; no vital sings documented    Dispo --pt is placement issue, citizenship, court ordered treatment against objection. guardian to be appointed

## 2021-11-21 PROCEDURE — 99231 SBSQ HOSP IP/OBS SF/LOW 25: CPT

## 2021-11-21 NOTE — PROGRESS NOTE ADULT - SUBJECTIVE AND OBJECTIVE BOX
S: patient sitting comfortably in chair  states he is okay    O/E:   no vitals recorded because of patient refusal  stable; comfortable

## 2021-11-22 PROCEDURE — 99231 SBSQ HOSP IP/OBS SF/LOW 25: CPT

## 2021-11-22 NOTE — PROGRESS NOTE ADULT - ASSESSMENT
61M PMHx seizure disorder, schizophrenia, HTN ; awaiting disposition plans      #Break through SZ 9/30 2/2 noncompliance   -patient is non compliant with medications  -PO Divalproex 500mg bid, , Keppra 1000 bid    #Dementia w Neurocognitive disorder.   -Continue olanzapine 5 mg at HS; zyprexa PRN    #HTN   -Refusing meds; no vital sings documented    change ensure clear to ensure enlive    Dispo --pt is placement issue, citizenship, court ordered treatment against objection. guardian to be appointed

## 2021-11-22 NOTE — PROGRESS NOTE ADULT - SUBJECTIVE AND OBJECTIVE BOX
S: patient lying in bed; comfortable; states he is not liking the ensure ; states he likes the other ensure      O/E:   no vitals recorded because of patient refusal  stable; comfortable

## 2021-11-23 PROCEDURE — 99231 SBSQ HOSP IP/OBS SF/LOW 25: CPT

## 2021-11-23 NOTE — PROGRESS NOTE ADULT - SUBJECTIVE AND OBJECTIVE BOX
HPI  Patient is a 61y old Male who presents with a chief complaint of mental health marco (22 Nov 2021 17:51)    Currently admitted to medicine with the primary diagnosis of Altered mental status       Today is hospital day 444d.     INTERVAL HPI / OVERNIGHT EVENTS:  comfortably lying in bed; no complaints      PAST MEDICAL & SURGICAL HISTORY  No pertinent past medical history  unknown    No significant past surgical history  unknown      ALLERGIES  No Known Allergies    MEDICATIONS  STANDING MEDICATIONS  diVALproex  milliGRAM(s) Oral two times a day  levETIRAcetam  Solution 1000 milliGRAM(s) Oral two times a day  OLANZapine 5 milliGRAM(s) Oral daily    PRN MEDICATIONS  cloNIDine 0.1 milliGRAM(s) Oral every 8 hours PRN  OLANZapine Injectable 5 milliGRAM(s) IntraMuscular once PRN  polyethylene glycol 3350 17 Gram(s) Oral two times a day PRN    VITALS:  T(F): --  HR: --  BP: --  RR: --  SpO2: --    PHYSICAL EXAM  GEN: no distress, comfortable      LABS                        RADIOLOGY

## 2021-11-23 NOTE — PROGRESS NOTE ADULT - ASSESSMENT
61M PMHx seizure disorder, schizophrenia, HTN ; awaiting disposition plans      #Break through SZ 9/30 secondary to noncompliance   -patient is non compliant with medications  -PO Divalproex 500mg bid, , Keppra 1000 bid    #Dementia w Neurocognitive disorder.   -Continue olanzapine 5 mg at HS; zyprexa PRN    #HTN   -Refusing meds; no vital sings documented    change ensure clear to ensure enlive    Dispo --pt is placement issue, citizenship, court ordered treatment against objection. guardian to be appointed

## 2021-11-24 PROCEDURE — 99231 SBSQ HOSP IP/OBS SF/LOW 25: CPT

## 2021-11-24 NOTE — PROGRESS NOTE ADULT - SUBJECTIVE AND OBJECTIVE BOX
PARVIZ TORRES  61y, Male  Allergy: No Known Allergies    Hospital Day: 445d    Patient seen and examined. No acute events overnight    PMH/PSH:  PAST MEDICAL & SURGICAL HISTORY:  No pertinent past medical history  unknown    No significant past surgical history  unknown    VITALS:  T(F): --  HR: --  BP: --  RR: --  SpO2: --    TESTS & MEASUREMENTS:  Weight (Kg):     MEDICATIONS:  MEDICATIONS  (STANDING):  diVALproex  milliGRAM(s) Oral two times a day  levETIRAcetam  Solution 1000 milliGRAM(s) Oral two times a day  OLANZapine 5 milliGRAM(s) Oral daily    MEDICATIONS  (PRN):  cloNIDine 0.1 milliGRAM(s) Oral every 8 hours PRN htn  OLANZapine Injectable 5 milliGRAM(s) IntraMuscular once PRN agitation  polyethylene glycol 3350 17 Gram(s) Oral two times a day PRN Constipation    HOME MEDICATIONS:  amLODIPine 5 mg oral tablet (09-17)  divalproex sodium 500 mg oral delayed release tablet (09-17)  levETIRAcetam 750 mg oral tablet (09-17)  OLANZapine 5 mg oral tablet (09-17)    REVIEW OF SYSTEMS:  All other review of systems is negative unless indicated above.     PHYSICAL EXAM:  PHYSICAL EXAM:  GENERAL: NAD, well-developed  HEAD:  Atraumatic, Normocephalic  NECK: Supple, No JVD  CHEST/LUNG: Clear to auscultation bilaterally; No wheeze  HEART: Regular rate and rhythm; No murmurs, rubs, or gallops  ABDOMEN: Soft, Nontender, Nondistended; Bowel sounds present  EXTREMITIES:  2+ Peripheral Pulses, No clubbing, cyanosis, or edema  PSYCH: AAOx2  SKIN: No rashes or lesions

## 2021-11-25 PROCEDURE — 99231 SBSQ HOSP IP/OBS SF/LOW 25: CPT

## 2021-11-25 NOTE — PROGRESS NOTE ADULT - ASSESSMENT
61M PMHx seizure disorder, schizophrenia, HTN ; awaiting disposition plans      #Break through SZ 9/30 secondary to noncompliance   -patient is non compliant with medications  -PO Divalproex 500mg bid, , Keppra 1000 bid    #Dementia w Neurocognitive disorder.   -Continue olanzapine 5 mg at HS; zyprexa PRN    #HTN   -Refusing meds and labs; no vital sings documented      Dispo --pt is placement issue, citizenship, court ordered treatment against objection. Guardian to be appointed

## 2021-11-25 NOTE — PROGRESS NOTE ADULT - SUBJECTIVE AND OBJECTIVE BOX
Pt seen and examined at bedside. No complaints.         VITAL SIGNS (Last 24 hrs):  T(C): --  HR: --  BP: --  RR: --  SpO2: --  Wt(kg): --  Daily     Daily     I&O's Summary      PHYSICAL EXAM:  GENERAL: NAD   HEAD:  Atraumatic, Normocephalic  EYES:  conjunctiva and sclera clear  NECK: Supple, No JVD  CHEST/LUNG: Clear to auscultation bilaterally; No wheeze  HEART: Regular rate and rhythm; No murmurs, rubs, or gallops  ABDOMEN: Soft, Nontender, Nondistended; Bowel sounds present  EXTREMITIES:  2+ Peripheral Pulses, No clubbing, cyanosis, or edema  SKIN: No rashes or lesions                MEDICATIONS  (STANDING):  diVALproex  milliGRAM(s) Oral two times a day  levETIRAcetam  Solution 1000 milliGRAM(s) Oral two times a day  OLANZapine 5 milliGRAM(s) Oral daily    MEDICATIONS  (PRN):  cloNIDine 0.1 milliGRAM(s) Oral every 8 hours PRN htn  OLANZapine Injectable 5 milliGRAM(s) IntraMuscular once PRN agitation  polyethylene glycol 3350 17 Gram(s) Oral two times a day PRN Constipation

## 2021-11-26 PROCEDURE — 99231 SBSQ HOSP IP/OBS SF/LOW 25: CPT

## 2021-11-26 RX ADMIN — OLANZAPINE 5 MILLIGRAM(S): 15 TABLET, FILM COATED ORAL at 18:16

## 2021-11-26 NOTE — PROGRESS NOTE ADULT - SUBJECTIVE AND OBJECTIVE BOX
Pt seen and examined at bedside.     VITAL SIGNS (Last 24 hrs):  T(C): --  HR: --  BP: --  RR: --  SpO2: --  Wt(kg): --         PHYSICAL EXAM:  GENERAL: NAD   HEAD:  Atraumatic, Normocephalic  EYES:  conjunctiva and sclera clear  NECK: Supple, No JVD  CHEST/LUNG: Clear to auscultation bilaterally; No wheeze  HEART: Regular rate and rhythm; No murmurs, rubs, or gallops  ABDOMEN: Soft, Nontender, Nondistended; Bowel sounds present  EXTREMITIES:  2+ Peripheral Pulses, No clubbing, cyanosis, or edema  SKIN: No rashes or lesions            MEDICATIONS  (STANDING):  diVALproex  milliGRAM(s) Oral two times a day  levETIRAcetam  Solution 1000 milliGRAM(s) Oral two times a day  OLANZapine 5 milliGRAM(s) Oral daily    MEDICATIONS  (PRN):  cloNIDine 0.1 milliGRAM(s) Oral every 8 hours PRN htn  OLANZapine Injectable 5 milliGRAM(s) IntraMuscular once PRN agitation  polyethylene glycol 3350 17 Gram(s) Oral two times a day PRN Constipation

## 2021-11-26 NOTE — CHART NOTE - NSCHARTNOTEFT_GEN_A_CORE
pt go agitated this morning   got out of room tried to throw breakfast tray in hallway   pt slipped and fell on his buttock , hit his back of the head on door frame, no LOC   pt got up and went inside room, still agitated  attempted to examine pt, pt agitated , yelling denies any pain    will give Zyprexa im and try examine afterwards   will decide images base on exam findings pt go agitated this morning   got out of room tried to throw breakfast tray in hallway   pt slipped and fell on his buttock , hit his back of the head on door frame, no LOC   pt got up and went inside room, still agitated  attempted to examine pt, pt agitated , yelling denies any pain    will give Zyprexa im and try examine afterwards   will decide images base on exam findings    Addendum  pt was examined without sedation  no laceration, tenderness or bleeding on back of head  pt AOx3   range of motion of neck intact   pt denies any pain, dizziness, blurry vison  no need for images at this time

## 2021-11-27 PROCEDURE — 99231 SBSQ HOSP IP/OBS SF/LOW 25: CPT

## 2021-11-27 NOTE — PROGRESS NOTE ADULT - SUBJECTIVE AND OBJECTIVE BOX
VITAL SIGNS (Last 24 hrs):  T(C): --  HR: --  BP: --  RR: --  SpO2: --  Wt(kg): --  Daily     Daily     I&O's Summary      PHYSICAL EXAM:  GENERAL: NAD   HEAD:  Atraumatic, Normocephalic  EYES:   conjunctiva and sclera clear  NECK: Supple, No JVD  CHEST/LUNG: Clear to auscultation bilaterally; No wheeze  HEART: Regular rate and rhythm; No murmurs, rubs, or gallops  ABDOMEN: Soft, Nontender, Nondistended; Bowel sounds present  EXTREMITIES:  2+ Peripheral Pulses, No clubbing, cyanosis, or edema  PSYCH: AAOx3  NEUROLOGY: non-focal  SKIN: No rashes or lesions    Labs Reviewed  Spoke to patient in regards to abnormal labs.         MEDICATIONS  (STANDING):  diVALproex  milliGRAM(s) Oral two times a day  levETIRAcetam  Solution 1000 milliGRAM(s) Oral two times a day  OLANZapine 5 milliGRAM(s) Oral daily    MEDICATIONS  (PRN):  cloNIDine 0.1 milliGRAM(s) Oral every 8 hours PRN htn  polyethylene glycol 3350 17 Gram(s) Oral two times a day PRN Constipation

## 2021-11-27 NOTE — PROGRESS NOTE ADULT - ASSESSMENT
61M PMHx seizure disorder, schizophrenia, HTN ; awaiting disposition plans      #Break through Sz 9/30 secondary to noncompliance   -patient is non compliant with medications  -PO Divalproex 500mg bid, Keppra 1000 bid    #Dementia w Neurocognitive disorder.   -Continue olanzapine 5 mg at HS; Zyprexa PRN    #HTN   -Refusing meds and labs; no vital sings documented      Dispo --pt is placement issue, citizenship, court ordered treatment against objection. Guardian to be appointed

## 2021-11-28 PROCEDURE — 99231 SBSQ HOSP IP/OBS SF/LOW 25: CPT

## 2021-11-28 NOTE — PROGRESS NOTE ADULT - SUBJECTIVE AND OBJECTIVE BOX
PARVIZ TORRES  61y, Male  Allergy: No Known Allergies    Hospital Day: 449d    Patient seen and examined. No acute events overnight    PMH/PSH:  PAST MEDICAL & SURGICAL HISTORY:  No pertinent past medical history  unknown    No significant past surgical history  unknown    VITALS:  T(F): --  HR: --  BP: --  RR: --  SpO2: --    TESTS & MEASUREMENTS:  Weight (Kg):     MEDICATIONS:  MEDICATIONS  (STANDING):  diVALproex  milliGRAM(s) Oral two times a day  levETIRAcetam  Solution 1000 milliGRAM(s) Oral two times a day  OLANZapine 5 milliGRAM(s) Oral daily    MEDICATIONS  (PRN):  cloNIDine 0.1 milliGRAM(s) Oral every 8 hours PRN htn  polyethylene glycol 3350 17 Gram(s) Oral two times a day PRN Constipation    HOME MEDICATIONS:  amLODIPine 5 mg oral tablet (09-17)  divalproex sodium 500 mg oral delayed release tablet (09-17)  levETIRAcetam 750 mg oral tablet (09-17)  OLANZapine 5 mg oral tablet (09-17)    REVIEW OF SYSTEMS:  All other review of systems is negative unless indicated above.     PHYSICAL EXAM:  PHYSICAL EXAM:  GENERAL: NAD, well-developed  HEAD:  Atraumatic, Normocephalic  NECK: Supple, No JVD  CHEST/LUNG: Clear to auscultation bilaterally; No wheeze  HEART: Regular rate and rhythm; No murmurs, rubs, or gallops  ABDOMEN: Soft, Nontender, Nondistended; Bowel sounds present  EXTREMITIES:  2+ Peripheral Pulses, No clubbing, cyanosis, or edema  PSYCH: AAOx1  SKIN: No rashes or lesions

## 2021-11-29 PROCEDURE — 99231 SBSQ HOSP IP/OBS SF/LOW 25: CPT

## 2021-11-29 NOTE — PROGRESS NOTE ADULT - SUBJECTIVE AND OBJECTIVE BOX
PARVIZ TORRES  61y, Male  Allergy: No Known Allergies    Hospital Day: 450d    Patient seen and examined. No acute events overnight    PMH/PSH:  PAST MEDICAL & SURGICAL HISTORY:  No pertinent past medical history  unknown    No significant past surgical history  unknown    VITALS:  T(F): --  HR: --  BP: --  RR: --  SpO2: --    TESTS & MEASUREMENTS:  Weight (Kg):     MEDICATIONS:  MEDICATIONS  (STANDING):  diVALproex  milliGRAM(s) Oral two times a day  levETIRAcetam  Solution 1000 milliGRAM(s) Oral two times a day  OLANZapine 5 milliGRAM(s) Oral daily    MEDICATIONS  (PRN):  cloNIDine 0.1 milliGRAM(s) Oral every 8 hours PRN htn  polyethylene glycol 3350 17 Gram(s) Oral two times a day PRN Constipation    HOME MEDICATIONS:  amLODIPine 5 mg oral tablet (09-17)  divalproex sodium 500 mg oral delayed release tablet (09-17)  levETIRAcetam 750 mg oral tablet (09-17)  OLANZapine 5 mg oral tablet (09-17)      REVIEW OF SYSTEMS:  All other review of systems is negative unless indicated above.     PHYSICAL EXAM:  PHYSICAL EXAM:  GENERAL: NAD, well-developed  HEAD:  Atraumatic, Normocephalic  NECK: Supple, No JVD  CHEST/LUNG: Clear to auscultation bilaterally; No wheeze  HEART: Regular rate and rhythm; No murmurs, rubs, or gallops  ABDOMEN: Soft, Nontender, Nondistended; Bowel sounds present  EXTREMITIES:  2+ Peripheral Pulses, No clubbing, cyanosis, or edema  PSYCH: AAOx3  SKIN: No rashes or lesions

## 2021-11-30 PROCEDURE — 99231 SBSQ HOSP IP/OBS SF/LOW 25: CPT

## 2021-12-01 PROCEDURE — 99231 SBSQ HOSP IP/OBS SF/LOW 25: CPT

## 2021-12-01 NOTE — PROGRESS NOTE ADULT - ASSESSMENT
61M PMHx seizure disorder, schizophrenia, HTN ; awaiting disposition plans    # non compliant    #Break through Sz 9/30 secondary to noncompliance   -patient is non compliant with medications  -PO Divalproex 500mg bid, Keppra 1000 bid    #Dementia w Neurocognitive disorder.   -Continue olanzapine 5 mg at HS; Zyprexa PRN    #HTN   -Refusing meds and labs;       Dispo --pt is placement issue, citizenship, Guardian to be appointed

## 2021-12-01 NOTE — PROGRESS NOTE ADULT - SUBJECTIVE AND OBJECTIVE BOX
S: no complaints  no new issues reported by staff    O/E: patient resting comfortably in chair  normal respiration

## 2021-12-02 PROCEDURE — 99231 SBSQ HOSP IP/OBS SF/LOW 25: CPT

## 2021-12-02 NOTE — CHART NOTE - NSCHARTNOTEFT_GEN_A_CORE
Was requested to routine covid swab the pt for post admission surveillance.   Attempted to swab the pt but pt refused.

## 2021-12-02 NOTE — PROGRESS NOTE ADULT - SUBJECTIVE AND OBJECTIVE BOX
S: no complaints  no new issues reported by staff    O/E: patient resting comfortably in chair; eating lunch  normal respiration

## 2021-12-03 PROCEDURE — 99231 SBSQ HOSP IP/OBS SF/LOW 25: CPT

## 2021-12-03 NOTE — CHART NOTE - NSCHARTNOTEFT_GEN_A_CORE
RD Follow-up: pt admitted x 15 months. Remains on DASH/TLC w/Ensure Enlive 1 x per day, PO intake fair to good. Pt alert, confused at times, limited meaningful participation in nutrition interview.  Bedscale weight today 62.2 kg, no sig change from admit wt 62.5 kg. No edema. No PU. GI: WDL. Meds include Keppra, depakote, catapres, zyprexa, miralax. No new labs since previous RD note. No new interventions indicated, will f/u within 30 days.

## 2021-12-03 NOTE — PROGRESS NOTE ADULT - SUBJECTIVE AND OBJECTIVE BOX
HPI  Patient is a 61y old Male who presents with a chief complaint of mental health marco (02 Dec 2021 18:50)    Currently admitted to medicine with the primary diagnosis of Altered mental status       Today is hospital day 454d.     INTERVAL HPI / OVERNIGHT EVENTS:  no new changes        PAST MEDICAL & SURGICAL HISTORY  No pertinent past medical history  unknown    No significant past surgical history  unknown      ALLERGIES  No Known Allergies    MEDICATIONS  STANDING MEDICATIONS  diVALproex  milliGRAM(s) Oral two times a day  levETIRAcetam  Solution 1000 milliGRAM(s) Oral two times a day  OLANZapine 5 milliGRAM(s) Oral daily    PRN MEDICATIONS  cloNIDine 0.1 milliGRAM(s) Oral every 8 hours PRN  polyethylene glycol 3350 17 Gram(s) Oral two times a day PRN    VITALS:  T(F): --  HR: --  BP: --  RR: --  SpO2: --    PHYSICAL EXAM  GEN: no distress, comfortable      LABS                        RADIOLOGY

## 2021-12-03 NOTE — PROGRESS NOTE ADULT - ASSESSMENT
61M PMHx seizure disorder, schizophrenia, HTN ; awaiting disposition plans    # non compliance    #Break through Sz 9/30 secondary to noncompliance   -patient is non compliant with medications  -PO Divalproex 500mg bid, Keppra 1000 bid    #Dementia w Neurocognitive disorder.   -Continue olanzapine 5 mg at HS; Zyprexa PRN    #HTN   -Refusing meds and labs;       Dispo --pt is placement issue, citizenship, Guardian to be appointed

## 2021-12-04 PROCEDURE — 99231 SBSQ HOSP IP/OBS SF/LOW 25: CPT

## 2021-12-04 NOTE — PROGRESS NOTE ADULT - SUBJECTIVE AND OBJECTIVE BOX
INTERVAL HPI / OVERNIGHT EVENTS:  no new changes  no issues reported      PAST MEDICAL & SURGICAL HISTORY  No pertinent past medical history  unknown    No significant past surgical history  unknown      ALLERGIES  No Known Allergies    MEDICATIONS  STANDING MEDICATIONS  diVALproex  milliGRAM(s) Oral two times a day  levETIRAcetam  Solution 1000 milliGRAM(s) Oral two times a day  OLANZapine 5 milliGRAM(s) Oral daily    PRN MEDICATIONS  cloNIDine 0.1 milliGRAM(s) Oral every 8 hours PRN  polyethylene glycol 3350 17 Gram(s) Oral two times a day PRN    VITALS:  T(F): --  HR: --  BP: --  RR: --  SpO2: --    PHYSICAL EXAM  GEN: no distress, comfortable      LABS                        RADIOLOGY

## 2021-12-05 PROCEDURE — 99231 SBSQ HOSP IP/OBS SF/LOW 25: CPT

## 2021-12-05 NOTE — PROGRESS NOTE ADULT - SUBJECTIVE AND OBJECTIVE BOX
INTERVAL HPI / OVERNIGHT EVENTS:  no new changes  patient stating why cant he exit      PAST MEDICAL & SURGICAL HISTORY  No pertinent past medical history  unknown    No significant past surgical history  unknown      ALLERGIES  No Known Allergies    MEDICATIONS  STANDING MEDICATIONS  diVALproex  milliGRAM(s) Oral two times a day  levETIRAcetam  Solution 1000 milliGRAM(s) Oral two times a day  OLANZapine 5 milliGRAM(s) Oral daily    PRN MEDICATIONS  cloNIDine 0.1 milliGRAM(s) Oral every 8 hours PRN  polyethylene glycol 3350 17 Gram(s) Oral two times a day PRN    VITALS:  T(F): --  HR: --  BP: --  RR: --  SpO2: --    PHYSICAL EXAM  GEN: no distress, comfortable      LABS                        RADIOLOGY

## 2021-12-06 PROCEDURE — 99231 SBSQ HOSP IP/OBS SF/LOW 25: CPT

## 2021-12-07 PROCEDURE — 99231 SBSQ HOSP IP/OBS SF/LOW 25: CPT

## 2021-12-07 NOTE — PROGRESS NOTE ADULT - SUBJECTIVE AND OBJECTIVE BOX
HPI  Patient is a 61y old Male who presents with a chief complaint of mental health marco (06 Dec 2021 17:50)    Currently admitted to medicine with the primary diagnosis of Altered mental status       Today is hospital day 458d.     INTERVAL HPI / OVERNIGHT EVENTS:  Patient was seen and examined at bedside  patient writing his family's name in a paper. Stating he wants to read the names and make sure he is not crazy  He insisted me to write he is okay and to sign the paper so that he can leave.  I informed patient that we are working to get his placement.        PAST MEDICAL & SURGICAL HISTORY  No pertinent past medical history  unknown    No significant past surgical history  unknown      ALLERGIES  No Known Allergies    MEDICATIONS  STANDING MEDICATIONS  diVALproex  milliGRAM(s) Oral two times a day  levETIRAcetam  Solution 1000 milliGRAM(s) Oral two times a day  OLANZapine 5 milliGRAM(s) Oral daily    PRN MEDICATIONS  cloNIDine 0.1 milliGRAM(s) Oral every 8 hours PRN  polyethylene glycol 3350 17 Gram(s) Oral two times a day PRN    VITALS:  T(F): --  HR: --  BP: --  RR: --  SpO2: --    PHYSICAL EXAM  GEN: no distress, comfortable  PULM: normal respiration  NEURO: A&Ox2    LABS                        RADIOLOGY

## 2021-12-08 PROCEDURE — 99232 SBSQ HOSP IP/OBS MODERATE 35: CPT

## 2021-12-08 NOTE — PROGRESS NOTE ADULT - ASSESSMENT
61M PMHx seizure disorder, schizophrenia, HTN ; awaiting disposition plans    # non compliance    #Break through Sz 9/30 secondary to noncompliance   -patient is non compliant with medications  -PO Divalproex 500mg bid, Keppra 1000 bid    #Dementia w Neurocognitive disorder.   -Continue olanzapine 5 mg at HS; Zyprexa PRN    #HTN   -Refusing meds and labs;       Dispo --pt is placement issue, citizenship, Guardian to be appointed    dc planning soon - wokring on transfer agreement - will neend covid swab prior

## 2021-12-08 NOTE — PROGRESS NOTE ADULT - SUBJECTIVE AND OBJECTIVE BOX
HPI  Patient is a 61y old Male who presents with a chief complaint of mental health marco (06 Dec 2021 17:50)    Currently admitted to medicine with the primary diagnosis of Altered mental status       Today is hospital day 458d.     INTERVAL HPI / OVERNIGHT EVENTS:  n oevents      PAST MEDICAL & SURGICAL HISTORY  No pertinent past medical history  unknown    No significant past surgical history  unknown      ALLERGIES  No Known Allergies    MEDICATIONS  STANDING MEDICATIONS  diVALproex  milliGRAM(s) Oral two times a day  levETIRAcetam  Solution 1000 milliGRAM(s) Oral two times a day  OLANZapine 5 milliGRAM(s) Oral daily    PRN MEDICATIONS  cloNIDine 0.1 milliGRAM(s) Oral every 8 hours PRN  polyethylene glycol 3350 17 Gram(s) Oral two times a day PRN    VITALS:  T(F): --  HR: --  BP: --  RR: --  SpO2: --    PHYSICAL EXAM  GEN: no distress, comfortable  PULM: normal respiration  NEURO: A&Ox2    LABS                        RADIOLOGY

## 2021-12-09 PROCEDURE — 99232 SBSQ HOSP IP/OBS MODERATE 35: CPT

## 2021-12-09 NOTE — PROGRESS NOTE ADULT - ASSESSMENT
61M PMHx seizure disorder, schizophrenia, HTN ; awaiting disposition plans    # non compliance    #Break through Sz 9/30 secondary to noncompliance   -patient is non compliant with medications  -PO Divalproex 500mg bid, Keppra 1000 bid    #Dementia w Neurocognitive disorder.   -Continue olanzapine 5 mg at HS; Zyprexa PRN  - with current meds  pts mood had been calm and he is not a danger to self or others    #HTN   -Refusing meds and labs;       Dispo --pt is placement issue, citizenship, Guardian to be appointed    dc planning soon - wokring on transfer agreement - will neend covid swab prior

## 2021-12-09 NOTE — CHART NOTE - NSCHARTNOTESELECT_GEN_ALL_CORE
Event Note
PA Note/Event Note
PA Note/Event Note
RD follow up/Event Note
RD limited note/Event Note
Rapid Response
COVID swab/Event Note
Event Note
Event Note/RD Follow-Up
Event Note/RD Limited Follow-Up
Event Note/RD follow up
Medical/Event Note
PA NOTE
PA NOTE
PA medical note/Event Note
Psychiatry/Event Note
RD Follow-Up/Event Note
RD LIMITED NOTE/Event Note
RD Limited Follow-Up/Event Note
RD Limited/Event Note
RD at risk f/u/Event Note
RD at risk f/u/Event Note
RD follow up/Event Note
RD limited note/Event Note
RD limited note:/Event Note
code escobar
pa note

## 2021-12-09 NOTE — CHART NOTE - NSCHARTNOTEFT_GEN_A_CORE
Pt refused covid swab, explained the reason for the test including admission surveillance and possible discharge planning. Pt still refuses states it is not good for his nose.

## 2021-12-09 NOTE — PROGRESS NOTE ADULT - SUBJECTIVE AND OBJECTIVE BOX
HPI  Patient is a 61y old Male who presents with a chief complaint of mental health marco (06 Dec 2021 17:50)    Currently admitted to medicine with the primary diagnosis of Altered mental status       Today is hospital day 458d.     INTERVAL HPI / OVERNIGHT EVENTS:  n oevents      PAST MEDICAL & SURGICAL HISTORY  No pertinent past medical history  unknown    No significant past surgical history  unknown      ALLERGIES  No Known Allergies    MEDICATIONS  STANDING MEDICATIONS  diVALproex  milliGRAM(s) Oral two times a day  levETIRAcetam  Solution 1000 milliGRAM(s) Oral two times a day  OLANZapine 5 milliGRAM(s) Oral daily    PRN MEDICATIONS  cloNIDine 0.1 milliGRAM(s) Oral every 8 hours PRN  polyethylene glycol 3350 17 Gram(s) Oral two times a day GA    VITALS:  T(F): --  HR: --  BP: --  RR: --  SpO2: --    PHYSICAL EXAM  GEN: no distress, comfortable  PULM: normal respiration  NEURO: A&Ox2    LABS                        RADIOLOGY

## 2021-12-10 PROCEDURE — 99232 SBSQ HOSP IP/OBS MODERATE 35: CPT

## 2021-12-10 NOTE — PROGRESS NOTE ADULT - SUBJECTIVE AND OBJECTIVE BOX
HPI  Patient is a 61y old Male who presents with a chief complaint of mental health marco (06 Dec 2021 17:50)    Currently admitted to medicine with the primary diagnosis of Altered mental status       Today is hospital day 458d.     INTERVAL HPI / OVERNIGHT EVENTS:  n oevents      PAST MEDICAL & SURGICAL HISTORY  No pertinent past medical history  unknown    No significant past surgical history  unknown      ALLERGIES  No Known Allergies    MEDICATIONS  STANDING MEDICATIONS  diVALproex  milliGRAM(s) Oral two times a day  levETIRAcetam  Solution 1000 milliGRAM(s) Oral two times a day  OLANZapine 5 milliGRAM(s) Oral daily    PRN MEDICATIONS  cloNIDine 0.1 milliGRAM(s) Oral every 8 hours PRN  polyethylene glycol 3350 17 Gram(s) Oral two times a day MO    VITALS:  T(F): --  HR: --  BP: --  RR: --  SpO2: --    PHYSICAL EXAM  GEN: no distress, comfortable  PULM: normal respiration  NEURO: A&Ox2    LABS                        RADIOLOGY

## 2021-12-11 PROCEDURE — 99231 SBSQ HOSP IP/OBS SF/LOW 25: CPT

## 2021-12-11 NOTE — PROGRESS NOTE ADULT - ASSESSMENT
61M PMHx seizure disorder, schizophrenia, HTN ; awaiting disposition plans    A/P   # Seizure disorder/ Schizophrenia / behavioral disturbance / Dementia w Neurocognitive disorder.   - poor compliance   -PO Divalproex 500mg bid, Keppra 1000 bid  -Continue olanzapine 5 mg at HS; Zyprexa PRN  - with current meds  pts mood had been calm and he is not a danger to self or others    #HTN   -DASH diet       Dispo --pt is placement issue, citizenship, Guardian to be appointed    dc planning soon - wokring on transfer agreement

## 2021-12-11 NOTE — PROGRESS NOTE ADULT - SUBJECTIVE AND OBJECTIVE BOX
61y old Male who presents with behavioral issues admitted to medicine with the primary diagnosis of Altered mental status, awaiting for placement.   Today pt is pacing in the room, friendly, cooperative, denies any complaints.        Today is hospital day 459 d.       PAST MEDICAL & SURGICAL HISTORY  No pertinent past medical history  unknown    No significant past surgical history  unknown      ALLERGIES  No Known Allergies    Vital Signs Last 24 Hrs  T(C): --  T(F): --  HR: --  BP: --  BP(mean): --  RR: --  SpO2: --    PHYSICAL EXAM  GEN: no distress, comfortable  pt is not cooperative for the rest of exam     MEDICATIONS  (STANDING):  diVALproex  milliGRAM(s) Oral two times a day  levETIRAcetam  Solution 1000 milliGRAM(s) Oral two times a day  OLANZapine 5 milliGRAM(s) Oral daily    MEDICATIONS  (PRN):  cloNIDine 0.1 milliGRAM(s) Oral every 8 hours PRN htn  polyethylene glycol 3350 17 Gram(s) Oral two times a day PRN Constipation

## 2021-12-12 PROCEDURE — 99231 SBSQ HOSP IP/OBS SF/LOW 25: CPT

## 2021-12-12 NOTE — PROGRESS NOTE ADULT - ASSESSMENT
61M PMHx seizure disorder, schizophrenia, HTN ; awaiting disposition plans    A/P   # Seizure disorder/ Schizophrenia / behavioral disturbance / Dementia w Neurocognitive disorder.   - poor compliance   -PO Divalproex 500mg bid, Keppra 1000 bid  -Continue olanzapine 5 mg at HS; Zyprexa PRN  - with current meds  pts mood had been calm and he is not a danger to self or others    #HTN   -DASH diet       Dispo --pt is awaiting for placement,  citizenship, Guardian to be appointed    dc planning  -case management is   wokring on transfer agreement

## 2021-12-12 NOTE — PROGRESS NOTE ADULT - SUBJECTIVE AND OBJECTIVE BOX
61y old Male who presents with behavioral issues admitted to medicine with the primary diagnosis of Altered mental status, awaiting for placement.   Today pt is ambulating, cooperative, denies any complaints.        Today is hospital day 459 d.       PAST MEDICAL & SURGICAL HISTORY  No pertinent past medical history  unknown    No significant past surgical history  unknown      ALLERGIES  No Known Allergies    Vital Signs Last 24 Hrs  T(C): --  T(F): --  HR: --  BP: --  BP(mean): --  RR: --  SpO2: --    PHYSICAL EXAM  GEN: no distress, comfortable  pt is not cooperative for the rest of exam     MEDICATIONS  (STANDING):  diVALproex  milliGRAM(s) Oral two times a day  levETIRAcetam  Solution 1000 milliGRAM(s) Oral two times a day  OLANZapine 5 milliGRAM(s) Oral daily    MEDICATIONS  (PRN):  cloNIDine 0.1 milliGRAM(s) Oral every 8 hours PRN htn  polyethylene glycol 3350 17 Gram(s) Oral two times a day PRN Constipation

## 2021-12-13 VITALS
TEMPERATURE: 96 F | HEART RATE: 78 BPM | RESPIRATION RATE: 16 BRPM | DIASTOLIC BLOOD PRESSURE: 91 MMHG | SYSTOLIC BLOOD PRESSURE: 184 MMHG

## 2021-12-13 LAB — SARS-COV-2 RNA SPEC QL NAA+PROBE: SIGNIFICANT CHANGE UP

## 2021-12-13 PROCEDURE — 99231 SBSQ HOSP IP/OBS SF/LOW 25: CPT

## 2021-12-13 NOTE — PROGRESS NOTE ADULT - ASSESSMENT
61M PMHx seizure disorder, schizophrenia, HTN ; awaiting disposition plans    A/P   # Seizure disorder/ Schizophrenia / behavioral disturbance / Dementia w Neurocognitive disorder.   - poor compliance   -PO Divalproex 500mg bid, Keppra 1000 bid  -Continue olanzapine 5 mg at HS; Zyprexa PRN  - with current meds  pts mood had been calm and he is not a danger to self or others    #HTN   -DASH diet       Dispo --pt is awaiting for placement,  citizenship, Guardian to be appointed    dc planning  -case management is   wokring on transfer agreement, anticipate discharge in 24 hours

## 2021-12-13 NOTE — PROGRESS NOTE ADULT - SUBJECTIVE AND OBJECTIVE BOX
61y old Male who presents with behavioral issues admitted to medicine with the primary diagnosis of Altered mental status, awaiting for placement.   Today pt is ambulating, cooperative, denies any complaints.        Today is hospital day 459 d.       PAST MEDICAL & SURGICAL HISTORY  No pertinent past medical history  unknown    No significant past surgical history  unknown      ALLERGIES  No Known Allergies    Vital Signs Last 24 Hrs  T(C): --  T(F): --  HR: --  BP: --  BP(mean): --  RR: --  SpO2: --    PHYSICAL EXAM  GEN: no distress, comfortable  pt is not cooperative for the rest of exam     MEDICATIONS  (STANDING):  diVALproex  milliGRAM(s) Oral two times a day  levETIRAcetam  Solution 1000 milliGRAM(s) Oral two times a day  OLANZapine 5 milliGRAM(s) Oral daily    MEDICATIONS  (PRN):  cloNIDine 0.1 milliGRAM(s) Oral every 8 hours PRN htn  polyethylene glycol 3350 17 Gram(s) Oral two times a day PRN Constipation           98

## 2021-12-20 DIAGNOSIS — Z91.83 WANDERING IN DISEASES CLASSIFIED ELSEWHERE: ICD-10-CM

## 2021-12-20 DIAGNOSIS — F20.9 SCHIZOPHRENIA, UNSPECIFIED: ICD-10-CM

## 2021-12-20 DIAGNOSIS — G40.909 EPILEPSY, UNSPECIFIED, NOT INTRACTABLE, WITHOUT STATUS EPILEPTICUS: ICD-10-CM

## 2021-12-20 DIAGNOSIS — Z59.01 SHELTERED HOMELESSNESS: ICD-10-CM

## 2021-12-20 DIAGNOSIS — Z53.29 PROCEDURE AND TREATMENT NOT CARRIED OUT BECAUSE OF PATIENT'S DECISION FOR OTHER REASONS: ICD-10-CM

## 2021-12-20 DIAGNOSIS — E03.9 HYPOTHYROIDISM, UNSPECIFIED: ICD-10-CM

## 2021-12-20 DIAGNOSIS — Z91.14 PATIENT'S OTHER NONCOMPLIANCE WITH MEDICATION REGIMEN: ICD-10-CM

## 2021-12-20 DIAGNOSIS — Z75.1 PERSON AWAITING ADMISSION TO ADEQUATE FACILITY ELSEWHERE: ICD-10-CM

## 2021-12-20 DIAGNOSIS — I10 ESSENTIAL (PRIMARY) HYPERTENSION: ICD-10-CM

## 2021-12-20 DIAGNOSIS — F22 DELUSIONAL DISORDERS: ICD-10-CM

## 2021-12-20 DIAGNOSIS — Z91.19 PATIENT'S NONCOMPLIANCE WITH OTHER MEDICAL TREATMENT AND REGIMEN: ICD-10-CM

## 2021-12-20 DIAGNOSIS — E87.2 ACIDOSIS: ICD-10-CM

## 2021-12-20 DIAGNOSIS — G92.8 OTHER TOXIC ENCEPHALOPATHY: ICD-10-CM

## 2021-12-20 DIAGNOSIS — F03.91 UNSPECIFIED DEMENTIA WITH BEHAVIORAL DISTURBANCE: ICD-10-CM

## 2021-12-20 DIAGNOSIS — B35.3 TINEA PEDIS: ICD-10-CM

## 2021-12-20 SDOH — ECONOMIC STABILITY - HOUSING INSECURITY: SHELTERED HOMELESSNESS: Z59.01

## 2022-01-07 PROBLEM — Z00.00 ENCOUNTER FOR PREVENTIVE HEALTH EXAMINATION: Status: ACTIVE | Noted: 2022-01-07

## 2022-02-22 NOTE — PROGRESS NOTE ADULT - SUBJECTIVE AND OBJECTIVE BOX
PARVIZ TORRES  61y, Male  Allergy: No Known Allergies    Hospital Day: 353d    Patient seen and examined earlier today. No acute events overnight.    PMH/PSH:  PAST MEDICAL & SURGICAL HISTORY:  No pertinent past medical history  unknown    No significant past surgical history  unknown        VITALS:  T(F): --  HR: --  BP: --  RR: --  SpO2: --    TESTS & MEASUREMENTS:  Weight (Kg):                             RADIOLOGY & ADDITIONAL TESTS:    RECENT DIAGNOSTIC ORDERS:      MEDICATIONS:  MEDICATIONS  (STANDING):  amLODIPine   Tablet 10 milliGRAM(s) Oral daily  AQUAPHOR (petrolatum Ointment) 1 Application(s) Topical daily  enalapril 10 milliGRAM(s) Oral daily  gabapentin 100 milliGRAM(s) Oral three times a day  levETIRAcetam 1000 milliGRAM(s) Oral two times a day  OLANZapine 5 milliGRAM(s) Oral daily  senna 2 Tablet(s) Oral at bedtime    MEDICATIONS  (PRN):  cloNIDine 0.1 milliGRAM(s) Oral every 8 hours PRN htn  haloperidol    Injectable 2 milliGRAM(s) IntraMuscular every 6 hours PRN agitation  haloperidol    Injectable 2 milliGRAM(s) IntraMuscular every 6 hours PRN agitation  polyethylene glycol 3350 17 Gram(s) Oral two times a day PRN Constipation      HOME MEDICATIONS:  amLODIPine 5 mg oral tablet (09-17)  divalproex sodium 500 mg oral delayed release tablet (09-17)  levETIRAcetam 750 mg oral tablet (09-17)  OLANZapine 5 mg oral tablet (09-17)      REVIEW OF SYSTEMS:  All other review of systems is negative unless indicated above.     PHYSICAL EXAM:  GENERAL: NAD  HEENT: No Swelling  CHEST/LUNG: Good air entry, No wheezing  HEART: RRR, No murmurs  ABDOMEN: Soft, Bowel sounds present  EXTREMITIES:  No clubbing       Bacilio Loco)  Orthopaedic Surgery  [address] [place] [address]  Phone: [phone]  Fax: [phone]  Follow Up Time:

## 2022-07-14 NOTE — PROGRESS NOTE ADULT - PROVIDER SPECIALTY LIST ADULT
Hospitalist Island Pedicle Flap-Requiring Vessel Identification Text: The defect edges were debeveled with a #15 scalpel blade.  Given the location of the defect, shape of the defect and the proximity to free margins an island pedicle advancement flap was deemed most appropriate.  Using a sterile surgical marker, an appropriate advancement flap was drawn, based on the axial vessel mentioned above, incorporating the defect, outlining the appropriate donor tissue and placing the expected incisions within the relaxed skin tension lines where possible.    The area thus outlined was incised deep to adipose tissue with a #15 scalpel blade.  The skin margins were undermined to an appropriate distance in all directions around the primary defect and laterally outward around the island pedicle utilizing iris scissors.  There was minimal undermining beneath the pedicle flap.

## 2022-09-19 NOTE — PROGRESS NOTE ADULT - SUBJECTIVE AND OBJECTIVE BOX
- Patient has severe knee arthritis worse on the right than the left.  Cortisone injections were performed at office visit today and patient was also given a referral for physical therapy to help with lower extremity strengthening and gait stabilization.  We will have patient follow-up in 4 to 6 weeks for reevaluation.  If pain is not improving at that time would then recommend obtaining approval for gel injections.     PARVIZ TORRES  61y, Male  Allergy: No Known Allergies    Hospital Day: 421d    Patient seen and examined. No acute events overnight    PMH/PSH:  PAST MEDICAL & SURGICAL HISTORY:  No pertinent past medical history  unknown    No significant past surgical history  unknown    VITALS:  T(F): --  HR: --  BP: --  RR: --  SpO2: --    TESTS & MEASUREMENTS:  Weight (Kg):     MEDICATIONS:  MEDICATIONS  (STANDING):  diVALproex  milliGRAM(s) Oral two times a day  levETIRAcetam  Solution 1000 milliGRAM(s) Oral two times a day  OLANZapine 5 milliGRAM(s) Oral daily    MEDICATIONS  (PRN):  cloNIDine 0.1 milliGRAM(s) Oral every 8 hours PRN htn  OLANZapine Injectable 5 milliGRAM(s) IntraMuscular once PRN agitation  polyethylene glycol 3350 17 Gram(s) Oral two times a day PRN Constipation    HOME MEDICATIONS:  amLODIPine 5 mg oral tablet (09-17)  divalproex sodium 500 mg oral delayed release tablet (09-17)  levETIRAcetam 750 mg oral tablet (09-17)  OLANZapine 5 mg oral tablet (09-17)    REVIEW OF SYSTEMS:  All other review of systems is negative unless indicated above.     PHYSICAL EXAM:  PHYSICAL EXAM:  GENERAL: NAD, well-developed  HEAD:  Atraumatic, Normocephalic  NECK: Supple, No JVD  CHEST/LUNG: Clear to auscultation bilaterally; No wheeze  HEART: Regular rate and rhythm; No murmurs, rubs, or gallops  ABDOMEN: Soft, Nontender, Nondistended; Bowel sounds present  EXTREMITIES:  2+ Peripheral Pulses, No clubbing, cyanosis, or edema  PSYCH: AAOx3  SKIN: No rashes or lesions         PARVIZ TORRES  61y, Male  Allergy: No Known Allergies    Hospital Day: 421d    Patient seen and examined. No acute events overnight    PMH/PSH:  PAST MEDICAL & SURGICAL HISTORY:  No pertinent past medical history  unknown    No significant past surgical history  unknown    VITALS:  T(F): --  HR: --  BP: --  RR: --  SpO2: --    TESTS & MEASUREMENTS:  Weight (Kg):     MEDICATIONS:  MEDICATIONS  (STANDING):  diVALproex  milliGRAM(s) Oral two times a day  levETIRAcetam  Solution 1000 milliGRAM(s) Oral two times a day  OLANZapine 5 milliGRAM(s) Oral daily    MEDICATIONS  (PRN):  cloNIDine 0.1 milliGRAM(s) Oral every 8 hours PRN htn  OLANZapine Injectable 5 milliGRAM(s) IntraMuscular once PRN agitation  polyethylene glycol 3350 17 Gram(s) Oral two times a day PRN Constipation    HOME MEDICATIONS:  amLODIPine 5 mg oral tablet (09-17)  divalproex sodium 500 mg oral delayed release tablet (09-17)  levETIRAcetam 750 mg oral tablet (09-17)  OLANZapine 5 mg oral tablet (09-17)    REVIEW OF SYSTEMS:  All other review of systems is negative unless indicated above.     PHYSICAL EXAM:  PHYSICAL EXAM:  GENERAL: NAD, well-developed  HEAD:  Atraumatic, Normocephalic  NECK: Supple, No JVD  CHEST/LUNG: Clear to auscultation bilaterally; No wheeze  HEART: Regular rate and rhythm; No murmurs, rubs, or gallops  ABDOMEN: Soft, Nontender, Nondistended; Bowel sounds present  EXTREMITIES:  2+ Peripheral Pulses, No clubbing, cyanosis, or edema  PSYCH: AAOx1  SKIN: No rashes or lesions

## 2023-04-04 NOTE — PROGRESS NOTE ADULT - REASON FOR ADMISSION
mental health marco Additional Notes: Patient consent was obtained to proceed with the visit and recommended plan of care after discussion of all risks and benefits, including the risks of COVID-19 exposure. Detail Level: Simple Additional Notes: LC counsels patient on this condition.  Notes that the main goal to calm down the inflammation, preventing the hair from falling out.\\n\\nDoxycycline was not tolerated well by patient,. Counsels patient on plaquenil. Vero notes that she had cataracts surgery a few months ago. Not a candidate for plaquenil,due to recent and upcoming cataract surgery.\\n\\nLC notes that patient can continue with topical treatment. Notes that hair is slow and will recheck in 3 months.\\n\\nDoxycycline was not tolerated well by patient, Render Risk Assessment In Note?: no

## 2023-08-30 NOTE — PROGRESS NOTE ADULT - ASSESSMENT
60M PMHx seizure disorder, schizophrenia, HTN here with altered mental status, suspected acute psychosis.    #Altered mental status, toxic metabolic encephalopathy, suspect psychosis  in setting of h/o schizophrenia  calm but refusing blood draws, medications  f/u psych  zyprexa 5  #HTN  norvasc 5  #Seizure disorder  depakote 500 bid  keppra 750 bid  #DVT ppx  lovenox    #Progress Note Handoff:  Pending (specify):  Consults_________, Tests________, Test Results_______, Other___f/u psych______  Family discussion:d/w pt at bedside re: treatment plan, primary dx  Disposition: Home___/SNF___/Other________/Unknown at this time____x____   rectal bleeding

## 2023-10-06 ENCOUNTER — EMERGENCY (EMERGENCY)
Facility: HOSPITAL | Age: 63
LOS: 0 days | Discharge: HOME | End: 2023-10-07
Attending: EMERGENCY MEDICINE
Payer: MEDICAID

## 2023-10-06 VITALS
SYSTOLIC BLOOD PRESSURE: 137 MMHG | DIASTOLIC BLOOD PRESSURE: 68 MMHG | TEMPERATURE: 98 F | RESPIRATION RATE: 16 BRPM | OXYGEN SATURATION: 99 % | HEART RATE: 78 BPM

## 2023-10-06 DIAGNOSIS — F03.90 UNSPECIFIED DEMENTIA WITHOUT BEHAVIORAL DISTURBANCE: ICD-10-CM

## 2023-10-06 DIAGNOSIS — M79.604 PAIN IN RIGHT LEG: ICD-10-CM

## 2023-10-06 DIAGNOSIS — F20.9 SCHIZOPHRENIA, UNSPECIFIED: ICD-10-CM

## 2023-10-06 DIAGNOSIS — R56.9 UNSPECIFIED CONVULSIONS: ICD-10-CM

## 2023-10-06 DIAGNOSIS — R74.02 ELEVATION OF LEVELS OF LACTIC ACID DEHYDROGENASE [LDH]: ICD-10-CM

## 2023-10-06 DIAGNOSIS — G89.29 OTHER CHRONIC PAIN: ICD-10-CM

## 2023-10-06 DIAGNOSIS — M79.605 PAIN IN LEFT LEG: ICD-10-CM

## 2023-10-06 LAB
ALBUMIN SERPL ELPH-MCNC: 4.1 G/DL — SIGNIFICANT CHANGE UP (ref 3.5–5.2)
ALP SERPL-CCNC: 73 U/L — SIGNIFICANT CHANGE UP (ref 30–115)
ALT FLD-CCNC: 14 U/L — SIGNIFICANT CHANGE UP (ref 0–41)
ANION GAP SERPL CALC-SCNC: 10 MMOL/L — SIGNIFICANT CHANGE UP (ref 7–14)
APPEARANCE UR: ABNORMAL
AST SERPL-CCNC: 22 U/L — SIGNIFICANT CHANGE UP (ref 0–41)
BACTERIA # UR AUTO: NEGATIVE /HPF — SIGNIFICANT CHANGE UP
BASE EXCESS BLDV CALC-SCNC: 7.3 MMOL/L — HIGH (ref -2–3)
BASOPHILS # BLD AUTO: 0.01 K/UL — SIGNIFICANT CHANGE UP (ref 0–0.2)
BASOPHILS NFR BLD AUTO: 0.1 % — SIGNIFICANT CHANGE UP (ref 0–1)
BILIRUB SERPL-MCNC: <0.2 MG/DL — SIGNIFICANT CHANGE UP (ref 0.2–1.2)
BILIRUB UR-MCNC: NEGATIVE — SIGNIFICANT CHANGE UP
BUN SERPL-MCNC: 27 MG/DL — HIGH (ref 10–20)
CA-I SERPL-SCNC: 1.33 MMOL/L — SIGNIFICANT CHANGE UP (ref 1.15–1.33)
CALCIUM SERPL-MCNC: 10.7 MG/DL — HIGH (ref 8.4–10.5)
CAST: 1 /LPF — SIGNIFICANT CHANGE UP (ref 0–4)
CHLORIDE SERPL-SCNC: 106 MMOL/L — SIGNIFICANT CHANGE UP (ref 98–110)
CO2 SERPL-SCNC: 29 MMOL/L — SIGNIFICANT CHANGE UP (ref 17–32)
COLOR SPEC: YELLOW — SIGNIFICANT CHANGE UP
CREAT SERPL-MCNC: 0.7 MG/DL — SIGNIFICANT CHANGE UP (ref 0.7–1.5)
DIFF PNL FLD: NEGATIVE — SIGNIFICANT CHANGE UP
EGFR: 102 ML/MIN/1.73M2 — SIGNIFICANT CHANGE UP
EOSINOPHIL # BLD AUTO: 0.03 K/UL — SIGNIFICANT CHANGE UP (ref 0–0.7)
EOSINOPHIL NFR BLD AUTO: 0.4 % — SIGNIFICANT CHANGE UP (ref 0–8)
GAS PNL BLDV: 139 MMOL/L — SIGNIFICANT CHANGE UP (ref 136–145)
GAS PNL BLDV: SIGNIFICANT CHANGE UP
GLUCOSE SERPL-MCNC: 144 MG/DL — HIGH (ref 70–99)
GLUCOSE UR QL: NEGATIVE MG/DL — SIGNIFICANT CHANGE UP
HCO3 BLDV-SCNC: 35 MMOL/L — HIGH (ref 22–29)
HCT VFR BLD CALC: 43.1 % — SIGNIFICANT CHANGE UP (ref 42–52)
HCT VFR BLDA CALC: 43 % — SIGNIFICANT CHANGE UP (ref 39–51)
HGB BLD CALC-MCNC: 14.3 G/DL — SIGNIFICANT CHANGE UP (ref 12.6–17.4)
HGB BLD-MCNC: 14.1 G/DL — SIGNIFICANT CHANGE UP (ref 14–18)
IMM GRANULOCYTES NFR BLD AUTO: 0.1 % — SIGNIFICANT CHANGE UP (ref 0.1–0.3)
KETONES UR-MCNC: NEGATIVE MG/DL — SIGNIFICANT CHANGE UP
LACTATE BLDV-MCNC: 2.9 MMOL/L — HIGH (ref 0.5–2)
LEUKOCYTE ESTERASE UR-ACNC: ABNORMAL
LYMPHOCYTES # BLD AUTO: 0.91 K/UL — LOW (ref 1.2–3.4)
LYMPHOCYTES # BLD AUTO: 13.5 % — LOW (ref 20.5–51.1)
MAGNESIUM SERPL-MCNC: 2.2 MG/DL — SIGNIFICANT CHANGE UP (ref 1.8–2.4)
MAGNESIUM SERPL-MCNC: 2.3 MG/DL — SIGNIFICANT CHANGE UP (ref 1.8–2.4)
MCHC RBC-ENTMCNC: 31.6 PG — HIGH (ref 27–31)
MCHC RBC-ENTMCNC: 32.7 G/DL — SIGNIFICANT CHANGE UP (ref 32–37)
MCV RBC AUTO: 96.6 FL — HIGH (ref 80–94)
MONOCYTES # BLD AUTO: 0.57 K/UL — SIGNIFICANT CHANGE UP (ref 0.1–0.6)
MONOCYTES NFR BLD AUTO: 8.5 % — SIGNIFICANT CHANGE UP (ref 1.7–9.3)
NEUTROPHILS # BLD AUTO: 5.19 K/UL — SIGNIFICANT CHANGE UP (ref 1.4–6.5)
NEUTROPHILS NFR BLD AUTO: 77.4 % — HIGH (ref 42.2–75.2)
NITRITE UR-MCNC: NEGATIVE — SIGNIFICANT CHANGE UP
NRBC # BLD: 0 /100 WBCS — SIGNIFICANT CHANGE UP (ref 0–0)
PCO2 BLDV: 62 MMHG — HIGH (ref 42–55)
PH BLDV: 7.36 — SIGNIFICANT CHANGE UP (ref 7.32–7.43)
PH UR: 7.5 — SIGNIFICANT CHANGE UP (ref 5–8)
PLATELET # BLD AUTO: 229 K/UL — SIGNIFICANT CHANGE UP (ref 130–400)
PMV BLD: 12.1 FL — HIGH (ref 7.4–10.4)
PO2 BLDV: 36 MMHG — SIGNIFICANT CHANGE UP
POTASSIUM BLDV-SCNC: 4.1 MMOL/L — SIGNIFICANT CHANGE UP (ref 3.5–5.1)
POTASSIUM SERPL-MCNC: 4.6 MMOL/L — SIGNIFICANT CHANGE UP (ref 3.5–5)
POTASSIUM SERPL-SCNC: 4.6 MMOL/L — SIGNIFICANT CHANGE UP (ref 3.5–5)
PROT SERPL-MCNC: 7.4 G/DL — SIGNIFICANT CHANGE UP (ref 6–8)
PROT UR-MCNC: NEGATIVE MG/DL — SIGNIFICANT CHANGE UP
RBC # BLD: 4.46 M/UL — LOW (ref 4.7–6.1)
RBC # FLD: 12.6 % — SIGNIFICANT CHANGE UP (ref 11.5–14.5)
RBC CASTS # UR COMP ASSIST: 1 /HPF — SIGNIFICANT CHANGE UP (ref 0–4)
SAO2 % BLDV: 51.8 % — SIGNIFICANT CHANGE UP
SODIUM SERPL-SCNC: 145 MMOL/L — SIGNIFICANT CHANGE UP (ref 135–146)
SP GR SPEC: 1.02 — SIGNIFICANT CHANGE UP (ref 1–1.03)
SQUAMOUS # UR AUTO: 0 /HPF — SIGNIFICANT CHANGE UP (ref 0–5)
UROBILINOGEN FLD QL: 1 MG/DL — SIGNIFICANT CHANGE UP (ref 0.2–1)
VALPROATE SERPL-MCNC: 75 UG/ML — SIGNIFICANT CHANGE UP (ref 50–100)
WBC # BLD: 6.72 K/UL — SIGNIFICANT CHANGE UP (ref 4.8–10.8)
WBC # FLD AUTO: 6.72 K/UL — SIGNIFICANT CHANGE UP (ref 4.8–10.8)
WBC UR QL: 1 /HPF — SIGNIFICANT CHANGE UP (ref 0–5)

## 2023-10-06 PROCEDURE — 70450 CT HEAD/BRAIN W/O DYE: CPT | Mod: 26,MA

## 2023-10-06 PROCEDURE — 96375 TX/PRO/DX INJ NEW DRUG ADDON: CPT

## 2023-10-06 PROCEDURE — 82803 BLOOD GASES ANY COMBINATION: CPT

## 2023-10-06 PROCEDURE — 96374 THER/PROPH/DIAG INJ IV PUSH: CPT

## 2023-10-06 PROCEDURE — 85025 COMPLETE CBC W/AUTO DIFF WBC: CPT

## 2023-10-06 PROCEDURE — 85014 HEMATOCRIT: CPT

## 2023-10-06 PROCEDURE — 99285 EMERGENCY DEPT VISIT HI MDM: CPT | Mod: 25

## 2023-10-06 PROCEDURE — 84295 ASSAY OF SERUM SODIUM: CPT

## 2023-10-06 PROCEDURE — 85018 HEMOGLOBIN: CPT

## 2023-10-06 PROCEDURE — 93010 ELECTROCARDIOGRAM REPORT: CPT

## 2023-10-06 PROCEDURE — 80177 DRUG SCRN QUAN LEVETIRACETAM: CPT

## 2023-10-06 PROCEDURE — 80053 COMPREHEN METABOLIC PANEL: CPT

## 2023-10-06 PROCEDURE — 70450 CT HEAD/BRAIN W/O DYE: CPT | Mod: MA

## 2023-10-06 PROCEDURE — 73552 X-RAY EXAM OF FEMUR 2/>: CPT | Mod: 26,50

## 2023-10-06 PROCEDURE — 83735 ASSAY OF MAGNESIUM: CPT

## 2023-10-06 PROCEDURE — 80164 ASSAY DIPROPYLACETIC ACD TOT: CPT

## 2023-10-06 PROCEDURE — 36415 COLL VENOUS BLD VENIPUNCTURE: CPT

## 2023-10-06 PROCEDURE — 82330 ASSAY OF CALCIUM: CPT

## 2023-10-06 PROCEDURE — 84132 ASSAY OF SERUM POTASSIUM: CPT

## 2023-10-06 PROCEDURE — 83605 ASSAY OF LACTIC ACID: CPT

## 2023-10-06 PROCEDURE — 93005 ELECTROCARDIOGRAM TRACING: CPT

## 2023-10-06 PROCEDURE — 72170 X-RAY EXAM OF PELVIS: CPT | Mod: 26

## 2023-10-06 PROCEDURE — 99285 EMERGENCY DEPT VISIT HI MDM: CPT

## 2023-10-06 PROCEDURE — 71045 X-RAY EXAM CHEST 1 VIEW: CPT | Mod: 26

## 2023-10-06 PROCEDURE — 87086 URINE CULTURE/COLONY COUNT: CPT

## 2023-10-06 PROCEDURE — 81001 URINALYSIS AUTO W/SCOPE: CPT

## 2023-10-06 PROCEDURE — 72170 X-RAY EXAM OF PELVIS: CPT

## 2023-10-06 PROCEDURE — 71045 X-RAY EXAM CHEST 1 VIEW: CPT

## 2023-10-06 PROCEDURE — 73552 X-RAY EXAM OF FEMUR 2/>: CPT | Mod: 50

## 2023-10-06 RX ORDER — SODIUM CHLORIDE 9 MG/ML
1000 INJECTION INTRAMUSCULAR; INTRAVENOUS; SUBCUTANEOUS ONCE
Refills: 0 | Status: COMPLETED | OUTPATIENT
Start: 2023-10-06 | End: 2023-10-06

## 2023-10-06 RX ORDER — DIVALPROEX SODIUM 500 MG/1
3 TABLET, DELAYED RELEASE ORAL
Qty: 180 | Refills: 0
Start: 2023-10-06 | End: 2023-11-04

## 2023-10-06 RX ORDER — LEVETIRACETAM 250 MG/1
1000 TABLET, FILM COATED ORAL ONCE
Refills: 0 | Status: COMPLETED | OUTPATIENT
Start: 2023-10-06 | End: 2023-10-06

## 2023-10-06 RX ORDER — VALPROIC ACID (AS SODIUM SALT) 250 MG/5ML
1000 SOLUTION, ORAL ORAL ONCE
Refills: 0 | Status: COMPLETED | OUTPATIENT
Start: 2023-10-06 | End: 2023-10-06

## 2023-10-06 RX ADMIN — SODIUM CHLORIDE 1000 MILLILITER(S): 9 INJECTION INTRAMUSCULAR; INTRAVENOUS; SUBCUTANEOUS at 18:28

## 2023-10-06 RX ADMIN — Medication 50 MILLIGRAM(S): at 23:06

## 2023-10-06 RX ADMIN — LEVETIRACETAM 400 MILLIGRAM(S): 250 TABLET, FILM COATED ORAL at 20:30

## 2023-10-06 NOTE — ED PROVIDER NOTE - NSFOLLOWUPCLINICS_GEN_ALL_ED_FT
[facility]  Neurology  [address] [place] [address], [address]  [place] [address]  Phone: [phone]  Fax:

## 2023-10-06 NOTE — CONSULT NOTE ADULT - ASSESSMENT
63 year old M with hx of dementia, schizophrenia, h/o seizure on Keppra and depakote presenting to ED for eval of witnessed seizure. History obtained from NH who reports that the patient seemed to be in his usual state of health when he woke up this morning. At 12 pm after eating lunch while sitting in chair when he had two witnessed seizures x few min with a brief post ictal state, no head trauma/injuries. NH staff reports that the patient has been compliant with meds. As per staff at baseline he is AOx2, and follows very simple commands.    Recommendation:  As per prior notes and NH staff the patient seems to be at baseline  S/p Keppra 1000 mg once  Depakote 1000 mg once  Increase Depakote to 750 mg BID  C/W Keppra 750 mg BID  Outpatient neurology follow up    Discussed with Attending on call

## 2023-10-06 NOTE — CONSULT NOTE ADULT - SUBJECTIVE AND OBJECTIVE BOX
Neurology Consult    Patient is a 63y old  Male who presents with a chief complaint of seizure    HPI:Limited hx from pt due to dementia.  63 year old M with hx of dementia, schizophrenia, h/o seizure on Keppra and depakote presenting to ED for eval of witnessed seizure. History obtained from NH who reports that the patient seemed to be in his usual state of health when he woke up this morning. At 12 pm after eating lunch while sitting in chair when he had two witnessed seizures x few min with a brief post ictal state, no head trauma/injuries. NH staff reports that the patient has been compliant with meds. As per staff at baseline he is AOx2, and follows very simple commands.      PAST MEDICAL & SURGICAL HISTORY:  No pertinent past medical history  unknown      No significant past surgical history  unknown          FAMILY HISTORY:  No pertinent family history in first degree relatives  unknown      Allergies    No Known Allergies    Intolerances        MEDICATIONS  (STANDING):  valproate sodium  IVPB 1000 milliGRAM(s) IV Intermittent Once    MEDICATIONS  (PRN):      Review of systems:    Unable to obtain  Vital Signs Last 24 Hrs  T(C): 36.9 (06 Oct 2023 15:11), Max: 36.9 (06 Oct 2023 15:11)  T(F): 98.5 (06 Oct 2023 15:11), Max: 98.5 (06 Oct 2023 15:11)  HR: 78 (06 Oct 2023 15:11) (78 - 78)  BP: 137/68 (06 Oct 2023 15:11) (137/68 - 137/68)  BP(mean): --  RR: 16 (06 Oct 2023 15:11) (16 - 16)  SpO2: 99% (06 Oct 2023 15:11) (99% - 99%)    Parameters below as of 06 Oct 2023 15:11  Patient On (Oxygen Delivery Method): room air          Neurological Examination:  MS: AOX2, aware to his name and place. Reports he is in the hospital. He is also aware of situation and reports being here for seizures.  CN: No gaze preference, equally reactive pupils, no facial droop, intact VF grossly  Motor: Moving all extremities spontaneously, intermittently to commands  Sensory: withdraws equally in both sides of UE and LE  Reflexes: Downgoing toes    Labs:   CBC Full  -  ( 06 Oct 2023 18:36 )  WBC Count : 6.72 K/uL  RBC Count : 4.46 M/uL  Hemoglobin : 14.1 g/dL  Hematocrit : 43.1 %  Platelet Count - Automated : 229 K/uL  Mean Cell Volume : 96.6 fL  Mean Cell Hemoglobin : 31.6 pg  Mean Cell Hemoglobin Concentration : 32.7 g/dL  Auto Neutrophil # : 5.19 K/uL  Auto Lymphocyte # : 0.91 K/uL  Auto Monocyte # : 0.57 K/uL  Auto Eosinophil # : 0.03 K/uL  Auto Basophil # : 0.01 K/uL  Auto Neutrophil % : 77.4 %  Auto Lymphocyte % : 13.5 %  Auto Monocyte % : 8.5 %  Auto Eosinophil % : 0.4 %  Auto Basophil % : 0.1 %    10-06    145  |  106  |  27<H>  ----------------------------<  144<H>  4.6   |  29  |  0.7    Ca    10.7<H>      06 Oct 2023 18:36  Mg     2.2     10-06    TPro  7.4  /  Alb  4.1  /  TBili  <0.2  /  DBili  x   /  AST  22  /  ALT  14  /  AlkPhos  73  10    LIVER FUNCTIONS - ( 06 Oct 2023 18:36 )  Alb: 4.1 g/dL / Pro: 7.4 g/dL / ALK PHOS: 73 U/L / ALT: 14 U/L / AST: 22 U/L / GGT: x             Urinalysis Basic - ( 06 Oct 2023 18:36 )    Color: Yellow / Appearance: Cloudy / S.020 / pH: x  Gluc: 144 mg/dL / Ketone: Negative mg/dL  / Bili: Negative / Urobili: 1.0 mg/dL   Blood: x / Protein: Negative mg/dL / Nitrite: Negative   Leuk Esterase: Trace / RBC: 1 /HPF / WBC 1 /HPF   Sq Epi: x / Non Sq Epi: 0 /HPF / Bacteria: Negative /HPF          Neuroimaging:  NCHCT: CT Head No Cont:   ACC: [id number] EXAM:  CT BRAIN   ORDERED BY: MARY ANN QUINONEZ     PROCEDURE DATE:  10/06/2023          INTERPRETATION:  Clinical History / Reason for exam: Seizure.    Technique: CT head without IV contrast performed. Multiple axial CT   images of the head were obtained from the base of the skull to the vertex   without the administration of IV contrast. Sagittal and coronal reformats   were obtained.    Comparison made with CT head 2021.    Findings:    Ventricular system, basal cisterns and sulcal patterns are symmetric and   appropriate for patient's age.    No acute extra-axial collection.  No mass effect, midline shift or acute   intracranial hemorrhage.    Patchy hypodensities in the periventricular white matter without mass   effect compatible with chronic microvascular changes.    Unchanged linear chronic infarct in the left basal ganglia.    No depressed skull fracture.    Unchanged left parietal karina hole defect. Unchanged right parietal   subgaleal lipoma measuring 0.7 cm in thickness.      Again noted is bony defect along the floor of the sella possibly   reflecting prior transsphenoidal surgical changes. No significant change   in 1.9 cm soft tissue density within the right sphenoid sinus abutting   the bone defect.    Paranasal sinuses and mastoid air cells are well-aerated.      Impression:    No evidence of acute intracranial abnormality.    Unchanged bony defect along the floor of the sella possibly reflecting   prior transsphenoidal surgical changes. No significant change in soft   tissue density within the right sphenoid sinus abutting the bone defect   could reflect retention cyst versus polyp. However, recommend further   characterization with outpatient contrast-enhanced MRI to exclude   cephalocele or mass.    --- End of Report ---

## 2023-10-06 NOTE — ED PROVIDER NOTE - CLINICAL SUMMARY MEDICAL DECISION MAKING FREE TEXT BOX
63 year old M with hx of dementia with neurocognitive d/o, schizophrenia, h/o seizure presenting to ed for eval of witnessed seizure. Patient is oriented x2 bedside which appears to be baseline. Discussed with nursing home staff.  Patient had 2 witnessed seizures, no trauma, was at baseline health otherwise.  Bedside patient endorses leg pain bilaterally which she states is chronic however has full range of motion of joints.  Pulses intact in all 4 extremities, rest of exam unremarkable, no signs of trauma noted.  EKG noted to have chronic T wave inversions in inferolateral leads seen on previous EKG taken 21.  Patient denies chest pain or shortness of breath.  Story is consistent with seizures patient had a postictal period.  Labs ordered noted to have mild elevated lactate likely from seizure otherwise within normal limits.  Urinalysis unremarkable.  CT head with no acute pathology.  X-rays of extremities performed with no displaced fractures.  Chest x-ray independently reviewed.  Patient given Keppra, neurology evaluated patient recommended giving dose of Depakote, and increasing Depakote dose for outpatient.  Outpatient follow-up with neurology.  At this time stable for discharge.

## 2023-10-06 NOTE — ED PROVIDER NOTE - CONSULTANT FREE TEXT FOR MDM DISCUSSED CASE WITH QUESTION
[address] [place] [address] [address], [address], [place]  Phone:  [phone]  TAJ:[phone]       Name: Yao Shultz  : 1961    Chief Complaint   Patient presents with    6 Month Follow-Up    Hyperlipidemia    Hyperthyroidism       HPI:     Yao Shultz is a 56 y.o. 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 Statin therapy for 4-5 days, irritates bladder, leading to pressure/frequency. Tried it x 2 occassions. Found probiotic and D-mannose supplement. Is engaged and sexually active, supplement helping. Can't stay away from sweets. More stress, the more sugar. Not salty. Encounter Diagnoses   Name Primary?  Mixed hyperlipidemia Yes    Familial hypercholesteremia     Hypothyroidism due to acquired atrophy of thyroid---poat hyper treatment     Elevated blood pressure reading     Encounter for screening for coronary artery disease     Mixed obsessional thoughts and acts      Not ablet to       Current Outpatient Medications:     ezetimibe (ZETIA) 10 MG tablet, Take 1 tablet by mouth daily, Disp: 30 tablet, Rfl: 5    sertraline (ZOLOFT) 100 MG tablet, Take 1.5 tablets by mouth daily, Disp: 135 tablet, Rfl: 3    Loratadine-Pseudoephedrine (CLARITIN-D 24 HOUR PO), Take by mouth as needed, Disp: , Rfl:     Allergies   Allergen Reactions    Aleve [Naproxen Sodium] Other (See Comments)     reflux    Crestor [Rosuvastatin Calcium]      Cystitis      Lipitor [Atorvastatin]      Cystitis     Prozac [Fluoxetine Hcl] Nausea Only    Flagyl [Metronidazole] Nausea And Vomiting       Review of Systems   Constitutional: Negative for fatigue and fever. Respiratory: Negative for shortness of breath. Cardiovascular: Negative for chest pain and leg swelling.        Objective:     BP (!) 162/90 (Site: Left Upper Arm, Position: Sitting, Cuff Size: Large Adult)   Pulse 74   Temp 97.6 °F (36.4 °C) (Temporal) Wt 184 lb (83.5 kg)   SpO2 98%   BMI 31.58 kg/m²     Physical Exam  Constitutional:       General: She is not in acute distress. Appearance: Normal appearance. She is not ill-appearing. Cardiovascular:      Rate and Rhythm: Normal rate and regular rhythm. Heart sounds: No murmur. Pulmonary:      Effort: Pulmonary effort is normal. No respiratory distress. Breath sounds: Normal breath sounds. No wheezing. Musculoskeletal:         General: No swelling. Neurological:      Mental Status: She is alert.    Psychiatric:         Mood and Affect: Mood normal.          BP Readings from Last 10 Encounters:   04/16/21 (!) 162/90   08/12/20 136/80   03/03/20 (!) 134/90   12/20/19 130/82   08/22/19 (!) 142/76   06/17/19 138/76   04/06/19 119/75   01/07/19 130/62   11/12/18 134/86   05/14/18 134/84     Wt Readings from Last 3 Encounters:   04/16/21 184 lb (83.5 kg)   08/12/20 180 lb 9.6 oz (81.9 kg)   03/03/20 180 lb (81.6 kg)     Diabetes labs reviewed:  No results found for: LABA1C  No results found for: EAG  No results found for: LABA1C    Lab Results   Component Value Date     08/11/2020    K 4.1 08/11/2020     08/11/2020    CO2 28 08/11/2020    BUN 15 08/11/2020    CREATININE 0.88 08/11/2020    CALCIUM 9.2 08/11/2020    GLUCOSE 91 08/11/2020        Lab Results   Component Value Date    CHOL 340 (H) 04/14/2021    CHOL 363 08/11/2020    CHOL 313 (H) 12/14/2019     Lab Results   Component Value Date    TRIG 152 (H) 04/14/2021    TRIG 234 08/11/2020    TRIG 234 (H) 12/14/2019     Lab Results   Component Value Date    HDL 52 04/14/2021    HDL 49 08/11/2020    HDL 53 12/14/2019     Lab Results   Component Value Date    LDLCALC 258 (H) 04/14/2021    LDLCALC 267 (A) 08/11/2020    LDLCALC 213 (H) 12/14/2019     Lab Results   Component Value Date    LABVLDL 30 04/14/2021    LABVLDL 47 (H) 12/14/2019    LABVLDL 38 (H) 06/13/2019     Lab Results   Component Value Date    CHOLHDLRATIO 6.5 (H) 04/14/2021 CHOLHDLRATIO 5.9 (H) 12/14/2019    CHOLHDLRATIO 6.3 (H) 06/13/2019       No results found for: Isabel Verdugo    Lab Results   Component Value Date    TSH 0.23 (L) 04/14/2021      No results found for: [id number]   No results found for: MG   Lab Results   Component Value Date    ALT 22 04/14/2021    AST 19 04/14/2021    ALKPHOS 108 04/14/2021    BILITOT 0.4 04/14/2021    BILIDIR 0.0 04/14/2021        Lab Results   Component Value Date    WBC 5.7 08/11/2020    HGB 14.3 08/11/2020    HCT 41.6 08/11/2020    MCV 95 08/11/2020     08/11/2020         Assessment/Plan:     Allie devine was seen today for 6 month follow-up, hyperlipidemia and hyperthyroidism. Diagnoses and all orders for this visit:    Mixed hyperlipidemia  -     Comprehensive Metabolic Panel; Future  -     ezetimibe (ZETIA) 10 MG tablet; Take 1 tablet by mouth daily  -     POCT Fecal Immunochemical Test (FIT)    Familial hypercholesteremia  -     Comprehensive Metabolic Panel; Future  -     Lipid Panel; Future  -     TSH with Reflex; Future  -     ezetimibe (ZETIA) 10 MG tablet; Take 1 tablet by mouth daily  -     POCT Fecal Immunochemical Test (FIT)    Hypothyroidism due to acquired atrophy of thyroid---poat hyper treatment  -     Comprehensive Metabolic Panel; Future  -     Lipid Panel; Future  -     TSH with Reflex; Future  -     POCT Fecal Immunochemical Test (FIT)    Elevated blood pressure reading    Encounter for screening for coronary artery disease  -     CT CARDIAC CALCIUM SCORING; Future    Mixed obsessional thoughts and acts  -     sertraline (ZOLOFT) 100 MG tablet; Take 1.5 tablets by mouth daily    discussed for cholesterol control. She will try Zetia and also obtain a cardiac coronary scoring to assess her risk and how aggressive to be. Blood pressure not well controlled and she will keep track of it at home and then return here for follow-up in a few weeks. Encouraged healthy diet and exercise.      Return in about 6 months (around 10/16/2021). Future Appointments   Date Time Provider Shruti Cortes   5/17/2021  3:30 PM SCHEDULE, [facility] [place]   10/18/2021  4:00 PM MD [facility] [place]          This office note has been dictated. Effort was made to review for errors but some may have been missed. Please contact Lesli Mccarty of note for clarification if needed.        Electronically signed by Ramona Escalante MD on 4/23/2021 at 8:30 AM neurology

## 2023-10-06 NOTE — ED PROVIDER NOTE - PATIENT PORTAL LINK FT
You can access the FollowMyHealth Patient Portal offered by [facility] by registering at the following website: [email or web address]. By joining [facility]’s FollowMyHealth portal, you will also be able to view your health information using other applications (apps) compatible with our system.

## 2023-10-06 NOTE — ED PROVIDER NOTE - OBJECTIVE STATEMENT
63 year old M with hx of dementia with neurocognitive d/o, schizophrenia, h/o seizure presenting to ed for eval of witnessed seizure. Sts pt seemed to be in his usual state of health when he woke up this morning. At 12 pm after eating lunch was sitting in chair when he had two witnessed seizures x few min? no head trauma/injuries. Unknown when last seizure before today was. Sts has been compliant with meds. Pt had brief post ictal period. Limited hx from pt due to dementia. 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 Denies any acute complaints.

## 2023-10-06 NOTE — ED PROVIDER NOTE - NS ED ATTENDING STATEMENT MOD
This was a shared visit with the ROSANA. I reviewed and verified the documentation and independently performed the documented:

## 2023-10-06 NOTE — ED PROVIDER NOTE - NSFOLLOWUPINSTRUCTIONS_ED_ALL_ED_FT
*** Patient's Depakote has been increased to 750mg BID      Seizure    A seizure is abnormal electrical activity in the brain; the specific cause may or may not be found. Prior to a seizure you may experience a warning sensation (aura) that may include fear, nausea, dizziness, and visual changes such as flashing lights of spots. Common symptoms during the seizure may include an altered mental status, rhythmic jerking movements, drooling, grunting, loss of bladder or bowel control, or tongue biting. After a seizure, you may feel confused and sleepy.     Do not swim, drive, operate machinery, or engage in any risky activity during which a seizure could cause further injury to you or others. Teach friends and family what to do if you HAVE a seizure which includes laying you on the ground with your head on a cushion and turning you to the side to keep your breathing passages clear in case of vomiting.    SEEK IMMEDIATE MEDICAL CARE IF YOU HAVE ANY OF THE FOLLOWING SYMPTOMS: seizure lasting over 5 minutes, not waking up or persistent altered mental status after the seizure, or more frequent or worsening seizures.

## 2023-10-06 NOTE — ED PROVIDER NOTE - PHYSICAL EXAMINATION
CONSTITUTIONAL: Well-appearing; well-nourished; in no apparent distress.   EYES: PERRL; EOM intact.   ENT: normal nose; no rhinorrhea; normal pharynx with no tonsillar hypertrophy.   NECK: Supple; non-tender; no cervical lymphadenopathy.  CARDIOVASCULAR: Normal S1, S2; no murmurs, rubs, or gallops. Equal radial pulses  RESPIRATORY: Normal chest excursion with respiration; breath sounds clear and equal bilaterally; no wheezes, rhonchi, or rales.  GI/: Normal bowel sounds; non-distended; non-tender; no palpable organomegaly.   MS: No evidence of trauma or deformity. Normal ROM in all four extremities; non-tender to palpation; distal pulses are normal.   SKIN: Normal for age and race; warm; dry; good turgor; no apparent lesions or exudate.   NEURO/PSYCH: A & O x 2; No focal deficits. No facial droop. No tongue deviation. Pt moving all extremities.

## 2023-10-06 NOTE — ED ADULT NURSE NOTE - NSFALLHARMRISKINTERV_ED_ALL_ED
Assistance OOB with selected safe patient handling equipment if applicable/Assistance with ambulation/Communicate risk of Fall with Harm to all staff, patient, and family/Monitor gait and stability/Monitor for mental status changes and reorient to person, place, and time, as needed/Provide visual cue: red socks, yellow wristband, yellow gown, etc/Reinforce activity limits and safety measures with patient and family/Toileting schedule using arm’s reach rule for commode and bathroom/Use of alarms - bed, stretcher, chair and/or video monitoring/Bed in lowest position, wheels locked, appropriate side rails in place/Call bell, personal items and telephone in reach/Instruct patient to call for assistance before getting out of bed/chair/stretcher/Non-slip footwear applied when patient is off stretcher/[place] to call system/Physically safe environment - no spills, clutter or unnecessary equipment/Purposeful Proactive Rounding/Room/bathroom lighting operational, light cord in reach

## 2023-10-07 VITALS
HEART RATE: 78 BPM | RESPIRATION RATE: 18 BRPM | SYSTOLIC BLOOD PRESSURE: 160 MMHG | DIASTOLIC BLOOD PRESSURE: 82 MMHG | OXYGEN SATURATION: 99 %

## 2023-10-08 LAB
CULTURE RESULTS: SIGNIFICANT CHANGE UP
SPECIMEN SOURCE: SIGNIFICANT CHANGE UP

## 2023-10-09 LAB — LEVETIRACETAM SERPL-MCNC: 20 UG/ML — SIGNIFICANT CHANGE UP (ref 10–40)

## 2023-10-09 NOTE — ED POST DISCHARGE NOTE - DETAILS
[facility]- LEFT MESSAGE FOR KINGSLEY GRIMM- [phone] [facility]- LEFT MESSAGE FOR KINGSLEY GRIMM- [phone]. REPORT FAXED TO [facility] - [phone]. THEY WILL F/U WITH PATIENT.

## 2023-10-09 NOTE — ED POST DISCHARGE NOTE - RESULT SUMMARY
R FEMUR AND R KNEE NOTED TO HAVE SCLEROTIC FOCUS. BENIGN IN APPEARANCE, BUT INDETERMINATE. BONE SCAN MAY BE OBTAINED.

## 2023-10-18 NOTE — PROGRESS NOTE ADULT - SUBJECTIVE AND OBJECTIVE BOX
Incoming phone call from Landy.   Landy says she and Heriberto (Active [facility]) would prefer an alternate colorectal provider.    Will discuss again with Dr. Fink.   They will be out of town this week, will request a call back early next week Monday or Tuesday from writer.    Patient is a 61y old  Male who presents with a chief complaint of mental health marco (03 Jul 2021 17:19)      Patient seen and examined at bedside.  Patient has no complaints    ALLERGIES:  No Known Allergies    MEDICATIONS:  amLODIPine   Tablet 10 milliGRAM(s) Oral daily  AQUAPHOR (petrolatum Ointment) 1 Application(s) Topical daily  chlorhexidine 4% Liquid 1 Application(s) Topical <User Schedule>  cloNIDine 0.1 milliGRAM(s) Oral every 8 hours PRN  diVALproex  milliGRAM(s) Oral two times a day  enalapril 10 milliGRAM(s) Oral daily  enoxaparin Injectable 40 milliGRAM(s) SubCutaneous daily  gabapentin 100 milliGRAM(s) Oral three times a day  haloperidol    Injectable 2 milliGRAM(s) IntraMuscular every 6 hours PRN  haloperidol    Injectable 0.5 milliGRAM(s) IntraMuscular once PRN  levETIRAcetam 1000 milliGRAM(s) Oral two times a day  OLANZapine 5 milliGRAM(s) Oral daily  polyethylene glycol 3350 17 Gram(s) Oral two times a day PRN  senna 2 Tablet(s) Oral at bedtime  sodium chloride 0.9%. 1000 milliLiter(s) IV Continuous <Continuous>    Vital Signs Last 24 Hrs  T(F): --  HR: --  BP: --  RR: --  SpO2: --  I&O's Summary      PHYSICAL EXAM:  General: NAD  ENT: MMM  Neck: Supple, No JVD  Lungs: Clear to auscultation bilaterally  Cardio: RRR, S1/S2, No murmurs  Abdomen: Soft, Nontender, Nondistended; Bowel sounds present  Extremities: No cyanosis, No edema    LABS:                                              RADIOLOGY & ADDITIONAL TESTS:    Care Discussed with Consultants/Other Providers:

## 2023-10-24 ENCOUNTER — OUTPATIENT (OUTPATIENT)
Dept: OUTPATIENT SERVICES | Facility: HOSPITAL | Age: 63
LOS: 1 days | End: 2023-10-24
Payer: MEDICAID

## 2023-10-24 DIAGNOSIS — M25.861 OTHER SPECIFIED JOINT DISORDERS, RIGHT KNEE: ICD-10-CM

## 2023-10-24 PROCEDURE — 78803 RP LOCLZJ TUM SPECT 1 AREA: CPT

## 2023-10-24 PROCEDURE — A9503: CPT

## 2023-10-24 PROCEDURE — 78306 BONE IMAGING WHOLE BODY: CPT | Mod: 26

## 2023-10-24 PROCEDURE — 78803 RP LOCLZJ TUM SPECT 1 AREA: CPT | Mod: 26

## 2023-10-24 PROCEDURE — 78306 BONE IMAGING WHOLE BODY: CPT

## 2023-10-25 DIAGNOSIS — M25.861 OTHER SPECIFIED JOINT DISORDERS, RIGHT KNEE: ICD-10-CM

## 2023-10-27 ENCOUNTER — OUTPATIENT (OUTPATIENT)
Dept: OUTPATIENT SERVICES | Facility: HOSPITAL | Age: 63
LOS: 1 days | End: 2023-10-27
Payer: MEDICAID

## 2023-10-27 DIAGNOSIS — R51.9 HEADACHE, UNSPECIFIED: ICD-10-CM

## 2023-10-27 DIAGNOSIS — Z00.8 ENCOUNTER FOR OTHER GENERAL EXAMINATION: ICD-10-CM

## 2023-10-27 PROCEDURE — 70551 MRI BRAIN STEM W/O DYE: CPT | Mod: 26

## 2023-10-27 PROCEDURE — 70551 MRI BRAIN STEM W/O DYE: CPT

## 2023-10-28 DIAGNOSIS — R51.9 HEADACHE, UNSPECIFIED: ICD-10-CM

## 2023-11-01 ENCOUNTER — APPOINTMENT (OUTPATIENT)
Dept: NEUROLOGY | Facility: CLINIC | Age: 63
End: 2023-11-01
Payer: MEDICAID

## 2023-11-01 ENCOUNTER — NON-APPOINTMENT (OUTPATIENT)
Age: 63
End: 2023-11-01

## 2023-11-01 VITALS
BODY MASS INDEX: 22.63 KG/M2 | OXYGEN SATURATION: 98 % | WEIGHT: 158.06 LBS | HEART RATE: 69 BPM | DIASTOLIC BLOOD PRESSURE: 66 MMHG | SYSTOLIC BLOOD PRESSURE: 112 MMHG | HEIGHT: 70 IN | TEMPERATURE: 96.3 F

## 2023-11-01 DIAGNOSIS — R56.9 UNSPECIFIED CONVULSIONS: ICD-10-CM

## 2023-11-01 PROCEDURE — 95816 EEG AWAKE AND DROWSY: CPT

## 2023-11-01 PROCEDURE — 99204 OFFICE O/P NEW MOD 45 MIN: CPT

## 2023-11-01 RX ORDER — DIVALPROEX SODIUM 250 MG/1
250 TABLET, DELAYED RELEASE ORAL
Refills: 0 | Status: ACTIVE | COMMUNITY

## 2023-11-01 RX ORDER — DIVALPROEX SODIUM 500 MG/1
500 TABLET, DELAYED RELEASE ORAL TWICE DAILY
Refills: 0 | Status: ACTIVE | COMMUNITY

## 2023-11-01 RX ORDER — ACETAMINOPHEN 325 MG/1
325 TABLET ORAL
Refills: 0 | Status: ACTIVE | COMMUNITY

## 2023-11-01 RX ORDER — MIRTAZAPINE 15 MG/1
15 TABLET, FILM COATED ORAL
Refills: 0 | Status: ACTIVE | COMMUNITY

## 2023-11-01 RX ORDER — CLONIDINE HYDROCHLORIDE 0.1 MG/1
0.1 TABLET ORAL
Refills: 0 | Status: ACTIVE | COMMUNITY

## 2023-11-01 RX ORDER — HALOPERIDOL DECANOATE 50 MG/ML
50 INJECTION INTRAMUSCULAR
Refills: 0 | Status: ACTIVE | COMMUNITY

## 2023-11-01 RX ORDER — SENNOSIDES 8.6 MG TABLETS 8.6 MG/1
8.6 TABLET ORAL
Refills: 0 | Status: ACTIVE | COMMUNITY

## 2023-11-01 RX ORDER — OLANZAPINE 10 MG/1
10 TABLET, FILM COATED ORAL
Refills: 0 | Status: ACTIVE | COMMUNITY

## 2023-11-01 RX ORDER — CARBIDOPA AND LEVODOPA 25; 100 MG/1; MG/1
25-100 TABLET ORAL
Refills: 0 | Status: ACTIVE | COMMUNITY

## 2023-11-01 RX ORDER — LEVOCARNITINE 330 MG
330 TABLET ORAL TWICE DAILY
Qty: 60 | Refills: 0 | Status: ACTIVE | COMMUNITY
Start: 2023-11-01 | End: 1900-01-01

## 2023-11-01 RX ORDER — AMLODIPINE BESYLATE 5 MG/1
5 TABLET ORAL
Refills: 0 | Status: ACTIVE | COMMUNITY

## 2023-11-01 RX ORDER — LEVETIRACETAM 750 MG/1
750 TABLET, FILM COATED ORAL TWICE DAILY
Refills: 0 | Status: ACTIVE | COMMUNITY

## 2024-01-01 ENCOUNTER — INPATIENT (INPATIENT)
Facility: HOSPITAL | Age: 64
LOS: 3 days | DRG: 469 | End: 2024-07-27
Attending: INTERNAL MEDICINE | Admitting: STUDENT IN AN ORGANIZED HEALTH CARE EDUCATION/TRAINING PROGRAM
Payer: MEDICAID

## 2024-01-01 VITALS
TEMPERATURE: 99 F | OXYGEN SATURATION: 96 % | RESPIRATION RATE: 20 BRPM | HEART RATE: 100 BPM | DIASTOLIC BLOOD PRESSURE: 69 MMHG | HEIGHT: 70 IN | SYSTOLIC BLOOD PRESSURE: 115 MMHG

## 2024-01-01 VITALS — RESPIRATION RATE: 22 BRPM

## 2024-01-01 DIAGNOSIS — J40 BRONCHITIS, NOT SPECIFIED AS ACUTE OR CHRONIC: ICD-10-CM

## 2024-01-01 DIAGNOSIS — F03.90 UNSPECIFIED DEMENTIA, UNSPECIFIED SEVERITY, WITHOUT BEHAVIORAL DISTURBANCE, PSYCHOTIC DISTURBANCE, MOOD DISTURBANCE, AND ANXIETY: ICD-10-CM

## 2024-01-01 DIAGNOSIS — E87.0 HYPEROSMOLALITY AND HYPERNATREMIA: ICD-10-CM

## 2024-01-01 DIAGNOSIS — G40.909 EPILEPSY, UNSPECIFIED, NOT INTRACTABLE, WITHOUT STATUS EPILEPTICUS: ICD-10-CM

## 2024-01-01 DIAGNOSIS — Z51.5 ENCOUNTER FOR PALLIATIVE CARE: ICD-10-CM

## 2024-01-01 DIAGNOSIS — R64 CACHEXIA: ICD-10-CM

## 2024-01-01 DIAGNOSIS — R62.7 ADULT FAILURE TO THRIVE: ICD-10-CM

## 2024-01-01 DIAGNOSIS — N17.9 ACUTE KIDNEY FAILURE, UNSPECIFIED: ICD-10-CM

## 2024-01-01 DIAGNOSIS — E86.0 DEHYDRATION: ICD-10-CM

## 2024-01-01 DIAGNOSIS — L89.620 PRESSURE ULCER OF LEFT HEEL, UNSTAGEABLE: ICD-10-CM

## 2024-01-01 DIAGNOSIS — J69.0 PNEUMONITIS DUE TO INHALATION OF FOOD AND VOMIT: ICD-10-CM

## 2024-01-01 DIAGNOSIS — R53.2 FUNCTIONAL QUADRIPLEGIA: ICD-10-CM

## 2024-01-01 DIAGNOSIS — R33.9 RETENTION OF URINE, UNSPECIFIED: ICD-10-CM

## 2024-01-01 DIAGNOSIS — I10 ESSENTIAL (PRIMARY) HYPERTENSION: ICD-10-CM

## 2024-01-01 DIAGNOSIS — E87.6 HYPOKALEMIA: ICD-10-CM

## 2024-01-01 DIAGNOSIS — J18.9 PNEUMONIA, UNSPECIFIED ORGANISM: ICD-10-CM

## 2024-01-01 DIAGNOSIS — F20.9 SCHIZOPHRENIA, UNSPECIFIED: ICD-10-CM

## 2024-01-01 DIAGNOSIS — Z93.1 GASTROSTOMY STATUS: ICD-10-CM

## 2024-01-01 DIAGNOSIS — Z74.1 NEED FOR ASSISTANCE WITH PERSONAL CARE: ICD-10-CM

## 2024-01-01 DIAGNOSIS — E86.1 HYPOVOLEMIA: ICD-10-CM

## 2024-01-01 DIAGNOSIS — Z66 DO NOT RESUSCITATE: ICD-10-CM

## 2024-01-01 LAB
ALBUMIN SERPL ELPH-MCNC: 3.1 G/DL — LOW (ref 3.5–5.2)
ALBUMIN SERPL ELPH-MCNC: 3.2 G/DL — LOW (ref 3.5–5.2)
ALP SERPL-CCNC: 86 U/L — SIGNIFICANT CHANGE UP (ref 30–115)
ALP SERPL-CCNC: 87 U/L — SIGNIFICANT CHANGE UP (ref 30–115)
ALT FLD-CCNC: 23 U/L — SIGNIFICANT CHANGE UP (ref 0–41)
ALT FLD-CCNC: 31 U/L — SIGNIFICANT CHANGE UP (ref 0–41)
ANION GAP SERPL CALC-SCNC: 14 MMOL/L — SIGNIFICANT CHANGE UP (ref 7–14)
ANION GAP SERPL CALC-SCNC: 15 MMOL/L — HIGH (ref 7–14)
APPEARANCE UR: ABNORMAL
APTT BLD: 35.1 SEC — SIGNIFICANT CHANGE UP (ref 27–39.2)
AST SERPL-CCNC: 36 U/L — SIGNIFICANT CHANGE UP (ref 0–41)
AST SERPL-CCNC: 58 U/L — HIGH (ref 0–41)
BACTERIA # UR AUTO: NEGATIVE /HPF — SIGNIFICANT CHANGE UP
BASOPHILS # BLD AUTO: 0.04 K/UL — SIGNIFICANT CHANGE UP (ref 0–0.2)
BASOPHILS NFR BLD AUTO: 0.3 % — SIGNIFICANT CHANGE UP (ref 0–1)
BILIRUB SERPL-MCNC: 0.2 MG/DL — SIGNIFICANT CHANGE UP (ref 0.2–1.2)
BILIRUB SERPL-MCNC: 0.3 MG/DL — SIGNIFICANT CHANGE UP (ref 0.2–1.2)
BILIRUB UR-MCNC: NEGATIVE — SIGNIFICANT CHANGE UP
BUN SERPL-MCNC: 43 MG/DL — HIGH (ref 10–20)
BUN SERPL-MCNC: 80 MG/DL — CRITICAL HIGH (ref 10–20)
CALCIUM SERPL-MCNC: 10.5 MG/DL — SIGNIFICANT CHANGE UP (ref 8.4–10.5)
CALCIUM SERPL-MCNC: 10.7 MG/DL — HIGH (ref 8.4–10.5)
CAST: 3 /LPF — SIGNIFICANT CHANGE UP (ref 0–4)
CHLORIDE SERPL-SCNC: 105 MMOL/L — SIGNIFICANT CHANGE UP (ref 98–110)
CHLORIDE SERPL-SCNC: 111 MMOL/L — HIGH (ref 98–110)
CO2 SERPL-SCNC: 32 MMOL/L — SIGNIFICANT CHANGE UP (ref 17–32)
CO2 SERPL-SCNC: 34 MMOL/L — HIGH (ref 17–32)
COLOR SPEC: ABNORMAL
CREAT SERPL-MCNC: 0.6 MG/DL — LOW (ref 0.7–1.5)
CREAT SERPL-MCNC: 1 MG/DL — SIGNIFICANT CHANGE UP (ref 0.7–1.5)
DIFF PNL FLD: ABNORMAL
EGFR: 106 ML/MIN/1.73M2 — SIGNIFICANT CHANGE UP
EGFR: 106 ML/MIN/1.73M2 — SIGNIFICANT CHANGE UP
EGFR: 83 ML/MIN/1.73M2 — SIGNIFICANT CHANGE UP
EGFR: 83 ML/MIN/1.73M2 — SIGNIFICANT CHANGE UP
EOSINOPHIL # BLD AUTO: 0.01 K/UL — SIGNIFICANT CHANGE UP (ref 0–0.7)
EOSINOPHIL NFR BLD AUTO: 0.1 % — SIGNIFICANT CHANGE UP (ref 0–8)
GLUCOSE SERPL-MCNC: 115 MG/DL — HIGH (ref 70–99)
GLUCOSE SERPL-MCNC: 87 MG/DL — SIGNIFICANT CHANGE UP (ref 70–99)
GLUCOSE UR QL: NEGATIVE MG/DL — SIGNIFICANT CHANGE UP
HCT VFR BLD CALC: 43.4 % — SIGNIFICANT CHANGE UP (ref 42–52)
HCT VFR BLD CALC: 45.3 % — SIGNIFICANT CHANGE UP (ref 42–52)
HGB BLD-MCNC: 13.5 G/DL — LOW (ref 14–18)
HGB BLD-MCNC: 13.9 G/DL — LOW (ref 14–18)
IMM GRANULOCYTES NFR BLD AUTO: 0.8 % — HIGH (ref 0.1–0.3)
INR BLD: 1.16 RATIO — SIGNIFICANT CHANGE UP (ref 0.65–1.3)
KETONES UR-MCNC: NEGATIVE MG/DL — SIGNIFICANT CHANGE UP
LEUKOCYTE ESTERASE UR-ACNC: ABNORMAL
LYMPHOCYTES # BLD AUTO: 1.56 K/UL — SIGNIFICANT CHANGE UP (ref 1.2–3.4)
LYMPHOCYTES # BLD AUTO: 10.6 % — LOW (ref 20.5–51.1)
MAGNESIUM SERPL-MCNC: 2.5 MG/DL — HIGH (ref 1.8–2.4)
MCHC RBC-ENTMCNC: 30.7 G/DL — LOW (ref 32–37)
MCHC RBC-ENTMCNC: 31.1 G/DL — LOW (ref 32–37)
MCHC RBC-ENTMCNC: 31.1 PG — HIGH (ref 27–31)
MCHC RBC-ENTMCNC: 31.6 PG — HIGH (ref 27–31)
MCV RBC AUTO: 101.3 FL — HIGH (ref 80–94)
MCV RBC AUTO: 101.6 FL — HIGH (ref 80–94)
MONOCYTES # BLD AUTO: 1.25 K/UL — HIGH (ref 0.1–0.6)
MONOCYTES NFR BLD AUTO: 8.5 % — SIGNIFICANT CHANGE UP (ref 1.7–9.3)
NEUTROPHILS # BLD AUTO: 11.78 K/UL — HIGH (ref 1.4–6.5)
NEUTROPHILS NFR BLD AUTO: 79.7 % — HIGH (ref 42.2–75.2)
NITRITE UR-MCNC: NEGATIVE — SIGNIFICANT CHANGE UP
NRBC # BLD: 0 /100 WBCS — SIGNIFICANT CHANGE UP (ref 0–0)
NRBC # BLD: 0 /100 WBCS — SIGNIFICANT CHANGE UP (ref 0–0)
NRBC BLD-RTO: 0 /100 WBCS — SIGNIFICANT CHANGE UP (ref 0–0)
NRBC BLD-RTO: 0 /100 WBCS — SIGNIFICANT CHANGE UP (ref 0–0)
PH UR: 6.5 — SIGNIFICANT CHANGE UP (ref 5–8)
PLATELET # BLD AUTO: 397 K/UL — SIGNIFICANT CHANGE UP (ref 130–400)
PLATELET # BLD AUTO: 407 K/UL — HIGH (ref 130–400)
PMV BLD: 10.6 FL — HIGH (ref 7.4–10.4)
PMV BLD: 10.8 FL — HIGH (ref 7.4–10.4)
POTASSIUM SERPL-MCNC: 2.9 MMOL/L — LOW (ref 3.5–5)
POTASSIUM SERPL-MCNC: 4.3 MMOL/L — SIGNIFICANT CHANGE UP (ref 3.5–5)
POTASSIUM SERPL-SCNC: 2.9 MMOL/L — LOW (ref 3.5–5)
POTASSIUM SERPL-SCNC: 4.3 MMOL/L — SIGNIFICANT CHANGE UP (ref 3.5–5)
PROT SERPL-MCNC: 6.8 G/DL — SIGNIFICANT CHANGE UP (ref 6–8)
PROT SERPL-MCNC: 7.1 G/DL — SIGNIFICANT CHANGE UP (ref 6–8)
PROT UR-MCNC: 100 MG/DL
PROTHROM AB SERPL-ACNC: 13.2 SEC — HIGH (ref 9.95–12.87)
RBC # BLD: 4.27 M/UL — LOW (ref 4.7–6.1)
RBC # BLD: 4.47 M/UL — LOW (ref 4.7–6.1)
RBC # FLD: 15.3 % — HIGH (ref 11.5–14.5)
RBC # FLD: 15.5 % — HIGH (ref 11.5–14.5)
RBC CASTS # UR COMP ASSIST: 679 /HPF — HIGH (ref 0–4)
SODIUM SERPL-SCNC: 152 MMOL/L — HIGH (ref 135–146)
SODIUM SERPL-SCNC: 159 MMOL/L — HIGH (ref 135–146)
SP GR SPEC: 1.02 — SIGNIFICANT CHANGE UP (ref 1–1.03)
SQUAMOUS # UR AUTO: 3 /HPF — SIGNIFICANT CHANGE UP (ref 0–5)
UROBILINOGEN FLD QL: 0.2 MG/DL — SIGNIFICANT CHANGE UP (ref 0.2–1)
WBC # BLD: 13.6 K/UL — HIGH (ref 4.8–10.8)
WBC # BLD: 14.76 K/UL — HIGH (ref 4.8–10.8)
WBC # FLD AUTO: 13.6 K/UL — HIGH (ref 4.8–10.8)
WBC # FLD AUTO: 14.76 K/UL — HIGH (ref 4.8–10.8)
WBC UR QL: 86 /HPF — HIGH (ref 0–5)

## 2024-01-01 PROCEDURE — 99232 SBSQ HOSP IP/OBS MODERATE 35: CPT

## 2024-01-01 PROCEDURE — 99231 SBSQ HOSP IP/OBS SF/LOW 25: CPT

## 2024-01-01 PROCEDURE — 36415 COLL VENOUS BLD VENIPUNCTURE: CPT

## 2024-01-01 PROCEDURE — 71045 X-RAY EXAM CHEST 1 VIEW: CPT | Mod: 26

## 2024-01-01 PROCEDURE — 94640 AIRWAY INHALATION TREATMENT: CPT

## 2024-01-01 PROCEDURE — 99223 1ST HOSP IP/OBS HIGH 75: CPT

## 2024-01-01 PROCEDURE — 80053 COMPREHEN METABOLIC PANEL: CPT

## 2024-01-01 PROCEDURE — 99253 IP/OBS CNSLTJ NEW/EST LOW 45: CPT

## 2024-01-01 PROCEDURE — 71045 X-RAY EXAM CHEST 1 VIEW: CPT

## 2024-01-01 PROCEDURE — 81001 URINALYSIS AUTO W/SCOPE: CPT

## 2024-01-01 PROCEDURE — 83735 ASSAY OF MAGNESIUM: CPT

## 2024-01-01 PROCEDURE — 93010 ELECTROCARDIOGRAM REPORT: CPT

## 2024-01-01 PROCEDURE — 99285 EMERGENCY DEPT VISIT HI MDM: CPT

## 2024-01-01 PROCEDURE — 99233 SBSQ HOSP IP/OBS HIGH 50: CPT

## 2024-01-01 PROCEDURE — 85025 COMPLETE CBC W/AUTO DIFF WBC: CPT

## 2024-01-01 RX ORDER — ACETAMINOPHEN 500 MG/5ML
650 LIQUID (ML) ORAL EVERY 6 HOURS
Refills: 0 | Status: DISCONTINUED | OUTPATIENT
Start: 2024-01-01 | End: 2024-01-01

## 2024-01-01 RX ORDER — POVIDONE-IODINE 7.5 %
1 SOLUTION, NON-ORAL TOPICAL
Refills: 0 | DISCHARGE

## 2024-01-01 RX ORDER — OLANZAPINE 10 MG/1
7.5 TABLET ORAL DAILY
Refills: 0 | Status: DISCONTINUED | OUTPATIENT
Start: 2024-01-01 | End: 2024-01-01

## 2024-01-01 RX ORDER — GLYCOPYRROLATE 0.2 MG/ML
0.2 INJECTION INTRAMUSCULAR; INTRAVENOUS
Refills: 0 | Status: DISCONTINUED | OUTPATIENT
Start: 2024-01-01 | End: 2024-01-01

## 2024-01-01 RX ORDER — ALBUTEROL SULFATE 2.5 MG/3ML
2 VIAL, NEBULIZER (ML) INHALATION EVERY 6 HOURS
Refills: 0 | Status: DISCONTINUED | OUTPATIENT
Start: 2024-01-01 | End: 2024-01-01

## 2024-01-01 RX ORDER — AMLODIPINE BESYLATE 10 MG/1
10 TABLET ORAL DAILY
Refills: 0 | Status: DISCONTINUED | OUTPATIENT
Start: 2024-01-01 | End: 2024-01-01

## 2024-01-01 RX ORDER — HYDROMORPHONE/SOD CHLOR,ISO/PF 2 MG/10 ML
0.5 SYRINGE (ML) INJECTION
Refills: 0 | Status: DISCONTINUED | OUTPATIENT
Start: 2024-01-01 | End: 2024-01-01

## 2024-01-01 RX ORDER — DEXTROMETHORPHAN HBR, GUAIFENESIN 200 MG/10ML
200 LIQUID ORAL EVERY 6 HOURS
Refills: 0 | Status: DISCONTINUED | OUTPATIENT
Start: 2024-01-01 | End: 2024-01-01

## 2024-01-01 RX ORDER — IPRATROPIUM BROMIDE AND ALBUTEROL SULFATE .5; 2.5 MG/3ML; MG/3ML
3 SOLUTION RESPIRATORY (INHALATION)
Refills: 0 | DISCHARGE
End: 2024-07-29

## 2024-01-01 RX ORDER — SODIUM CHLORIDE 9 G/1000ML
1000 INJECTION, SOLUTION INTRAVENOUS
Refills: 0 | Status: DISCONTINUED | OUTPATIENT
Start: 2024-01-01 | End: 2024-01-01

## 2024-01-01 RX ORDER — METOPROLOL SUCCINATE 50 MG/1
25 TABLET, EXTENDED RELEASE ORAL
Refills: 0 | Status: DISCONTINUED | OUTPATIENT
Start: 2024-01-01 | End: 2024-01-01

## 2024-01-01 RX ORDER — MAGNESIUM, ALUMINUM HYDROXIDE 200-200 MG
30 TABLET,CHEWABLE ORAL EVERY 4 HOURS
Refills: 0 | Status: DISCONTINUED | OUTPATIENT
Start: 2024-01-01 | End: 2024-01-01

## 2024-01-01 RX ORDER — LEVOFLOXACIN 25 MG/ML
1 SOLUTION ORAL
Refills: 0 | DISCHARGE
End: 2024-01-01

## 2024-01-01 RX ORDER — LORAZEPAM 4 MG/ML
0.5 VIAL (ML) INJECTION EVERY 4 HOURS
Refills: 0 | Status: DISCONTINUED | OUTPATIENT
Start: 2024-01-01 | End: 2024-01-01

## 2024-01-01 RX ORDER — POVIDONE-IODINE 7.5 %
1 SOLUTION, NON-ORAL TOPICAL DAILY
Refills: 0 | Status: DISCONTINUED | OUTPATIENT
Start: 2024-01-01 | End: 2024-01-01

## 2024-01-01 RX ORDER — METOPROLOL SUCCINATE 50 MG/1
1 TABLET, EXTENDED RELEASE ORAL
Refills: 0 | DISCHARGE

## 2024-01-01 RX ORDER — AMLODIPINE BESYLATE 10 MG/1
5 TABLET ORAL DAILY
Refills: 0 | Status: DISCONTINUED | OUTPATIENT
Start: 2024-01-01 | End: 2024-01-01

## 2024-01-01 RX ORDER — TAMSULOSIN HYDROCHLORIDE 0.4 MG/1
0.4 CAPSULE ORAL AT BEDTIME
Refills: 0 | Status: DISCONTINUED | OUTPATIENT
Start: 2024-01-01 | End: 2024-01-01

## 2024-01-01 RX ORDER — MELATONIN 5 MG
3 TABLET ORAL AT BEDTIME
Refills: 0 | Status: DISCONTINUED | OUTPATIENT
Start: 2024-01-01 | End: 2024-01-01

## 2024-01-01 RX ORDER — SODIUM CHLORIDE 9 G/1000ML
1000 INJECTION, SOLUTION INTRAVENOUS ONCE
Refills: 0 | Status: COMPLETED | OUTPATIENT
Start: 2024-01-01 | End: 2024-01-01

## 2024-01-01 RX ORDER — CEFTRIAXONE 500 MG/1
1000 INJECTION, POWDER, FOR SOLUTION INTRAMUSCULAR; INTRAVENOUS EVERY 24 HOURS
Refills: 0 | Status: DISCONTINUED | OUTPATIENT
Start: 2024-01-01 | End: 2024-01-01

## 2024-01-01 RX ORDER — LABETALOL HYDROCHLORIDE 200 MG/1
10 TABLET, FILM COATED ORAL ONCE
Refills: 0 | Status: COMPLETED | OUTPATIENT
Start: 2024-01-01 | End: 2024-01-01

## 2024-01-01 RX ORDER — AMLODIPINE BESYLATE 10 MG/1
5 TABLET ORAL ONCE
Refills: 0 | Status: COMPLETED | OUTPATIENT
Start: 2024-01-01 | End: 2024-01-01

## 2024-01-01 RX ORDER — HEPARIN SODIUM 1000 [USP'U]/ML
5000 INJECTION INTRAVENOUS; SUBCUTANEOUS EVERY 8 HOURS
Refills: 0 | Status: DISCONTINUED | OUTPATIENT
Start: 2024-01-01 | End: 2024-01-01

## 2024-01-01 RX ORDER — DOXYCYCLINE HYCLATE 100 MG
100 TABLET ORAL EVERY 12 HOURS
Refills: 0 | Status: DISCONTINUED | OUTPATIENT
Start: 2024-01-01 | End: 2024-01-01

## 2024-01-01 RX ORDER — AMLODIPINE BESYLATE 10 MG/1
1 TABLET ORAL
Refills: 0 | DISCHARGE

## 2024-01-01 RX ORDER — OMEPRAZOLE 20 MG/1
1 CAPSULE, DELAYED RELEASE ORAL
Refills: 0 | DISCHARGE

## 2024-01-01 RX ORDER — ONDANSETRON HCL/PF 4 MG/2 ML
4 VIAL (ML) INJECTION EVERY 8 HOURS
Refills: 0 | Status: DISCONTINUED | OUTPATIENT
Start: 2024-01-01 | End: 2024-01-01

## 2024-01-01 RX ORDER — ACETAMINOPHEN 500 MG/5ML
2 LIQUID (ML) ORAL
Refills: 0 | DISCHARGE

## 2024-01-01 RX ORDER — LEVOCARNITINE 1 G/5ML
330 INJECTION INTRAVENOUS
Refills: 0 | Status: DISCONTINUED | OUTPATIENT
Start: 2024-01-01 | End: 2024-01-01

## 2024-01-01 RX ORDER — TIOTROPIUM BROMIDE INHALATION SPRAY 3.12 UG/1
2 SPRAY, METERED RESPIRATORY (INHALATION) DAILY
Refills: 0 | Status: DISCONTINUED | OUTPATIENT
Start: 2024-01-01 | End: 2024-01-01

## 2024-01-01 RX ADMIN — Medication 1 APPLICATION(S): at 12:44

## 2024-01-01 RX ADMIN — LEVOCARNITINE 330 MILLIGRAM(S): 1 INJECTION INTRAVENOUS at 09:49

## 2024-01-01 RX ADMIN — Medication 0.5 MILLIGRAM(S): at 06:55

## 2024-01-01 RX ADMIN — Medication 0.5 MILLIGRAM(S): at 12:44

## 2024-01-01 RX ADMIN — Medication 50 MILLIEQUIVALENT(S): at 12:30

## 2024-01-01 RX ADMIN — LABETALOL HYDROCHLORIDE 10 MILLIGRAM(S): 200 TABLET, FILM COATED ORAL at 09:48

## 2024-01-01 RX ADMIN — AMLODIPINE BESYLATE 5 MILLIGRAM(S): 10 TABLET ORAL at 08:55

## 2024-01-01 RX ADMIN — TIOTROPIUM BROMIDE INHALATION SPRAY 2 PUFF(S): 3.12 SPRAY, METERED RESPIRATORY (INHALATION) at 09:22

## 2024-01-01 RX ADMIN — Medication 2 PUFF(S): at 09:22

## 2024-01-01 RX ADMIN — SODIUM CHLORIDE 75 MILLILITER(S): 9 INJECTION, SOLUTION INTRAVENOUS at 09:48

## 2024-01-01 RX ADMIN — CEFTRIAXONE 100 MILLIGRAM(S): 500 INJECTION, POWDER, FOR SOLUTION INTRAMUSCULAR; INTRAVENOUS at 06:04

## 2024-01-01 RX ADMIN — Medication 750 MILLIGRAM(S): at 06:04

## 2024-01-01 RX ADMIN — SODIUM CHLORIDE 75 MILLILITER(S): 9 INJECTION, SOLUTION INTRAVENOUS at 06:03

## 2024-01-01 RX ADMIN — Medication 0.5 MILLIGRAM(S): at 11:55

## 2024-01-01 RX ADMIN — SODIUM CHLORIDE 100 MILLILITER(S): 9 INJECTION, SOLUTION INTRAVENOUS at 12:44

## 2024-01-01 RX ADMIN — HEPARIN SODIUM 5000 UNIT(S): 1000 INJECTION INTRAVENOUS; SUBCUTANEOUS at 06:04

## 2024-01-01 RX ADMIN — OLANZAPINE 7.5 MILLIGRAM(S): 10 TABLET ORAL at 12:30

## 2024-01-01 RX ADMIN — Medication 100 MILLIGRAM(S): at 06:04

## 2024-01-01 RX ADMIN — METOPROLOL SUCCINATE 25 MILLIGRAM(S): 50 TABLET, EXTENDED RELEASE ORAL at 06:05

## 2024-01-01 RX ADMIN — SODIUM CHLORIDE 1000 MILLILITER(S): 9 INJECTION, SOLUTION INTRAVENOUS at 20:29

## 2024-01-01 RX ADMIN — Medication 40 MILLIEQUIVALENT(S): at 12:30

## 2024-01-01 RX ADMIN — SODIUM CHLORIDE 1000 MILLILITER(S): 9 INJECTION, SOLUTION INTRAVENOUS at 08:50

## 2024-03-18 ENCOUNTER — APPOINTMENT (OUTPATIENT)
Age: 64
End: 2024-03-18
Payer: MEDICAID

## 2024-03-18 ENCOUNTER — OUTPATIENT (OUTPATIENT)
Dept: OUTPATIENT SERVICES | Facility: HOSPITAL | Age: 64
LOS: 1 days | End: 2024-03-18
Payer: MEDICAID

## 2024-03-18 DIAGNOSIS — Z00.8 ENCOUNTER FOR OTHER GENERAL EXAMINATION: ICD-10-CM

## 2024-03-18 DIAGNOSIS — R97.20 ELEVATED PROSTATE SPECIFIC ANTIGEN [PSA]: ICD-10-CM

## 2024-03-18 PROCEDURE — 72197 MRI PELVIS W/O & W/DYE: CPT | Mod: 26

## 2024-03-18 PROCEDURE — 72197 MRI PELVIS W/O & W/DYE: CPT

## 2024-03-18 PROCEDURE — A9579: CPT

## 2024-03-19 DIAGNOSIS — R97.20 ELEVATED PROSTATE SPECIFIC ANTIGEN [PSA]: ICD-10-CM

## 2024-04-01 ENCOUNTER — APPOINTMENT (OUTPATIENT)
Dept: NEUROLOGY | Facility: CLINIC | Age: 64
End: 2024-04-01

## 2024-05-20 ENCOUNTER — INPATIENT (INPATIENT)
Facility: HOSPITAL | Age: 64
LOS: 31 days | Discharge: NURSING, REHAB OR LONG-TERM CARE | DRG: 720 | End: 2024-06-21
Attending: INTERNAL MEDICINE | Admitting: INTERNAL MEDICINE
Payer: MEDICAID

## 2024-05-20 VITALS
RESPIRATION RATE: 16 BRPM | WEIGHT: 133.16 LBS | TEMPERATURE: 101 F | HEART RATE: 126 BPM | HEIGHT: 70 IN | SYSTOLIC BLOOD PRESSURE: 125 MMHG | OXYGEN SATURATION: 92 % | DIASTOLIC BLOOD PRESSURE: 59 MMHG

## 2024-05-20 DIAGNOSIS — A41.9 SEPSIS, UNSPECIFIED ORGANISM: ICD-10-CM

## 2024-05-20 LAB
ALBUMIN SERPL ELPH-MCNC: 3.3 G/DL — LOW (ref 3.5–5.2)
ALP SERPL-CCNC: 109 U/L — SIGNIFICANT CHANGE UP (ref 30–115)
ALT FLD-CCNC: 7 U/L — SIGNIFICANT CHANGE UP (ref 0–41)
ANION GAP SERPL CALC-SCNC: 14 MMOL/L — SIGNIFICANT CHANGE UP (ref 7–14)
APPEARANCE UR: ABNORMAL
APTT BLD: 38 SEC — SIGNIFICANT CHANGE UP (ref 27–39.2)
AST SERPL-CCNC: 20 U/L — SIGNIFICANT CHANGE UP (ref 0–41)
BACTERIA # UR AUTO: ABNORMAL /HPF
BASE EXCESS BLDV CALC-SCNC: 7.7 MMOL/L — HIGH (ref -2–3)
BASOPHILS # BLD AUTO: 0.02 K/UL — SIGNIFICANT CHANGE UP (ref 0–0.2)
BASOPHILS NFR BLD AUTO: 0.1 % — SIGNIFICANT CHANGE UP (ref 0–1)
BILIRUB SERPL-MCNC: 0.4 MG/DL — SIGNIFICANT CHANGE UP (ref 0.2–1.2)
BILIRUB UR-MCNC: ABNORMAL
BUN SERPL-MCNC: 18 MG/DL — SIGNIFICANT CHANGE UP (ref 10–20)
CA-I SERPL-SCNC: 1.3 MMOL/L — SIGNIFICANT CHANGE UP (ref 1.15–1.33)
CALCIUM SERPL-MCNC: 10.2 MG/DL — SIGNIFICANT CHANGE UP (ref 8.4–10.5)
CAST: 9 /LPF — HIGH (ref 0–4)
CHLORIDE SERPL-SCNC: 103 MMOL/L — SIGNIFICANT CHANGE UP (ref 98–110)
CO2 SERPL-SCNC: 28 MMOL/L — SIGNIFICANT CHANGE UP (ref 17–32)
COLOR SPEC: SIGNIFICANT CHANGE UP
CREAT SERPL-MCNC: 0.7 MG/DL — SIGNIFICANT CHANGE UP (ref 0.7–1.5)
DIFF PNL FLD: NEGATIVE — SIGNIFICANT CHANGE UP
EGFR: 102 ML/MIN/1.73M2 — SIGNIFICANT CHANGE UP
EOSINOPHIL # BLD AUTO: 0 K/UL — SIGNIFICANT CHANGE UP (ref 0–0.7)
EOSINOPHIL NFR BLD AUTO: 0 % — SIGNIFICANT CHANGE UP (ref 0–8)
FLUAV AG NPH QL: SIGNIFICANT CHANGE UP
FLUBV AG NPH QL: SIGNIFICANT CHANGE UP
GAS PNL BLDV: 139 MMOL/L — SIGNIFICANT CHANGE UP (ref 136–145)
GAS PNL BLDV: SIGNIFICANT CHANGE UP
GLUCOSE SERPL-MCNC: 130 MG/DL — HIGH (ref 70–99)
GLUCOSE UR QL: NEGATIVE MG/DL — SIGNIFICANT CHANGE UP
HCO3 BLDV-SCNC: 34 MMOL/L — HIGH (ref 22–29)
HCT VFR BLD CALC: 41 % — LOW (ref 42–52)
HCT VFR BLDA CALC: 39 % — SIGNIFICANT CHANGE UP (ref 39–51)
HGB BLD CALC-MCNC: 12.9 G/DL — SIGNIFICANT CHANGE UP (ref 12.6–17.4)
HGB BLD-MCNC: 13 G/DL — LOW (ref 14–18)
IMM GRANULOCYTES NFR BLD AUTO: 0.2 % — SIGNIFICANT CHANGE UP (ref 0.1–0.3)
INR BLD: 1.13 RATIO — SIGNIFICANT CHANGE UP (ref 0.65–1.3)
KETONES UR-MCNC: 15 MG/DL
LACTATE BLDV-MCNC: 3.6 MMOL/L — HIGH (ref 0.5–2)
LACTATE SERPL-SCNC: 3.1 MMOL/L — HIGH (ref 0.7–2)
LACTATE SERPL-SCNC: 3.7 MMOL/L — HIGH (ref 0.7–2)
LEUKOCYTE ESTERASE UR-ACNC: NEGATIVE — SIGNIFICANT CHANGE UP
LYMPHOCYTES # BLD AUTO: 0.93 K/UL — LOW (ref 1.2–3.4)
LYMPHOCYTES # BLD AUTO: 6.7 % — LOW (ref 20.5–51.1)
MCHC RBC-ENTMCNC: 31.4 PG — HIGH (ref 27–31)
MCHC RBC-ENTMCNC: 31.7 G/DL — LOW (ref 32–37)
MCV RBC AUTO: 99 FL — HIGH (ref 80–94)
MONOCYTES # BLD AUTO: 0.6 K/UL — SIGNIFICANT CHANGE UP (ref 0.1–0.6)
MONOCYTES NFR BLD AUTO: 4.3 % — SIGNIFICANT CHANGE UP (ref 1.7–9.3)
NEUTROPHILS # BLD AUTO: 12.36 K/UL — HIGH (ref 1.4–6.5)
NEUTROPHILS NFR BLD AUTO: 88.7 % — HIGH (ref 42.2–75.2)
NITRITE UR-MCNC: NEGATIVE — SIGNIFICANT CHANGE UP
NRBC # BLD: 0 /100 WBCS — SIGNIFICANT CHANGE UP (ref 0–0)
PCO2 BLDV: 57 MMHG — HIGH (ref 42–55)
PH BLDV: 7.39 — SIGNIFICANT CHANGE UP (ref 7.32–7.43)
PH UR: 6 — SIGNIFICANT CHANGE UP (ref 5–8)
PLATELET # BLD AUTO: 456 K/UL — HIGH (ref 130–400)
PMV BLD: 9.9 FL — SIGNIFICANT CHANGE UP (ref 7.4–10.4)
PO2 BLDV: 19 MMHG — LOW (ref 25–45)
POTASSIUM BLDV-SCNC: 4 MMOL/L — SIGNIFICANT CHANGE UP (ref 3.5–5.1)
POTASSIUM SERPL-MCNC: 4.7 MMOL/L — SIGNIFICANT CHANGE UP (ref 3.5–5)
POTASSIUM SERPL-SCNC: 4.7 MMOL/L — SIGNIFICANT CHANGE UP (ref 3.5–5)
PROT SERPL-MCNC: 7 G/DL — SIGNIFICANT CHANGE UP (ref 6–8)
PROT UR-MCNC: 30 MG/DL
PROTHROM AB SERPL-ACNC: 12.9 SEC — HIGH (ref 9.95–12.87)
RBC # BLD: 4.14 M/UL — LOW (ref 4.7–6.1)
RBC # FLD: 13.4 % — SIGNIFICANT CHANGE UP (ref 11.5–14.5)
RBC CASTS # UR COMP ASSIST: 4 /HPF — SIGNIFICANT CHANGE UP (ref 0–4)
RSV RNA NPH QL NAA+NON-PROBE: SIGNIFICANT CHANGE UP
SAO2 % BLDV: 24.8 % — LOW (ref 67–88)
SARS-COV-2 RNA SPEC QL NAA+PROBE: SIGNIFICANT CHANGE UP
SODIUM SERPL-SCNC: 145 MMOL/L — SIGNIFICANT CHANGE UP (ref 135–146)
SP GR SPEC: >1.03 — HIGH (ref 1–1.03)
SQUAMOUS # UR AUTO: 2 /HPF — SIGNIFICANT CHANGE UP (ref 0–5)
UROBILINOGEN FLD QL: 1 MG/DL — SIGNIFICANT CHANGE UP (ref 0.2–1)
WBC # BLD: 13.94 K/UL — HIGH (ref 4.8–10.8)
WBC # FLD AUTO: 13.94 K/UL — HIGH (ref 4.8–10.8)
WBC UR QL: 6 /HPF — HIGH (ref 0–5)

## 2024-05-20 PROCEDURE — 80164 ASSAY DIPROPYLACETIC ACD TOT: CPT

## 2024-05-20 PROCEDURE — 92610 EVALUATE SWALLOWING FUNCTION: CPT | Mod: GN

## 2024-05-20 PROCEDURE — 87449 NOS EACH ORGANISM AG IA: CPT

## 2024-05-20 PROCEDURE — 83010 ASSAY OF HAPTOGLOBIN QUANT: CPT

## 2024-05-20 PROCEDURE — 93010 ELECTROCARDIOGRAM REPORT: CPT

## 2024-05-20 PROCEDURE — 85379 FIBRIN DEGRADATION QUANT: CPT

## 2024-05-20 PROCEDURE — 85610 PROTHROMBIN TIME: CPT

## 2024-05-20 PROCEDURE — 87899 AGENT NOS ASSAY W/OPTIC: CPT

## 2024-05-20 PROCEDURE — 84100 ASSAY OF PHOSPHORUS: CPT

## 2024-05-20 PROCEDURE — 0241U: CPT

## 2024-05-20 PROCEDURE — 71275 CT ANGIOGRAPHY CHEST: CPT | Mod: MC

## 2024-05-20 PROCEDURE — 85018 HEMOGLOBIN: CPT

## 2024-05-20 PROCEDURE — 88312 SPECIAL STAINS GROUP 1: CPT

## 2024-05-20 PROCEDURE — 82803 BLOOD GASES ANY COMBINATION: CPT

## 2024-05-20 PROCEDURE — 92526 ORAL FUNCTION THERAPY: CPT | Mod: GN

## 2024-05-20 PROCEDURE — 83550 IRON BINDING TEST: CPT

## 2024-05-20 PROCEDURE — 82746 ASSAY OF FOLIC ACID SERUM: CPT

## 2024-05-20 PROCEDURE — 99291 CRITICAL CARE FIRST HOUR: CPT

## 2024-05-20 PROCEDURE — 36430 TRANSFUSION BLD/BLD COMPNT: CPT

## 2024-05-20 PROCEDURE — 85045 AUTOMATED RETICULOCYTE COUNT: CPT

## 2024-05-20 PROCEDURE — 86923 COMPATIBILITY TEST ELECTRIC: CPT

## 2024-05-20 PROCEDURE — 71045 X-RAY EXAM CHEST 1 VIEW: CPT

## 2024-05-20 PROCEDURE — 84132 ASSAY OF SERUM POTASSIUM: CPT

## 2024-05-20 PROCEDURE — 83036 HEMOGLOBIN GLYCOSYLATED A1C: CPT

## 2024-05-20 PROCEDURE — 85027 COMPLETE CBC AUTOMATED: CPT

## 2024-05-20 PROCEDURE — 83735 ASSAY OF MAGNESIUM: CPT

## 2024-05-20 PROCEDURE — P9047: CPT

## 2024-05-20 PROCEDURE — 82607 VITAMIN B-12: CPT

## 2024-05-20 PROCEDURE — 85652 RBC SED RATE AUTOMATED: CPT

## 2024-05-20 PROCEDURE — 86900 BLOOD TYPING SEROLOGIC ABO: CPT

## 2024-05-20 PROCEDURE — 83605 ASSAY OF LACTIC ACID: CPT

## 2024-05-20 PROCEDURE — 85730 THROMBOPLASTIN TIME PARTIAL: CPT

## 2024-05-20 PROCEDURE — 87640 STAPH A DNA AMP PROBE: CPT

## 2024-05-20 PROCEDURE — 86140 C-REACTIVE PROTEIN: CPT

## 2024-05-20 PROCEDURE — 85025 COMPLETE CBC W/AUTO DIFF WBC: CPT

## 2024-05-20 PROCEDURE — 83540 ASSAY OF IRON: CPT

## 2024-05-20 PROCEDURE — P9016: CPT

## 2024-05-20 PROCEDURE — 86901 BLOOD TYPING SEROLOGIC RH(D): CPT

## 2024-05-20 PROCEDURE — 80048 BASIC METABOLIC PNL TOTAL CA: CPT

## 2024-05-20 PROCEDURE — 82962 GLUCOSE BLOOD TEST: CPT

## 2024-05-20 PROCEDURE — 80053 COMPREHEN METABOLIC PANEL: CPT

## 2024-05-20 PROCEDURE — 71045 X-RAY EXAM CHEST 1 VIEW: CPT | Mod: 26

## 2024-05-20 PROCEDURE — 86850 RBC ANTIBODY SCREEN: CPT

## 2024-05-20 PROCEDURE — 80177 DRUG SCRN QUAN LEVETIRACETAM: CPT

## 2024-05-20 PROCEDURE — 87641 MR-STAPH DNA AMP PROBE: CPT

## 2024-05-20 PROCEDURE — 87040 BLOOD CULTURE FOR BACTERIA: CPT

## 2024-05-20 PROCEDURE — 83615 LACTATE (LD) (LDH) ENZYME: CPT

## 2024-05-20 PROCEDURE — 93005 ELECTROCARDIOGRAM TRACING: CPT

## 2024-05-20 PROCEDURE — 81001 URINALYSIS AUTO W/SCOPE: CPT

## 2024-05-20 PROCEDURE — 85014 HEMATOCRIT: CPT

## 2024-05-20 PROCEDURE — 84295 ASSAY OF SERUM SODIUM: CPT

## 2024-05-20 PROCEDURE — 95819 EEG AWAKE AND ASLEEP: CPT

## 2024-05-20 PROCEDURE — 80061 LIPID PANEL: CPT

## 2024-05-20 PROCEDURE — 94640 AIRWAY INHALATION TREATMENT: CPT

## 2024-05-20 PROCEDURE — 82330 ASSAY OF CALCIUM: CPT

## 2024-05-20 PROCEDURE — 82728 ASSAY OF FERRITIN: CPT

## 2024-05-20 PROCEDURE — 88305 TISSUE EXAM BY PATHOLOGIST: CPT

## 2024-05-20 PROCEDURE — 36415 COLL VENOUS BLD VENIPUNCTURE: CPT

## 2024-05-20 PROCEDURE — L8699: CPT

## 2024-05-20 PROCEDURE — 0225U NFCT DS DNA&RNA 21 SARSCOV2: CPT

## 2024-05-20 PROCEDURE — 84145 PROCALCITONIN (PCT): CPT

## 2024-05-20 RX ORDER — CEFEPIME 1 G/1
2000 INJECTION, POWDER, FOR SOLUTION INTRAMUSCULAR; INTRAVENOUS ONCE
Refills: 0 | Status: COMPLETED | OUTPATIENT
Start: 2024-05-20 | End: 2024-05-20

## 2024-05-20 RX ORDER — DEXTROSE MONOHYDRATE AND SODIUM CHLORIDE 5; .3 G/100ML; G/100ML
1000 INJECTION, SOLUTION INTRAVENOUS ONCE
Refills: 0 | Status: COMPLETED | OUTPATIENT
Start: 2024-05-20 | End: 2024-05-20

## 2024-05-20 RX ORDER — ACETAMINOPHEN 325 MG
650 TABLET ORAL ONCE
Refills: 0 | Status: COMPLETED | OUTPATIENT
Start: 2024-05-20 | End: 2024-05-20

## 2024-05-20 RX ORDER — VANCOMYCIN HYDROCHLORIDE 50 MG/ML
1000 KIT ORAL ONCE
Refills: 0 | Status: COMPLETED | OUTPATIENT
Start: 2024-05-20 | End: 2024-05-20

## 2024-05-20 RX ADMIN — DEXTROSE MONOHYDRATE AND SODIUM CHLORIDE 1000 MILLILITER(S): 5; .3 INJECTION, SOLUTION INTRAVENOUS at 20:15

## 2024-05-20 RX ADMIN — Medication 650 MILLIGRAM(S): at 19:07

## 2024-05-20 RX ADMIN — VANCOMYCIN HYDROCHLORIDE 250 MILLIGRAM(S): KIT at 19:06

## 2024-05-20 RX ADMIN — CEFEPIME 100 MILLIGRAM(S): 1 INJECTION, POWDER, FOR SOLUTION INTRAMUSCULAR; INTRAVENOUS at 19:06

## 2024-05-20 RX ADMIN — DEXTROSE MONOHYDRATE AND SODIUM CHLORIDE 1000 MILLILITER(S): 5; .3 INJECTION, SOLUTION INTRAVENOUS at 20:05

## 2024-05-20 RX ADMIN — DEXTROSE MONOHYDRATE AND SODIUM CHLORIDE 1000 MILLILITER(S): 5; .3 INJECTION, SOLUTION INTRAVENOUS at 21:10

## 2024-05-20 RX ADMIN — DEXTROSE MONOHYDRATE AND SODIUM CHLORIDE 1000 MILLILITER(S): 5; .3 INJECTION, SOLUTION INTRAVENOUS at 19:06

## 2024-05-21 LAB
-  STAPHYLOCOCCUS EPIDERMIDIS, METHICILLIN RESISTANT: SIGNIFICANT CHANGE UP
A1C WITH ESTIMATED AVERAGE GLUCOSE RESULT: 6.4 % — HIGH (ref 4–5.6)
ALBUMIN SERPL ELPH-MCNC: 2.8 G/DL — LOW (ref 3.5–5.2)
ALP SERPL-CCNC: 90 U/L — SIGNIFICANT CHANGE UP (ref 30–115)
ALT FLD-CCNC: 10 U/L — SIGNIFICANT CHANGE UP (ref 0–41)
ANION GAP SERPL CALC-SCNC: 11 MMOL/L — SIGNIFICANT CHANGE UP (ref 7–14)
APTT BLD: 37.6 SEC — SIGNIFICANT CHANGE UP (ref 27–39.2)
AST SERPL-CCNC: 14 U/L — SIGNIFICANT CHANGE UP (ref 0–41)
BASE EXCESS BLDV CALC-SCNC: 6.7 MMOL/L — HIGH (ref -2–3)
BASOPHILS # BLD AUTO: 0.01 K/UL — SIGNIFICANT CHANGE UP (ref 0–0.2)
BASOPHILS NFR BLD AUTO: 0.1 % — SIGNIFICANT CHANGE UP (ref 0–1)
BILIRUB SERPL-MCNC: 0.3 MG/DL — SIGNIFICANT CHANGE UP (ref 0.2–1.2)
BUN SERPL-MCNC: 14 MG/DL — SIGNIFICANT CHANGE UP (ref 10–20)
CA-I SERPL-SCNC: 1.32 MMOL/L — SIGNIFICANT CHANGE UP (ref 1.15–1.33)
CALCIUM SERPL-MCNC: 9.9 MG/DL — SIGNIFICANT CHANGE UP (ref 8.4–10.4)
CHLORIDE SERPL-SCNC: 106 MMOL/L — SIGNIFICANT CHANGE UP (ref 98–110)
CHOLEST SERPL-MCNC: 135 MG/DL — SIGNIFICANT CHANGE UP
CO2 SERPL-SCNC: 30 MMOL/L — SIGNIFICANT CHANGE UP (ref 17–32)
CREAT SERPL-MCNC: 0.6 MG/DL — LOW (ref 0.7–1.5)
EGFR: 106 ML/MIN/1.73M2 — SIGNIFICANT CHANGE UP
EOSINOPHIL # BLD AUTO: 0.03 K/UL — SIGNIFICANT CHANGE UP (ref 0–0.7)
EOSINOPHIL NFR BLD AUTO: 0.4 % — SIGNIFICANT CHANGE UP (ref 0–8)
ESTIMATED AVERAGE GLUCOSE: 137 MG/DL — HIGH (ref 68–114)
FLUAV AG NPH QL: SIGNIFICANT CHANGE UP
FLUBV AG NPH QL: SIGNIFICANT CHANGE UP
GAS PNL BLDV: 137 MMOL/L — SIGNIFICANT CHANGE UP (ref 136–145)
GAS PNL BLDV: SIGNIFICANT CHANGE UP
GLUCOSE SERPL-MCNC: 99 MG/DL — SIGNIFICANT CHANGE UP (ref 70–99)
GRAM STN FLD: ABNORMAL
HCO3 BLDV-SCNC: 34 MMOL/L — HIGH (ref 22–29)
HCT VFR BLD CALC: 32.5 % — LOW (ref 42–52)
HCT VFR BLDA CALC: 44 % — SIGNIFICANT CHANGE UP (ref 39–51)
HDLC SERPL-MCNC: 47 MG/DL — SIGNIFICANT CHANGE UP
HGB BLD CALC-MCNC: 14.7 G/DL — SIGNIFICANT CHANGE UP (ref 12.6–17.4)
HGB BLD-MCNC: 10.3 G/DL — LOW (ref 14–18)
IMM GRANULOCYTES NFR BLD AUTO: 0.1 % — SIGNIFICANT CHANGE UP (ref 0.1–0.3)
INR BLD: 1.26 RATIO — SIGNIFICANT CHANGE UP (ref 0.65–1.3)
LACTATE BLDV-MCNC: 2.5 MMOL/L — HIGH (ref 0.5–2)
LACTATE SERPL-SCNC: 2.3 MMOL/L — HIGH (ref 0.7–2)
LIPID PNL WITH DIRECT LDL SERPL: 74 MG/DL — SIGNIFICANT CHANGE UP
LYMPHOCYTES # BLD AUTO: 0.83 K/UL — LOW (ref 1.2–3.4)
LYMPHOCYTES # BLD AUTO: 10 % — LOW (ref 20.5–51.1)
MAGNESIUM SERPL-MCNC: 1.9 MG/DL — SIGNIFICANT CHANGE UP (ref 1.8–2.4)
MCHC RBC-ENTMCNC: 31 PG — SIGNIFICANT CHANGE UP (ref 27–31)
MCHC RBC-ENTMCNC: 31.7 G/DL — LOW (ref 32–37)
MCV RBC AUTO: 97.9 FL — HIGH (ref 80–94)
METHOD TYPE: SIGNIFICANT CHANGE UP
MONOCYTES # BLD AUTO: 0.66 K/UL — HIGH (ref 0.1–0.6)
MONOCYTES NFR BLD AUTO: 7.9 % — SIGNIFICANT CHANGE UP (ref 1.7–9.3)
NEUTROPHILS # BLD AUTO: 6.8 K/UL — HIGH (ref 1.4–6.5)
NEUTROPHILS NFR BLD AUTO: 81.5 % — HIGH (ref 42.2–75.2)
NON HDL CHOLESTEROL: 88 MG/DL — SIGNIFICANT CHANGE UP
NRBC # BLD: 0 /100 WBCS — SIGNIFICANT CHANGE UP (ref 0–0)
PCO2 BLDV: 59 MMHG — HIGH (ref 42–55)
PH BLDV: 7.37 — SIGNIFICANT CHANGE UP (ref 7.32–7.43)
PLATELET # BLD AUTO: 384 K/UL — SIGNIFICANT CHANGE UP (ref 130–400)
PMV BLD: 9.7 FL — SIGNIFICANT CHANGE UP (ref 7.4–10.4)
PO2 BLDV: 21 MMHG — LOW (ref 25–45)
POTASSIUM BLDV-SCNC: 3.7 MMOL/L — SIGNIFICANT CHANGE UP (ref 3.5–5.1)
POTASSIUM SERPL-MCNC: 4.1 MMOL/L — SIGNIFICANT CHANGE UP (ref 3.5–5)
POTASSIUM SERPL-SCNC: 4.1 MMOL/L — SIGNIFICANT CHANGE UP (ref 3.5–5)
PROCALCITONIN SERPL-MCNC: 0.44 NG/ML — HIGH (ref 0.02–0.1)
PROT SERPL-MCNC: 5.9 G/DL — LOW (ref 6–8)
PROTHROM AB SERPL-ACNC: 14.4 SEC — HIGH (ref 9.95–12.87)
RAPID RVP RESULT: SIGNIFICANT CHANGE UP
RBC # BLD: 3.32 M/UL — LOW (ref 4.7–6.1)
RBC # FLD: 13.1 % — SIGNIFICANT CHANGE UP (ref 11.5–14.5)
RSV RNA NPH QL NAA+NON-PROBE: SIGNIFICANT CHANGE UP
SAO2 % BLDV: 30.8 % — LOW (ref 67–88)
SARS-COV-2 RNA SPEC QL NAA+PROBE: SIGNIFICANT CHANGE UP
SARS-COV-2 RNA SPEC QL NAA+PROBE: SIGNIFICANT CHANGE UP
SODIUM SERPL-SCNC: 147 MMOL/L — HIGH (ref 135–146)
SPECIMEN SOURCE: SIGNIFICANT CHANGE UP
TRIGL SERPL-MCNC: 69 MG/DL — SIGNIFICANT CHANGE UP
VALPROATE SERPL-MCNC: 55 UG/ML — SIGNIFICANT CHANGE UP (ref 50–100)
WBC # BLD: 8.34 K/UL — SIGNIFICANT CHANGE UP (ref 4.8–10.8)
WBC # FLD AUTO: 8.34 K/UL — SIGNIFICANT CHANGE UP (ref 4.8–10.8)

## 2024-05-21 PROCEDURE — 93010 ELECTROCARDIOGRAM REPORT: CPT

## 2024-05-21 PROCEDURE — 99223 1ST HOSP IP/OBS HIGH 75: CPT

## 2024-05-21 PROCEDURE — 71045 X-RAY EXAM CHEST 1 VIEW: CPT | Mod: 26

## 2024-05-21 RX ORDER — DEXTROSE MONOHYDRATE AND SODIUM CHLORIDE 5; .3 G/100ML; G/100ML
1000 INJECTION, SOLUTION INTRAVENOUS
Refills: 0 | Status: DISCONTINUED | OUTPATIENT
Start: 2024-05-21 | End: 2024-05-25

## 2024-05-21 RX ORDER — CEFTRIAXONE SODIUM 500 MG
1000 VIAL (EA) INJECTION EVERY 24 HOURS
Refills: 0 | Status: COMPLETED | OUTPATIENT
Start: 2024-05-21 | End: 2024-05-27

## 2024-05-21 RX ORDER — DOXYCYCLINE 100 MG/1
100 CAPSULE ORAL EVERY 12 HOURS
Refills: 0 | Status: DISCONTINUED | OUTPATIENT
Start: 2024-05-21 | End: 2024-05-27

## 2024-05-21 RX ORDER — DEXTROSE MONOHYDRATE AND SODIUM CHLORIDE 5; .3 G/100ML; G/100ML
1000 INJECTION, SOLUTION INTRAVENOUS
Refills: 0 | Status: DISCONTINUED | OUTPATIENT
Start: 2024-05-21 | End: 2024-05-21

## 2024-05-21 RX ORDER — AMLODIPINE BESYLATE 2.5 MG/1
5 TABLET ORAL DAILY
Refills: 0 | Status: DISCONTINUED | OUTPATIENT
Start: 2024-05-21 | End: 2024-06-04

## 2024-05-21 RX ORDER — LEVETIRACETAM 100 MG/ML
750 INJECTION INTRAVENOUS EVERY 12 HOURS
Refills: 0 | Status: DISCONTINUED | OUTPATIENT
Start: 2024-05-21 | End: 2024-06-03

## 2024-05-21 RX ORDER — AMPICILLIN AND SULBACTAM 2; 1 G/20ML; G/20ML
3 INJECTION, POWDER, FOR SOLUTION INTRAMUSCULAR; INTRAVENOUS EVERY 6 HOURS
Refills: 0 | Status: DISCONTINUED | OUTPATIENT
Start: 2024-05-21 | End: 2024-05-21

## 2024-05-21 RX ORDER — ENOXAPARIN SODIUM 100 MG/ML
40 INJECTION SUBCUTANEOUS EVERY 24 HOURS
Refills: 0 | Status: DISCONTINUED | OUTPATIENT
Start: 2024-05-21 | End: 2024-06-04

## 2024-05-21 RX ORDER — VALPROIC ACID 250 MG/5ML
500 SOLUTION ORAL EVERY 12 HOURS
Refills: 0 | Status: DISCONTINUED | OUTPATIENT
Start: 2024-05-21 | End: 2024-05-28

## 2024-05-21 RX ORDER — ACETAMINOPHEN 325 MG
650 TABLET ORAL EVERY 6 HOURS
Refills: 0 | Status: DISCONTINUED | OUTPATIENT
Start: 2024-05-21 | End: 2024-05-28

## 2024-05-21 RX ADMIN — VALPROIC ACID 50 MILLIGRAM(S): 250 SOLUTION ORAL at 02:50

## 2024-05-21 RX ADMIN — LEVETIRACETAM 400 MILLIGRAM(S): 100 INJECTION INTRAVENOUS at 02:50

## 2024-05-21 RX ADMIN — DOXYCYCLINE 100 MILLIGRAM(S): 100 CAPSULE ORAL at 17:39

## 2024-05-21 RX ADMIN — ENOXAPARIN SODIUM 40 MILLIGRAM(S): 100 INJECTION SUBCUTANEOUS at 11:27

## 2024-05-21 RX ADMIN — DOXYCYCLINE 100 MILLIGRAM(S): 100 CAPSULE ORAL at 06:08

## 2024-05-21 RX ADMIN — Medication 100 MILLIGRAM(S): at 11:27

## 2024-05-21 RX ADMIN — DEXTROSE MONOHYDRATE AND SODIUM CHLORIDE 75 MILLILITER(S): 5; .3 INJECTION, SOLUTION INTRAVENOUS at 13:54

## 2024-05-21 RX ADMIN — DEXTROSE MONOHYDRATE AND SODIUM CHLORIDE 75 MILLILITER(S): 5; .3 INJECTION, SOLUTION INTRAVENOUS at 06:00

## 2024-05-21 RX ADMIN — AMLODIPINE BESYLATE 5 MILLIGRAM(S): 2.5 TABLET ORAL at 06:08

## 2024-05-21 RX ADMIN — VALPROIC ACID 50 MILLIGRAM(S): 250 SOLUTION ORAL at 17:39

## 2024-05-21 RX ADMIN — DEXTROSE MONOHYDRATE AND SODIUM CHLORIDE 75 MILLILITER(S): 5; .3 INJECTION, SOLUTION INTRAVENOUS at 11:25

## 2024-05-21 RX ADMIN — LEVETIRACETAM 400 MILLIGRAM(S): 100 INJECTION INTRAVENOUS at 17:29

## 2024-05-21 RX ADMIN — Medication 1 APPLICATION(S): at 11:34

## 2024-05-22 DIAGNOSIS — A41.9 SEPSIS, UNSPECIFIED ORGANISM: ICD-10-CM

## 2024-05-22 DIAGNOSIS — Z51.5 ENCOUNTER FOR PALLIATIVE CARE: ICD-10-CM

## 2024-05-22 DIAGNOSIS — F03.90 UNSPECIFIED DEMENTIA, UNSPECIFIED SEVERITY, WITHOUT BEHAVIORAL DISTURBANCE, PSYCHOTIC DISTURBANCE, MOOD DISTURBANCE, AND ANXIETY: ICD-10-CM

## 2024-05-22 LAB
ANION GAP SERPL CALC-SCNC: 11 MMOL/L — SIGNIFICANT CHANGE UP (ref 7–14)
BASE EXCESS BLDA CALC-SCNC: 8.8 MMOL/L — HIGH (ref -2–3)
BUN SERPL-MCNC: 9 MG/DL — LOW (ref 10–20)
CALCIUM SERPL-MCNC: 9.3 MG/DL — SIGNIFICANT CHANGE UP (ref 8.4–10.5)
CHLORIDE SERPL-SCNC: 106 MMOL/L — SIGNIFICANT CHANGE UP (ref 98–110)
CO2 SERPL-SCNC: 25 MMOL/L — SIGNIFICANT CHANGE UP (ref 17–32)
CREAT SERPL-MCNC: <0.5 MG/DL — LOW (ref 0.7–1.5)
CULTURE RESULTS: ABNORMAL
EGFR: 120 ML/MIN/1.73M2 — SIGNIFICANT CHANGE UP
GAS PNL BLDA: SIGNIFICANT CHANGE UP
GLUCOSE SERPL-MCNC: 103 MG/DL — HIGH (ref 70–99)
GRAM STN FLD: ABNORMAL
HCO3 BLDA-SCNC: 33 MMOL/L — HIGH (ref 21–28)
HCT VFR BLD CALC: 29 % — LOW (ref 42–52)
HGB BLD-MCNC: 9.5 G/DL — LOW (ref 14–18)
MAGNESIUM SERPL-MCNC: 1.6 MG/DL — LOW (ref 1.8–2.4)
MCHC RBC-ENTMCNC: 31.3 PG — HIGH (ref 27–31)
MCHC RBC-ENTMCNC: 32.8 G/DL — SIGNIFICANT CHANGE UP (ref 32–37)
MCV RBC AUTO: 95.4 FL — HIGH (ref 80–94)
NRBC # BLD: 0 /100 WBCS — SIGNIFICANT CHANGE UP (ref 0–0)
ORGANISM # SPEC MICROSCOPIC CNT: ABNORMAL
ORGANISM # SPEC MICROSCOPIC CNT: SIGNIFICANT CHANGE UP
PCO2 BLDA: 41 MMHG — SIGNIFICANT CHANGE UP (ref 35–48)
PH BLDA: 7.51 — HIGH (ref 7.35–7.45)
PLATELET # BLD AUTO: 395 K/UL — SIGNIFICANT CHANGE UP (ref 130–400)
PMV BLD: 9.9 FL — SIGNIFICANT CHANGE UP (ref 7.4–10.4)
PO2 BLDA: 79 MMHG — LOW (ref 83–108)
POTASSIUM SERPL-MCNC: 3.5 MMOL/L — SIGNIFICANT CHANGE UP (ref 3.5–5)
POTASSIUM SERPL-SCNC: 3.5 MMOL/L — SIGNIFICANT CHANGE UP (ref 3.5–5)
RBC # BLD: 3.04 M/UL — LOW (ref 4.7–6.1)
RBC # FLD: 12.7 % — SIGNIFICANT CHANGE UP (ref 11.5–14.5)
SAO2 % BLDA: 99 % — HIGH (ref 94–98)
SODIUM SERPL-SCNC: 142 MMOL/L — SIGNIFICANT CHANGE UP (ref 135–146)
SPECIMEN SOURCE: SIGNIFICANT CHANGE UP
WBC # BLD: 7.85 K/UL — SIGNIFICANT CHANGE UP (ref 4.8–10.8)
WBC # FLD AUTO: 7.85 K/UL — SIGNIFICANT CHANGE UP (ref 4.8–10.8)

## 2024-05-22 PROCEDURE — 99253 IP/OBS CNSLTJ NEW/EST LOW 45: CPT

## 2024-05-22 PROCEDURE — 99232 SBSQ HOSP IP/OBS MODERATE 35: CPT

## 2024-05-22 RX ORDER — MAGNESIUM SULFATE 100 %
2 POWDER (GRAM) MISCELLANEOUS ONCE
Refills: 0 | Status: COMPLETED | OUTPATIENT
Start: 2024-05-22 | End: 2024-05-22

## 2024-05-22 RX ADMIN — DOXYCYCLINE 100 MILLIGRAM(S): 100 CAPSULE ORAL at 05:17

## 2024-05-22 RX ADMIN — AMLODIPINE BESYLATE 5 MILLIGRAM(S): 2.5 TABLET ORAL at 05:16

## 2024-05-22 RX ADMIN — VALPROIC ACID 50 MILLIGRAM(S): 250 SOLUTION ORAL at 05:56

## 2024-05-22 RX ADMIN — VALPROIC ACID 50 MILLIGRAM(S): 250 SOLUTION ORAL at 17:30

## 2024-05-22 RX ADMIN — LEVETIRACETAM 400 MILLIGRAM(S): 100 INJECTION INTRAVENOUS at 17:30

## 2024-05-22 RX ADMIN — Medication 100 MILLIGRAM(S): at 11:18

## 2024-05-22 RX ADMIN — LEVETIRACETAM 400 MILLIGRAM(S): 100 INJECTION INTRAVENOUS at 05:56

## 2024-05-22 RX ADMIN — ENOXAPARIN SODIUM 40 MILLIGRAM(S): 100 INJECTION SUBCUTANEOUS at 11:17

## 2024-05-22 RX ADMIN — Medication 1 APPLICATION(S): at 11:24

## 2024-05-22 RX ADMIN — DOXYCYCLINE 100 MILLIGRAM(S): 100 CAPSULE ORAL at 22:05

## 2024-05-22 RX ADMIN — DEXTROSE MONOHYDRATE AND SODIUM CHLORIDE 75 MILLILITER(S): 5; .3 INJECTION, SOLUTION INTRAVENOUS at 21:17

## 2024-05-23 LAB
ALBUMIN SERPL ELPH-MCNC: 2.6 G/DL — LOW (ref 3.5–5.2)
ALP SERPL-CCNC: 90 U/L — SIGNIFICANT CHANGE UP (ref 30–115)
ALT FLD-CCNC: 8 U/L — SIGNIFICANT CHANGE UP (ref 0–41)
ANION GAP SERPL CALC-SCNC: 8 MMOL/L — SIGNIFICANT CHANGE UP (ref 7–14)
ANION GAP SERPL CALC-SCNC: 9 MMOL/L — SIGNIFICANT CHANGE UP (ref 7–14)
AST SERPL-CCNC: 14 U/L — SIGNIFICANT CHANGE UP (ref 0–41)
BASOPHILS # BLD AUTO: 0.03 K/UL — SIGNIFICANT CHANGE UP (ref 0–0.2)
BASOPHILS NFR BLD AUTO: 0.4 % — SIGNIFICANT CHANGE UP (ref 0–1)
BILIRUB SERPL-MCNC: <0.2 MG/DL — SIGNIFICANT CHANGE UP (ref 0.2–1.2)
BUN SERPL-MCNC: 7 MG/DL — LOW (ref 10–20)
BUN SERPL-MCNC: 7 MG/DL — LOW (ref 10–20)
CALCIUM SERPL-MCNC: 9.1 MG/DL — SIGNIFICANT CHANGE UP (ref 8.4–10.5)
CALCIUM SERPL-MCNC: 9.2 MG/DL — SIGNIFICANT CHANGE UP (ref 8.4–10.5)
CHLORIDE SERPL-SCNC: 101 MMOL/L — SIGNIFICANT CHANGE UP (ref 98–110)
CHLORIDE SERPL-SCNC: 103 MMOL/L — SIGNIFICANT CHANGE UP (ref 98–110)
CO2 SERPL-SCNC: 26 MMOL/L — SIGNIFICANT CHANGE UP (ref 17–32)
CO2 SERPL-SCNC: 28 MMOL/L — SIGNIFICANT CHANGE UP (ref 17–32)
CREAT SERPL-MCNC: 0.5 MG/DL — LOW (ref 0.7–1.5)
CREAT SERPL-MCNC: <0.5 MG/DL — LOW (ref 0.7–1.5)
EGFR: 112 ML/MIN/1.73M2 — SIGNIFICANT CHANGE UP
EGFR: 120 ML/MIN/1.73M2 — SIGNIFICANT CHANGE UP
EOSINOPHIL # BLD AUTO: 0.38 K/UL — SIGNIFICANT CHANGE UP (ref 0–0.7)
EOSINOPHIL NFR BLD AUTO: 4.9 % — SIGNIFICANT CHANGE UP (ref 0–8)
GLUCOSE SERPL-MCNC: 109 MG/DL — HIGH (ref 70–99)
GLUCOSE SERPL-MCNC: 113 MG/DL — HIGH (ref 70–99)
HCT VFR BLD CALC: 30.9 % — LOW (ref 42–52)
HCT VFR BLD CALC: 32.5 % — LOW (ref 42–52)
HGB BLD-MCNC: 10.1 G/DL — LOW (ref 14–18)
HGB BLD-MCNC: 10.7 G/DL — LOW (ref 14–18)
IMM GRANULOCYTES NFR BLD AUTO: 1.5 % — HIGH (ref 0.1–0.3)
IRON SATN MFR SERPL: 26 % — SIGNIFICANT CHANGE UP (ref 15–50)
IRON SATN MFR SERPL: 28 UG/DL — LOW (ref 35–150)
LDH SERPL L TO P-CCNC: 192 U/L — SIGNIFICANT CHANGE UP (ref 50–242)
LYMPHOCYTES # BLD AUTO: 1.43 K/UL — SIGNIFICANT CHANGE UP (ref 1.2–3.4)
LYMPHOCYTES # BLD AUTO: 18.4 % — LOW (ref 20.5–51.1)
MAGNESIUM SERPL-MCNC: 2.1 MG/DL — SIGNIFICANT CHANGE UP (ref 1.8–2.4)
MCHC RBC-ENTMCNC: 31.3 PG — HIGH (ref 27–31)
MCHC RBC-ENTMCNC: 31.3 PG — HIGH (ref 27–31)
MCHC RBC-ENTMCNC: 32.7 G/DL — SIGNIFICANT CHANGE UP (ref 32–37)
MCHC RBC-ENTMCNC: 32.9 G/DL — SIGNIFICANT CHANGE UP (ref 32–37)
MCV RBC AUTO: 95 FL — HIGH (ref 80–94)
MCV RBC AUTO: 95.7 FL — HIGH (ref 80–94)
MONOCYTES # BLD AUTO: 0.56 K/UL — SIGNIFICANT CHANGE UP (ref 0.1–0.6)
MONOCYTES NFR BLD AUTO: 7.2 % — SIGNIFICANT CHANGE UP (ref 1.7–9.3)
MRSA PCR RESULT.: NEGATIVE — SIGNIFICANT CHANGE UP
NEUTROPHILS # BLD AUTO: 5.27 K/UL — SIGNIFICANT CHANGE UP (ref 1.4–6.5)
NEUTROPHILS NFR BLD AUTO: 67.6 % — SIGNIFICANT CHANGE UP (ref 42.2–75.2)
NRBC # BLD: 0 /100 WBCS — SIGNIFICANT CHANGE UP (ref 0–0)
NRBC # BLD: 0 /100 WBCS — SIGNIFICANT CHANGE UP (ref 0–0)
PLATELET # BLD AUTO: 304 K/UL — SIGNIFICANT CHANGE UP (ref 130–400)
PLATELET # BLD AUTO: 418 K/UL — HIGH (ref 130–400)
PMV BLD: 10.8 FL — HIGH (ref 7.4–10.4)
PMV BLD: 9.9 FL — SIGNIFICANT CHANGE UP (ref 7.4–10.4)
POTASSIUM SERPL-MCNC: 3.4 MMOL/L — LOW (ref 3.5–5)
POTASSIUM SERPL-MCNC: 3.4 MMOL/L — LOW (ref 3.5–5)
POTASSIUM SERPL-SCNC: 3.4 MMOL/L — LOW (ref 3.5–5)
POTASSIUM SERPL-SCNC: 3.4 MMOL/L — LOW (ref 3.5–5)
PROT SERPL-MCNC: 5.5 G/DL — LOW (ref 6–8)
RBC # BLD: 3.23 M/UL — LOW (ref 4.7–6.1)
RBC # BLD: 3.42 M/UL — LOW (ref 4.7–6.1)
RBC # BLD: 3.42 M/UL — LOW (ref 4.7–6.1)
RBC # FLD: 12.6 % — SIGNIFICANT CHANGE UP (ref 11.5–14.5)
RBC # FLD: 12.7 % — SIGNIFICANT CHANGE UP (ref 11.5–14.5)
RETICS #: 72.2 K/UL — SIGNIFICANT CHANGE UP (ref 25–125)
RETICS/RBC NFR: 2.1 % — HIGH (ref 0.5–1.5)
SODIUM SERPL-SCNC: 136 MMOL/L — SIGNIFICANT CHANGE UP (ref 135–146)
SODIUM SERPL-SCNC: 139 MMOL/L — SIGNIFICANT CHANGE UP (ref 135–146)
TIBC SERPL-MCNC: 108 UG/DL — LOW (ref 220–430)
UIBC SERPL-MCNC: 80 UG/DL — LOW (ref 110–370)
VALPROATE SERPL-MCNC: 58 UG/ML — SIGNIFICANT CHANGE UP (ref 50–100)
WBC # BLD: 7.79 K/UL — SIGNIFICANT CHANGE UP (ref 4.8–10.8)
WBC # BLD: 7.83 K/UL — SIGNIFICANT CHANGE UP (ref 4.8–10.8)
WBC # FLD AUTO: 7.79 K/UL — SIGNIFICANT CHANGE UP (ref 4.8–10.8)
WBC # FLD AUTO: 7.83 K/UL — SIGNIFICANT CHANGE UP (ref 4.8–10.8)

## 2024-05-23 PROCEDURE — 99232 SBSQ HOSP IP/OBS MODERATE 35: CPT

## 2024-05-23 RX ORDER — HYDRALAZINE HYDROCHLORIDE 50 MG/1
10 TABLET ORAL ONCE
Refills: 0 | Status: COMPLETED | OUTPATIENT
Start: 2024-05-23 | End: 2024-05-23

## 2024-05-23 RX ORDER — POTASSIUM CHLORIDE 600 MG/1
40 TABLET, FILM COATED, EXTENDED RELEASE ORAL ONCE
Refills: 0 | Status: COMPLETED | OUTPATIENT
Start: 2024-05-23 | End: 2024-05-24

## 2024-05-23 RX ADMIN — VALPROIC ACID 50 MILLIGRAM(S): 250 SOLUTION ORAL at 05:39

## 2024-05-23 RX ADMIN — DOXYCYCLINE 100 MILLIGRAM(S): 100 CAPSULE ORAL at 05:24

## 2024-05-23 RX ADMIN — LEVETIRACETAM 400 MILLIGRAM(S): 100 INJECTION INTRAVENOUS at 17:01

## 2024-05-23 RX ADMIN — Medication 1 APPLICATION(S): at 11:52

## 2024-05-23 RX ADMIN — HYDRALAZINE HYDROCHLORIDE 10 MILLIGRAM(S): 50 TABLET ORAL at 21:10

## 2024-05-23 RX ADMIN — AMLODIPINE BESYLATE 5 MILLIGRAM(S): 2.5 TABLET ORAL at 05:24

## 2024-05-23 RX ADMIN — LEVETIRACETAM 400 MILLIGRAM(S): 100 INJECTION INTRAVENOUS at 05:39

## 2024-05-23 RX ADMIN — ENOXAPARIN SODIUM 40 MILLIGRAM(S): 100 INJECTION SUBCUTANEOUS at 11:53

## 2024-05-23 RX ADMIN — VALPROIC ACID 50 MILLIGRAM(S): 250 SOLUTION ORAL at 17:21

## 2024-05-23 RX ADMIN — Medication 25 GRAM(S): at 02:25

## 2024-05-23 RX ADMIN — DOXYCYCLINE 100 MILLIGRAM(S): 100 CAPSULE ORAL at 18:35

## 2024-05-23 RX ADMIN — Medication 100 MILLIGRAM(S): at 11:50

## 2024-05-23 RX ADMIN — DEXTROSE MONOHYDRATE AND SODIUM CHLORIDE 75 MILLILITER(S): 5; .3 INJECTION, SOLUTION INTRAVENOUS at 05:40

## 2024-05-24 ENCOUNTER — TRANSCRIPTION ENCOUNTER (OUTPATIENT)
Age: 64
End: 2024-05-24

## 2024-05-24 LAB
FERRITIN SERPL-MCNC: 428 NG/ML — HIGH (ref 30–400)
FOLATE SERPL-MCNC: 9.5 NG/ML — SIGNIFICANT CHANGE UP
HAPTOGLOB SERPL-MCNC: 270 MG/DL — HIGH (ref 34–200)
HCT VFR BLD CALC: 34.2 % — LOW (ref 42–52)
HGB BLD-MCNC: 10.9 G/DL — LOW (ref 14–18)
LEGIONELLA AG UR QL: NEGATIVE — SIGNIFICANT CHANGE UP
LEVETIRACETAM SERPL-MCNC: 25.1 UG/ML — SIGNIFICANT CHANGE UP (ref 10–40)
MCHC RBC-ENTMCNC: 30.4 PG — SIGNIFICANT CHANGE UP (ref 27–31)
MCHC RBC-ENTMCNC: 31.9 G/DL — LOW (ref 32–37)
MCV RBC AUTO: 95.5 FL — HIGH (ref 80–94)
NRBC # BLD: 0 /100 WBCS — SIGNIFICANT CHANGE UP (ref 0–0)
PLATELET # BLD AUTO: 388 K/UL — SIGNIFICANT CHANGE UP (ref 130–400)
PMV BLD: 10.1 FL — SIGNIFICANT CHANGE UP (ref 7.4–10.4)
RBC # BLD: 3.58 M/UL — LOW (ref 4.7–6.1)
RBC # FLD: 12.4 % — SIGNIFICANT CHANGE UP (ref 11.5–14.5)
S PNEUM AG UR QL: NEGATIVE — SIGNIFICANT CHANGE UP
VIT B12 SERPL-MCNC: 1791 PG/ML — HIGH (ref 232–1245)
WBC # BLD: 8.29 K/UL — SIGNIFICANT CHANGE UP (ref 4.8–10.8)
WBC # FLD AUTO: 8.29 K/UL — SIGNIFICANT CHANGE UP (ref 4.8–10.8)

## 2024-05-24 PROCEDURE — 99232 SBSQ HOSP IP/OBS MODERATE 35: CPT

## 2024-05-24 RX ORDER — HYDRALAZINE HYDROCHLORIDE 50 MG/1
10 TABLET ORAL ONCE
Refills: 0 | Status: DISCONTINUED | OUTPATIENT
Start: 2024-05-24 | End: 2024-05-24

## 2024-05-24 RX ORDER — POTASSIUM CHLORIDE 600 MG/1
40 TABLET, FILM COATED, EXTENDED RELEASE ORAL ONCE
Refills: 0 | Status: COMPLETED | OUTPATIENT
Start: 2024-05-24 | End: 2024-05-24

## 2024-05-24 RX ADMIN — DOXYCYCLINE 100 MILLIGRAM(S): 100 CAPSULE ORAL at 18:30

## 2024-05-24 RX ADMIN — ENOXAPARIN SODIUM 40 MILLIGRAM(S): 100 INJECTION SUBCUTANEOUS at 15:02

## 2024-05-24 RX ADMIN — Medication 1 APPLICATION(S): at 15:15

## 2024-05-24 RX ADMIN — LEVETIRACETAM 400 MILLIGRAM(S): 100 INJECTION INTRAVENOUS at 18:30

## 2024-05-24 RX ADMIN — Medication 100 MILLIGRAM(S): at 15:02

## 2024-05-24 RX ADMIN — VALPROIC ACID 50 MILLIGRAM(S): 250 SOLUTION ORAL at 18:31

## 2024-05-24 RX ADMIN — DOXYCYCLINE 100 MILLIGRAM(S): 100 CAPSULE ORAL at 05:18

## 2024-05-24 RX ADMIN — AMLODIPINE BESYLATE 5 MILLIGRAM(S): 2.5 TABLET ORAL at 05:18

## 2024-05-24 RX ADMIN — POTASSIUM CHLORIDE 40 MILLIEQUIVALENT(S): 600 TABLET, FILM COATED, EXTENDED RELEASE ORAL at 15:02

## 2024-05-24 RX ADMIN — LEVETIRACETAM 400 MILLIGRAM(S): 100 INJECTION INTRAVENOUS at 05:18

## 2024-05-24 RX ADMIN — VALPROIC ACID 50 MILLIGRAM(S): 250 SOLUTION ORAL at 05:18

## 2024-05-25 PROCEDURE — 99232 SBSQ HOSP IP/OBS MODERATE 35: CPT

## 2024-05-25 RX ADMIN — DOXYCYCLINE 100 MILLIGRAM(S): 100 CAPSULE ORAL at 17:43

## 2024-05-25 RX ADMIN — ENOXAPARIN SODIUM 40 MILLIGRAM(S): 100 INJECTION SUBCUTANEOUS at 12:58

## 2024-05-25 RX ADMIN — Medication 1 APPLICATION(S): at 13:00

## 2024-05-25 RX ADMIN — Medication 100 MILLIGRAM(S): at 12:58

## 2024-05-25 RX ADMIN — DOXYCYCLINE 100 MILLIGRAM(S): 100 CAPSULE ORAL at 05:35

## 2024-05-25 RX ADMIN — VALPROIC ACID 50 MILLIGRAM(S): 250 SOLUTION ORAL at 05:04

## 2024-05-25 RX ADMIN — LEVETIRACETAM 400 MILLIGRAM(S): 100 INJECTION INTRAVENOUS at 17:43

## 2024-05-25 RX ADMIN — LEVETIRACETAM 400 MILLIGRAM(S): 100 INJECTION INTRAVENOUS at 05:04

## 2024-05-25 RX ADMIN — VALPROIC ACID 50 MILLIGRAM(S): 250 SOLUTION ORAL at 17:43

## 2024-05-25 RX ADMIN — AMLODIPINE BESYLATE 5 MILLIGRAM(S): 2.5 TABLET ORAL at 05:04

## 2024-05-26 LAB
ANION GAP SERPL CALC-SCNC: 13 MMOL/L — SIGNIFICANT CHANGE UP (ref 7–14)
BUN SERPL-MCNC: 4 MG/DL — LOW (ref 10–20)
CALCIUM SERPL-MCNC: 9.6 MG/DL — SIGNIFICANT CHANGE UP (ref 8.4–10.5)
CHLORIDE SERPL-SCNC: 97 MMOL/L — LOW (ref 98–110)
CO2 SERPL-SCNC: 24 MMOL/L — SIGNIFICANT CHANGE UP (ref 17–32)
CREAT SERPL-MCNC: <0.5 MG/DL — LOW (ref 0.7–1.5)
CULTURE RESULTS: SIGNIFICANT CHANGE UP
EGFR: 120 ML/MIN/1.73M2 — SIGNIFICANT CHANGE UP
GLUCOSE BLDC GLUCOMTR-MCNC: 125 MG/DL — HIGH (ref 70–99)
GLUCOSE SERPL-MCNC: 102 MG/DL — HIGH (ref 70–99)
HCT VFR BLD CALC: 32.6 % — LOW (ref 42–52)
HGB BLD-MCNC: 11.1 G/DL — LOW (ref 14–18)
MCHC RBC-ENTMCNC: 31 PG — SIGNIFICANT CHANGE UP (ref 27–31)
MCHC RBC-ENTMCNC: 34 G/DL — SIGNIFICANT CHANGE UP (ref 32–37)
MCV RBC AUTO: 91.1 FL — SIGNIFICANT CHANGE UP (ref 80–94)
NRBC # BLD: 0 /100 WBCS — SIGNIFICANT CHANGE UP (ref 0–0)
PLATELET # BLD AUTO: 509 K/UL — HIGH (ref 130–400)
PMV BLD: 9.7 FL — SIGNIFICANT CHANGE UP (ref 7.4–10.4)
POTASSIUM SERPL-MCNC: 3.7 MMOL/L — SIGNIFICANT CHANGE UP (ref 3.5–5)
POTASSIUM SERPL-SCNC: 3.7 MMOL/L — SIGNIFICANT CHANGE UP (ref 3.5–5)
RBC # BLD: 3.58 M/UL — LOW (ref 4.7–6.1)
RBC # FLD: 12.3 % — SIGNIFICANT CHANGE UP (ref 11.5–14.5)
SODIUM SERPL-SCNC: 134 MMOL/L — LOW (ref 135–146)
SPECIMEN SOURCE: SIGNIFICANT CHANGE UP
WBC # BLD: 6.38 K/UL — SIGNIFICANT CHANGE UP (ref 4.8–10.8)
WBC # FLD AUTO: 6.38 K/UL — SIGNIFICANT CHANGE UP (ref 4.8–10.8)

## 2024-05-26 PROCEDURE — 99232 SBSQ HOSP IP/OBS MODERATE 35: CPT

## 2024-05-26 RX ADMIN — ENOXAPARIN SODIUM 40 MILLIGRAM(S): 100 INJECTION SUBCUTANEOUS at 11:24

## 2024-05-26 RX ADMIN — VALPROIC ACID 50 MILLIGRAM(S): 250 SOLUTION ORAL at 17:51

## 2024-05-26 RX ADMIN — Medication 100 MILLIGRAM(S): at 11:24

## 2024-05-26 RX ADMIN — VALPROIC ACID 50 MILLIGRAM(S): 250 SOLUTION ORAL at 06:51

## 2024-05-26 RX ADMIN — DOXYCYCLINE 100 MILLIGRAM(S): 100 CAPSULE ORAL at 17:51

## 2024-05-26 RX ADMIN — Medication 1 APPLICATION(S): at 11:28

## 2024-05-26 RX ADMIN — AMLODIPINE BESYLATE 5 MILLIGRAM(S): 2.5 TABLET ORAL at 05:22

## 2024-05-26 RX ADMIN — DOXYCYCLINE 100 MILLIGRAM(S): 100 CAPSULE ORAL at 05:22

## 2024-05-26 RX ADMIN — LEVETIRACETAM 400 MILLIGRAM(S): 100 INJECTION INTRAVENOUS at 06:51

## 2024-05-26 RX ADMIN — LEVETIRACETAM 400 MILLIGRAM(S): 100 INJECTION INTRAVENOUS at 17:51

## 2024-05-27 LAB
ALBUMIN SERPL ELPH-MCNC: 3 G/DL — LOW (ref 3.5–5.2)
ALP SERPL-CCNC: 111 U/L — SIGNIFICANT CHANGE UP (ref 30–115)
ALT FLD-CCNC: 12 U/L — SIGNIFICANT CHANGE UP (ref 0–41)
ANION GAP SERPL CALC-SCNC: 15 MMOL/L — HIGH (ref 7–14)
AST SERPL-CCNC: 22 U/L — SIGNIFICANT CHANGE UP (ref 0–41)
BASOPHILS # BLD AUTO: 0.02 K/UL — SIGNIFICANT CHANGE UP (ref 0–0.2)
BASOPHILS NFR BLD AUTO: 0.3 % — SIGNIFICANT CHANGE UP (ref 0–1)
BILIRUB SERPL-MCNC: 0.3 MG/DL — SIGNIFICANT CHANGE UP (ref 0.2–1.2)
BUN SERPL-MCNC: 6 MG/DL — LOW (ref 10–20)
CALCIUM SERPL-MCNC: 9.8 MG/DL — SIGNIFICANT CHANGE UP (ref 8.4–10.5)
CHLORIDE SERPL-SCNC: 96 MMOL/L — LOW (ref 98–110)
CO2 SERPL-SCNC: 24 MMOL/L — SIGNIFICANT CHANGE UP (ref 17–32)
CREAT SERPL-MCNC: 0.5 MG/DL — LOW (ref 0.7–1.5)
CULTURE RESULTS: SIGNIFICANT CHANGE UP
D DIMER BLD IA.RAPID-MCNC: 652 NG/ML DDU — HIGH
EGFR: 112 ML/MIN/1.73M2 — SIGNIFICANT CHANGE UP
EOSINOPHIL # BLD AUTO: 0.03 K/UL — SIGNIFICANT CHANGE UP (ref 0–0.7)
EOSINOPHIL NFR BLD AUTO: 0.4 % — SIGNIFICANT CHANGE UP (ref 0–8)
GLUCOSE SERPL-MCNC: 134 MG/DL — HIGH (ref 70–99)
HCT VFR BLD CALC: 34.3 % — LOW (ref 42–52)
HGB BLD-MCNC: 11.4 G/DL — LOW (ref 14–18)
IMM GRANULOCYTES NFR BLD AUTO: 1.2 % — HIGH (ref 0.1–0.3)
LEVETIRACETAM SERPL-MCNC: 27.2 UG/ML — SIGNIFICANT CHANGE UP (ref 10–40)
LYMPHOCYTES # BLD AUTO: 0.99 K/UL — LOW (ref 1.2–3.4)
LYMPHOCYTES # BLD AUTO: 13.7 % — LOW (ref 20.5–51.1)
MAGNESIUM SERPL-MCNC: 1.8 MG/DL — SIGNIFICANT CHANGE UP (ref 1.8–2.4)
MCHC RBC-ENTMCNC: 31 PG — SIGNIFICANT CHANGE UP (ref 27–31)
MCHC RBC-ENTMCNC: 33.2 G/DL — SIGNIFICANT CHANGE UP (ref 32–37)
MCV RBC AUTO: 93.2 FL — SIGNIFICANT CHANGE UP (ref 80–94)
MONOCYTES # BLD AUTO: 0.69 K/UL — HIGH (ref 0.1–0.6)
MONOCYTES NFR BLD AUTO: 9.5 % — HIGH (ref 1.7–9.3)
NEUTROPHILS # BLD AUTO: 5.41 K/UL — SIGNIFICANT CHANGE UP (ref 1.4–6.5)
NEUTROPHILS NFR BLD AUTO: 74.9 % — SIGNIFICANT CHANGE UP (ref 42.2–75.2)
NRBC # BLD: 0 /100 WBCS — SIGNIFICANT CHANGE UP (ref 0–0)
PHOSPHATE SERPL-MCNC: 2.7 MG/DL — SIGNIFICANT CHANGE UP (ref 2.1–4.9)
PLATELET # BLD AUTO: 566 K/UL — HIGH (ref 130–400)
PMV BLD: 9.4 FL — SIGNIFICANT CHANGE UP (ref 7.4–10.4)
POTASSIUM SERPL-MCNC: 3.6 MMOL/L — SIGNIFICANT CHANGE UP (ref 3.5–5)
POTASSIUM SERPL-SCNC: 3.6 MMOL/L — SIGNIFICANT CHANGE UP (ref 3.5–5)
PROT SERPL-MCNC: 6.4 G/DL — SIGNIFICANT CHANGE UP (ref 6–8)
RBC # BLD: 3.68 M/UL — LOW (ref 4.7–6.1)
RBC # FLD: 12.6 % — SIGNIFICANT CHANGE UP (ref 11.5–14.5)
SODIUM SERPL-SCNC: 135 MMOL/L — SIGNIFICANT CHANGE UP (ref 135–146)
SPECIMEN SOURCE: SIGNIFICANT CHANGE UP
WBC # BLD: 7.23 K/UL — SIGNIFICANT CHANGE UP (ref 4.8–10.8)
WBC # FLD AUTO: 7.23 K/UL — SIGNIFICANT CHANGE UP (ref 4.8–10.8)

## 2024-05-27 PROCEDURE — 99232 SBSQ HOSP IP/OBS MODERATE 35: CPT

## 2024-05-27 PROCEDURE — 93010 ELECTROCARDIOGRAM REPORT: CPT

## 2024-05-27 PROCEDURE — 71275 CT ANGIOGRAPHY CHEST: CPT | Mod: 26

## 2024-05-27 RX ORDER — HYDRALAZINE HYDROCHLORIDE 50 MG/1
50 TABLET ORAL ONCE
Refills: 0 | Status: COMPLETED | OUTPATIENT
Start: 2024-05-27 | End: 2024-05-27

## 2024-05-27 RX ORDER — DEXTROSE MONOHYDRATE AND SODIUM CHLORIDE 5; .3 G/100ML; G/100ML
1000 INJECTION, SOLUTION INTRAVENOUS
Refills: 0 | Status: DISCONTINUED | OUTPATIENT
Start: 2024-05-27 | End: 2024-05-27

## 2024-05-27 RX ORDER — DEXTROSE MONOHYDRATE AND SODIUM CHLORIDE 5; .3 G/100ML; G/100ML
500 INJECTION, SOLUTION INTRAVENOUS ONCE
Refills: 0 | Status: COMPLETED | OUTPATIENT
Start: 2024-05-27 | End: 2024-05-27

## 2024-05-27 RX ORDER — HYDRALAZINE HYDROCHLORIDE 50 MG/1
50 TABLET ORAL DAILY
Refills: 0 | Status: DISCONTINUED | OUTPATIENT
Start: 2024-05-27 | End: 2024-05-27

## 2024-05-27 RX ORDER — LABETALOL HYDROCHLORIDE 300 MG/1
5 TABLET ORAL ONCE
Refills: 0 | Status: COMPLETED | OUTPATIENT
Start: 2024-05-27 | End: 2024-05-27

## 2024-05-27 RX ORDER — DEXTROSE MONOHYDRATE, SODIUM CHLORIDE, AND POTASSIUM CHLORIDE 50; 4.5; 2.24 G/1000ML; G/1000ML; G/1000ML
1000 INJECTION, SOLUTION INTRAVENOUS
Refills: 0 | Status: DISCONTINUED | OUTPATIENT
Start: 2024-05-27 | End: 2024-05-31

## 2024-05-27 RX ADMIN — LEVETIRACETAM 400 MILLIGRAM(S): 100 INJECTION INTRAVENOUS at 06:30

## 2024-05-27 RX ADMIN — Medication 100 MILLIGRAM(S): at 12:14

## 2024-05-27 RX ADMIN — VALPROIC ACID 50 MILLIGRAM(S): 250 SOLUTION ORAL at 06:30

## 2024-05-27 RX ADMIN — ENOXAPARIN SODIUM 40 MILLIGRAM(S): 100 INJECTION SUBCUTANEOUS at 12:15

## 2024-05-27 RX ADMIN — AMLODIPINE BESYLATE 5 MILLIGRAM(S): 2.5 TABLET ORAL at 06:30

## 2024-05-27 RX ADMIN — DEXTROSE MONOHYDRATE AND SODIUM CHLORIDE 75 MILLILITER(S): 5; .3 INJECTION, SOLUTION INTRAVENOUS at 09:08

## 2024-05-27 RX ADMIN — DOXYCYCLINE 100 MILLIGRAM(S): 100 CAPSULE ORAL at 06:29

## 2024-05-27 RX ADMIN — LEVETIRACETAM 400 MILLIGRAM(S): 100 INJECTION INTRAVENOUS at 18:19

## 2024-05-27 RX ADMIN — Medication 1 APPLICATION(S): at 12:22

## 2024-05-27 RX ADMIN — VALPROIC ACID 50 MILLIGRAM(S): 250 SOLUTION ORAL at 18:21

## 2024-05-27 RX ADMIN — DEXTROSE MONOHYDRATE, SODIUM CHLORIDE, AND POTASSIUM CHLORIDE 75 MILLILITER(S): 50; 4.5; 2.24 INJECTION, SOLUTION INTRAVENOUS at 14:59

## 2024-05-27 RX ADMIN — DEXTROSE MONOHYDRATE AND SODIUM CHLORIDE 1000 MILLILITER(S): 5; .3 INJECTION, SOLUTION INTRAVENOUS at 09:00

## 2024-05-28 DIAGNOSIS — E63.9 NUTRITIONAL DEFICIENCY, UNSPECIFIED: ICD-10-CM

## 2024-05-28 LAB
ALBUMIN SERPL ELPH-MCNC: 2.9 G/DL — LOW (ref 3.5–5.2)
ALP SERPL-CCNC: 100 U/L — SIGNIFICANT CHANGE UP (ref 30–115)
ALT FLD-CCNC: 9 U/L — SIGNIFICANT CHANGE UP (ref 0–41)
ANION GAP SERPL CALC-SCNC: 11 MMOL/L — SIGNIFICANT CHANGE UP (ref 7–14)
AST SERPL-CCNC: 18 U/L — SIGNIFICANT CHANGE UP (ref 0–41)
BASOPHILS # BLD AUTO: 0.02 K/UL — SIGNIFICANT CHANGE UP (ref 0–0.2)
BASOPHILS NFR BLD AUTO: 0.3 % — SIGNIFICANT CHANGE UP (ref 0–1)
BILIRUB SERPL-MCNC: 0.2 MG/DL — SIGNIFICANT CHANGE UP (ref 0.2–1.2)
BUN SERPL-MCNC: 6 MG/DL — LOW (ref 10–20)
CALCIUM SERPL-MCNC: 9.3 MG/DL — SIGNIFICANT CHANGE UP (ref 8.4–10.5)
CHLORIDE SERPL-SCNC: 101 MMOL/L — SIGNIFICANT CHANGE UP (ref 98–110)
CO2 SERPL-SCNC: 25 MMOL/L — SIGNIFICANT CHANGE UP (ref 17–32)
CREAT SERPL-MCNC: 0.5 MG/DL — LOW (ref 0.7–1.5)
EGFR: 112 ML/MIN/1.73M2 — SIGNIFICANT CHANGE UP
EOSINOPHIL # BLD AUTO: 0.02 K/UL — SIGNIFICANT CHANGE UP (ref 0–0.7)
EOSINOPHIL NFR BLD AUTO: 0.3 % — SIGNIFICANT CHANGE UP (ref 0–8)
GLUCOSE SERPL-MCNC: 117 MG/DL — HIGH (ref 70–99)
HCT VFR BLD CALC: 32.7 % — LOW (ref 42–52)
HGB BLD-MCNC: 10.7 G/DL — LOW (ref 14–18)
IMM GRANULOCYTES NFR BLD AUTO: 0.8 % — HIGH (ref 0.1–0.3)
LYMPHOCYTES # BLD AUTO: 1.28 K/UL — SIGNIFICANT CHANGE UP (ref 1.2–3.4)
LYMPHOCYTES # BLD AUTO: 16.8 % — LOW (ref 20.5–51.1)
MAGNESIUM SERPL-MCNC: 1.6 MG/DL — LOW (ref 1.8–2.4)
MCHC RBC-ENTMCNC: 31 PG — SIGNIFICANT CHANGE UP (ref 27–31)
MCHC RBC-ENTMCNC: 32.7 G/DL — SIGNIFICANT CHANGE UP (ref 32–37)
MCV RBC AUTO: 94.8 FL — HIGH (ref 80–94)
MONOCYTES # BLD AUTO: 0.94 K/UL — HIGH (ref 0.1–0.6)
MONOCYTES NFR BLD AUTO: 12.4 % — HIGH (ref 1.7–9.3)
NEUTROPHILS # BLD AUTO: 5.28 K/UL — SIGNIFICANT CHANGE UP (ref 1.4–6.5)
NEUTROPHILS NFR BLD AUTO: 69.4 % — SIGNIFICANT CHANGE UP (ref 42.2–75.2)
NRBC # BLD: 0 /100 WBCS — SIGNIFICANT CHANGE UP (ref 0–0)
PLATELET # BLD AUTO: 523 K/UL — HIGH (ref 130–400)
PMV BLD: 9.3 FL — SIGNIFICANT CHANGE UP (ref 7.4–10.4)
POTASSIUM SERPL-MCNC: 3.7 MMOL/L — SIGNIFICANT CHANGE UP (ref 3.5–5)
POTASSIUM SERPL-SCNC: 3.7 MMOL/L — SIGNIFICANT CHANGE UP (ref 3.5–5)
PROT SERPL-MCNC: 5.7 G/DL — LOW (ref 6–8)
RBC # BLD: 3.45 M/UL — LOW (ref 4.7–6.1)
RBC # FLD: 13.2 % — SIGNIFICANT CHANGE UP (ref 11.5–14.5)
SODIUM SERPL-SCNC: 137 MMOL/L — SIGNIFICANT CHANGE UP (ref 135–146)
WBC # BLD: 7.6 K/UL — SIGNIFICANT CHANGE UP (ref 4.8–10.8)
WBC # FLD AUTO: 7.6 K/UL — SIGNIFICANT CHANGE UP (ref 4.8–10.8)

## 2024-05-28 PROCEDURE — 95816 EEG AWAKE AND DROWSY: CPT | Mod: 26

## 2024-05-28 PROCEDURE — 99233 SBSQ HOSP IP/OBS HIGH 50: CPT

## 2024-05-28 PROCEDURE — 99232 SBSQ HOSP IP/OBS MODERATE 35: CPT

## 2024-05-28 RX ORDER — ACETAMINOPHEN 325 MG
650 TABLET ORAL EVERY 6 HOURS
Refills: 0 | Status: DISCONTINUED | OUTPATIENT
Start: 2024-05-28 | End: 2024-06-03

## 2024-05-28 RX ORDER — MIRTAZAPINE 15 MG/1
22.5 TABLET, FILM COATED ORAL AT BEDTIME
Refills: 0 | Status: DISCONTINUED | OUTPATIENT
Start: 2024-05-28 | End: 2024-06-04

## 2024-05-28 RX ORDER — MIRTAZAPINE 15 MG/1
7.5 TABLET, FILM COATED ORAL AT BEDTIME
Refills: 0 | Status: DISCONTINUED | OUTPATIENT
Start: 2024-05-28 | End: 2024-05-28

## 2024-05-28 RX ORDER — VALPROIC ACID 250 MG/5ML
375 SOLUTION ORAL EVERY 6 HOURS
Refills: 0 | Status: DISCONTINUED | OUTPATIENT
Start: 2024-05-28 | End: 2024-06-03

## 2024-05-28 RX ORDER — MAGNESIUM SULFATE 100 %
2 POWDER (GRAM) MISCELLANEOUS ONCE
Refills: 0 | Status: COMPLETED | OUTPATIENT
Start: 2024-05-28 | End: 2024-05-28

## 2024-05-28 RX ORDER — CLONIDINE HYDROCHLORIDE 0.3 MG/1
0.1 TABLET ORAL THREE TIMES A DAY
Refills: 0 | Status: DISCONTINUED | OUTPATIENT
Start: 2024-05-28 | End: 2024-05-29

## 2024-05-28 RX ORDER — VALPROIC ACID 250 MG/5ML
750 SOLUTION ORAL EVERY 12 HOURS
Refills: 0 | Status: DISCONTINUED | OUTPATIENT
Start: 2024-05-28 | End: 2024-05-28

## 2024-05-28 RX ADMIN — Medication 650 MILLIGRAM(S): at 15:35

## 2024-05-28 RX ADMIN — VALPROIC ACID 50 MILLIGRAM(S): 250 SOLUTION ORAL at 23:26

## 2024-05-28 RX ADMIN — MIRTAZAPINE 22.5 MILLIGRAM(S): 15 TABLET, FILM COATED ORAL at 21:13

## 2024-05-28 RX ADMIN — LEVETIRACETAM 400 MILLIGRAM(S): 100 INJECTION INTRAVENOUS at 05:09

## 2024-05-28 RX ADMIN — LEVETIRACETAM 400 MILLIGRAM(S): 100 INJECTION INTRAVENOUS at 18:04

## 2024-05-28 RX ADMIN — VALPROIC ACID 50 MILLIGRAM(S): 250 SOLUTION ORAL at 18:36

## 2024-05-28 RX ADMIN — ENOXAPARIN SODIUM 40 MILLIGRAM(S): 100 INJECTION SUBCUTANEOUS at 12:46

## 2024-05-28 RX ADMIN — Medication 1 APPLICATION(S): at 12:59

## 2024-05-28 RX ADMIN — Medication 25 GRAM(S): at 16:01

## 2024-05-28 RX ADMIN — DEXTROSE MONOHYDRATE, SODIUM CHLORIDE, AND POTASSIUM CHLORIDE 75 MILLILITER(S): 50; 4.5; 2.24 INJECTION, SOLUTION INTRAVENOUS at 12:47

## 2024-05-28 RX ADMIN — VALPROIC ACID 50 MILLIGRAM(S): 250 SOLUTION ORAL at 05:10

## 2024-05-28 RX ADMIN — Medication 650 MILLIGRAM(S): at 16:25

## 2024-05-29 LAB
APPEARANCE UR: CLEAR — SIGNIFICANT CHANGE UP
BACTERIA # UR AUTO: NEGATIVE /HPF — SIGNIFICANT CHANGE UP
BILIRUB UR-MCNC: NEGATIVE — SIGNIFICANT CHANGE UP
CAST: 2 /LPF — SIGNIFICANT CHANGE UP (ref 0–4)
COLOR SPEC: YELLOW — SIGNIFICANT CHANGE UP
DIFF PNL FLD: NEGATIVE — SIGNIFICANT CHANGE UP
GLUCOSE UR QL: NEGATIVE MG/DL — SIGNIFICANT CHANGE UP
KETONES UR-MCNC: 15 MG/DL
LEUKOCYTE ESTERASE UR-ACNC: NEGATIVE — SIGNIFICANT CHANGE UP
NITRITE UR-MCNC: NEGATIVE — SIGNIFICANT CHANGE UP
PH UR: 6.5 — SIGNIFICANT CHANGE UP (ref 5–8)
PROT UR-MCNC: 30 MG/DL
RAPID RVP RESULT: SIGNIFICANT CHANGE UP
RBC CASTS # UR COMP ASSIST: 13 /HPF — HIGH (ref 0–4)
SARS-COV-2 RNA SPEC QL NAA+PROBE: SIGNIFICANT CHANGE UP
SP GR SPEC: 1.03 — SIGNIFICANT CHANGE UP (ref 1–1.03)
SQUAMOUS # UR AUTO: 0 /HPF — SIGNIFICANT CHANGE UP (ref 0–5)
URATE CRY FLD QL MICRO: PRESENT
UROBILINOGEN FLD QL: 1 MG/DL — SIGNIFICANT CHANGE UP (ref 0.2–1)
WBC UR QL: 2 /HPF — SIGNIFICANT CHANGE UP (ref 0–5)

## 2024-05-29 PROCEDURE — 99252 IP/OBS CONSLTJ NEW/EST SF 35: CPT

## 2024-05-29 PROCEDURE — 99232 SBSQ HOSP IP/OBS MODERATE 35: CPT

## 2024-05-29 PROCEDURE — 71045 X-RAY EXAM CHEST 1 VIEW: CPT | Mod: 26

## 2024-05-29 RX ORDER — CLONIDINE HYDROCHLORIDE 0.3 MG/1
1 TABLET ORAL
Refills: 0 | Status: DISCONTINUED | OUTPATIENT
Start: 2024-05-29 | End: 2024-05-31

## 2024-05-29 RX ORDER — HYDRALAZINE HYDROCHLORIDE 50 MG/1
5 TABLET ORAL ONCE
Refills: 0 | Status: COMPLETED | OUTPATIENT
Start: 2024-05-29 | End: 2024-05-29

## 2024-05-29 RX ADMIN — LEVETIRACETAM 400 MILLIGRAM(S): 100 INJECTION INTRAVENOUS at 05:00

## 2024-05-29 RX ADMIN — ENOXAPARIN SODIUM 40 MILLIGRAM(S): 100 INJECTION SUBCUTANEOUS at 10:35

## 2024-05-29 RX ADMIN — VALPROIC ACID 50 MILLIGRAM(S): 250 SOLUTION ORAL at 19:00

## 2024-05-29 RX ADMIN — LEVETIRACETAM 400 MILLIGRAM(S): 100 INJECTION INTRAVENOUS at 17:27

## 2024-05-29 RX ADMIN — Medication 1 APPLICATION(S): at 14:41

## 2024-05-29 RX ADMIN — HYDRALAZINE HYDROCHLORIDE 5 MILLIGRAM(S): 50 TABLET ORAL at 21:22

## 2024-05-29 RX ADMIN — VALPROIC ACID 50 MILLIGRAM(S): 250 SOLUTION ORAL at 05:00

## 2024-05-29 RX ADMIN — VALPROIC ACID 50 MILLIGRAM(S): 250 SOLUTION ORAL at 23:16

## 2024-05-29 RX ADMIN — VALPROIC ACID 50 MILLIGRAM(S): 250 SOLUTION ORAL at 12:42

## 2024-05-30 LAB
ALBUMIN SERPL ELPH-MCNC: 2.6 G/DL — LOW (ref 3.5–5.2)
ALP SERPL-CCNC: 94 U/L — SIGNIFICANT CHANGE UP (ref 30–115)
ALT FLD-CCNC: 7 U/L — SIGNIFICANT CHANGE UP (ref 0–41)
ANION GAP SERPL CALC-SCNC: 9 MMOL/L — SIGNIFICANT CHANGE UP (ref 7–14)
AST SERPL-CCNC: 15 U/L — SIGNIFICANT CHANGE UP (ref 0–41)
BASOPHILS # BLD AUTO: 0.02 K/UL — SIGNIFICANT CHANGE UP (ref 0–0.2)
BASOPHILS NFR BLD AUTO: 0.1 % — SIGNIFICANT CHANGE UP (ref 0–1)
BILIRUB SERPL-MCNC: 0.3 MG/DL — SIGNIFICANT CHANGE UP (ref 0.2–1.2)
BUN SERPL-MCNC: 6 MG/DL — LOW (ref 10–20)
CALCIUM SERPL-MCNC: 9.3 MG/DL — SIGNIFICANT CHANGE UP (ref 8.4–10.5)
CHLORIDE SERPL-SCNC: 109 MMOL/L — SIGNIFICANT CHANGE UP (ref 98–110)
CO2 SERPL-SCNC: 23 MMOL/L — SIGNIFICANT CHANGE UP (ref 17–32)
CREAT SERPL-MCNC: <0.5 MG/DL — LOW (ref 0.7–1.5)
CULTURE RESULTS: SIGNIFICANT CHANGE UP
EGFR: 120 ML/MIN/1.73M2 — SIGNIFICANT CHANGE UP
EOSINOPHIL # BLD AUTO: 0 K/UL — SIGNIFICANT CHANGE UP (ref 0–0.7)
EOSINOPHIL NFR BLD AUTO: 0 % — SIGNIFICANT CHANGE UP (ref 0–8)
GLUCOSE SERPL-MCNC: 129 MG/DL — HIGH (ref 70–99)
HCT VFR BLD CALC: 33.1 % — LOW (ref 42–52)
HGB BLD-MCNC: 10.8 G/DL — LOW (ref 14–18)
IMM GRANULOCYTES NFR BLD AUTO: 0.4 % — HIGH (ref 0.1–0.3)
LACTATE SERPL-SCNC: 1.4 MMOL/L — SIGNIFICANT CHANGE UP (ref 0.7–2)
LYMPHOCYTES # BLD AUTO: 0.83 K/UL — LOW (ref 1.2–3.4)
LYMPHOCYTES # BLD AUTO: 4.5 % — LOW (ref 20.5–51.1)
MAGNESIUM SERPL-MCNC: 1.9 MG/DL — SIGNIFICANT CHANGE UP (ref 1.8–2.4)
MCHC RBC-ENTMCNC: 30.9 PG — SIGNIFICANT CHANGE UP (ref 27–31)
MCHC RBC-ENTMCNC: 32.6 G/DL — SIGNIFICANT CHANGE UP (ref 32–37)
MCV RBC AUTO: 94.6 FL — HIGH (ref 80–94)
MONOCYTES # BLD AUTO: 1.04 K/UL — HIGH (ref 0.1–0.6)
MONOCYTES NFR BLD AUTO: 5.7 % — SIGNIFICANT CHANGE UP (ref 1.7–9.3)
NEUTROPHILS # BLD AUTO: 16.34 K/UL — HIGH (ref 1.4–6.5)
NEUTROPHILS NFR BLD AUTO: 89.3 % — HIGH (ref 42.2–75.2)
NRBC # BLD: 0 /100 WBCS — SIGNIFICANT CHANGE UP (ref 0–0)
PLATELET # BLD AUTO: 496 K/UL — HIGH (ref 130–400)
PMV BLD: 9.1 FL — SIGNIFICANT CHANGE UP (ref 7.4–10.4)
POTASSIUM SERPL-MCNC: 3.8 MMOL/L — SIGNIFICANT CHANGE UP (ref 3.5–5)
POTASSIUM SERPL-SCNC: 3.8 MMOL/L — SIGNIFICANT CHANGE UP (ref 3.5–5)
PROT SERPL-MCNC: 5.7 G/DL — LOW (ref 6–8)
RBC # BLD: 3.5 M/UL — LOW (ref 4.7–6.1)
RBC # FLD: 13.6 % — SIGNIFICANT CHANGE UP (ref 11.5–14.5)
SODIUM SERPL-SCNC: 141 MMOL/L — SIGNIFICANT CHANGE UP (ref 135–146)
SPECIMEN SOURCE: SIGNIFICANT CHANGE UP
WBC # BLD: 18.31 K/UL — HIGH (ref 4.8–10.8)
WBC # FLD AUTO: 18.31 K/UL — HIGH (ref 4.8–10.8)

## 2024-05-30 PROCEDURE — 99253 IP/OBS CNSLTJ NEW/EST LOW 45: CPT

## 2024-05-30 PROCEDURE — 71045 X-RAY EXAM CHEST 1 VIEW: CPT | Mod: 26

## 2024-05-30 PROCEDURE — 99233 SBSQ HOSP IP/OBS HIGH 50: CPT

## 2024-05-30 PROCEDURE — 99232 SBSQ HOSP IP/OBS MODERATE 35: CPT

## 2024-05-30 RX ORDER — PIPERACILLIN SODIUM AND TAZOBACTAM SODIUM 3; .375 G/15ML; G/15ML
3.38 INJECTION, POWDER, LYOPHILIZED, FOR SOLUTION INTRAVENOUS ONCE
Refills: 0 | Status: COMPLETED | OUTPATIENT
Start: 2024-05-30 | End: 2024-05-30

## 2024-05-30 RX ORDER — PIPERACILLIN SODIUM AND TAZOBACTAM SODIUM 3; .375 G/15ML; G/15ML
3.38 INJECTION, POWDER, LYOPHILIZED, FOR SOLUTION INTRAVENOUS EVERY 8 HOURS
Refills: 0 | Status: COMPLETED | OUTPATIENT
Start: 2024-05-30 | End: 2024-06-06

## 2024-05-30 RX ORDER — IPRATROPIUM BROMIDE AND ALBUTEROL SULFATE .5; 3 MG/3ML; MG/3ML
3 SOLUTION RESPIRATORY (INHALATION) EVERY 6 HOURS
Refills: 0 | Status: DISCONTINUED | OUTPATIENT
Start: 2024-05-30 | End: 2024-06-21

## 2024-05-30 RX ORDER — SODIUM CHLORIDE 0.9 % (FLUSH) 0.9 %
3 SYRINGE (ML) INJECTION EVERY 6 HOURS
Refills: 0 | Status: DISCONTINUED | OUTPATIENT
Start: 2024-05-30 | End: 2024-06-21

## 2024-05-30 RX ORDER — AMMONIUM LACTATE 12 %
1 LOTION (GRAM) TOPICAL EVERY 12 HOURS
Refills: 0 | Status: DISCONTINUED | OUTPATIENT
Start: 2024-05-30 | End: 2024-06-07

## 2024-05-30 RX ORDER — GUAIFENESIN 100 %
600 POWDER (GRAM) MISCELLANEOUS EVERY 12 HOURS
Refills: 0 | Status: DISCONTINUED | OUTPATIENT
Start: 2024-05-30 | End: 2024-06-01

## 2024-05-30 RX ADMIN — Medication 1 APPLICATION(S): at 12:36

## 2024-05-30 RX ADMIN — IPRATROPIUM BROMIDE AND ALBUTEROL SULFATE 3 MILLILITER(S): .5; 3 SOLUTION RESPIRATORY (INHALATION) at 15:22

## 2024-05-30 RX ADMIN — VALPROIC ACID 50 MILLIGRAM(S): 250 SOLUTION ORAL at 17:19

## 2024-05-30 RX ADMIN — VALPROIC ACID 50 MILLIGRAM(S): 250 SOLUTION ORAL at 12:50

## 2024-05-30 RX ADMIN — DEXTROSE MONOHYDRATE, SODIUM CHLORIDE, AND POTASSIUM CHLORIDE 75 MILLILITER(S): 50; 4.5; 2.24 INJECTION, SOLUTION INTRAVENOUS at 06:25

## 2024-05-30 RX ADMIN — PIPERACILLIN SODIUM AND TAZOBACTAM SODIUM 25 GRAM(S): 3; .375 INJECTION, POWDER, LYOPHILIZED, FOR SOLUTION INTRAVENOUS at 06:01

## 2024-05-30 RX ADMIN — PIPERACILLIN SODIUM AND TAZOBACTAM SODIUM 200 GRAM(S): 3; .375 INJECTION, POWDER, LYOPHILIZED, FOR SOLUTION INTRAVENOUS at 02:59

## 2024-05-30 RX ADMIN — ENOXAPARIN SODIUM 40 MILLIGRAM(S): 100 INJECTION SUBCUTANEOUS at 12:34

## 2024-05-30 RX ADMIN — VALPROIC ACID 50 MILLIGRAM(S): 250 SOLUTION ORAL at 06:01

## 2024-05-30 RX ADMIN — Medication 1 APPLICATION(S): at 17:26

## 2024-05-30 RX ADMIN — PIPERACILLIN SODIUM AND TAZOBACTAM SODIUM 25 GRAM(S): 3; .375 INJECTION, POWDER, LYOPHILIZED, FOR SOLUTION INTRAVENOUS at 22:22

## 2024-05-30 RX ADMIN — PIPERACILLIN SODIUM AND TAZOBACTAM SODIUM 25 GRAM(S): 3; .375 INJECTION, POWDER, LYOPHILIZED, FOR SOLUTION INTRAVENOUS at 14:46

## 2024-05-30 RX ADMIN — LEVETIRACETAM 400 MILLIGRAM(S): 100 INJECTION INTRAVENOUS at 06:01

## 2024-05-30 RX ADMIN — CLONIDINE HYDROCHLORIDE 1 PATCH: 0.3 TABLET ORAL at 12:34

## 2024-05-30 RX ADMIN — PIPERACILLIN SODIUM AND TAZOBACTAM SODIUM 3.38 GRAM(S): 3; .375 INJECTION, POWDER, LYOPHILIZED, FOR SOLUTION INTRAVENOUS at 08:03

## 2024-05-30 RX ADMIN — LEVETIRACETAM 400 MILLIGRAM(S): 100 INJECTION INTRAVENOUS at 17:19

## 2024-05-30 RX ADMIN — MIRTAZAPINE 22.5 MILLIGRAM(S): 15 TABLET, FILM COATED ORAL at 22:21

## 2024-05-30 RX ADMIN — DEXTROSE MONOHYDRATE, SODIUM CHLORIDE, AND POTASSIUM CHLORIDE 75 MILLILITER(S): 50; 4.5; 2.24 INJECTION, SOLUTION INTRAVENOUS at 17:24

## 2024-05-31 LAB
ALBUMIN SERPL ELPH-MCNC: 2.8 G/DL — LOW (ref 3.5–5.2)
ALP SERPL-CCNC: 96 U/L — SIGNIFICANT CHANGE UP (ref 30–115)
ALT FLD-CCNC: 11 U/L — SIGNIFICANT CHANGE UP (ref 0–41)
ANION GAP SERPL CALC-SCNC: 10 MMOL/L — SIGNIFICANT CHANGE UP (ref 7–14)
AST SERPL-CCNC: 23 U/L — SIGNIFICANT CHANGE UP (ref 0–41)
BASOPHILS # BLD AUTO: 0.03 K/UL — SIGNIFICANT CHANGE UP (ref 0–0.2)
BASOPHILS NFR BLD AUTO: 0.2 % — SIGNIFICANT CHANGE UP (ref 0–1)
BILIRUB SERPL-MCNC: 0.3 MG/DL — SIGNIFICANT CHANGE UP (ref 0.2–1.2)
BUN SERPL-MCNC: 8 MG/DL — LOW (ref 10–20)
CALCIUM SERPL-MCNC: 9.9 MG/DL — SIGNIFICANT CHANGE UP (ref 8.4–10.5)
CHLORIDE SERPL-SCNC: 107 MMOL/L — SIGNIFICANT CHANGE UP (ref 98–110)
CO2 SERPL-SCNC: 27 MMOL/L — SIGNIFICANT CHANGE UP (ref 17–32)
CREAT SERPL-MCNC: <0.5 MG/DL — LOW (ref 0.7–1.5)
EGFR: 120 ML/MIN/1.73M2 — SIGNIFICANT CHANGE UP
EOSINOPHIL # BLD AUTO: 0.01 K/UL — SIGNIFICANT CHANGE UP (ref 0–0.7)
EOSINOPHIL NFR BLD AUTO: 0.1 % — SIGNIFICANT CHANGE UP (ref 0–8)
GLUCOSE SERPL-MCNC: 128 MG/DL — HIGH (ref 70–99)
HCT VFR BLD CALC: 35.6 % — LOW (ref 42–52)
HGB BLD-MCNC: 11 G/DL — LOW (ref 14–18)
IMM GRANULOCYTES NFR BLD AUTO: 0.8 % — HIGH (ref 0.1–0.3)
LYMPHOCYTES # BLD AUTO: 1.29 K/UL — SIGNIFICANT CHANGE UP (ref 1.2–3.4)
LYMPHOCYTES # BLD AUTO: 7.2 % — LOW (ref 20.5–51.1)
MAGNESIUM SERPL-MCNC: 2.1 MG/DL — SIGNIFICANT CHANGE UP (ref 1.8–2.4)
MCHC RBC-ENTMCNC: 30.6 PG — SIGNIFICANT CHANGE UP (ref 27–31)
MCHC RBC-ENTMCNC: 30.9 G/DL — LOW (ref 32–37)
MCV RBC AUTO: 98.9 FL — HIGH (ref 80–94)
MONOCYTES # BLD AUTO: 0.94 K/UL — HIGH (ref 0.1–0.6)
MONOCYTES NFR BLD AUTO: 5.2 % — SIGNIFICANT CHANGE UP (ref 1.7–9.3)
NEUTROPHILS # BLD AUTO: 15.62 K/UL — HIGH (ref 1.4–6.5)
NEUTROPHILS NFR BLD AUTO: 86.5 % — HIGH (ref 42.2–75.2)
NRBC # BLD: 0 /100 WBCS — SIGNIFICANT CHANGE UP (ref 0–0)
PHOSPHATE SERPL-MCNC: 2.5 MG/DL — SIGNIFICANT CHANGE UP (ref 2.1–4.9)
PLATELET # BLD AUTO: 416 K/UL — HIGH (ref 130–400)
PMV BLD: 10.8 FL — HIGH (ref 7.4–10.4)
POTASSIUM SERPL-MCNC: 4.5 MMOL/L — SIGNIFICANT CHANGE UP (ref 3.5–5)
POTASSIUM SERPL-SCNC: 4.5 MMOL/L — SIGNIFICANT CHANGE UP (ref 3.5–5)
PROT SERPL-MCNC: 6 G/DL — SIGNIFICANT CHANGE UP (ref 6–8)
RBC # BLD: 3.6 M/UL — LOW (ref 4.7–6.1)
RBC # FLD: 14 % — SIGNIFICANT CHANGE UP (ref 11.5–14.5)
SODIUM SERPL-SCNC: 144 MMOL/L — SIGNIFICANT CHANGE UP (ref 135–146)
WBC # BLD: 18.03 K/UL — HIGH (ref 4.8–10.8)
WBC # FLD AUTO: 18.03 K/UL — HIGH (ref 4.8–10.8)

## 2024-05-31 PROCEDURE — 99233 SBSQ HOSP IP/OBS HIGH 50: CPT

## 2024-05-31 PROCEDURE — 99232 SBSQ HOSP IP/OBS MODERATE 35: CPT

## 2024-05-31 RX ORDER — CLONIDINE HYDROCHLORIDE 0.3 MG/1
0.1 TABLET ORAL DAILY
Refills: 0 | Status: DISCONTINUED | OUTPATIENT
Start: 2024-05-31 | End: 2024-06-02

## 2024-05-31 RX ORDER — OLANZAPINE 2.5 MG/1
12.5 TABLET, FILM COATED ORAL AT BEDTIME
Refills: 0 | Status: DISCONTINUED | OUTPATIENT
Start: 2024-05-31 | End: 2024-06-21

## 2024-05-31 RX ADMIN — DEXTROSE MONOHYDRATE, SODIUM CHLORIDE, AND POTASSIUM CHLORIDE 75 MILLILITER(S): 50; 4.5; 2.24 INJECTION, SOLUTION INTRAVENOUS at 08:45

## 2024-05-31 RX ADMIN — CLONIDINE HYDROCHLORIDE 1 PATCH: 0.3 TABLET ORAL at 06:28

## 2024-05-31 RX ADMIN — VALPROIC ACID 50 MILLIGRAM(S): 250 SOLUTION ORAL at 15:23

## 2024-05-31 RX ADMIN — ENOXAPARIN SODIUM 40 MILLIGRAM(S): 100 INJECTION SUBCUTANEOUS at 12:14

## 2024-05-31 RX ADMIN — PIPERACILLIN SODIUM AND TAZOBACTAM SODIUM 25 GRAM(S): 3; .375 INJECTION, POWDER, LYOPHILIZED, FOR SOLUTION INTRAVENOUS at 22:10

## 2024-05-31 RX ADMIN — PIPERACILLIN SODIUM AND TAZOBACTAM SODIUM 25 GRAM(S): 3; .375 INJECTION, POWDER, LYOPHILIZED, FOR SOLUTION INTRAVENOUS at 15:27

## 2024-05-31 RX ADMIN — LEVETIRACETAM 400 MILLIGRAM(S): 100 INJECTION INTRAVENOUS at 17:41

## 2024-05-31 RX ADMIN — LEVETIRACETAM 400 MILLIGRAM(S): 100 INJECTION INTRAVENOUS at 05:29

## 2024-05-31 RX ADMIN — OLANZAPINE 12.5 MILLIGRAM(S): 2.5 TABLET, FILM COATED ORAL at 22:11

## 2024-05-31 RX ADMIN — Medication 600 MILLIGRAM(S): at 17:23

## 2024-05-31 RX ADMIN — IPRATROPIUM BROMIDE AND ALBUTEROL SULFATE 3 MILLILITER(S): .5; 3 SOLUTION RESPIRATORY (INHALATION) at 10:22

## 2024-05-31 RX ADMIN — AMLODIPINE BESYLATE 5 MILLIGRAM(S): 2.5 TABLET ORAL at 05:28

## 2024-05-31 RX ADMIN — Medication 600 MILLIGRAM(S): at 05:27

## 2024-05-31 RX ADMIN — VALPROIC ACID 50 MILLIGRAM(S): 250 SOLUTION ORAL at 20:01

## 2024-05-31 RX ADMIN — VALPROIC ACID 50 MILLIGRAM(S): 250 SOLUTION ORAL at 05:29

## 2024-05-31 RX ADMIN — CLONIDINE HYDROCHLORIDE 1 PATCH: 0.3 TABLET ORAL at 09:13

## 2024-05-31 RX ADMIN — PIPERACILLIN SODIUM AND TAZOBACTAM SODIUM 25 GRAM(S): 3; .375 INJECTION, POWDER, LYOPHILIZED, FOR SOLUTION INTRAVENOUS at 06:59

## 2024-05-31 RX ADMIN — IPRATROPIUM BROMIDE AND ALBUTEROL SULFATE 3 MILLILITER(S): .5; 3 SOLUTION RESPIRATORY (INHALATION) at 21:24

## 2024-05-31 RX ADMIN — Medication 1 APPLICATION(S): at 19:04

## 2024-05-31 RX ADMIN — Medication 1 APPLICATION(S): at 05:26

## 2024-05-31 RX ADMIN — Medication 330 MILLIGRAM(S): at 09:01

## 2024-05-31 RX ADMIN — MIRTAZAPINE 22.5 MILLIGRAM(S): 15 TABLET, FILM COATED ORAL at 22:11

## 2024-05-31 RX ADMIN — VALPROIC ACID 50 MILLIGRAM(S): 250 SOLUTION ORAL at 00:02

## 2024-05-31 RX ADMIN — Medication 1 APPLICATION(S): at 12:14

## 2024-05-31 RX ADMIN — Medication 330 MILLIGRAM(S): at 17:23

## 2024-06-01 LAB
ALBUMIN SERPL ELPH-MCNC: 2.2 G/DL — LOW (ref 3.5–5.2)
ALBUMIN SERPL ELPH-MCNC: 2.2 G/DL — LOW (ref 3.5–5.2)
ALP SERPL-CCNC: 75 U/L — SIGNIFICANT CHANGE UP (ref 30–115)
ALP SERPL-CCNC: 87 U/L — SIGNIFICANT CHANGE UP (ref 30–115)
ALT FLD-CCNC: 12 U/L — SIGNIFICANT CHANGE UP (ref 0–41)
ALT FLD-CCNC: 14 U/L — SIGNIFICANT CHANGE UP (ref 0–41)
ANION GAP SERPL CALC-SCNC: 12 MMOL/L — SIGNIFICANT CHANGE UP (ref 7–14)
ANION GAP SERPL CALC-SCNC: 9 MMOL/L — SIGNIFICANT CHANGE UP (ref 7–14)
AST SERPL-CCNC: 27 U/L — SIGNIFICANT CHANGE UP (ref 0–41)
AST SERPL-CCNC: 28 U/L — SIGNIFICANT CHANGE UP (ref 0–41)
BASOPHILS # BLD AUTO: 0.02 K/UL — SIGNIFICANT CHANGE UP (ref 0–0.2)
BASOPHILS # BLD AUTO: 0.02 K/UL — SIGNIFICANT CHANGE UP (ref 0–0.2)
BASOPHILS NFR BLD AUTO: 0.2 % — SIGNIFICANT CHANGE UP (ref 0–1)
BASOPHILS NFR BLD AUTO: 0.3 % — SIGNIFICANT CHANGE UP (ref 0–1)
BILIRUB SERPL-MCNC: <0.2 MG/DL — SIGNIFICANT CHANGE UP (ref 0.2–1.2)
BILIRUB SERPL-MCNC: <0.2 MG/DL — SIGNIFICANT CHANGE UP (ref 0.2–1.2)
BUN SERPL-MCNC: 7 MG/DL — LOW (ref 10–20)
BUN SERPL-MCNC: 8 MG/DL — LOW (ref 10–20)
CALCIUM SERPL-MCNC: 8.7 MG/DL — SIGNIFICANT CHANGE UP (ref 8.4–10.4)
CALCIUM SERPL-MCNC: 9.1 MG/DL — SIGNIFICANT CHANGE UP (ref 8.4–10.4)
CHLORIDE SERPL-SCNC: 108 MMOL/L — SIGNIFICANT CHANGE UP (ref 98–110)
CHLORIDE SERPL-SCNC: 109 MMOL/L — SIGNIFICANT CHANGE UP (ref 98–110)
CO2 SERPL-SCNC: 24 MMOL/L — SIGNIFICANT CHANGE UP (ref 17–32)
CO2 SERPL-SCNC: 25 MMOL/L — SIGNIFICANT CHANGE UP (ref 17–32)
CREAT SERPL-MCNC: 0.5 MG/DL — LOW (ref 0.7–1.5)
CREAT SERPL-MCNC: <0.5 MG/DL — LOW (ref 0.7–1.5)
EGFR: 112 ML/MIN/1.73M2 — SIGNIFICANT CHANGE UP
EGFR: 120 ML/MIN/1.73M2 — SIGNIFICANT CHANGE UP
EOSINOPHIL # BLD AUTO: 0.07 K/UL — SIGNIFICANT CHANGE UP (ref 0–0.7)
EOSINOPHIL # BLD AUTO: 0.2 K/UL — SIGNIFICANT CHANGE UP (ref 0–0.7)
EOSINOPHIL NFR BLD AUTO: 0.6 % — SIGNIFICANT CHANGE UP (ref 0–8)
EOSINOPHIL NFR BLD AUTO: 2.6 % — SIGNIFICANT CHANGE UP (ref 0–8)
GLUCOSE SERPL-MCNC: 155 MG/DL — HIGH (ref 70–99)
GLUCOSE SERPL-MCNC: 159 MG/DL — HIGH (ref 70–99)
HCT VFR BLD CALC: 26.2 % — LOW (ref 42–52)
HCT VFR BLD CALC: 31 % — LOW (ref 42–52)
HGB BLD-MCNC: 8.3 G/DL — LOW (ref 14–18)
HGB BLD-MCNC: 9.8 G/DL — LOW (ref 14–18)
IMM GRANULOCYTES NFR BLD AUTO: 0.8 % — HIGH (ref 0.1–0.3)
IMM GRANULOCYTES NFR BLD AUTO: 1 % — HIGH (ref 0.1–0.3)
LYMPHOCYTES # BLD AUTO: 0.79 K/UL — LOW (ref 1.2–3.4)
LYMPHOCYTES # BLD AUTO: 1.23 K/UL — SIGNIFICANT CHANGE UP (ref 1.2–3.4)
LYMPHOCYTES # BLD AUTO: 16.1 % — LOW (ref 20.5–51.1)
LYMPHOCYTES # BLD AUTO: 6.7 % — LOW (ref 20.5–51.1)
MAGNESIUM SERPL-MCNC: 1.8 MG/DL — SIGNIFICANT CHANGE UP (ref 1.8–2.4)
MAGNESIUM SERPL-MCNC: 1.8 MG/DL — SIGNIFICANT CHANGE UP (ref 1.8–2.4)
MCHC RBC-ENTMCNC: 30.6 PG — SIGNIFICANT CHANGE UP (ref 27–31)
MCHC RBC-ENTMCNC: 31.1 PG — HIGH (ref 27–31)
MCHC RBC-ENTMCNC: 31.6 G/DL — LOW (ref 32–37)
MCHC RBC-ENTMCNC: 31.7 G/DL — LOW (ref 32–37)
MCV RBC AUTO: 96.7 FL — HIGH (ref 80–94)
MCV RBC AUTO: 98.4 FL — HIGH (ref 80–94)
MONOCYTES # BLD AUTO: 0.7 K/UL — HIGH (ref 0.1–0.6)
MONOCYTES # BLD AUTO: 0.98 K/UL — HIGH (ref 0.1–0.6)
MONOCYTES NFR BLD AUTO: 8.3 % — SIGNIFICANT CHANGE UP (ref 1.7–9.3)
MONOCYTES NFR BLD AUTO: 9.2 % — SIGNIFICANT CHANGE UP (ref 1.7–9.3)
NEUTROPHILS # BLD AUTO: 5.39 K/UL — SIGNIFICANT CHANGE UP (ref 1.4–6.5)
NEUTROPHILS # BLD AUTO: 9.9 K/UL — HIGH (ref 1.4–6.5)
NEUTROPHILS NFR BLD AUTO: 70.8 % — SIGNIFICANT CHANGE UP (ref 42.2–75.2)
NEUTROPHILS NFR BLD AUTO: 83.4 % — HIGH (ref 42.2–75.2)
NRBC # BLD: 0 /100 WBCS — SIGNIFICANT CHANGE UP (ref 0–0)
NRBC # BLD: 0 /100 WBCS — SIGNIFICANT CHANGE UP (ref 0–0)
PLATELET # BLD AUTO: 269 K/UL — SIGNIFICANT CHANGE UP (ref 130–400)
PLATELET # BLD AUTO: 366 K/UL — SIGNIFICANT CHANGE UP (ref 130–400)
PMV BLD: 10.9 FL — HIGH (ref 7.4–10.4)
PMV BLD: 9.7 FL — SIGNIFICANT CHANGE UP (ref 7.4–10.4)
POTASSIUM SERPL-MCNC: 3.7 MMOL/L — SIGNIFICANT CHANGE UP (ref 3.5–5)
POTASSIUM SERPL-MCNC: 4 MMOL/L — SIGNIFICANT CHANGE UP (ref 3.5–5)
POTASSIUM SERPL-SCNC: 3.7 MMOL/L — SIGNIFICANT CHANGE UP (ref 3.5–5)
POTASSIUM SERPL-SCNC: 4 MMOL/L — SIGNIFICANT CHANGE UP (ref 3.5–5)
PROT SERPL-MCNC: 4.5 G/DL — LOW (ref 6–8)
PROT SERPL-MCNC: 4.9 G/DL — LOW (ref 6–8)
RBC # BLD: 2.71 M/UL — LOW (ref 4.7–6.1)
RBC # BLD: 3.15 M/UL — LOW (ref 4.7–6.1)
RBC # FLD: 14.1 % — SIGNIFICANT CHANGE UP (ref 11.5–14.5)
RBC # FLD: 14.3 % — SIGNIFICANT CHANGE UP (ref 11.5–14.5)
SODIUM SERPL-SCNC: 143 MMOL/L — SIGNIFICANT CHANGE UP (ref 135–146)
SODIUM SERPL-SCNC: 144 MMOL/L — SIGNIFICANT CHANGE UP (ref 135–146)
WBC # BLD: 11.85 K/UL — HIGH (ref 4.8–10.8)
WBC # BLD: 7.62 K/UL — SIGNIFICANT CHANGE UP (ref 4.8–10.8)
WBC # FLD AUTO: 11.85 K/UL — HIGH (ref 4.8–10.8)
WBC # FLD AUTO: 7.62 K/UL — SIGNIFICANT CHANGE UP (ref 4.8–10.8)

## 2024-06-01 PROCEDURE — 99232 SBSQ HOSP IP/OBS MODERATE 35: CPT

## 2024-06-01 RX ORDER — GUAIFENESIN 100 %
200 POWDER (GRAM) MISCELLANEOUS EVERY 6 HOURS
Refills: 0 | Status: DISCONTINUED | OUTPATIENT
Start: 2024-06-01 | End: 2024-06-02

## 2024-06-01 RX ADMIN — PIPERACILLIN SODIUM AND TAZOBACTAM SODIUM 25 GRAM(S): 3; .375 INJECTION, POWDER, LYOPHILIZED, FOR SOLUTION INTRAVENOUS at 06:36

## 2024-06-01 RX ADMIN — VALPROIC ACID 50 MILLIGRAM(S): 250 SOLUTION ORAL at 02:01

## 2024-06-01 RX ADMIN — LEVETIRACETAM 400 MILLIGRAM(S): 100 INJECTION INTRAVENOUS at 17:35

## 2024-06-01 RX ADMIN — MIRTAZAPINE 22.5 MILLIGRAM(S): 15 TABLET, FILM COATED ORAL at 21:33

## 2024-06-01 RX ADMIN — Medication 3 MILLILITER(S): at 17:25

## 2024-06-01 RX ADMIN — Medication 330 MILLIGRAM(S): at 17:34

## 2024-06-01 RX ADMIN — PIPERACILLIN SODIUM AND TAZOBACTAM SODIUM 25 GRAM(S): 3; .375 INJECTION, POWDER, LYOPHILIZED, FOR SOLUTION INTRAVENOUS at 17:35

## 2024-06-01 RX ADMIN — Medication 1 APPLICATION(S): at 17:35

## 2024-06-01 RX ADMIN — Medication 1 APPLICATION(S): at 11:43

## 2024-06-01 RX ADMIN — VALPROIC ACID 50 MILLIGRAM(S): 250 SOLUTION ORAL at 11:52

## 2024-06-01 RX ADMIN — Medication 330 MILLIGRAM(S): at 09:00

## 2024-06-01 RX ADMIN — ENOXAPARIN SODIUM 40 MILLIGRAM(S): 100 INJECTION SUBCUTANEOUS at 11:43

## 2024-06-01 RX ADMIN — Medication 600 MILLIGRAM(S): at 06:35

## 2024-06-01 RX ADMIN — Medication 200 MILLIGRAM(S): at 17:46

## 2024-06-01 RX ADMIN — VALPROIC ACID 50 MILLIGRAM(S): 250 SOLUTION ORAL at 21:33

## 2024-06-01 RX ADMIN — Medication 3 MILLILITER(S): at 11:54

## 2024-06-01 RX ADMIN — Medication 1 APPLICATION(S): at 06:37

## 2024-06-01 RX ADMIN — AMLODIPINE BESYLATE 5 MILLIGRAM(S): 2.5 TABLET ORAL at 06:35

## 2024-06-01 RX ADMIN — OLANZAPINE 12.5 MILLIGRAM(S): 2.5 TABLET, FILM COATED ORAL at 21:33

## 2024-06-01 RX ADMIN — IPRATROPIUM BROMIDE AND ALBUTEROL SULFATE 3 MILLILITER(S): .5; 3 SOLUTION RESPIRATORY (INHALATION) at 19:44

## 2024-06-01 RX ADMIN — LEVETIRACETAM 400 MILLIGRAM(S): 100 INJECTION INTRAVENOUS at 06:52

## 2024-06-01 RX ADMIN — VALPROIC ACID 50 MILLIGRAM(S): 250 SOLUTION ORAL at 17:35

## 2024-06-02 LAB
CULTURE RESULTS: SIGNIFICANT CHANGE UP
HCT VFR BLD CALC: 29.9 % — LOW (ref 42–52)
HGB BLD-MCNC: 9.4 G/DL — LOW (ref 14–18)
MCHC RBC-ENTMCNC: 30.1 PG — SIGNIFICANT CHANGE UP (ref 27–31)
MCHC RBC-ENTMCNC: 31.4 G/DL — LOW (ref 32–37)
MCV RBC AUTO: 95.8 FL — HIGH (ref 80–94)
NRBC # BLD: 0 /100 WBCS — SIGNIFICANT CHANGE UP (ref 0–0)
PLATELET # BLD AUTO: 389 K/UL — SIGNIFICANT CHANGE UP (ref 130–400)
PMV BLD: 9.5 FL — SIGNIFICANT CHANGE UP (ref 7.4–10.4)
RBC # BLD: 3.12 M/UL — LOW (ref 4.7–6.1)
RBC # FLD: 14 % — SIGNIFICANT CHANGE UP (ref 11.5–14.5)
SPECIMEN SOURCE: SIGNIFICANT CHANGE UP
WBC # BLD: 8.68 K/UL — SIGNIFICANT CHANGE UP (ref 4.8–10.8)
WBC # FLD AUTO: 8.68 K/UL — SIGNIFICANT CHANGE UP (ref 4.8–10.8)

## 2024-06-02 PROCEDURE — 93010 ELECTROCARDIOGRAM REPORT: CPT

## 2024-06-02 PROCEDURE — 99232 SBSQ HOSP IP/OBS MODERATE 35: CPT

## 2024-06-02 RX ORDER — GUAIFENESIN 100 %
200 POWDER (GRAM) MISCELLANEOUS EVERY 6 HOURS
Refills: 0 | Status: DISCONTINUED | OUTPATIENT
Start: 2024-06-02 | End: 2024-06-21

## 2024-06-02 RX ORDER — DEXTROSE MONOHYDRATE AND SODIUM CHLORIDE 5; .3 G/100ML; G/100ML
2000 INJECTION, SOLUTION INTRAVENOUS ONCE
Refills: 0 | Status: COMPLETED | OUTPATIENT
Start: 2024-06-02 | End: 2024-06-02

## 2024-06-02 RX ADMIN — VALPROIC ACID 50 MILLIGRAM(S): 250 SOLUTION ORAL at 17:03

## 2024-06-02 RX ADMIN — Medication 330 MILLIGRAM(S): at 17:09

## 2024-06-02 RX ADMIN — Medication 200 MILLIGRAM(S): at 17:09

## 2024-06-02 RX ADMIN — PIPERACILLIN SODIUM AND TAZOBACTAM SODIUM 25 GRAM(S): 3; .375 INJECTION, POWDER, LYOPHILIZED, FOR SOLUTION INTRAVENOUS at 22:39

## 2024-06-02 RX ADMIN — DEXTROSE MONOHYDRATE AND SODIUM CHLORIDE 2000 MILLILITER(S): 5; .3 INJECTION, SOLUTION INTRAVENOUS at 18:26

## 2024-06-02 RX ADMIN — VALPROIC ACID 50 MILLIGRAM(S): 250 SOLUTION ORAL at 21:39

## 2024-06-02 RX ADMIN — Medication 1 APPLICATION(S): at 05:42

## 2024-06-02 RX ADMIN — PIPERACILLIN SODIUM AND TAZOBACTAM SODIUM 25 GRAM(S): 3; .375 INJECTION, POWDER, LYOPHILIZED, FOR SOLUTION INTRAVENOUS at 00:01

## 2024-06-02 RX ADMIN — Medication 200 MILLIGRAM(S): at 23:16

## 2024-06-02 RX ADMIN — VALPROIC ACID 50 MILLIGRAM(S): 250 SOLUTION ORAL at 09:05

## 2024-06-02 RX ADMIN — Medication 330 MILLIGRAM(S): at 09:05

## 2024-06-02 RX ADMIN — MIRTAZAPINE 22.5 MILLIGRAM(S): 15 TABLET, FILM COATED ORAL at 21:39

## 2024-06-02 RX ADMIN — AMLODIPINE BESYLATE 5 MILLIGRAM(S): 2.5 TABLET ORAL at 05:41

## 2024-06-02 RX ADMIN — OLANZAPINE 12.5 MILLIGRAM(S): 2.5 TABLET, FILM COATED ORAL at 21:39

## 2024-06-02 RX ADMIN — Medication 1 APPLICATION(S): at 17:08

## 2024-06-02 RX ADMIN — PIPERACILLIN SODIUM AND TAZOBACTAM SODIUM 25 GRAM(S): 3; .375 INJECTION, POWDER, LYOPHILIZED, FOR SOLUTION INTRAVENOUS at 06:41

## 2024-06-02 RX ADMIN — LEVETIRACETAM 400 MILLIGRAM(S): 100 INJECTION INTRAVENOUS at 05:47

## 2024-06-02 RX ADMIN — PIPERACILLIN SODIUM AND TAZOBACTAM SODIUM 25 GRAM(S): 3; .375 INJECTION, POWDER, LYOPHILIZED, FOR SOLUTION INTRAVENOUS at 16:50

## 2024-06-02 RX ADMIN — LEVETIRACETAM 400 MILLIGRAM(S): 100 INJECTION INTRAVENOUS at 17:08

## 2024-06-02 RX ADMIN — IPRATROPIUM BROMIDE AND ALBUTEROL SULFATE 3 MILLILITER(S): .5; 3 SOLUTION RESPIRATORY (INHALATION) at 09:26

## 2024-06-02 RX ADMIN — Medication 1 APPLICATION(S): at 11:18

## 2024-06-02 RX ADMIN — VALPROIC ACID 50 MILLIGRAM(S): 250 SOLUTION ORAL at 02:18

## 2024-06-02 RX ADMIN — ENOXAPARIN SODIUM 40 MILLIGRAM(S): 100 INJECTION SUBCUTANEOUS at 11:18

## 2024-06-03 LAB
ALBUMIN SERPL ELPH-MCNC: 2.1 G/DL — LOW (ref 3.5–5.2)
ALP SERPL-CCNC: 70 U/L — SIGNIFICANT CHANGE UP (ref 30–115)
ALT FLD-CCNC: 16 U/L — SIGNIFICANT CHANGE UP (ref 0–41)
ANION GAP SERPL CALC-SCNC: 8 MMOL/L — SIGNIFICANT CHANGE UP (ref 7–14)
AST SERPL-CCNC: 25 U/L — SIGNIFICANT CHANGE UP (ref 0–41)
BASOPHILS # BLD AUTO: 0.03 K/UL — SIGNIFICANT CHANGE UP (ref 0–0.2)
BASOPHILS NFR BLD AUTO: 0.4 % — SIGNIFICANT CHANGE UP (ref 0–1)
BILIRUB SERPL-MCNC: <0.2 MG/DL — SIGNIFICANT CHANGE UP (ref 0.2–1.2)
BUN SERPL-MCNC: 8 MG/DL — LOW (ref 10–20)
CALCIUM SERPL-MCNC: 8.8 MG/DL — SIGNIFICANT CHANGE UP (ref 8.4–10.5)
CHLORIDE SERPL-SCNC: 104 MMOL/L — SIGNIFICANT CHANGE UP (ref 98–110)
CO2 SERPL-SCNC: 26 MMOL/L — SIGNIFICANT CHANGE UP (ref 17–32)
CREAT SERPL-MCNC: <0.5 MG/DL — LOW (ref 0.7–1.5)
EGFR: 120 ML/MIN/1.73M2 — SIGNIFICANT CHANGE UP
EOSINOPHIL # BLD AUTO: 0.4 K/UL — SIGNIFICANT CHANGE UP (ref 0–0.7)
EOSINOPHIL NFR BLD AUTO: 5.3 % — SIGNIFICANT CHANGE UP (ref 0–8)
GLUCOSE SERPL-MCNC: 168 MG/DL — HIGH (ref 70–99)
HCT VFR BLD CALC: 28.2 % — LOW (ref 42–52)
HGB BLD-MCNC: 8.7 G/DL — LOW (ref 14–18)
IMM GRANULOCYTES NFR BLD AUTO: 1.7 % — HIGH (ref 0.1–0.3)
LYMPHOCYTES # BLD AUTO: 1.13 K/UL — LOW (ref 1.2–3.4)
LYMPHOCYTES # BLD AUTO: 14.9 % — LOW (ref 20.5–51.1)
MAGNESIUM SERPL-MCNC: 1.8 MG/DL — SIGNIFICANT CHANGE UP (ref 1.8–2.4)
MCHC RBC-ENTMCNC: 30.1 PG — SIGNIFICANT CHANGE UP (ref 27–31)
MCHC RBC-ENTMCNC: 30.9 G/DL — LOW (ref 32–37)
MCV RBC AUTO: 97.6 FL — HIGH (ref 80–94)
MONOCYTES # BLD AUTO: 0.83 K/UL — HIGH (ref 0.1–0.6)
MONOCYTES NFR BLD AUTO: 10.9 % — HIGH (ref 1.7–9.3)
NEUTROPHILS # BLD AUTO: 5.06 K/UL — SIGNIFICANT CHANGE UP (ref 1.4–6.5)
NEUTROPHILS NFR BLD AUTO: 66.8 % — SIGNIFICANT CHANGE UP (ref 42.2–75.2)
NRBC # BLD: 0 /100 WBCS — SIGNIFICANT CHANGE UP (ref 0–0)
PLATELET # BLD AUTO: 371 K/UL — SIGNIFICANT CHANGE UP (ref 130–400)
PMV BLD: 9.9 FL — SIGNIFICANT CHANGE UP (ref 7.4–10.4)
POTASSIUM SERPL-MCNC: 3.9 MMOL/L — SIGNIFICANT CHANGE UP (ref 3.5–5)
POTASSIUM SERPL-SCNC: 3.9 MMOL/L — SIGNIFICANT CHANGE UP (ref 3.5–5)
PROT SERPL-MCNC: 5 G/DL — LOW (ref 6–8)
RBC # BLD: 2.89 M/UL — LOW (ref 4.7–6.1)
RBC # FLD: 14 % — SIGNIFICANT CHANGE UP (ref 11.5–14.5)
SODIUM SERPL-SCNC: 138 MMOL/L — SIGNIFICANT CHANGE UP (ref 135–146)
WBC # BLD: 7.58 K/UL — SIGNIFICANT CHANGE UP (ref 4.8–10.8)
WBC # FLD AUTO: 7.58 K/UL — SIGNIFICANT CHANGE UP (ref 4.8–10.8)

## 2024-06-03 PROCEDURE — 99233 SBSQ HOSP IP/OBS HIGH 50: CPT

## 2024-06-03 PROCEDURE — 99232 SBSQ HOSP IP/OBS MODERATE 35: CPT

## 2024-06-03 PROCEDURE — 71045 X-RAY EXAM CHEST 1 VIEW: CPT | Mod: 26

## 2024-06-03 RX ORDER — VALPROIC ACID 250 MG/5ML
375 SOLUTION ORAL EVERY 6 HOURS
Refills: 0 | Status: DISCONTINUED | OUTPATIENT
Start: 2024-06-03 | End: 2024-06-12

## 2024-06-03 RX ORDER — LEVETIRACETAM 100 MG/ML
750 INJECTION INTRAVENOUS EVERY 12 HOURS
Refills: 0 | Status: DISCONTINUED | OUTPATIENT
Start: 2024-06-03 | End: 2024-06-21

## 2024-06-03 RX ORDER — ACETAMINOPHEN 325 MG
650 TABLET ORAL EVERY 8 HOURS
Refills: 0 | Status: COMPLETED | OUTPATIENT
Start: 2024-06-03 | End: 2024-06-06

## 2024-06-03 RX ORDER — PANTOPRAZOLE SODIUM 40 MG/10ML
40 INJECTION, POWDER, FOR SOLUTION INTRAVENOUS DAILY
Refills: 0 | Status: DISCONTINUED | OUTPATIENT
Start: 2024-06-03 | End: 2024-06-08

## 2024-06-03 RX ADMIN — MIRTAZAPINE 22.5 MILLIGRAM(S): 15 TABLET, FILM COATED ORAL at 22:14

## 2024-06-03 RX ADMIN — PIPERACILLIN SODIUM AND TAZOBACTAM SODIUM 25 GRAM(S): 3; .375 INJECTION, POWDER, LYOPHILIZED, FOR SOLUTION INTRAVENOUS at 14:14

## 2024-06-03 RX ADMIN — Medication 650 MILLIGRAM(S): at 10:19

## 2024-06-03 RX ADMIN — Medication 650 MILLIGRAM(S): at 14:13

## 2024-06-03 RX ADMIN — Medication 1 APPLICATION(S): at 06:15

## 2024-06-03 RX ADMIN — IPRATROPIUM BROMIDE AND ALBUTEROL SULFATE 3 MILLILITER(S): .5; 3 SOLUTION RESPIRATORY (INHALATION) at 17:52

## 2024-06-03 RX ADMIN — Medication 200 MILLIGRAM(S): at 18:30

## 2024-06-03 RX ADMIN — Medication 3 MILLILITER(S): at 09:12

## 2024-06-03 RX ADMIN — Medication 330 MILLIGRAM(S): at 18:29

## 2024-06-03 RX ADMIN — VALPROIC ACID 50 MILLIGRAM(S): 250 SOLUTION ORAL at 04:49

## 2024-06-03 RX ADMIN — Medication 650 MILLIGRAM(S): at 14:45

## 2024-06-03 RX ADMIN — VALPROIC ACID 375 MILLIGRAM(S): 250 SOLUTION ORAL at 23:14

## 2024-06-03 RX ADMIN — OLANZAPINE 12.5 MILLIGRAM(S): 2.5 TABLET, FILM COATED ORAL at 22:16

## 2024-06-03 RX ADMIN — Medication 200 MILLIGRAM(S): at 23:07

## 2024-06-03 RX ADMIN — Medication 1 APPLICATION(S): at 11:32

## 2024-06-03 RX ADMIN — Medication 330 MILLIGRAM(S): at 09:12

## 2024-06-03 RX ADMIN — VALPROIC ACID 375 MILLIGRAM(S): 250 SOLUTION ORAL at 18:29

## 2024-06-03 RX ADMIN — ENOXAPARIN SODIUM 40 MILLIGRAM(S): 100 INJECTION SUBCUTANEOUS at 11:34

## 2024-06-03 RX ADMIN — Medication 650 MILLIGRAM(S): at 22:14

## 2024-06-03 RX ADMIN — Medication 200 MILLIGRAM(S): at 11:32

## 2024-06-03 RX ADMIN — LEVETIRACETAM 750 MILLIGRAM(S): 100 INJECTION INTRAVENOUS at 18:29

## 2024-06-03 RX ADMIN — Medication 650 MILLIGRAM(S): at 10:50

## 2024-06-03 RX ADMIN — LEVETIRACETAM 400 MILLIGRAM(S): 100 INJECTION INTRAVENOUS at 06:08

## 2024-06-03 RX ADMIN — PANTOPRAZOLE SODIUM 40 MILLIGRAM(S): 40 INJECTION, POWDER, FOR SOLUTION INTRAVENOUS at 11:33

## 2024-06-03 RX ADMIN — VALPROIC ACID 50 MILLIGRAM(S): 250 SOLUTION ORAL at 09:12

## 2024-06-03 RX ADMIN — Medication 200 MILLIGRAM(S): at 06:15

## 2024-06-03 RX ADMIN — AMLODIPINE BESYLATE 5 MILLIGRAM(S): 2.5 TABLET ORAL at 06:15

## 2024-06-03 RX ADMIN — PIPERACILLIN SODIUM AND TAZOBACTAM SODIUM 25 GRAM(S): 3; .375 INJECTION, POWDER, LYOPHILIZED, FOR SOLUTION INTRAVENOUS at 06:15

## 2024-06-03 RX ADMIN — PIPERACILLIN SODIUM AND TAZOBACTAM SODIUM 25 GRAM(S): 3; .375 INJECTION, POWDER, LYOPHILIZED, FOR SOLUTION INTRAVENOUS at 22:14

## 2024-06-04 LAB
ALBUMIN SERPL ELPH-MCNC: 2.2 G/DL — LOW (ref 3.5–5.2)
ALP SERPL-CCNC: 77 U/L — SIGNIFICANT CHANGE UP (ref 30–115)
ALT FLD-CCNC: 14 U/L — SIGNIFICANT CHANGE UP (ref 0–41)
ANION GAP SERPL CALC-SCNC: 10 MMOL/L — SIGNIFICANT CHANGE UP (ref 7–14)
ANION GAP SERPL CALC-SCNC: 8 MMOL/L — SIGNIFICANT CHANGE UP (ref 7–14)
APTT BLD: 33.5 SEC — SIGNIFICANT CHANGE UP (ref 27–39.2)
AST SERPL-CCNC: 19 U/L — SIGNIFICANT CHANGE UP (ref 0–41)
BASOPHILS # BLD AUTO: 0.02 K/UL — SIGNIFICANT CHANGE UP (ref 0–0.2)
BASOPHILS NFR BLD AUTO: 0.2 % — SIGNIFICANT CHANGE UP (ref 0–1)
BILIRUB SERPL-MCNC: <0.2 MG/DL — SIGNIFICANT CHANGE UP (ref 0.2–1.2)
BUN SERPL-MCNC: 8 MG/DL — LOW (ref 10–20)
BUN SERPL-MCNC: 9 MG/DL — LOW (ref 10–20)
CALCIUM SERPL-MCNC: 8.4 MG/DL — SIGNIFICANT CHANGE UP (ref 8.4–10.4)
CALCIUM SERPL-MCNC: 9 MG/DL — SIGNIFICANT CHANGE UP (ref 8.4–10.5)
CHLORIDE SERPL-SCNC: 103 MMOL/L — SIGNIFICANT CHANGE UP (ref 98–110)
CHLORIDE SERPL-SCNC: 104 MMOL/L — SIGNIFICANT CHANGE UP (ref 98–110)
CO2 SERPL-SCNC: 26 MMOL/L — SIGNIFICANT CHANGE UP (ref 17–32)
CO2 SERPL-SCNC: 27 MMOL/L — SIGNIFICANT CHANGE UP (ref 17–32)
CREAT SERPL-MCNC: 0.5 MG/DL — LOW (ref 0.7–1.5)
CREAT SERPL-MCNC: <0.5 MG/DL — LOW (ref 0.7–1.5)
CULTURE RESULTS: SIGNIFICANT CHANGE UP
EGFR: 112 ML/MIN/1.73M2 — SIGNIFICANT CHANGE UP
EGFR: 120 ML/MIN/1.73M2 — SIGNIFICANT CHANGE UP
EOSINOPHIL # BLD AUTO: 0.41 K/UL — SIGNIFICANT CHANGE UP (ref 0–0.7)
EOSINOPHIL NFR BLD AUTO: 4.9 % — SIGNIFICANT CHANGE UP (ref 0–8)
GLUCOSE SERPL-MCNC: 104 MG/DL — HIGH (ref 70–99)
GLUCOSE SERPL-MCNC: 154 MG/DL — HIGH (ref 70–99)
HCT VFR BLD CALC: 25.2 % — LOW (ref 42–52)
HCT VFR BLD CALC: 28.2 % — LOW (ref 42–52)
HGB BLD-MCNC: 7.8 G/DL — LOW (ref 14–18)
HGB BLD-MCNC: 9 G/DL — LOW (ref 14–18)
IMM GRANULOCYTES NFR BLD AUTO: 2 % — HIGH (ref 0.1–0.3)
INR BLD: 1.14 RATIO — SIGNIFICANT CHANGE UP (ref 0.65–1.3)
LYMPHOCYTES # BLD AUTO: 1.75 K/UL — SIGNIFICANT CHANGE UP (ref 1.2–3.4)
LYMPHOCYTES # BLD AUTO: 20.9 % — SIGNIFICANT CHANGE UP (ref 20.5–51.1)
MAGNESIUM SERPL-MCNC: 2 MG/DL — SIGNIFICANT CHANGE UP (ref 1.8–2.4)
MCHC RBC-ENTMCNC: 30.4 PG — SIGNIFICANT CHANGE UP (ref 27–31)
MCHC RBC-ENTMCNC: 30.7 PG — SIGNIFICANT CHANGE UP (ref 27–31)
MCHC RBC-ENTMCNC: 31 G/DL — LOW (ref 32–37)
MCHC RBC-ENTMCNC: 31.9 G/DL — LOW (ref 32–37)
MCV RBC AUTO: 96.2 FL — HIGH (ref 80–94)
MCV RBC AUTO: 98.1 FL — HIGH (ref 80–94)
MONOCYTES # BLD AUTO: 0.95 K/UL — HIGH (ref 0.1–0.6)
MONOCYTES NFR BLD AUTO: 11.3 % — HIGH (ref 1.7–9.3)
NEUTROPHILS # BLD AUTO: 5.09 K/UL — SIGNIFICANT CHANGE UP (ref 1.4–6.5)
NEUTROPHILS NFR BLD AUTO: 60.7 % — SIGNIFICANT CHANGE UP (ref 42.2–75.2)
NRBC # BLD: 0 /100 WBCS — SIGNIFICANT CHANGE UP (ref 0–0)
NRBC # BLD: 0 /100 WBCS — SIGNIFICANT CHANGE UP (ref 0–0)
PLATELET # BLD AUTO: 327 K/UL — SIGNIFICANT CHANGE UP (ref 130–400)
PLATELET # BLD AUTO: 368 K/UL — SIGNIFICANT CHANGE UP (ref 130–400)
PMV BLD: 10 FL — SIGNIFICANT CHANGE UP (ref 7.4–10.4)
PMV BLD: 9.5 FL — SIGNIFICANT CHANGE UP (ref 7.4–10.4)
POTASSIUM SERPL-MCNC: 3.9 MMOL/L — SIGNIFICANT CHANGE UP (ref 3.5–5)
POTASSIUM SERPL-MCNC: 4 MMOL/L — SIGNIFICANT CHANGE UP (ref 3.5–5)
POTASSIUM SERPL-SCNC: 3.9 MMOL/L — SIGNIFICANT CHANGE UP (ref 3.5–5)
POTASSIUM SERPL-SCNC: 4 MMOL/L — SIGNIFICANT CHANGE UP (ref 3.5–5)
PROT SERPL-MCNC: 5.1 G/DL — LOW (ref 6–8)
PROTHROM AB SERPL-ACNC: 13 SEC — HIGH (ref 9.95–12.87)
RBC # BLD: 2.57 M/UL — LOW (ref 4.7–6.1)
RBC # BLD: 2.93 M/UL — LOW (ref 4.7–6.1)
RBC # FLD: 13.8 % — SIGNIFICANT CHANGE UP (ref 11.5–14.5)
RBC # FLD: 14.1 % — SIGNIFICANT CHANGE UP (ref 11.5–14.5)
SODIUM SERPL-SCNC: 138 MMOL/L — SIGNIFICANT CHANGE UP (ref 135–146)
SODIUM SERPL-SCNC: 140 MMOL/L — SIGNIFICANT CHANGE UP (ref 135–146)
SPECIMEN SOURCE: SIGNIFICANT CHANGE UP
WBC # BLD: 6.36 K/UL — SIGNIFICANT CHANGE UP (ref 4.8–10.8)
WBC # BLD: 8.39 K/UL — SIGNIFICANT CHANGE UP (ref 4.8–10.8)
WBC # FLD AUTO: 6.36 K/UL — SIGNIFICANT CHANGE UP (ref 4.8–10.8)
WBC # FLD AUTO: 8.39 K/UL — SIGNIFICANT CHANGE UP (ref 4.8–10.8)

## 2024-06-04 PROCEDURE — 99232 SBSQ HOSP IP/OBS MODERATE 35: CPT

## 2024-06-04 PROCEDURE — 99233 SBSQ HOSP IP/OBS HIGH 50: CPT

## 2024-06-04 RX ORDER — KETOROLAC TROMETHAMINE 30 MG/ML
30 INJECTION, SOLUTION INTRAMUSCULAR EVERY 6 HOURS
Refills: 0 | Status: DISCONTINUED | OUTPATIENT
Start: 2024-06-04 | End: 2024-06-05

## 2024-06-04 RX ORDER — ENOXAPARIN SODIUM 100 MG/ML
40 INJECTION SUBCUTANEOUS ONCE
Refills: 0 | Status: COMPLETED | OUTPATIENT
Start: 2024-06-04 | End: 2024-06-04

## 2024-06-04 RX ORDER — FUROSEMIDE 10 MG/ML
40 INJECTION, SOLUTION INTRAMUSCULAR; INTRAVENOUS ONCE
Refills: 0 | Status: COMPLETED | OUTPATIENT
Start: 2024-06-04 | End: 2024-06-04

## 2024-06-04 RX ADMIN — PIPERACILLIN SODIUM AND TAZOBACTAM SODIUM 25 GRAM(S): 3; .375 INJECTION, POWDER, LYOPHILIZED, FOR SOLUTION INTRAVENOUS at 22:08

## 2024-06-04 RX ADMIN — Medication 650 MILLIGRAM(S): at 06:05

## 2024-06-04 RX ADMIN — Medication 1 APPLICATION(S): at 06:06

## 2024-06-04 RX ADMIN — OLANZAPINE 12.5 MILLIGRAM(S): 2.5 TABLET, FILM COATED ORAL at 22:08

## 2024-06-04 RX ADMIN — PIPERACILLIN SODIUM AND TAZOBACTAM SODIUM 25 GRAM(S): 3; .375 INJECTION, POWDER, LYOPHILIZED, FOR SOLUTION INTRAVENOUS at 06:04

## 2024-06-04 RX ADMIN — PANTOPRAZOLE SODIUM 40 MILLIGRAM(S): 40 INJECTION, POWDER, FOR SOLUTION INTRAVENOUS at 11:48

## 2024-06-04 RX ADMIN — LEVETIRACETAM 750 MILLIGRAM(S): 100 INJECTION INTRAVENOUS at 06:05

## 2024-06-04 RX ADMIN — Medication 330 MILLIGRAM(S): at 17:41

## 2024-06-04 RX ADMIN — Medication 200 MILLIGRAM(S): at 17:40

## 2024-06-04 RX ADMIN — Medication 1 APPLICATION(S): at 12:14

## 2024-06-04 RX ADMIN — Medication 330 MILLIGRAM(S): at 08:14

## 2024-06-04 RX ADMIN — VALPROIC ACID 375 MILLIGRAM(S): 250 SOLUTION ORAL at 17:40

## 2024-06-04 RX ADMIN — Medication 200 MILLIGRAM(S): at 11:49

## 2024-06-04 RX ADMIN — AMLODIPINE BESYLATE 5 MILLIGRAM(S): 2.5 TABLET ORAL at 06:05

## 2024-06-04 RX ADMIN — Medication 650 MILLIGRAM(S): at 21:53

## 2024-06-04 RX ADMIN — PIPERACILLIN SODIUM AND TAZOBACTAM SODIUM 25 GRAM(S): 3; .375 INJECTION, POWDER, LYOPHILIZED, FOR SOLUTION INTRAVENOUS at 14:03

## 2024-06-04 RX ADMIN — ENOXAPARIN SODIUM 40 MILLIGRAM(S): 100 INJECTION SUBCUTANEOUS at 14:03

## 2024-06-04 RX ADMIN — VALPROIC ACID 375 MILLIGRAM(S): 250 SOLUTION ORAL at 06:05

## 2024-06-04 RX ADMIN — Medication 1 APPLICATION(S): at 17:41

## 2024-06-04 RX ADMIN — FUROSEMIDE 40 MILLIGRAM(S): 10 INJECTION, SOLUTION INTRAMUSCULAR; INTRAVENOUS at 21:55

## 2024-06-04 RX ADMIN — VALPROIC ACID 375 MILLIGRAM(S): 250 SOLUTION ORAL at 11:50

## 2024-06-04 RX ADMIN — Medication 650 MILLIGRAM(S): at 14:04

## 2024-06-04 RX ADMIN — KETOROLAC TROMETHAMINE 30 MILLIGRAM(S): 30 INJECTION, SOLUTION INTRAMUSCULAR at 18:40

## 2024-06-04 RX ADMIN — Medication 200 MILLIGRAM(S): at 06:06

## 2024-06-04 RX ADMIN — VALPROIC ACID 375 MILLIGRAM(S): 250 SOLUTION ORAL at 23:02

## 2024-06-04 RX ADMIN — Medication 200 MILLIGRAM(S): at 23:02

## 2024-06-04 RX ADMIN — IPRATROPIUM BROMIDE AND ALBUTEROL SULFATE 3 MILLILITER(S): .5; 3 SOLUTION RESPIRATORY (INHALATION) at 22:50

## 2024-06-04 RX ADMIN — LEVETIRACETAM 750 MILLIGRAM(S): 100 INJECTION INTRAVENOUS at 17:40

## 2024-06-04 RX ADMIN — KETOROLAC TROMETHAMINE 30 MILLIGRAM(S): 30 INJECTION, SOLUTION INTRAMUSCULAR at 23:03

## 2024-06-05 ENCOUNTER — RESULT REVIEW (OUTPATIENT)
Age: 64
End: 2024-06-05

## 2024-06-05 ENCOUNTER — TRANSCRIPTION ENCOUNTER (OUTPATIENT)
Age: 64
End: 2024-06-05

## 2024-06-05 PROCEDURE — 88305 TISSUE EXAM BY PATHOLOGIST: CPT | Mod: 26

## 2024-06-05 PROCEDURE — 88312 SPECIAL STAINS GROUP 1: CPT | Mod: 26

## 2024-06-05 PROCEDURE — 43251 EGD REMOVE LESION SNARE: CPT | Mod: XU

## 2024-06-05 PROCEDURE — 43239 EGD BIOPSY SINGLE/MULTIPLE: CPT | Mod: XU

## 2024-06-05 PROCEDURE — 99232 SBSQ HOSP IP/OBS MODERATE 35: CPT

## 2024-06-05 PROCEDURE — 43246 EGD PLACE GASTROSTOMY TUBE: CPT

## 2024-06-05 RX ORDER — FUROSEMIDE 10 MG/ML
40 INJECTION, SOLUTION INTRAMUSCULAR; INTRAVENOUS DAILY
Refills: 0 | Status: DISCONTINUED | OUTPATIENT
Start: 2024-06-05 | End: 2024-06-06

## 2024-06-05 RX ADMIN — VALPROIC ACID 375 MILLIGRAM(S): 250 SOLUTION ORAL at 17:49

## 2024-06-05 RX ADMIN — FUROSEMIDE 40 MILLIGRAM(S): 10 INJECTION, SOLUTION INTRAMUSCULAR; INTRAVENOUS at 17:49

## 2024-06-05 RX ADMIN — OLANZAPINE 12.5 MILLIGRAM(S): 2.5 TABLET, FILM COATED ORAL at 22:49

## 2024-06-05 RX ADMIN — VALPROIC ACID 375 MILLIGRAM(S): 250 SOLUTION ORAL at 23:00

## 2024-06-05 RX ADMIN — Medication 330 MILLIGRAM(S): at 17:47

## 2024-06-05 RX ADMIN — LEVETIRACETAM 750 MILLIGRAM(S): 100 INJECTION INTRAVENOUS at 17:48

## 2024-06-05 RX ADMIN — PIPERACILLIN SODIUM AND TAZOBACTAM SODIUM 25 GRAM(S): 3; .375 INJECTION, POWDER, LYOPHILIZED, FOR SOLUTION INTRAVENOUS at 08:01

## 2024-06-05 RX ADMIN — KETOROLAC TROMETHAMINE 30 MILLIGRAM(S): 30 INJECTION, SOLUTION INTRAMUSCULAR at 23:00

## 2024-06-05 RX ADMIN — Medication 1 APPLICATION(S): at 17:56

## 2024-06-05 RX ADMIN — Medication 330 MILLIGRAM(S): at 08:00

## 2024-06-05 RX ADMIN — Medication 650 MILLIGRAM(S): at 05:40

## 2024-06-05 RX ADMIN — Medication 650 MILLIGRAM(S): at 21:50

## 2024-06-05 RX ADMIN — KETOROLAC TROMETHAMINE 30 MILLIGRAM(S): 30 INJECTION, SOLUTION INTRAMUSCULAR at 17:55

## 2024-06-05 RX ADMIN — Medication 200 MILLIGRAM(S): at 23:03

## 2024-06-05 RX ADMIN — Medication 3 MILLILITER(S): at 16:49

## 2024-06-05 RX ADMIN — Medication 1 APPLICATION(S): at 05:41

## 2024-06-05 RX ADMIN — PIPERACILLIN SODIUM AND TAZOBACTAM SODIUM 25 GRAM(S): 3; .375 INJECTION, POWDER, LYOPHILIZED, FOR SOLUTION INTRAVENOUS at 22:49

## 2024-06-05 RX ADMIN — Medication 3 MILLILITER(S): at 08:02

## 2024-06-05 RX ADMIN — KETOROLAC TROMETHAMINE 30 MILLIGRAM(S): 30 INJECTION, SOLUTION INTRAMUSCULAR at 05:40

## 2024-06-05 RX ADMIN — Medication 200 MILLIGRAM(S): at 17:50

## 2024-06-05 RX ADMIN — LEVETIRACETAM 750 MILLIGRAM(S): 100 INJECTION INTRAVENOUS at 05:40

## 2024-06-05 RX ADMIN — Medication 200 MILLIGRAM(S): at 05:40

## 2024-06-05 RX ADMIN — VALPROIC ACID 375 MILLIGRAM(S): 250 SOLUTION ORAL at 05:40

## 2024-06-06 DIAGNOSIS — R60.1 GENERALIZED EDEMA: ICD-10-CM

## 2024-06-06 LAB
ALBUMIN SERPL ELPH-MCNC: 2 G/DL — LOW (ref 3.5–5.2)
ALP SERPL-CCNC: 83 U/L — SIGNIFICANT CHANGE UP (ref 30–115)
ALT FLD-CCNC: 8 U/L — SIGNIFICANT CHANGE UP (ref 0–41)
ANION GAP SERPL CALC-SCNC: 7 MMOL/L — SIGNIFICANT CHANGE UP (ref 7–14)
AST SERPL-CCNC: 14 U/L — SIGNIFICANT CHANGE UP (ref 0–41)
BASOPHILS # BLD AUTO: 0.02 K/UL — SIGNIFICANT CHANGE UP (ref 0–0.2)
BASOPHILS NFR BLD AUTO: 0.4 % — SIGNIFICANT CHANGE UP (ref 0–1)
BILIRUB SERPL-MCNC: <0.2 MG/DL — SIGNIFICANT CHANGE UP (ref 0.2–1.2)
BUN SERPL-MCNC: 14 MG/DL — SIGNIFICANT CHANGE UP (ref 10–20)
CALCIUM SERPL-MCNC: 8.7 MG/DL — SIGNIFICANT CHANGE UP (ref 8.4–10.5)
CHLORIDE SERPL-SCNC: 104 MMOL/L — SIGNIFICANT CHANGE UP (ref 98–110)
CO2 SERPL-SCNC: 31 MMOL/L — SIGNIFICANT CHANGE UP (ref 17–32)
CREAT SERPL-MCNC: <0.5 MG/DL — LOW (ref 0.7–1.5)
EGFR: 120 ML/MIN/1.73M2 — SIGNIFICANT CHANGE UP
EOSINOPHIL # BLD AUTO: 0.42 K/UL — SIGNIFICANT CHANGE UP (ref 0–0.7)
EOSINOPHIL NFR BLD AUTO: 7.8 % — SIGNIFICANT CHANGE UP (ref 0–8)
GLUCOSE SERPL-MCNC: 80 MG/DL — SIGNIFICANT CHANGE UP (ref 70–99)
HCT VFR BLD CALC: 25.4 % — LOW (ref 42–52)
HGB BLD-MCNC: 8 G/DL — LOW (ref 14–18)
IMM GRANULOCYTES NFR BLD AUTO: 1.9 % — HIGH (ref 0.1–0.3)
LACTATE SERPL-SCNC: 1.1 MMOL/L — SIGNIFICANT CHANGE UP (ref 0.7–2)
LYMPHOCYTES # BLD AUTO: 1.25 K/UL — SIGNIFICANT CHANGE UP (ref 1.2–3.4)
LYMPHOCYTES # BLD AUTO: 23.3 % — SIGNIFICANT CHANGE UP (ref 20.5–51.1)
MAGNESIUM SERPL-MCNC: 2 MG/DL — SIGNIFICANT CHANGE UP (ref 1.8–2.4)
MCHC RBC-ENTMCNC: 30.4 PG — SIGNIFICANT CHANGE UP (ref 27–31)
MCHC RBC-ENTMCNC: 31.5 G/DL — LOW (ref 32–37)
MCV RBC AUTO: 96.6 FL — HIGH (ref 80–94)
MONOCYTES # BLD AUTO: 0.68 K/UL — HIGH (ref 0.1–0.6)
MONOCYTES NFR BLD AUTO: 12.7 % — HIGH (ref 1.7–9.3)
NEUTROPHILS # BLD AUTO: 2.89 K/UL — SIGNIFICANT CHANGE UP (ref 1.4–6.5)
NEUTROPHILS NFR BLD AUTO: 53.9 % — SIGNIFICANT CHANGE UP (ref 42.2–75.2)
NRBC # BLD: 0 /100 WBCS — SIGNIFICANT CHANGE UP (ref 0–0)
PHOSPHATE SERPL-MCNC: 2.7 MG/DL — SIGNIFICANT CHANGE UP (ref 2.1–4.9)
PLATELET # BLD AUTO: 353 K/UL — SIGNIFICANT CHANGE UP (ref 130–400)
PMV BLD: 9.6 FL — SIGNIFICANT CHANGE UP (ref 7.4–10.4)
POTASSIUM SERPL-MCNC: 4 MMOL/L — SIGNIFICANT CHANGE UP (ref 3.5–5)
POTASSIUM SERPL-SCNC: 4 MMOL/L — SIGNIFICANT CHANGE UP (ref 3.5–5)
PROT SERPL-MCNC: 4.5 G/DL — LOW (ref 6–8)
RBC # BLD: 2.63 M/UL — LOW (ref 4.7–6.1)
RBC # FLD: 14 % — SIGNIFICANT CHANGE UP (ref 11.5–14.5)
SODIUM SERPL-SCNC: 142 MMOL/L — SIGNIFICANT CHANGE UP (ref 135–146)
WBC # BLD: 5.36 K/UL — SIGNIFICANT CHANGE UP (ref 4.8–10.8)
WBC # FLD AUTO: 5.36 K/UL — SIGNIFICANT CHANGE UP (ref 4.8–10.8)

## 2024-06-06 PROCEDURE — 99232 SBSQ HOSP IP/OBS MODERATE 35: CPT

## 2024-06-06 PROCEDURE — 99233 SBSQ HOSP IP/OBS HIGH 50: CPT

## 2024-06-06 RX ORDER — FUROSEMIDE 10 MG/ML
40 INJECTION, SOLUTION INTRAMUSCULAR; INTRAVENOUS EVERY 12 HOURS
Refills: 0 | Status: DISCONTINUED | OUTPATIENT
Start: 2024-06-06 | End: 2024-06-06

## 2024-06-06 RX ORDER — FUROSEMIDE 10 MG/ML
80 INJECTION, SOLUTION INTRAMUSCULAR; INTRAVENOUS EVERY 12 HOURS
Refills: 0 | Status: DISCONTINUED | OUTPATIENT
Start: 2024-06-06 | End: 2024-06-06

## 2024-06-06 RX ORDER — FUROSEMIDE 10 MG/ML
40 INJECTION, SOLUTION INTRAMUSCULAR; INTRAVENOUS ONCE
Refills: 0 | Status: COMPLETED | OUTPATIENT
Start: 2024-06-06 | End: 2024-06-06

## 2024-06-06 RX ORDER — FUROSEMIDE 10 MG/ML
80 INJECTION, SOLUTION INTRAMUSCULAR; INTRAVENOUS EVERY 12 HOURS
Refills: 0 | Status: DISCONTINUED | OUTPATIENT
Start: 2024-06-06 | End: 2024-06-07

## 2024-06-06 RX ORDER — ALBUMIN HUMAN 5 %
200 INTRAVENOUS SOLUTION INTRAVENOUS ONCE
Refills: 0 | Status: COMPLETED | OUTPATIENT
Start: 2024-06-06 | End: 2024-06-06

## 2024-06-06 RX ORDER — ENOXAPARIN SODIUM 100 MG/ML
40 INJECTION SUBCUTANEOUS EVERY 24 HOURS
Refills: 0 | Status: DISCONTINUED | OUTPATIENT
Start: 2024-06-06 | End: 2024-06-21

## 2024-06-06 RX ORDER — MORPHINE SULFATE 100 MG/1
2 TABLET, EXTENDED RELEASE ORAL EVERY 4 HOURS
Refills: 0 | Status: DISCONTINUED | OUTPATIENT
Start: 2024-06-06 | End: 2024-06-06

## 2024-06-06 RX ADMIN — Medication 1 APPLICATION(S): at 05:46

## 2024-06-06 RX ADMIN — VALPROIC ACID 375 MILLIGRAM(S): 250 SOLUTION ORAL at 11:50

## 2024-06-06 RX ADMIN — Medication 1 APPLICATION(S): at 18:44

## 2024-06-06 RX ADMIN — IPRATROPIUM BROMIDE AND ALBUTEROL SULFATE 3 MILLILITER(S): .5; 3 SOLUTION RESPIRATORY (INHALATION) at 13:26

## 2024-06-06 RX ADMIN — IPRATROPIUM BROMIDE AND ALBUTEROL SULFATE 3 MILLILITER(S): .5; 3 SOLUTION RESPIRATORY (INHALATION) at 20:04

## 2024-06-06 RX ADMIN — MORPHINE SULFATE 2 MILLIGRAM(S): 100 TABLET, EXTENDED RELEASE ORAL at 13:33

## 2024-06-06 RX ADMIN — Medication 330 MILLIGRAM(S): at 08:13

## 2024-06-06 RX ADMIN — Medication 1 APPLICATION(S): at 12:43

## 2024-06-06 RX ADMIN — FUROSEMIDE 40 MILLIGRAM(S): 10 INJECTION, SOLUTION INTRAMUSCULAR; INTRAVENOUS at 12:40

## 2024-06-06 RX ADMIN — Medication 100 MILLILITER(S): at 13:15

## 2024-06-06 RX ADMIN — Medication 200 MILLIGRAM(S): at 23:13

## 2024-06-06 RX ADMIN — Medication 200 MILLIGRAM(S): at 11:50

## 2024-06-06 RX ADMIN — MORPHINE SULFATE 2 MILLIGRAM(S): 100 TABLET, EXTENDED RELEASE ORAL at 19:20

## 2024-06-06 RX ADMIN — Medication 200 MILLIGRAM(S): at 05:46

## 2024-06-06 RX ADMIN — Medication 330 MILLIGRAM(S): at 18:36

## 2024-06-06 RX ADMIN — VALPROIC ACID 375 MILLIGRAM(S): 250 SOLUTION ORAL at 23:13

## 2024-06-06 RX ADMIN — FUROSEMIDE 40 MILLIGRAM(S): 10 INJECTION, SOLUTION INTRAMUSCULAR; INTRAVENOUS at 05:45

## 2024-06-06 RX ADMIN — MORPHINE SULFATE 2 MILLIGRAM(S): 100 TABLET, EXTENDED RELEASE ORAL at 13:18

## 2024-06-06 RX ADMIN — Medication 200 MILLIGRAM(S): at 18:35

## 2024-06-06 RX ADMIN — PIPERACILLIN SODIUM AND TAZOBACTAM SODIUM 25 GRAM(S): 3; .375 INJECTION, POWDER, LYOPHILIZED, FOR SOLUTION INTRAVENOUS at 06:40

## 2024-06-06 RX ADMIN — LEVETIRACETAM 750 MILLIGRAM(S): 100 INJECTION INTRAVENOUS at 18:35

## 2024-06-06 RX ADMIN — ENOXAPARIN SODIUM 40 MILLIGRAM(S): 100 INJECTION SUBCUTANEOUS at 18:36

## 2024-06-06 RX ADMIN — MORPHINE SULFATE 2 MILLIGRAM(S): 100 TABLET, EXTENDED RELEASE ORAL at 09:50

## 2024-06-06 RX ADMIN — Medication 3 MILLILITER(S): at 20:35

## 2024-06-06 RX ADMIN — Medication 650 MILLIGRAM(S): at 05:45

## 2024-06-06 RX ADMIN — MORPHINE SULFATE 2 MILLIGRAM(S): 100 TABLET, EXTENDED RELEASE ORAL at 09:37

## 2024-06-06 RX ADMIN — VALPROIC ACID 375 MILLIGRAM(S): 250 SOLUTION ORAL at 05:45

## 2024-06-06 RX ADMIN — FUROSEMIDE 80 MILLIGRAM(S): 10 INJECTION, SOLUTION INTRAMUSCULAR; INTRAVENOUS at 18:36

## 2024-06-06 RX ADMIN — OLANZAPINE 12.5 MILLIGRAM(S): 2.5 TABLET, FILM COATED ORAL at 21:36

## 2024-06-06 RX ADMIN — PANTOPRAZOLE SODIUM 40 MILLIGRAM(S): 40 INJECTION, POWDER, FOR SOLUTION INTRAVENOUS at 11:49

## 2024-06-06 RX ADMIN — LEVETIRACETAM 750 MILLIGRAM(S): 100 INJECTION INTRAVENOUS at 05:45

## 2024-06-06 RX ADMIN — VALPROIC ACID 375 MILLIGRAM(S): 250 SOLUTION ORAL at 18:35

## 2024-06-06 RX ADMIN — Medication 3 MILLILITER(S): at 09:38

## 2024-06-06 RX ADMIN — Medication 3 MILLILITER(S): at 13:31

## 2024-06-07 LAB
ALBUMIN SERPL ELPH-MCNC: 3 G/DL — LOW (ref 3.5–5.2)
ALP SERPL-CCNC: 80 U/L — SIGNIFICANT CHANGE UP (ref 30–115)
ALT FLD-CCNC: 8 U/L — SIGNIFICANT CHANGE UP (ref 0–41)
ANION GAP SERPL CALC-SCNC: 7 MMOL/L — SIGNIFICANT CHANGE UP (ref 7–14)
AST SERPL-CCNC: 18 U/L — SIGNIFICANT CHANGE UP (ref 0–41)
BASOPHILS # BLD AUTO: 0.01 K/UL — SIGNIFICANT CHANGE UP (ref 0–0.2)
BASOPHILS NFR BLD AUTO: 0.2 % — SIGNIFICANT CHANGE UP (ref 0–1)
BILIRUB SERPL-MCNC: <0.2 MG/DL — SIGNIFICANT CHANGE UP (ref 0.2–1.2)
BUN SERPL-MCNC: 18 MG/DL — SIGNIFICANT CHANGE UP (ref 10–20)
CALCIUM SERPL-MCNC: 9 MG/DL — SIGNIFICANT CHANGE UP (ref 8.4–10.5)
CHLORIDE SERPL-SCNC: 101 MMOL/L — SIGNIFICANT CHANGE UP (ref 98–110)
CO2 SERPL-SCNC: 34 MMOL/L — HIGH (ref 17–32)
CREAT SERPL-MCNC: 0.5 MG/DL — LOW (ref 0.7–1.5)
EGFR: 112 ML/MIN/1.73M2 — SIGNIFICANT CHANGE UP
EOSINOPHIL # BLD AUTO: 0.19 K/UL — SIGNIFICANT CHANGE UP (ref 0–0.7)
EOSINOPHIL NFR BLD AUTO: 3.5 % — SIGNIFICANT CHANGE UP (ref 0–8)
GLUCOSE SERPL-MCNC: 213 MG/DL — HIGH (ref 70–99)
HCT VFR BLD CALC: 19.1 % — LOW (ref 42–52)
HCT VFR BLD CALC: 22.5 % — LOW (ref 42–52)
HGB BLD-MCNC: 6.2 G/DL — CRITICAL LOW (ref 14–18)
HGB BLD-MCNC: 7.5 G/DL — LOW (ref 14–18)
IMM GRANULOCYTES NFR BLD AUTO: 1.9 % — HIGH (ref 0.1–0.3)
LYMPHOCYTES # BLD AUTO: 0.71 K/UL — LOW (ref 1.2–3.4)
LYMPHOCYTES # BLD AUTO: 13.2 % — LOW (ref 20.5–51.1)
MAGNESIUM SERPL-MCNC: 1.9 MG/DL — SIGNIFICANT CHANGE UP (ref 1.8–2.4)
MCHC RBC-ENTMCNC: 31.1 PG — HIGH (ref 27–31)
MCHC RBC-ENTMCNC: 31.2 PG — HIGH (ref 27–31)
MCHC RBC-ENTMCNC: 32.5 G/DL — SIGNIFICANT CHANGE UP (ref 32–37)
MCHC RBC-ENTMCNC: 33.3 G/DL — SIGNIFICANT CHANGE UP (ref 32–37)
MCV RBC AUTO: 93.4 FL — SIGNIFICANT CHANGE UP (ref 80–94)
MCV RBC AUTO: 96 FL — HIGH (ref 80–94)
MONOCYTES # BLD AUTO: 0.69 K/UL — HIGH (ref 0.1–0.6)
MONOCYTES NFR BLD AUTO: 12.8 % — HIGH (ref 1.7–9.3)
NEUTROPHILS # BLD AUTO: 3.69 K/UL — SIGNIFICANT CHANGE UP (ref 1.4–6.5)
NEUTROPHILS NFR BLD AUTO: 68.4 % — SIGNIFICANT CHANGE UP (ref 42.2–75.2)
NRBC # BLD: 0 /100 WBCS — SIGNIFICANT CHANGE UP (ref 0–0)
NRBC # BLD: 0 /100 WBCS — SIGNIFICANT CHANGE UP (ref 0–0)
PLATELET # BLD AUTO: 331 K/UL — SIGNIFICANT CHANGE UP (ref 130–400)
PLATELET # BLD AUTO: 368 K/UL — SIGNIFICANT CHANGE UP (ref 130–400)
PMV BLD: 9.6 FL — SIGNIFICANT CHANGE UP (ref 7.4–10.4)
PMV BLD: 9.8 FL — SIGNIFICANT CHANGE UP (ref 7.4–10.4)
POTASSIUM SERPL-MCNC: 3.5 MMOL/L — SIGNIFICANT CHANGE UP (ref 3.5–5)
POTASSIUM SERPL-SCNC: 3.5 MMOL/L — SIGNIFICANT CHANGE UP (ref 3.5–5)
PROT SERPL-MCNC: 5.4 G/DL — LOW (ref 6–8)
RBC # BLD: 1.99 M/UL — LOW (ref 4.7–6.1)
RBC # BLD: 2.41 M/UL — LOW (ref 4.7–6.1)
RBC # FLD: 13.8 % — SIGNIFICANT CHANGE UP (ref 11.5–14.5)
RBC # FLD: 14.3 % — SIGNIFICANT CHANGE UP (ref 11.5–14.5)
SODIUM SERPL-SCNC: 142 MMOL/L — SIGNIFICANT CHANGE UP (ref 135–146)
SURGICAL PATHOLOGY STUDY: SIGNIFICANT CHANGE UP
WBC # BLD: 5.39 K/UL — SIGNIFICANT CHANGE UP (ref 4.8–10.8)
WBC # BLD: 7.48 K/UL — SIGNIFICANT CHANGE UP (ref 4.8–10.8)
WBC # FLD AUTO: 5.39 K/UL — SIGNIFICANT CHANGE UP (ref 4.8–10.8)
WBC # FLD AUTO: 7.48 K/UL — SIGNIFICANT CHANGE UP (ref 4.8–10.8)

## 2024-06-07 PROCEDURE — 99232 SBSQ HOSP IP/OBS MODERATE 35: CPT

## 2024-06-07 PROCEDURE — 99233 SBSQ HOSP IP/OBS HIGH 50: CPT

## 2024-06-07 RX ORDER — ACETAMINOPHEN 325 MG
650 TABLET ORAL EVERY 6 HOURS
Refills: 0 | Status: COMPLETED | OUTPATIENT
Start: 2024-06-07 | End: 2024-06-10

## 2024-06-07 RX ORDER — FUROSEMIDE 10 MG/ML
60 INJECTION, SOLUTION INTRAMUSCULAR; INTRAVENOUS EVERY 12 HOURS
Refills: 0 | Status: DISCONTINUED | OUTPATIENT
Start: 2024-06-07 | End: 2024-06-10

## 2024-06-07 RX ADMIN — Medication 330 MILLIGRAM(S): at 09:58

## 2024-06-07 RX ADMIN — ENOXAPARIN SODIUM 40 MILLIGRAM(S): 100 INJECTION SUBCUTANEOUS at 17:43

## 2024-06-07 RX ADMIN — VALPROIC ACID 375 MILLIGRAM(S): 250 SOLUTION ORAL at 17:26

## 2024-06-07 RX ADMIN — VALPROIC ACID 375 MILLIGRAM(S): 250 SOLUTION ORAL at 11:57

## 2024-06-07 RX ADMIN — OLANZAPINE 12.5 MILLIGRAM(S): 2.5 TABLET, FILM COATED ORAL at 21:51

## 2024-06-07 RX ADMIN — LEVETIRACETAM 750 MILLIGRAM(S): 100 INJECTION INTRAVENOUS at 05:46

## 2024-06-07 RX ADMIN — Medication 1 APPLICATION(S): at 06:17

## 2024-06-07 RX ADMIN — Medication 650 MILLIGRAM(S): at 17:27

## 2024-06-07 RX ADMIN — Medication 3 MILLILITER(S): at 21:22

## 2024-06-07 RX ADMIN — Medication 330 MILLIGRAM(S): at 18:42

## 2024-06-07 RX ADMIN — Medication 650 MILLIGRAM(S): at 09:58

## 2024-06-07 RX ADMIN — FUROSEMIDE 80 MILLIGRAM(S): 10 INJECTION, SOLUTION INTRAMUSCULAR; INTRAVENOUS at 05:46

## 2024-06-07 RX ADMIN — PANTOPRAZOLE SODIUM 40 MILLIGRAM(S): 40 INJECTION, POWDER, FOR SOLUTION INTRAVENOUS at 11:57

## 2024-06-07 RX ADMIN — LEVETIRACETAM 750 MILLIGRAM(S): 100 INJECTION INTRAVENOUS at 17:27

## 2024-06-07 RX ADMIN — FUROSEMIDE 60 MILLIGRAM(S): 10 INJECTION, SOLUTION INTRAMUSCULAR; INTRAVENOUS at 17:28

## 2024-06-07 RX ADMIN — VALPROIC ACID 375 MILLIGRAM(S): 250 SOLUTION ORAL at 05:46

## 2024-06-07 RX ADMIN — Medication 650 MILLIGRAM(S): at 18:20

## 2024-06-07 RX ADMIN — Medication 200 MILLIGRAM(S): at 11:57

## 2024-06-07 RX ADMIN — IPRATROPIUM BROMIDE AND ALBUTEROL SULFATE 3 MILLILITER(S): .5; 3 SOLUTION RESPIRATORY (INHALATION) at 21:22

## 2024-06-07 RX ADMIN — Medication 200 MILLIGRAM(S): at 23:31

## 2024-06-07 RX ADMIN — Medication 3 MILLILITER(S): at 12:04

## 2024-06-07 RX ADMIN — Medication 1 APPLICATION(S): at 12:04

## 2024-06-07 RX ADMIN — Medication 3 MILLILITER(S): at 18:42

## 2024-06-07 RX ADMIN — Medication 650 MILLIGRAM(S): at 10:58

## 2024-06-07 RX ADMIN — Medication 200 MILLIGRAM(S): at 05:45

## 2024-06-07 RX ADMIN — Medication 200 MILLIGRAM(S): at 17:26

## 2024-06-07 RX ADMIN — VALPROIC ACID 375 MILLIGRAM(S): 250 SOLUTION ORAL at 23:31

## 2024-06-07 RX ADMIN — Medication 3 MILLILITER(S): at 02:00

## 2024-06-07 RX ADMIN — Medication 650 MILLIGRAM(S): at 23:39

## 2024-06-08 LAB
ALBUMIN SERPL ELPH-MCNC: 3 G/DL — LOW (ref 3.5–5.2)
ALP SERPL-CCNC: 82 U/L — SIGNIFICANT CHANGE UP (ref 30–115)
ALT FLD-CCNC: 12 U/L — SIGNIFICANT CHANGE UP (ref 0–41)
ANION GAP SERPL CALC-SCNC: 7 MMOL/L — SIGNIFICANT CHANGE UP (ref 7–14)
AST SERPL-CCNC: 27 U/L — SIGNIFICANT CHANGE UP (ref 0–41)
BILIRUB SERPL-MCNC: <0.2 MG/DL — SIGNIFICANT CHANGE UP (ref 0.2–1.2)
BUN SERPL-MCNC: 17 MG/DL — SIGNIFICANT CHANGE UP (ref 10–20)
CALCIUM SERPL-MCNC: 9.2 MG/DL — SIGNIFICANT CHANGE UP (ref 8.4–10.5)
CHLORIDE SERPL-SCNC: 94 MMOL/L — LOW (ref 98–110)
CO2 SERPL-SCNC: 36 MMOL/L — HIGH (ref 17–32)
CREAT SERPL-MCNC: 0.5 MG/DL — LOW (ref 0.7–1.5)
CRP SERPL-MCNC: 49.3 MG/L — HIGH
EGFR: 112 ML/MIN/1.73M2 — SIGNIFICANT CHANGE UP
ERYTHROCYTE [SEDIMENTATION RATE] IN BLOOD: 50 MM/HR — HIGH (ref 0–10)
GLUCOSE SERPL-MCNC: 179 MG/DL — HIGH (ref 70–99)
HCT VFR BLD CALC: 26.2 % — LOW (ref 42–52)
HGB BLD-MCNC: 8.5 G/DL — LOW (ref 14–18)
MCHC RBC-ENTMCNC: 30.9 PG — SIGNIFICANT CHANGE UP (ref 27–31)
MCHC RBC-ENTMCNC: 32.4 G/DL — SIGNIFICANT CHANGE UP (ref 32–37)
MCV RBC AUTO: 95.3 FL — HIGH (ref 80–94)
NRBC # BLD: 0 /100 WBCS — SIGNIFICANT CHANGE UP (ref 0–0)
PLATELET # BLD AUTO: 367 K/UL — SIGNIFICANT CHANGE UP (ref 130–400)
PMV BLD: 9.6 FL — SIGNIFICANT CHANGE UP (ref 7.4–10.4)
POTASSIUM SERPL-MCNC: 3.2 MMOL/L — LOW (ref 3.5–5)
POTASSIUM SERPL-SCNC: 3.2 MMOL/L — LOW (ref 3.5–5)
PROT SERPL-MCNC: 5.6 G/DL — LOW (ref 6–8)
RBC # BLD: 2.75 M/UL — LOW (ref 4.7–6.1)
RBC # FLD: 14.5 % — SIGNIFICANT CHANGE UP (ref 11.5–14.5)
SODIUM SERPL-SCNC: 137 MMOL/L — SIGNIFICANT CHANGE UP (ref 135–146)
WBC # BLD: 7.78 K/UL — SIGNIFICANT CHANGE UP (ref 4.8–10.8)
WBC # FLD AUTO: 7.78 K/UL — SIGNIFICANT CHANGE UP (ref 4.8–10.8)

## 2024-06-08 PROCEDURE — 99232 SBSQ HOSP IP/OBS MODERATE 35: CPT

## 2024-06-08 PROCEDURE — 71045 X-RAY EXAM CHEST 1 VIEW: CPT | Mod: 26

## 2024-06-08 RX ORDER — TETRACYCLINE HCL 500 MG
500 CAPSULE ORAL EVERY 6 HOURS
Refills: 0 | Status: DISCONTINUED | OUTPATIENT
Start: 2024-06-08 | End: 2024-06-21

## 2024-06-08 RX ORDER — BISMUTH SUBSALICYLATE 262MG/15ML
300 SUSPENSION, ORAL (FINAL DOSE FORM) ORAL EVERY 6 HOURS
Refills: 0 | Status: DISCONTINUED | OUTPATIENT
Start: 2024-06-08 | End: 2024-06-21

## 2024-06-08 RX ORDER — POTASSIUM CHLORIDE 600 MG/1
40 TABLET, FILM COATED, EXTENDED RELEASE ORAL ONCE
Refills: 0 | Status: COMPLETED | OUTPATIENT
Start: 2024-06-08 | End: 2024-06-08

## 2024-06-08 RX ORDER — PANTOPRAZOLE SODIUM 40 MG/10ML
40 INJECTION, POWDER, FOR SOLUTION INTRAVENOUS EVERY 12 HOURS
Refills: 0 | Status: DISCONTINUED | OUTPATIENT
Start: 2024-06-08 | End: 2024-06-21

## 2024-06-08 RX ORDER — METRONIDAZOLE 500 MG/1
500 TABLET ORAL EVERY 8 HOURS
Refills: 0 | Status: DISCONTINUED | OUTPATIENT
Start: 2024-06-08 | End: 2024-06-21

## 2024-06-08 RX ADMIN — Medication 650 MILLIGRAM(S): at 18:18

## 2024-06-08 RX ADMIN — LEVETIRACETAM 750 MILLIGRAM(S): 100 INJECTION INTRAVENOUS at 05:44

## 2024-06-08 RX ADMIN — VALPROIC ACID 375 MILLIGRAM(S): 250 SOLUTION ORAL at 05:44

## 2024-06-08 RX ADMIN — Medication 3 MILLILITER(S): at 09:00

## 2024-06-08 RX ADMIN — IPRATROPIUM BROMIDE AND ALBUTEROL SULFATE 3 MILLILITER(S): .5; 3 SOLUTION RESPIRATORY (INHALATION) at 09:01

## 2024-06-08 RX ADMIN — METRONIDAZOLE 500 MILLIGRAM(S): 500 TABLET ORAL at 22:35

## 2024-06-08 RX ADMIN — POTASSIUM CHLORIDE 40 MILLIEQUIVALENT(S): 600 TABLET, FILM COATED, EXTENDED RELEASE ORAL at 13:26

## 2024-06-08 RX ADMIN — Medication 650 MILLIGRAM(S): at 14:12

## 2024-06-08 RX ADMIN — Medication 650 MILLIGRAM(S): at 05:44

## 2024-06-08 RX ADMIN — Medication 500 MILLIGRAM(S): at 17:32

## 2024-06-08 RX ADMIN — VALPROIC ACID 375 MILLIGRAM(S): 250 SOLUTION ORAL at 17:31

## 2024-06-08 RX ADMIN — Medication 500 MILLIGRAM(S): at 13:26

## 2024-06-08 RX ADMIN — Medication 3 MILLILITER(S): at 13:30

## 2024-06-08 RX ADMIN — Medication 650 MILLIGRAM(S): at 13:28

## 2024-06-08 RX ADMIN — Medication 300 MILLIGRAM(S): at 13:29

## 2024-06-08 RX ADMIN — LEVETIRACETAM 750 MILLIGRAM(S): 100 INJECTION INTRAVENOUS at 17:31

## 2024-06-08 RX ADMIN — Medication 330 MILLIGRAM(S): at 08:22

## 2024-06-08 RX ADMIN — Medication 200 MILLIGRAM(S): at 05:54

## 2024-06-08 RX ADMIN — Medication 300 MILLIGRAM(S): at 17:32

## 2024-06-08 RX ADMIN — Medication 200 MILLIGRAM(S): at 13:30

## 2024-06-08 RX ADMIN — OLANZAPINE 12.5 MILLIGRAM(S): 2.5 TABLET, FILM COATED ORAL at 22:35

## 2024-06-08 RX ADMIN — Medication 1 APPLICATION(S): at 13:38

## 2024-06-08 RX ADMIN — PANTOPRAZOLE SODIUM 40 MILLIGRAM(S): 40 INJECTION, POWDER, FOR SOLUTION INTRAVENOUS at 17:32

## 2024-06-08 RX ADMIN — Medication 650 MILLIGRAM(S): at 17:32

## 2024-06-08 RX ADMIN — FUROSEMIDE 60 MILLIGRAM(S): 10 INJECTION, SOLUTION INTRAMUSCULAR; INTRAVENOUS at 17:30

## 2024-06-08 RX ADMIN — METRONIDAZOLE 500 MILLIGRAM(S): 500 TABLET ORAL at 13:29

## 2024-06-08 RX ADMIN — Medication 330 MILLIGRAM(S): at 17:31

## 2024-06-08 RX ADMIN — ENOXAPARIN SODIUM 40 MILLIGRAM(S): 100 INJECTION SUBCUTANEOUS at 17:30

## 2024-06-08 RX ADMIN — Medication 200 MILLIGRAM(S): at 17:31

## 2024-06-08 RX ADMIN — VALPROIC ACID 375 MILLIGRAM(S): 250 SOLUTION ORAL at 13:27

## 2024-06-08 RX ADMIN — FUROSEMIDE 60 MILLIGRAM(S): 10 INJECTION, SOLUTION INTRAMUSCULAR; INTRAVENOUS at 05:45

## 2024-06-09 LAB
ALBUMIN SERPL ELPH-MCNC: 3.1 G/DL — LOW (ref 3.5–5.2)
ALP SERPL-CCNC: 87 U/L — SIGNIFICANT CHANGE UP (ref 30–115)
ALT FLD-CCNC: 15 U/L — SIGNIFICANT CHANGE UP (ref 0–41)
ANION GAP SERPL CALC-SCNC: 4 MMOL/L — LOW (ref 7–14)
AST SERPL-CCNC: 29 U/L — SIGNIFICANT CHANGE UP (ref 0–41)
BASOPHILS # BLD AUTO: 0.04 K/UL — SIGNIFICANT CHANGE UP (ref 0–0.2)
BASOPHILS NFR BLD AUTO: 0.2 % — SIGNIFICANT CHANGE UP (ref 0–1)
BILIRUB SERPL-MCNC: <0.2 MG/DL — SIGNIFICANT CHANGE UP (ref 0.2–1.2)
BUN SERPL-MCNC: 16 MG/DL — SIGNIFICANT CHANGE UP (ref 10–20)
CALCIUM SERPL-MCNC: 9.7 MG/DL — SIGNIFICANT CHANGE UP (ref 8.4–10.5)
CHLORIDE SERPL-SCNC: 92 MMOL/L — LOW (ref 98–110)
CO2 SERPL-SCNC: 39 MMOL/L — HIGH (ref 17–32)
CREAT SERPL-MCNC: 0.5 MG/DL — LOW (ref 0.7–1.5)
EGFR: 112 ML/MIN/1.73M2 — SIGNIFICANT CHANGE UP
EOSINOPHIL # BLD AUTO: 0.09 K/UL — SIGNIFICANT CHANGE UP (ref 0–0.7)
EOSINOPHIL NFR BLD AUTO: 0.5 % — SIGNIFICANT CHANGE UP (ref 0–8)
GLUCOSE SERPL-MCNC: 200 MG/DL — HIGH (ref 70–99)
HCT VFR BLD CALC: 27.6 % — LOW (ref 42–52)
HGB BLD-MCNC: 9 G/DL — LOW (ref 14–18)
IMM GRANULOCYTES NFR BLD AUTO: 0.7 % — HIGH (ref 0.1–0.3)
LYMPHOCYTES # BLD AUTO: 1.17 K/UL — LOW (ref 1.2–3.4)
LYMPHOCYTES # BLD AUTO: 6.7 % — LOW (ref 20.5–51.1)
MAGNESIUM SERPL-MCNC: 1.9 MG/DL — SIGNIFICANT CHANGE UP (ref 1.8–2.4)
MCHC RBC-ENTMCNC: 31 PG — SIGNIFICANT CHANGE UP (ref 27–31)
MCHC RBC-ENTMCNC: 32.6 G/DL — SIGNIFICANT CHANGE UP (ref 32–37)
MCV RBC AUTO: 95.2 FL — HIGH (ref 80–94)
MONOCYTES # BLD AUTO: 1.04 K/UL — HIGH (ref 0.1–0.6)
MONOCYTES NFR BLD AUTO: 5.9 % — SIGNIFICANT CHANGE UP (ref 1.7–9.3)
NEUTROPHILS # BLD AUTO: 15.01 K/UL — HIGH (ref 1.4–6.5)
NEUTROPHILS NFR BLD AUTO: 86 % — HIGH (ref 42.2–75.2)
NRBC # BLD: 0 /100 WBCS — SIGNIFICANT CHANGE UP (ref 0–0)
PHOSPHATE SERPL-MCNC: 2.2 MG/DL — SIGNIFICANT CHANGE UP (ref 2.1–4.9)
PLATELET # BLD AUTO: 413 K/UL — HIGH (ref 130–400)
PMV BLD: 9.6 FL — SIGNIFICANT CHANGE UP (ref 7.4–10.4)
POTASSIUM SERPL-MCNC: 3.4 MMOL/L — LOW (ref 3.5–5)
POTASSIUM SERPL-SCNC: 3.4 MMOL/L — LOW (ref 3.5–5)
PROT SERPL-MCNC: 6 G/DL — SIGNIFICANT CHANGE UP (ref 6–8)
RBC # BLD: 2.9 M/UL — LOW (ref 4.7–6.1)
RBC # FLD: 14.6 % — HIGH (ref 11.5–14.5)
SODIUM SERPL-SCNC: 135 MMOL/L — SIGNIFICANT CHANGE UP (ref 135–146)
WBC # BLD: 17.48 K/UL — HIGH (ref 4.8–10.8)
WBC # FLD AUTO: 17.48 K/UL — HIGH (ref 4.8–10.8)

## 2024-06-09 PROCEDURE — 99232 SBSQ HOSP IP/OBS MODERATE 35: CPT

## 2024-06-09 RX ADMIN — Medication 650 MILLIGRAM(S): at 01:30

## 2024-06-09 RX ADMIN — Medication 300 MILLIGRAM(S): at 13:21

## 2024-06-09 RX ADMIN — Medication 3 MILLILITER(S): at 21:12

## 2024-06-09 RX ADMIN — PANTOPRAZOLE SODIUM 40 MILLIGRAM(S): 40 INJECTION, POWDER, FOR SOLUTION INTRAVENOUS at 06:02

## 2024-06-09 RX ADMIN — Medication 330 MILLIGRAM(S): at 13:22

## 2024-06-09 RX ADMIN — Medication 1 APPLICATION(S): at 13:27

## 2024-06-09 RX ADMIN — Medication 3 MILLILITER(S): at 14:46

## 2024-06-09 RX ADMIN — METRONIDAZOLE 500 MILLIGRAM(S): 500 TABLET ORAL at 06:02

## 2024-06-09 RX ADMIN — OLANZAPINE 12.5 MILLIGRAM(S): 2.5 TABLET, FILM COATED ORAL at 22:37

## 2024-06-09 RX ADMIN — Medication 300 MILLIGRAM(S): at 00:36

## 2024-06-09 RX ADMIN — Medication 300 MILLIGRAM(S): at 17:03

## 2024-06-09 RX ADMIN — Medication 200 MILLIGRAM(S): at 00:35

## 2024-06-09 RX ADMIN — Medication 200 MILLIGRAM(S): at 17:05

## 2024-06-09 RX ADMIN — Medication 200 MILLIGRAM(S): at 06:01

## 2024-06-09 RX ADMIN — FUROSEMIDE 60 MILLIGRAM(S): 10 INJECTION, SOLUTION INTRAMUSCULAR; INTRAVENOUS at 06:01

## 2024-06-09 RX ADMIN — Medication 500 MILLIGRAM(S): at 13:21

## 2024-06-09 RX ADMIN — Medication 650 MILLIGRAM(S): at 06:50

## 2024-06-09 RX ADMIN — METRONIDAZOLE 500 MILLIGRAM(S): 500 TABLET ORAL at 22:37

## 2024-06-09 RX ADMIN — Medication 650 MILLIGRAM(S): at 13:20

## 2024-06-09 RX ADMIN — Medication 650 MILLIGRAM(S): at 18:08

## 2024-06-09 RX ADMIN — Medication 3 MILLILITER(S): at 08:31

## 2024-06-09 RX ADMIN — ENOXAPARIN SODIUM 40 MILLIGRAM(S): 100 INJECTION SUBCUTANEOUS at 17:02

## 2024-06-09 RX ADMIN — Medication 500 MILLIGRAM(S): at 17:03

## 2024-06-09 RX ADMIN — VALPROIC ACID 375 MILLIGRAM(S): 250 SOLUTION ORAL at 00:35

## 2024-06-09 RX ADMIN — Medication 650 MILLIGRAM(S): at 13:54

## 2024-06-09 RX ADMIN — LEVETIRACETAM 750 MILLIGRAM(S): 100 INJECTION INTRAVENOUS at 17:03

## 2024-06-09 RX ADMIN — Medication 330 MILLIGRAM(S): at 17:05

## 2024-06-09 RX ADMIN — Medication 500 MILLIGRAM(S): at 00:37

## 2024-06-09 RX ADMIN — Medication 650 MILLIGRAM(S): at 06:00

## 2024-06-09 RX ADMIN — FUROSEMIDE 60 MILLIGRAM(S): 10 INJECTION, SOLUTION INTRAMUSCULAR; INTRAVENOUS at 17:04

## 2024-06-09 RX ADMIN — PANTOPRAZOLE SODIUM 40 MILLIGRAM(S): 40 INJECTION, POWDER, FOR SOLUTION INTRAVENOUS at 17:04

## 2024-06-09 RX ADMIN — Medication 500 MILLIGRAM(S): at 06:02

## 2024-06-09 RX ADMIN — METRONIDAZOLE 500 MILLIGRAM(S): 500 TABLET ORAL at 13:22

## 2024-06-09 RX ADMIN — VALPROIC ACID 375 MILLIGRAM(S): 250 SOLUTION ORAL at 13:20

## 2024-06-09 RX ADMIN — Medication 300 MILLIGRAM(S): at 06:00

## 2024-06-09 RX ADMIN — VALPROIC ACID 375 MILLIGRAM(S): 250 SOLUTION ORAL at 06:02

## 2024-06-09 RX ADMIN — VALPROIC ACID 375 MILLIGRAM(S): 250 SOLUTION ORAL at 17:09

## 2024-06-09 RX ADMIN — Medication 650 MILLIGRAM(S): at 00:36

## 2024-06-09 RX ADMIN — Medication 200 MILLIGRAM(S): at 13:22

## 2024-06-09 RX ADMIN — LEVETIRACETAM 750 MILLIGRAM(S): 100 INJECTION INTRAVENOUS at 06:01

## 2024-06-09 RX ADMIN — Medication 650 MILLIGRAM(S): at 17:06

## 2024-06-10 LAB
ALBUMIN SERPL ELPH-MCNC: 2.7 G/DL — LOW (ref 3.5–5.2)
ALP SERPL-CCNC: 91 U/L — SIGNIFICANT CHANGE UP (ref 30–115)
ALT FLD-CCNC: 11 U/L — SIGNIFICANT CHANGE UP (ref 0–41)
ANION GAP SERPL CALC-SCNC: 10 MMOL/L — SIGNIFICANT CHANGE UP (ref 7–14)
AST SERPL-CCNC: 19 U/L — SIGNIFICANT CHANGE UP (ref 0–41)
BILIRUB SERPL-MCNC: <0.2 MG/DL — SIGNIFICANT CHANGE UP (ref 0.2–1.2)
BUN SERPL-MCNC: 22 MG/DL — HIGH (ref 10–20)
CALCIUM SERPL-MCNC: 9.2 MG/DL — SIGNIFICANT CHANGE UP (ref 8.4–10.5)
CHLORIDE SERPL-SCNC: 90 MMOL/L — LOW (ref 98–110)
CO2 SERPL-SCNC: 36 MMOL/L — HIGH (ref 17–32)
CREAT SERPL-MCNC: 0.5 MG/DL — LOW (ref 0.7–1.5)
EGFR: 112 ML/MIN/1.73M2 — SIGNIFICANT CHANGE UP
GLUCOSE BLDC GLUCOMTR-MCNC: 138 MG/DL — HIGH (ref 70–99)
GLUCOSE BLDC GLUCOMTR-MCNC: 166 MG/DL — HIGH (ref 70–99)
GLUCOSE SERPL-MCNC: 188 MG/DL — HIGH (ref 70–99)
HCT VFR BLD CALC: 24.9 % — LOW (ref 42–52)
HGB BLD-MCNC: 8 G/DL — LOW (ref 14–18)
MCHC RBC-ENTMCNC: 30.8 PG — SIGNIFICANT CHANGE UP (ref 27–31)
MCHC RBC-ENTMCNC: 32.1 G/DL — SIGNIFICANT CHANGE UP (ref 32–37)
MCV RBC AUTO: 95.8 FL — HIGH (ref 80–94)
NRBC # BLD: 0 /100 WBCS — SIGNIFICANT CHANGE UP (ref 0–0)
PLATELET # BLD AUTO: 405 K/UL — HIGH (ref 130–400)
PMV BLD: 9.4 FL — SIGNIFICANT CHANGE UP (ref 7.4–10.4)
POTASSIUM SERPL-MCNC: 3 MMOL/L — LOW (ref 3.5–5)
POTASSIUM SERPL-SCNC: 3 MMOL/L — LOW (ref 3.5–5)
PROT SERPL-MCNC: 5.7 G/DL — LOW (ref 6–8)
RBC # BLD: 2.6 M/UL — LOW (ref 4.7–6.1)
RBC # FLD: 14.6 % — HIGH (ref 11.5–14.5)
SODIUM SERPL-SCNC: 136 MMOL/L — SIGNIFICANT CHANGE UP (ref 135–146)
WBC # BLD: 15.87 K/UL — HIGH (ref 4.8–10.8)
WBC # FLD AUTO: 15.87 K/UL — HIGH (ref 4.8–10.8)

## 2024-06-10 PROCEDURE — 99232 SBSQ HOSP IP/OBS MODERATE 35: CPT

## 2024-06-10 RX ORDER — ACETAMINOPHEN 325 MG
650 TABLET ORAL EVERY 6 HOURS
Refills: 0 | Status: COMPLETED | OUTPATIENT
Start: 2024-06-10 | End: 2024-06-13

## 2024-06-10 RX ORDER — POTASSIUM CHLORIDE 600 MG/1
20 TABLET, FILM COATED, EXTENDED RELEASE ORAL EVERY 4 HOURS
Refills: 0 | Status: COMPLETED | OUTPATIENT
Start: 2024-06-10 | End: 2024-06-10

## 2024-06-10 RX ADMIN — Medication 650 MILLIGRAM(S): at 01:35

## 2024-06-10 RX ADMIN — Medication 300 MILLIGRAM(S): at 11:20

## 2024-06-10 RX ADMIN — Medication 1 APPLICATION(S): at 11:27

## 2024-06-10 RX ADMIN — LEVETIRACETAM 750 MILLIGRAM(S): 100 INJECTION INTRAVENOUS at 06:20

## 2024-06-10 RX ADMIN — VALPROIC ACID 375 MILLIGRAM(S): 250 SOLUTION ORAL at 00:13

## 2024-06-10 RX ADMIN — Medication 500 MILLIGRAM(S): at 17:38

## 2024-06-10 RX ADMIN — Medication 3 MILLILITER(S): at 08:23

## 2024-06-10 RX ADMIN — Medication 650 MILLIGRAM(S): at 18:35

## 2024-06-10 RX ADMIN — Medication 330 MILLIGRAM(S): at 08:00

## 2024-06-10 RX ADMIN — METRONIDAZOLE 500 MILLIGRAM(S): 500 TABLET ORAL at 22:27

## 2024-06-10 RX ADMIN — PANTOPRAZOLE SODIUM 40 MILLIGRAM(S): 40 INJECTION, POWDER, FOR SOLUTION INTRAVENOUS at 17:39

## 2024-06-10 RX ADMIN — Medication 200 MILLIGRAM(S): at 17:38

## 2024-06-10 RX ADMIN — Medication 200 MILLIGRAM(S): at 06:20

## 2024-06-10 RX ADMIN — POTASSIUM CHLORIDE 50 MILLIEQUIVALENT(S): 600 TABLET, FILM COATED, EXTENDED RELEASE ORAL at 17:39

## 2024-06-10 RX ADMIN — Medication 330 MILLIGRAM(S): at 17:38

## 2024-06-10 RX ADMIN — Medication 3 MILLILITER(S): at 13:33

## 2024-06-10 RX ADMIN — Medication 200 MILLIGRAM(S): at 11:20

## 2024-06-10 RX ADMIN — Medication 650 MILLIGRAM(S): at 00:19

## 2024-06-10 RX ADMIN — Medication 650 MILLIGRAM(S): at 14:30

## 2024-06-10 RX ADMIN — VALPROIC ACID 375 MILLIGRAM(S): 250 SOLUTION ORAL at 06:20

## 2024-06-10 RX ADMIN — POTASSIUM CHLORIDE 50 MILLIEQUIVALENT(S): 600 TABLET, FILM COATED, EXTENDED RELEASE ORAL at 22:27

## 2024-06-10 RX ADMIN — FUROSEMIDE 60 MILLIGRAM(S): 10 INJECTION, SOLUTION INTRAMUSCULAR; INTRAVENOUS at 06:20

## 2024-06-10 RX ADMIN — ENOXAPARIN SODIUM 40 MILLIGRAM(S): 100 INJECTION SUBCUTANEOUS at 17:38

## 2024-06-10 RX ADMIN — Medication 500 MILLIGRAM(S): at 11:15

## 2024-06-10 RX ADMIN — POTASSIUM CHLORIDE 50 MILLIEQUIVALENT(S): 600 TABLET, FILM COATED, EXTENDED RELEASE ORAL at 13:31

## 2024-06-10 RX ADMIN — METRONIDAZOLE 500 MILLIGRAM(S): 500 TABLET ORAL at 13:04

## 2024-06-10 RX ADMIN — Medication 650 MILLIGRAM(S): at 06:20

## 2024-06-10 RX ADMIN — VALPROIC ACID 375 MILLIGRAM(S): 250 SOLUTION ORAL at 11:20

## 2024-06-10 RX ADMIN — OLANZAPINE 12.5 MILLIGRAM(S): 2.5 TABLET, FILM COATED ORAL at 22:27

## 2024-06-10 RX ADMIN — Medication 300 MILLIGRAM(S): at 06:20

## 2024-06-10 RX ADMIN — Medication 650 MILLIGRAM(S): at 06:35

## 2024-06-10 RX ADMIN — Medication 300 MILLIGRAM(S): at 00:14

## 2024-06-10 RX ADMIN — Medication 200 MILLIGRAM(S): at 00:14

## 2024-06-10 RX ADMIN — PANTOPRAZOLE SODIUM 40 MILLIGRAM(S): 40 INJECTION, POWDER, FOR SOLUTION INTRAVENOUS at 06:27

## 2024-06-10 RX ADMIN — VALPROIC ACID 375 MILLIGRAM(S): 250 SOLUTION ORAL at 17:37

## 2024-06-10 RX ADMIN — Medication 650 MILLIGRAM(S): at 17:38

## 2024-06-10 RX ADMIN — Medication 500 MILLIGRAM(S): at 06:35

## 2024-06-10 RX ADMIN — LEVETIRACETAM 750 MILLIGRAM(S): 100 INJECTION INTRAVENOUS at 17:37

## 2024-06-10 RX ADMIN — Medication 300 MILLIGRAM(S): at 17:38

## 2024-06-10 RX ADMIN — METRONIDAZOLE 500 MILLIGRAM(S): 500 TABLET ORAL at 06:19

## 2024-06-10 RX ADMIN — Medication 500 MILLIGRAM(S): at 00:39

## 2024-06-10 RX ADMIN — Medication 650 MILLIGRAM(S): at 13:30

## 2024-06-11 LAB
ANION GAP SERPL CALC-SCNC: 9 MMOL/L — SIGNIFICANT CHANGE UP (ref 7–14)
BUN SERPL-MCNC: 21 MG/DL — HIGH (ref 10–20)
CALCIUM SERPL-MCNC: 9 MG/DL — SIGNIFICANT CHANGE UP (ref 8.4–10.5)
CHLORIDE SERPL-SCNC: 93 MMOL/L — LOW (ref 98–110)
CO2 SERPL-SCNC: 34 MMOL/L — HIGH (ref 17–32)
CREAT SERPL-MCNC: 0.5 MG/DL — LOW (ref 0.7–1.5)
EGFR: 112 ML/MIN/1.73M2 — SIGNIFICANT CHANGE UP
GLUCOSE SERPL-MCNC: 163 MG/DL — HIGH (ref 70–99)
HCT VFR BLD CALC: 25.4 % — LOW (ref 42–52)
HGB BLD-MCNC: 8.3 G/DL — LOW (ref 14–18)
MAGNESIUM SERPL-MCNC: 2 MG/DL — SIGNIFICANT CHANGE UP (ref 1.8–2.4)
MCHC RBC-ENTMCNC: 31.6 PG — HIGH (ref 27–31)
MCHC RBC-ENTMCNC: 32.7 G/DL — SIGNIFICANT CHANGE UP (ref 32–37)
MCV RBC AUTO: 96.6 FL — HIGH (ref 80–94)
NRBC # BLD: 0 /100 WBCS — SIGNIFICANT CHANGE UP (ref 0–0)
PLATELET # BLD AUTO: 485 K/UL — HIGH (ref 130–400)
PMV BLD: 11.1 FL — HIGH (ref 7.4–10.4)
POTASSIUM SERPL-MCNC: 3.4 MMOL/L — LOW (ref 3.5–5)
POTASSIUM SERPL-SCNC: 3.4 MMOL/L — LOW (ref 3.5–5)
RBC # BLD: 2.63 M/UL — LOW (ref 4.7–6.1)
RBC # FLD: 15.1 % — HIGH (ref 11.5–14.5)
SODIUM SERPL-SCNC: 136 MMOL/L — SIGNIFICANT CHANGE UP (ref 135–146)
WBC # BLD: 11.26 K/UL — HIGH (ref 4.8–10.8)
WBC # FLD AUTO: 11.26 K/UL — HIGH (ref 4.8–10.8)

## 2024-06-11 PROCEDURE — 99239 HOSP IP/OBS DSCHRG MGMT >30: CPT

## 2024-06-11 PROCEDURE — 99233 SBSQ HOSP IP/OBS HIGH 50: CPT

## 2024-06-11 RX ORDER — POTASSIUM CHLORIDE 600 MG/1
40 TABLET, FILM COATED, EXTENDED RELEASE ORAL ONCE
Refills: 0 | Status: COMPLETED | OUTPATIENT
Start: 2024-06-11 | End: 2024-06-11

## 2024-06-11 RX ORDER — LEVOFLOXACIN 25 MG/ML
1 INJECTION INTRAVENOUS
Qty: 0 | Refills: 0 | DISCHARGE

## 2024-06-11 RX ADMIN — Medication 300 MILLIGRAM(S): at 06:13

## 2024-06-11 RX ADMIN — METRONIDAZOLE 500 MILLIGRAM(S): 500 TABLET ORAL at 06:12

## 2024-06-11 RX ADMIN — Medication 650 MILLIGRAM(S): at 02:39

## 2024-06-11 RX ADMIN — Medication 650 MILLIGRAM(S): at 06:13

## 2024-06-11 RX ADMIN — Medication 3 MILLILITER(S): at 04:50

## 2024-06-11 RX ADMIN — Medication 500 MILLIGRAM(S): at 01:48

## 2024-06-11 RX ADMIN — PANTOPRAZOLE SODIUM 40 MILLIGRAM(S): 40 INJECTION, POWDER, FOR SOLUTION INTRAVENOUS at 17:24

## 2024-06-11 RX ADMIN — ENOXAPARIN SODIUM 40 MILLIGRAM(S): 100 INJECTION SUBCUTANEOUS at 17:33

## 2024-06-11 RX ADMIN — Medication 650 MILLIGRAM(S): at 12:30

## 2024-06-11 RX ADMIN — VALPROIC ACID 375 MILLIGRAM(S): 250 SOLUTION ORAL at 01:39

## 2024-06-11 RX ADMIN — Medication 500 MILLIGRAM(S): at 11:32

## 2024-06-11 RX ADMIN — Medication 650 MILLIGRAM(S): at 06:39

## 2024-06-11 RX ADMIN — Medication 300 MILLIGRAM(S): at 01:37

## 2024-06-11 RX ADMIN — Medication 300 MILLIGRAM(S): at 17:27

## 2024-06-11 RX ADMIN — Medication 200 MILLIGRAM(S): at 17:25

## 2024-06-11 RX ADMIN — LEVETIRACETAM 750 MILLIGRAM(S): 100 INJECTION INTRAVENOUS at 06:12

## 2024-06-11 RX ADMIN — Medication 300 MILLIGRAM(S): at 11:30

## 2024-06-11 RX ADMIN — Medication 330 MILLIGRAM(S): at 07:56

## 2024-06-11 RX ADMIN — VALPROIC ACID 375 MILLIGRAM(S): 250 SOLUTION ORAL at 17:26

## 2024-06-11 RX ADMIN — Medication 200 MILLIGRAM(S): at 01:37

## 2024-06-11 RX ADMIN — POTASSIUM CHLORIDE 40 MILLIEQUIVALENT(S): 600 TABLET, FILM COATED, EXTENDED RELEASE ORAL at 13:01

## 2024-06-11 RX ADMIN — Medication 330 MILLIGRAM(S): at 17:24

## 2024-06-11 RX ADMIN — Medication 500 MILLIGRAM(S): at 17:27

## 2024-06-11 RX ADMIN — Medication 200 MILLIGRAM(S): at 11:32

## 2024-06-11 RX ADMIN — METRONIDAZOLE 500 MILLIGRAM(S): 500 TABLET ORAL at 17:24

## 2024-06-11 RX ADMIN — LEVETIRACETAM 750 MILLIGRAM(S): 100 INJECTION INTRAVENOUS at 17:25

## 2024-06-11 RX ADMIN — Medication 3 MILLILITER(S): at 15:11

## 2024-06-11 RX ADMIN — Medication 3 MILLILITER(S): at 08:53

## 2024-06-11 RX ADMIN — PANTOPRAZOLE SODIUM 40 MILLIGRAM(S): 40 INJECTION, POWDER, FOR SOLUTION INTRAVENOUS at 06:12

## 2024-06-11 RX ADMIN — Medication 1 APPLICATION(S): at 11:43

## 2024-06-11 RX ADMIN — Medication 650 MILLIGRAM(S): at 01:39

## 2024-06-11 RX ADMIN — VALPROIC ACID 375 MILLIGRAM(S): 250 SOLUTION ORAL at 11:31

## 2024-06-11 RX ADMIN — Medication 650 MILLIGRAM(S): at 17:24

## 2024-06-11 RX ADMIN — VALPROIC ACID 375 MILLIGRAM(S): 250 SOLUTION ORAL at 06:12

## 2024-06-11 RX ADMIN — Medication 500 MILLIGRAM(S): at 06:40

## 2024-06-11 RX ADMIN — Medication 200 MILLIGRAM(S): at 06:23

## 2024-06-11 RX ADMIN — Medication 650 MILLIGRAM(S): at 11:32

## 2024-06-12 LAB
ANION GAP SERPL CALC-SCNC: 7 MMOL/L — SIGNIFICANT CHANGE UP (ref 7–14)
BUN SERPL-MCNC: 16 MG/DL — SIGNIFICANT CHANGE UP (ref 10–20)
CALCIUM SERPL-MCNC: 9.1 MG/DL — SIGNIFICANT CHANGE UP (ref 8.4–10.5)
CHLORIDE SERPL-SCNC: 94 MMOL/L — LOW (ref 98–110)
CO2 SERPL-SCNC: 34 MMOL/L — HIGH (ref 17–32)
CREAT SERPL-MCNC: <0.5 MG/DL — LOW (ref 0.7–1.5)
EGFR: 120 ML/MIN/1.73M2 — SIGNIFICANT CHANGE UP
GLUCOSE SERPL-MCNC: 155 MG/DL — HIGH (ref 70–99)
HCT VFR BLD CALC: 24.4 % — LOW (ref 42–52)
HGB BLD-MCNC: 8 G/DL — LOW (ref 14–18)
MCHC RBC-ENTMCNC: 31.4 PG — HIGH (ref 27–31)
MCHC RBC-ENTMCNC: 32.8 G/DL — SIGNIFICANT CHANGE UP (ref 32–37)
MCV RBC AUTO: 95.7 FL — HIGH (ref 80–94)
NRBC # BLD: 0 /100 WBCS — SIGNIFICANT CHANGE UP (ref 0–0)
PLATELET # BLD AUTO: 465 K/UL — HIGH (ref 130–400)
PMV BLD: 9.6 FL — SIGNIFICANT CHANGE UP (ref 7.4–10.4)
POTASSIUM SERPL-MCNC: 3.5 MMOL/L — SIGNIFICANT CHANGE UP (ref 3.5–5)
POTASSIUM SERPL-SCNC: 3.5 MMOL/L — SIGNIFICANT CHANGE UP (ref 3.5–5)
RBC # BLD: 2.55 M/UL — LOW (ref 4.7–6.1)
RBC # FLD: 14.7 % — HIGH (ref 11.5–14.5)
SODIUM SERPL-SCNC: 135 MMOL/L — SIGNIFICANT CHANGE UP (ref 135–146)
WBC # BLD: 8.56 K/UL — SIGNIFICANT CHANGE UP (ref 4.8–10.8)
WBC # FLD AUTO: 8.56 K/UL — SIGNIFICANT CHANGE UP (ref 4.8–10.8)

## 2024-06-12 PROCEDURE — 99232 SBSQ HOSP IP/OBS MODERATE 35: CPT

## 2024-06-12 RX ORDER — VALPROIC ACID 250 MG/5ML
375 SOLUTION ORAL EVERY 6 HOURS
Refills: 0 | Status: DISCONTINUED | OUTPATIENT
Start: 2024-06-12 | End: 2024-06-21

## 2024-06-12 RX ADMIN — Medication 3 MILLILITER(S): at 21:21

## 2024-06-12 RX ADMIN — Medication 200 MILLIGRAM(S): at 05:07

## 2024-06-12 RX ADMIN — VALPROIC ACID 375 MILLIGRAM(S): 250 SOLUTION ORAL at 11:21

## 2024-06-12 RX ADMIN — LEVETIRACETAM 750 MILLIGRAM(S): 100 INJECTION INTRAVENOUS at 05:05

## 2024-06-12 RX ADMIN — Medication 650 MILLIGRAM(S): at 12:30

## 2024-06-12 RX ADMIN — PANTOPRAZOLE SODIUM 40 MILLIGRAM(S): 40 INJECTION, POWDER, FOR SOLUTION INTRAVENOUS at 05:07

## 2024-06-12 RX ADMIN — ENOXAPARIN SODIUM 40 MILLIGRAM(S): 100 INJECTION SUBCUTANEOUS at 17:50

## 2024-06-12 RX ADMIN — METRONIDAZOLE 500 MILLIGRAM(S): 500 TABLET ORAL at 05:08

## 2024-06-12 RX ADMIN — IPRATROPIUM BROMIDE AND ALBUTEROL SULFATE 3 MILLILITER(S): .5; 3 SOLUTION RESPIRATORY (INHALATION) at 21:21

## 2024-06-12 RX ADMIN — Medication 200 MILLIGRAM(S): at 17:51

## 2024-06-12 RX ADMIN — Medication 330 MILLIGRAM(S): at 17:51

## 2024-06-12 RX ADMIN — Medication 330 MILLIGRAM(S): at 08:56

## 2024-06-12 RX ADMIN — Medication 500 MILLIGRAM(S): at 05:23

## 2024-06-12 RX ADMIN — PANTOPRAZOLE SODIUM 40 MILLIGRAM(S): 40 INJECTION, POWDER, FOR SOLUTION INTRAVENOUS at 17:49

## 2024-06-12 RX ADMIN — Medication 300 MILLIGRAM(S): at 05:07

## 2024-06-12 RX ADMIN — Medication 3 MILLILITER(S): at 14:24

## 2024-06-12 RX ADMIN — Medication 300 MILLIGRAM(S): at 11:22

## 2024-06-12 RX ADMIN — VALPROIC ACID 375 MILLIGRAM(S): 250 SOLUTION ORAL at 17:50

## 2024-06-12 RX ADMIN — Medication 200 MILLIGRAM(S): at 11:21

## 2024-06-12 RX ADMIN — VALPROIC ACID 375 MILLIGRAM(S): 250 SOLUTION ORAL at 23:55

## 2024-06-12 RX ADMIN — Medication 300 MILLIGRAM(S): at 17:50

## 2024-06-12 RX ADMIN — Medication 1 APPLICATION(S): at 11:36

## 2024-06-12 RX ADMIN — Medication 650 MILLIGRAM(S): at 11:21

## 2024-06-12 RX ADMIN — Medication 3 MILLILITER(S): at 07:33

## 2024-06-12 RX ADMIN — OLANZAPINE 12.5 MILLIGRAM(S): 2.5 TABLET, FILM COATED ORAL at 22:23

## 2024-06-12 RX ADMIN — Medication 500 MILLIGRAM(S): at 19:01

## 2024-06-12 RX ADMIN — Medication 500 MILLIGRAM(S): at 11:22

## 2024-06-12 RX ADMIN — METRONIDAZOLE 500 MILLIGRAM(S): 500 TABLET ORAL at 22:18

## 2024-06-12 RX ADMIN — LEVETIRACETAM 750 MILLIGRAM(S): 100 INJECTION INTRAVENOUS at 17:50

## 2024-06-12 RX ADMIN — Medication 650 MILLIGRAM(S): at 17:50

## 2024-06-12 RX ADMIN — Medication 3 MILLILITER(S): at 04:55

## 2024-06-12 RX ADMIN — Medication 3 MILLILITER(S): at 23:24

## 2024-06-12 RX ADMIN — Medication 650 MILLIGRAM(S): at 23:56

## 2024-06-12 RX ADMIN — METRONIDAZOLE 500 MILLIGRAM(S): 500 TABLET ORAL at 13:35

## 2024-06-12 RX ADMIN — VALPROIC ACID 375 MILLIGRAM(S): 250 SOLUTION ORAL at 05:06

## 2024-06-12 RX ADMIN — Medication 650 MILLIGRAM(S): at 05:07

## 2024-06-13 PROCEDURE — 99232 SBSQ HOSP IP/OBS MODERATE 35: CPT

## 2024-06-13 RX ADMIN — Medication 3 MILLILITER(S): at 13:41

## 2024-06-13 RX ADMIN — Medication 1 APPLICATION(S): at 11:20

## 2024-06-13 RX ADMIN — Medication 330 MILLIGRAM(S): at 17:41

## 2024-06-13 RX ADMIN — OLANZAPINE 12.5 MILLIGRAM(S): 2.5 TABLET, FILM COATED ORAL at 22:22

## 2024-06-13 RX ADMIN — Medication 300 MILLIGRAM(S): at 00:00

## 2024-06-13 RX ADMIN — VALPROIC ACID 375 MILLIGRAM(S): 250 SOLUTION ORAL at 11:13

## 2024-06-13 RX ADMIN — METRONIDAZOLE 500 MILLIGRAM(S): 500 TABLET ORAL at 13:08

## 2024-06-13 RX ADMIN — LEVETIRACETAM 750 MILLIGRAM(S): 100 INJECTION INTRAVENOUS at 05:59

## 2024-06-13 RX ADMIN — Medication 300 MILLIGRAM(S): at 11:13

## 2024-06-13 RX ADMIN — Medication 200 MILLIGRAM(S): at 00:00

## 2024-06-13 RX ADMIN — LEVETIRACETAM 750 MILLIGRAM(S): 100 INJECTION INTRAVENOUS at 17:38

## 2024-06-13 RX ADMIN — Medication 500 MILLIGRAM(S): at 06:00

## 2024-06-13 RX ADMIN — PANTOPRAZOLE SODIUM 40 MILLIGRAM(S): 40 INJECTION, POWDER, FOR SOLUTION INTRAVENOUS at 17:40

## 2024-06-13 RX ADMIN — Medication 300 MILLIGRAM(S): at 17:40

## 2024-06-13 RX ADMIN — METRONIDAZOLE 500 MILLIGRAM(S): 500 TABLET ORAL at 05:58

## 2024-06-13 RX ADMIN — VALPROIC ACID 375 MILLIGRAM(S): 250 SOLUTION ORAL at 17:39

## 2024-06-13 RX ADMIN — Medication 500 MILLIGRAM(S): at 11:13

## 2024-06-13 RX ADMIN — Medication 500 MILLIGRAM(S): at 00:00

## 2024-06-13 RX ADMIN — Medication 3 MILLILITER(S): at 08:19

## 2024-06-13 RX ADMIN — Medication 330 MILLIGRAM(S): at 07:36

## 2024-06-13 RX ADMIN — Medication 500 MILLIGRAM(S): at 17:42

## 2024-06-13 RX ADMIN — Medication 650 MILLIGRAM(S): at 06:00

## 2024-06-13 RX ADMIN — Medication 200 MILLIGRAM(S): at 17:38

## 2024-06-13 RX ADMIN — VALPROIC ACID 375 MILLIGRAM(S): 250 SOLUTION ORAL at 05:59

## 2024-06-13 RX ADMIN — Medication 300 MILLIGRAM(S): at 06:00

## 2024-06-13 RX ADMIN — METRONIDAZOLE 500 MILLIGRAM(S): 500 TABLET ORAL at 22:21

## 2024-06-13 RX ADMIN — ENOXAPARIN SODIUM 40 MILLIGRAM(S): 100 INJECTION SUBCUTANEOUS at 17:42

## 2024-06-13 RX ADMIN — Medication 200 MILLIGRAM(S): at 05:58

## 2024-06-13 RX ADMIN — Medication 200 MILLIGRAM(S): at 11:13

## 2024-06-13 RX ADMIN — PANTOPRAZOLE SODIUM 40 MILLIGRAM(S): 40 INJECTION, POWDER, FOR SOLUTION INTRAVENOUS at 05:58

## 2024-06-14 LAB
ANION GAP SERPL CALC-SCNC: 9 MMOL/L — SIGNIFICANT CHANGE UP (ref 7–14)
BUN SERPL-MCNC: 9 MG/DL — LOW (ref 10–20)
CALCIUM SERPL-MCNC: 9.1 MG/DL — SIGNIFICANT CHANGE UP (ref 8.4–10.5)
CHLORIDE SERPL-SCNC: 93 MMOL/L — LOW (ref 98–110)
CO2 SERPL-SCNC: 34 MMOL/L — HIGH (ref 17–32)
CREAT SERPL-MCNC: <0.5 MG/DL — LOW (ref 0.7–1.5)
EGFR: 131 ML/MIN/1.73M2 — SIGNIFICANT CHANGE UP
GLUCOSE SERPL-MCNC: 133 MG/DL — HIGH (ref 70–99)
HCT VFR BLD CALC: 25.4 % — LOW (ref 42–52)
HGB BLD-MCNC: 8.2 G/DL — LOW (ref 14–18)
MCHC RBC-ENTMCNC: 31.3 PG — HIGH (ref 27–31)
MCHC RBC-ENTMCNC: 32.3 G/DL — SIGNIFICANT CHANGE UP (ref 32–37)
MCV RBC AUTO: 96.9 FL — HIGH (ref 80–94)
NRBC # BLD: 0 /100 WBCS — SIGNIFICANT CHANGE UP (ref 0–0)
PLATELET # BLD AUTO: 465 K/UL — HIGH (ref 130–400)
PMV BLD: 9.5 FL — SIGNIFICANT CHANGE UP (ref 7.4–10.4)
POTASSIUM SERPL-MCNC: 3.3 MMOL/L — LOW (ref 3.5–5)
POTASSIUM SERPL-SCNC: 3.3 MMOL/L — LOW (ref 3.5–5)
RBC # BLD: 2.62 M/UL — LOW (ref 4.7–6.1)
RBC # FLD: 14.9 % — HIGH (ref 11.5–14.5)
SODIUM SERPL-SCNC: 136 MMOL/L — SIGNIFICANT CHANGE UP (ref 135–146)
WBC # BLD: 7.2 K/UL — SIGNIFICANT CHANGE UP (ref 4.8–10.8)
WBC # FLD AUTO: 7.2 K/UL — SIGNIFICANT CHANGE UP (ref 4.8–10.8)

## 2024-06-14 PROCEDURE — 99232 SBSQ HOSP IP/OBS MODERATE 35: CPT

## 2024-06-14 RX ORDER — POTASSIUM CHLORIDE 600 MG/1
20 TABLET, FILM COATED, EXTENDED RELEASE ORAL EVERY 4 HOURS
Refills: 0 | Status: COMPLETED | OUTPATIENT
Start: 2024-06-14 | End: 2024-06-14

## 2024-06-14 RX ADMIN — POTASSIUM CHLORIDE 20 MILLIEQUIVALENT(S): 600 TABLET, FILM COATED, EXTENDED RELEASE ORAL at 12:05

## 2024-06-14 RX ADMIN — VALPROIC ACID 375 MILLIGRAM(S): 250 SOLUTION ORAL at 12:06

## 2024-06-14 RX ADMIN — Medication 200 MILLIGRAM(S): at 06:06

## 2024-06-14 RX ADMIN — VALPROIC ACID 375 MILLIGRAM(S): 250 SOLUTION ORAL at 00:58

## 2024-06-14 RX ADMIN — Medication 200 MILLIGRAM(S): at 12:06

## 2024-06-14 RX ADMIN — LEVETIRACETAM 750 MILLIGRAM(S): 100 INJECTION INTRAVENOUS at 06:05

## 2024-06-14 RX ADMIN — METRONIDAZOLE 500 MILLIGRAM(S): 500 TABLET ORAL at 13:13

## 2024-06-14 RX ADMIN — LEVETIRACETAM 750 MILLIGRAM(S): 100 INJECTION INTRAVENOUS at 17:18

## 2024-06-14 RX ADMIN — Medication 200 MILLIGRAM(S): at 17:18

## 2024-06-14 RX ADMIN — PANTOPRAZOLE SODIUM 40 MILLIGRAM(S): 40 INJECTION, POWDER, FOR SOLUTION INTRAVENOUS at 06:05

## 2024-06-14 RX ADMIN — Medication 500 MILLIGRAM(S): at 07:01

## 2024-06-14 RX ADMIN — POTASSIUM CHLORIDE 20 MILLIEQUIVALENT(S): 600 TABLET, FILM COATED, EXTENDED RELEASE ORAL at 22:39

## 2024-06-14 RX ADMIN — Medication 500 MILLIGRAM(S): at 17:19

## 2024-06-14 RX ADMIN — ENOXAPARIN SODIUM 40 MILLIGRAM(S): 100 INJECTION SUBCUTANEOUS at 17:32

## 2024-06-14 RX ADMIN — Medication 300 MILLIGRAM(S): at 00:00

## 2024-06-14 RX ADMIN — VALPROIC ACID 375 MILLIGRAM(S): 250 SOLUTION ORAL at 17:18

## 2024-06-14 RX ADMIN — Medication 200 MILLIGRAM(S): at 00:59

## 2024-06-14 RX ADMIN — Medication 500 MILLIGRAM(S): at 12:06

## 2024-06-14 RX ADMIN — Medication 300 MILLIGRAM(S): at 12:06

## 2024-06-14 RX ADMIN — METRONIDAZOLE 500 MILLIGRAM(S): 500 TABLET ORAL at 06:05

## 2024-06-14 RX ADMIN — METRONIDAZOLE 500 MILLIGRAM(S): 500 TABLET ORAL at 22:39

## 2024-06-14 RX ADMIN — OLANZAPINE 12.5 MILLIGRAM(S): 2.5 TABLET, FILM COATED ORAL at 22:40

## 2024-06-14 RX ADMIN — Medication 300 MILLIGRAM(S): at 17:17

## 2024-06-14 RX ADMIN — Medication 300 MILLIGRAM(S): at 06:05

## 2024-06-14 RX ADMIN — Medication 1 APPLICATION(S): at 12:07

## 2024-06-14 RX ADMIN — Medication 330 MILLIGRAM(S): at 08:12

## 2024-06-14 RX ADMIN — VALPROIC ACID 375 MILLIGRAM(S): 250 SOLUTION ORAL at 07:01

## 2024-06-14 RX ADMIN — Medication 500 MILLIGRAM(S): at 00:01

## 2024-06-14 RX ADMIN — Medication 330 MILLIGRAM(S): at 17:17

## 2024-06-14 RX ADMIN — PANTOPRAZOLE SODIUM 40 MILLIGRAM(S): 40 INJECTION, POWDER, FOR SOLUTION INTRAVENOUS at 17:20

## 2024-06-14 RX ADMIN — POTASSIUM CHLORIDE 20 MILLIEQUIVALENT(S): 600 TABLET, FILM COATED, EXTENDED RELEASE ORAL at 17:17

## 2024-06-15 LAB
ANION GAP SERPL CALC-SCNC: 8 MMOL/L — SIGNIFICANT CHANGE UP (ref 7–14)
BUN SERPL-MCNC: 9 MG/DL — LOW (ref 10–20)
CALCIUM SERPL-MCNC: 9.1 MG/DL — SIGNIFICANT CHANGE UP (ref 8.4–10.4)
CHLORIDE SERPL-SCNC: 93 MMOL/L — LOW (ref 98–110)
CO2 SERPL-SCNC: 30 MMOL/L — SIGNIFICANT CHANGE UP (ref 17–32)
CREAT SERPL-MCNC: <0.5 MG/DL — LOW (ref 0.7–1.5)
EGFR: 120 ML/MIN/1.73M2 — SIGNIFICANT CHANGE UP
GLUCOSE BLDC GLUCOMTR-MCNC: 155 MG/DL — HIGH (ref 70–99)
GLUCOSE SERPL-MCNC: 179 MG/DL — HIGH (ref 70–99)
HCT VFR BLD CALC: 27.3 % — LOW (ref 42–52)
HGB BLD-MCNC: 9.1 G/DL — LOW (ref 14–18)
MCHC RBC-ENTMCNC: 32.2 PG — HIGH (ref 27–31)
MCHC RBC-ENTMCNC: 33.3 G/DL — SIGNIFICANT CHANGE UP (ref 32–37)
MCV RBC AUTO: 96.5 FL — HIGH (ref 80–94)
NRBC # BLD: 0 /100 WBCS — SIGNIFICANT CHANGE UP (ref 0–0)
PLATELET # BLD AUTO: 547 K/UL — HIGH (ref 130–400)
PMV BLD: 9.7 FL — SIGNIFICANT CHANGE UP (ref 7.4–10.4)
POTASSIUM SERPL-MCNC: 4.1 MMOL/L — SIGNIFICANT CHANGE UP (ref 3.5–5)
POTASSIUM SERPL-SCNC: 4.1 MMOL/L — SIGNIFICANT CHANGE UP (ref 3.5–5)
RBC # BLD: 2.83 M/UL — LOW (ref 4.7–6.1)
RBC # FLD: 15.7 % — HIGH (ref 11.5–14.5)
SODIUM SERPL-SCNC: 131 MMOL/L — LOW (ref 135–146)
WBC # BLD: 8.01 K/UL — SIGNIFICANT CHANGE UP (ref 4.8–10.8)
WBC # FLD AUTO: 8.01 K/UL — SIGNIFICANT CHANGE UP (ref 4.8–10.8)

## 2024-06-15 PROCEDURE — 99232 SBSQ HOSP IP/OBS MODERATE 35: CPT

## 2024-06-15 RX ORDER — VALPROIC ACID 250 MG/5ML
750 SOLUTION ORAL
Qty: 0 | Refills: 0 | DISCHARGE
Start: 2024-06-15

## 2024-06-15 RX ORDER — GUAIFENESIN 100 %
10 POWDER (GRAM) MISCELLANEOUS
Qty: 0 | Refills: 0 | DISCHARGE
Start: 2024-06-15

## 2024-06-15 RX ORDER — PANTOPRAZOLE SODIUM 40 MG/10ML
40 INJECTION, POWDER, FOR SOLUTION INTRAVENOUS
Qty: 0 | Refills: 0 | DISCHARGE
Start: 2024-06-15

## 2024-06-15 RX ORDER — METRONIDAZOLE 500 MG/1
1 TABLET ORAL
Qty: 0 | Refills: 0 | DISCHARGE
Start: 2024-06-15

## 2024-06-15 RX ORDER — BISMUTH SUBSALICYLATE 262MG/15ML
17.18 SUSPENSION, ORAL (FINAL DOSE FORM) ORAL
Qty: 0 | Refills: 0 | DISCHARGE
Start: 2024-06-15

## 2024-06-15 RX ORDER — TETRACYCLINE HCL 500 MG
500 CAPSULE ORAL
Qty: 0 | Refills: 0 | DISCHARGE
Start: 2024-06-15

## 2024-06-15 RX ADMIN — Medication 200 MILLIGRAM(S): at 07:41

## 2024-06-15 RX ADMIN — Medication 1 APPLICATION(S): at 12:17

## 2024-06-15 RX ADMIN — METRONIDAZOLE 500 MILLIGRAM(S): 500 TABLET ORAL at 12:23

## 2024-06-15 RX ADMIN — Medication 200 MILLIGRAM(S): at 01:07

## 2024-06-15 RX ADMIN — VALPROIC ACID 375 MILLIGRAM(S): 250 SOLUTION ORAL at 12:22

## 2024-06-15 RX ADMIN — LEVETIRACETAM 750 MILLIGRAM(S): 100 INJECTION INTRAVENOUS at 06:48

## 2024-06-15 RX ADMIN — Medication 500 MILLIGRAM(S): at 07:41

## 2024-06-15 RX ADMIN — Medication 200 MILLIGRAM(S): at 17:29

## 2024-06-15 RX ADMIN — VALPROIC ACID 375 MILLIGRAM(S): 250 SOLUTION ORAL at 06:48

## 2024-06-15 RX ADMIN — PANTOPRAZOLE SODIUM 40 MILLIGRAM(S): 40 INJECTION, POWDER, FOR SOLUTION INTRAVENOUS at 06:48

## 2024-06-15 RX ADMIN — Medication 300 MILLIGRAM(S): at 07:41

## 2024-06-15 RX ADMIN — PANTOPRAZOLE SODIUM 40 MILLIGRAM(S): 40 INJECTION, POWDER, FOR SOLUTION INTRAVENOUS at 17:27

## 2024-06-15 RX ADMIN — VALPROIC ACID 375 MILLIGRAM(S): 250 SOLUTION ORAL at 17:28

## 2024-06-15 RX ADMIN — Medication 330 MILLIGRAM(S): at 17:27

## 2024-06-15 RX ADMIN — Medication 500 MILLIGRAM(S): at 17:46

## 2024-06-15 RX ADMIN — METRONIDAZOLE 500 MILLIGRAM(S): 500 TABLET ORAL at 06:48

## 2024-06-15 RX ADMIN — VALPROIC ACID 375 MILLIGRAM(S): 250 SOLUTION ORAL at 01:08

## 2024-06-15 RX ADMIN — Medication 300 MILLIGRAM(S): at 17:29

## 2024-06-15 RX ADMIN — Medication 300 MILLIGRAM(S): at 12:23

## 2024-06-15 RX ADMIN — Medication 500 MILLIGRAM(S): at 01:07

## 2024-06-15 RX ADMIN — ENOXAPARIN SODIUM 40 MILLIGRAM(S): 100 INJECTION SUBCUTANEOUS at 17:45

## 2024-06-15 RX ADMIN — IPRATROPIUM BROMIDE AND ALBUTEROL SULFATE 3 MILLILITER(S): .5; 3 SOLUTION RESPIRATORY (INHALATION) at 21:11

## 2024-06-15 RX ADMIN — Medication 300 MILLIGRAM(S): at 01:06

## 2024-06-15 RX ADMIN — Medication 500 MILLIGRAM(S): at 12:38

## 2024-06-15 RX ADMIN — Medication 200 MILLIGRAM(S): at 12:22

## 2024-06-15 RX ADMIN — LEVETIRACETAM 750 MILLIGRAM(S): 100 INJECTION INTRAVENOUS at 17:28

## 2024-06-16 LAB
ANION GAP SERPL CALC-SCNC: 11 MMOL/L — SIGNIFICANT CHANGE UP (ref 7–14)
BUN SERPL-MCNC: 8 MG/DL — LOW (ref 10–20)
CALCIUM SERPL-MCNC: 9.2 MG/DL — SIGNIFICANT CHANGE UP (ref 8.4–10.4)
CHLORIDE SERPL-SCNC: 95 MMOL/L — LOW (ref 98–110)
CO2 SERPL-SCNC: 28 MMOL/L — SIGNIFICANT CHANGE UP (ref 17–32)
CREAT SERPL-MCNC: <0.5 MG/DL — LOW (ref 0.7–1.5)
EGFR: 131 ML/MIN/1.73M2 — SIGNIFICANT CHANGE UP
GLUCOSE SERPL-MCNC: 151 MG/DL — HIGH (ref 70–99)
HCT VFR BLD CALC: 28.8 % — LOW (ref 42–52)
HGB BLD-MCNC: 9.1 G/DL — LOW (ref 14–18)
MCHC RBC-ENTMCNC: 31.4 PG — HIGH (ref 27–31)
MCHC RBC-ENTMCNC: 31.6 G/DL — LOW (ref 32–37)
MCV RBC AUTO: 99.3 FL — HIGH (ref 80–94)
NRBC # BLD: 0 /100 WBCS — SIGNIFICANT CHANGE UP (ref 0–0)
PLATELET # BLD AUTO: 488 K/UL — HIGH (ref 130–400)
PMV BLD: 9.6 FL — SIGNIFICANT CHANGE UP (ref 7.4–10.4)
POTASSIUM SERPL-MCNC: 4.1 MMOL/L — SIGNIFICANT CHANGE UP (ref 3.5–5)
POTASSIUM SERPL-SCNC: 4.1 MMOL/L — SIGNIFICANT CHANGE UP (ref 3.5–5)
RBC # BLD: 2.9 M/UL — LOW (ref 4.7–6.1)
RBC # FLD: 16.7 % — HIGH (ref 11.5–14.5)
SODIUM SERPL-SCNC: 134 MMOL/L — LOW (ref 135–146)
WBC # BLD: 8.23 K/UL — SIGNIFICANT CHANGE UP (ref 4.8–10.8)
WBC # FLD AUTO: 8.23 K/UL — SIGNIFICANT CHANGE UP (ref 4.8–10.8)

## 2024-06-16 PROCEDURE — 99232 SBSQ HOSP IP/OBS MODERATE 35: CPT

## 2024-06-16 RX ADMIN — VALPROIC ACID 375 MILLIGRAM(S): 250 SOLUTION ORAL at 17:30

## 2024-06-16 RX ADMIN — Medication 500 MILLIGRAM(S): at 11:14

## 2024-06-16 RX ADMIN — METRONIDAZOLE 500 MILLIGRAM(S): 500 TABLET ORAL at 13:24

## 2024-06-16 RX ADMIN — PANTOPRAZOLE SODIUM 40 MILLIGRAM(S): 40 INJECTION, POWDER, FOR SOLUTION INTRAVENOUS at 06:09

## 2024-06-16 RX ADMIN — Medication 200 MILLIGRAM(S): at 11:13

## 2024-06-16 RX ADMIN — Medication 500 MILLIGRAM(S): at 06:09

## 2024-06-16 RX ADMIN — METRONIDAZOLE 500 MILLIGRAM(S): 500 TABLET ORAL at 22:11

## 2024-06-16 RX ADMIN — Medication 330 MILLIGRAM(S): at 17:30

## 2024-06-16 RX ADMIN — LEVETIRACETAM 750 MILLIGRAM(S): 100 INJECTION INTRAVENOUS at 17:31

## 2024-06-16 RX ADMIN — ENOXAPARIN SODIUM 40 MILLIGRAM(S): 100 INJECTION SUBCUTANEOUS at 17:31

## 2024-06-16 RX ADMIN — VALPROIC ACID 375 MILLIGRAM(S): 250 SOLUTION ORAL at 00:59

## 2024-06-16 RX ADMIN — VALPROIC ACID 375 MILLIGRAM(S): 250 SOLUTION ORAL at 05:56

## 2024-06-16 RX ADMIN — Medication 200 MILLIGRAM(S): at 05:56

## 2024-06-16 RX ADMIN — OLANZAPINE 12.5 MILLIGRAM(S): 2.5 TABLET, FILM COATED ORAL at 22:17

## 2024-06-16 RX ADMIN — Medication 300 MILLIGRAM(S): at 00:59

## 2024-06-16 RX ADMIN — Medication 300 MILLIGRAM(S): at 17:30

## 2024-06-16 RX ADMIN — Medication 500 MILLIGRAM(S): at 01:11

## 2024-06-16 RX ADMIN — METRONIDAZOLE 500 MILLIGRAM(S): 500 TABLET ORAL at 05:57

## 2024-06-16 RX ADMIN — Medication 330 MILLIGRAM(S): at 07:58

## 2024-06-16 RX ADMIN — Medication 300 MILLIGRAM(S): at 05:57

## 2024-06-16 RX ADMIN — VALPROIC ACID 375 MILLIGRAM(S): 250 SOLUTION ORAL at 11:13

## 2024-06-16 RX ADMIN — Medication 500 MILLIGRAM(S): at 17:39

## 2024-06-16 RX ADMIN — Medication 1 APPLICATION(S): at 11:26

## 2024-06-16 RX ADMIN — LEVETIRACETAM 750 MILLIGRAM(S): 100 INJECTION INTRAVENOUS at 05:56

## 2024-06-16 RX ADMIN — Medication 300 MILLIGRAM(S): at 11:14

## 2024-06-16 RX ADMIN — Medication 200 MILLIGRAM(S): at 17:30

## 2024-06-16 RX ADMIN — Medication 200 MILLIGRAM(S): at 00:59

## 2024-06-17 PROCEDURE — 99231 SBSQ HOSP IP/OBS SF/LOW 25: CPT

## 2024-06-17 RX ORDER — ACETAMINOPHEN 325 MG
650 TABLET ORAL EVERY 6 HOURS
Refills: 0 | Status: DISCONTINUED | OUTPATIENT
Start: 2024-06-17 | End: 2024-06-21

## 2024-06-17 RX ADMIN — Medication 500 MILLIGRAM(S): at 11:10

## 2024-06-17 RX ADMIN — OLANZAPINE 12.5 MILLIGRAM(S): 2.5 TABLET, FILM COATED ORAL at 22:13

## 2024-06-17 RX ADMIN — LEVETIRACETAM 750 MILLIGRAM(S): 100 INJECTION INTRAVENOUS at 05:50

## 2024-06-17 RX ADMIN — Medication 330 MILLIGRAM(S): at 17:25

## 2024-06-17 RX ADMIN — METRONIDAZOLE 500 MILLIGRAM(S): 500 TABLET ORAL at 13:33

## 2024-06-17 RX ADMIN — Medication 650 MILLIGRAM(S): at 13:33

## 2024-06-17 RX ADMIN — Medication 500 MILLIGRAM(S): at 00:00

## 2024-06-17 RX ADMIN — Medication 650 MILLIGRAM(S): at 13:42

## 2024-06-17 RX ADMIN — Medication 330 MILLIGRAM(S): at 09:27

## 2024-06-17 RX ADMIN — VALPROIC ACID 375 MILLIGRAM(S): 250 SOLUTION ORAL at 17:26

## 2024-06-17 RX ADMIN — Medication 650 MILLIGRAM(S): at 18:58

## 2024-06-17 RX ADMIN — VALPROIC ACID 375 MILLIGRAM(S): 250 SOLUTION ORAL at 11:15

## 2024-06-17 RX ADMIN — PANTOPRAZOLE SODIUM 40 MILLIGRAM(S): 40 INJECTION, POWDER, FOR SOLUTION INTRAVENOUS at 05:52

## 2024-06-17 RX ADMIN — Medication 300 MILLIGRAM(S): at 05:50

## 2024-06-17 RX ADMIN — Medication 300 MILLIGRAM(S): at 00:13

## 2024-06-17 RX ADMIN — Medication 200 MILLIGRAM(S): at 00:13

## 2024-06-17 RX ADMIN — Medication 650 MILLIGRAM(S): at 20:00

## 2024-06-17 RX ADMIN — VALPROIC ACID 375 MILLIGRAM(S): 250 SOLUTION ORAL at 05:51

## 2024-06-17 RX ADMIN — Medication 200 MILLIGRAM(S): at 05:50

## 2024-06-17 RX ADMIN — Medication 300 MILLIGRAM(S): at 17:25

## 2024-06-17 RX ADMIN — ENOXAPARIN SODIUM 40 MILLIGRAM(S): 100 INJECTION SUBCUTANEOUS at 17:27

## 2024-06-17 RX ADMIN — METRONIDAZOLE 500 MILLIGRAM(S): 500 TABLET ORAL at 22:13

## 2024-06-17 RX ADMIN — VALPROIC ACID 375 MILLIGRAM(S): 250 SOLUTION ORAL at 00:14

## 2024-06-17 RX ADMIN — Medication 300 MILLIGRAM(S): at 11:15

## 2024-06-17 RX ADMIN — LEVETIRACETAM 750 MILLIGRAM(S): 100 INJECTION INTRAVENOUS at 17:26

## 2024-06-17 RX ADMIN — Medication 1 APPLICATION(S): at 11:16

## 2024-06-17 RX ADMIN — Medication 200 MILLIGRAM(S): at 11:15

## 2024-06-17 RX ADMIN — Medication 500 MILLIGRAM(S): at 17:26

## 2024-06-17 RX ADMIN — METRONIDAZOLE 500 MILLIGRAM(S): 500 TABLET ORAL at 05:52

## 2024-06-17 RX ADMIN — Medication 500 MILLIGRAM(S): at 05:51

## 2024-06-17 RX ADMIN — Medication 200 MILLIGRAM(S): at 17:26

## 2024-06-18 LAB
ANION GAP SERPL CALC-SCNC: 9 MMOL/L — SIGNIFICANT CHANGE UP (ref 7–14)
BUN SERPL-MCNC: 8 MG/DL — LOW (ref 10–20)
CALCIUM SERPL-MCNC: 8.8 MG/DL — SIGNIFICANT CHANGE UP (ref 8.4–10.5)
CHLORIDE SERPL-SCNC: 94 MMOL/L — LOW (ref 98–110)
CO2 SERPL-SCNC: 30 MMOL/L — SIGNIFICANT CHANGE UP (ref 17–32)
CREAT SERPL-MCNC: <0.5 MG/DL — LOW (ref 0.7–1.5)
EGFR: 131 ML/MIN/1.73M2 — SIGNIFICANT CHANGE UP
GLUCOSE SERPL-MCNC: 161 MG/DL — HIGH (ref 70–99)
HCT VFR BLD CALC: 26 % — LOW (ref 42–52)
HGB BLD-MCNC: 8.5 G/DL — LOW (ref 14–18)
MCHC RBC-ENTMCNC: 32.2 PG — HIGH (ref 27–31)
MCHC RBC-ENTMCNC: 32.7 G/DL — SIGNIFICANT CHANGE UP (ref 32–37)
MCV RBC AUTO: 98.5 FL — HIGH (ref 80–94)
NRBC # BLD: 0 /100 WBCS — SIGNIFICANT CHANGE UP (ref 0–0)
PLATELET # BLD AUTO: 546 K/UL — HIGH (ref 130–400)
PMV BLD: 9.5 FL — SIGNIFICANT CHANGE UP (ref 7.4–10.4)
POTASSIUM SERPL-MCNC: 4.1 MMOL/L — SIGNIFICANT CHANGE UP (ref 3.5–5)
POTASSIUM SERPL-SCNC: 4.1 MMOL/L — SIGNIFICANT CHANGE UP (ref 3.5–5)
RBC # BLD: 2.64 M/UL — LOW (ref 4.7–6.1)
RBC # FLD: 18 % — HIGH (ref 11.5–14.5)
SODIUM SERPL-SCNC: 133 MMOL/L — LOW (ref 135–146)
WBC # BLD: 5.62 K/UL — SIGNIFICANT CHANGE UP (ref 4.8–10.8)
WBC # FLD AUTO: 5.62 K/UL — SIGNIFICANT CHANGE UP (ref 4.8–10.8)

## 2024-06-18 PROCEDURE — 99231 SBSQ HOSP IP/OBS SF/LOW 25: CPT

## 2024-06-18 RX ADMIN — Medication 330 MILLIGRAM(S): at 17:09

## 2024-06-18 RX ADMIN — PANTOPRAZOLE SODIUM 40 MILLIGRAM(S): 40 INJECTION, POWDER, FOR SOLUTION INTRAVENOUS at 05:42

## 2024-06-18 RX ADMIN — Medication 300 MILLIGRAM(S): at 00:31

## 2024-06-18 RX ADMIN — Medication 650 MILLIGRAM(S): at 23:08

## 2024-06-18 RX ADMIN — Medication 1 APPLICATION(S): at 11:59

## 2024-06-18 RX ADMIN — ENOXAPARIN SODIUM 40 MILLIGRAM(S): 100 INJECTION SUBCUTANEOUS at 17:26

## 2024-06-18 RX ADMIN — Medication 650 MILLIGRAM(S): at 11:57

## 2024-06-18 RX ADMIN — Medication 650 MILLIGRAM(S): at 12:50

## 2024-06-18 RX ADMIN — Medication 330 MILLIGRAM(S): at 08:30

## 2024-06-18 RX ADMIN — LEVETIRACETAM 750 MILLIGRAM(S): 100 INJECTION INTRAVENOUS at 05:42

## 2024-06-18 RX ADMIN — LEVETIRACETAM 750 MILLIGRAM(S): 100 INJECTION INTRAVENOUS at 17:10

## 2024-06-18 RX ADMIN — Medication 650 MILLIGRAM(S): at 17:08

## 2024-06-18 RX ADMIN — Medication 650 MILLIGRAM(S): at 06:45

## 2024-06-18 RX ADMIN — Medication 650 MILLIGRAM(S): at 05:42

## 2024-06-18 RX ADMIN — Medication 500 MILLIGRAM(S): at 17:06

## 2024-06-18 RX ADMIN — Medication 300 MILLIGRAM(S): at 05:41

## 2024-06-18 RX ADMIN — OLANZAPINE 12.5 MILLIGRAM(S): 2.5 TABLET, FILM COATED ORAL at 21:42

## 2024-06-18 RX ADMIN — Medication 500 MILLIGRAM(S): at 05:43

## 2024-06-18 RX ADMIN — PANTOPRAZOLE SODIUM 40 MILLIGRAM(S): 40 INJECTION, POWDER, FOR SOLUTION INTRAVENOUS at 17:11

## 2024-06-18 RX ADMIN — Medication 200 MILLIGRAM(S): at 17:08

## 2024-06-18 RX ADMIN — METRONIDAZOLE 500 MILLIGRAM(S): 500 TABLET ORAL at 05:42

## 2024-06-18 RX ADMIN — Medication 500 MILLIGRAM(S): at 11:59

## 2024-06-18 RX ADMIN — Medication 300 MILLIGRAM(S): at 11:58

## 2024-06-18 RX ADMIN — Medication 200 MILLIGRAM(S): at 23:05

## 2024-06-18 RX ADMIN — METRONIDAZOLE 500 MILLIGRAM(S): 500 TABLET ORAL at 13:28

## 2024-06-18 RX ADMIN — Medication 500 MILLIGRAM(S): at 23:06

## 2024-06-18 RX ADMIN — VALPROIC ACID 375 MILLIGRAM(S): 250 SOLUTION ORAL at 17:10

## 2024-06-18 RX ADMIN — Medication 650 MILLIGRAM(S): at 18:00

## 2024-06-18 RX ADMIN — VALPROIC ACID 375 MILLIGRAM(S): 250 SOLUTION ORAL at 00:32

## 2024-06-18 RX ADMIN — Medication 500 MILLIGRAM(S): at 00:34

## 2024-06-18 RX ADMIN — Medication 200 MILLIGRAM(S): at 05:42

## 2024-06-18 RX ADMIN — VALPROIC ACID 375 MILLIGRAM(S): 250 SOLUTION ORAL at 05:43

## 2024-06-18 RX ADMIN — Medication 300 MILLIGRAM(S): at 17:08

## 2024-06-18 RX ADMIN — Medication 200 MILLIGRAM(S): at 11:59

## 2024-06-18 RX ADMIN — Medication 650 MILLIGRAM(S): at 01:30

## 2024-06-18 RX ADMIN — Medication 300 MILLIGRAM(S): at 23:07

## 2024-06-18 RX ADMIN — VALPROIC ACID 375 MILLIGRAM(S): 250 SOLUTION ORAL at 11:56

## 2024-06-18 RX ADMIN — VALPROIC ACID 375 MILLIGRAM(S): 250 SOLUTION ORAL at 23:07

## 2024-06-18 RX ADMIN — Medication 650 MILLIGRAM(S): at 00:30

## 2024-06-18 RX ADMIN — Medication 200 MILLIGRAM(S): at 00:32

## 2024-06-18 RX ADMIN — METRONIDAZOLE 500 MILLIGRAM(S): 500 TABLET ORAL at 21:43

## 2024-06-19 PROCEDURE — 99231 SBSQ HOSP IP/OBS SF/LOW 25: CPT

## 2024-06-19 RX ADMIN — VALPROIC ACID 375 MILLIGRAM(S): 250 SOLUTION ORAL at 05:03

## 2024-06-19 RX ADMIN — Medication 1 APPLICATION(S): at 12:45

## 2024-06-19 RX ADMIN — OLANZAPINE 12.5 MILLIGRAM(S): 2.5 TABLET, FILM COATED ORAL at 21:54

## 2024-06-19 RX ADMIN — Medication 330 MILLIGRAM(S): at 17:35

## 2024-06-19 RX ADMIN — PANTOPRAZOLE SODIUM 40 MILLIGRAM(S): 40 INJECTION, POWDER, FOR SOLUTION INTRAVENOUS at 05:03

## 2024-06-19 RX ADMIN — Medication 650 MILLIGRAM(S): at 23:58

## 2024-06-19 RX ADMIN — Medication 300 MILLIGRAM(S): at 05:03

## 2024-06-19 RX ADMIN — Medication 650 MILLIGRAM(S): at 13:00

## 2024-06-19 RX ADMIN — Medication 500 MILLIGRAM(S): at 05:04

## 2024-06-19 RX ADMIN — METRONIDAZOLE 500 MILLIGRAM(S): 500 TABLET ORAL at 05:04

## 2024-06-19 RX ADMIN — Medication 650 MILLIGRAM(S): at 17:35

## 2024-06-19 RX ADMIN — ENOXAPARIN SODIUM 40 MILLIGRAM(S): 100 INJECTION SUBCUTANEOUS at 17:48

## 2024-06-19 RX ADMIN — LEVETIRACETAM 750 MILLIGRAM(S): 100 INJECTION INTRAVENOUS at 05:03

## 2024-06-19 RX ADMIN — Medication 650 MILLIGRAM(S): at 05:04

## 2024-06-19 RX ADMIN — PANTOPRAZOLE SODIUM 40 MILLIGRAM(S): 40 INJECTION, POWDER, FOR SOLUTION INTRAVENOUS at 17:35

## 2024-06-19 RX ADMIN — METRONIDAZOLE 500 MILLIGRAM(S): 500 TABLET ORAL at 21:53

## 2024-06-19 RX ADMIN — VALPROIC ACID 375 MILLIGRAM(S): 250 SOLUTION ORAL at 23:57

## 2024-06-19 RX ADMIN — VALPROIC ACID 375 MILLIGRAM(S): 250 SOLUTION ORAL at 17:32

## 2024-06-19 RX ADMIN — Medication 200 MILLIGRAM(S): at 12:35

## 2024-06-19 RX ADMIN — LEVETIRACETAM 750 MILLIGRAM(S): 100 INJECTION INTRAVENOUS at 17:31

## 2024-06-19 RX ADMIN — VALPROIC ACID 375 MILLIGRAM(S): 250 SOLUTION ORAL at 12:35

## 2024-06-19 RX ADMIN — Medication 300 MILLIGRAM(S): at 17:33

## 2024-06-19 RX ADMIN — Medication 300 MILLIGRAM(S): at 23:58

## 2024-06-19 RX ADMIN — Medication 200 MILLIGRAM(S): at 05:03

## 2024-06-19 RX ADMIN — Medication 300 MILLIGRAM(S): at 12:37

## 2024-06-19 RX ADMIN — Medication 500 MILLIGRAM(S): at 12:40

## 2024-06-19 RX ADMIN — Medication 650 MILLIGRAM(S): at 00:00

## 2024-06-19 RX ADMIN — Medication 500 MILLIGRAM(S): at 17:36

## 2024-06-19 RX ADMIN — METRONIDAZOLE 500 MILLIGRAM(S): 500 TABLET ORAL at 16:05

## 2024-06-19 RX ADMIN — Medication 650 MILLIGRAM(S): at 12:40

## 2024-06-19 RX ADMIN — Medication 330 MILLIGRAM(S): at 12:40

## 2024-06-19 RX ADMIN — Medication 200 MILLIGRAM(S): at 17:34

## 2024-06-20 PROCEDURE — 99231 SBSQ HOSP IP/OBS SF/LOW 25: CPT

## 2024-06-20 RX ORDER — METRONIDAZOLE 500 MG/1
1 TABLET ORAL
Qty: 6 | Refills: 0
Start: 2024-06-20 | End: 2024-06-21

## 2024-06-20 RX ORDER — PANTOPRAZOLE SODIUM 40 MG/10ML
40 INJECTION, POWDER, FOR SOLUTION INTRAVENOUS
Qty: 2 | Refills: 0
Start: 2024-06-20 | End: 2024-06-21

## 2024-06-20 RX ORDER — BISMUTH SUBSALICYLATE 262MG/15ML
17.18 SUSPENSION, ORAL (FINAL DOSE FORM) ORAL
Qty: 137.44 | Refills: 0
Start: 2024-06-20 | End: 2024-06-21

## 2024-06-20 RX ORDER — TETRACYCLINE HCL 500 MG
500 CAPSULE ORAL
Qty: 16 | Refills: 0
Start: 2024-06-20 | End: 2024-06-21

## 2024-06-20 RX ADMIN — Medication 330 MILLIGRAM(S): at 12:23

## 2024-06-20 RX ADMIN — Medication 500 MILLIGRAM(S): at 00:06

## 2024-06-20 RX ADMIN — LEVETIRACETAM 750 MILLIGRAM(S): 100 INJECTION INTRAVENOUS at 18:02

## 2024-06-20 RX ADMIN — VALPROIC ACID 375 MILLIGRAM(S): 250 SOLUTION ORAL at 05:10

## 2024-06-20 RX ADMIN — Medication 300 MILLIGRAM(S): at 05:12

## 2024-06-20 RX ADMIN — VALPROIC ACID 250 MILLIGRAM(S): 250 SOLUTION ORAL at 12:32

## 2024-06-20 RX ADMIN — Medication 200 MILLIGRAM(S): at 00:06

## 2024-06-20 RX ADMIN — Medication 650 MILLIGRAM(S): at 18:01

## 2024-06-20 RX ADMIN — Medication 200 MILLIGRAM(S): at 17:59

## 2024-06-20 RX ADMIN — METRONIDAZOLE 500 MILLIGRAM(S): 500 TABLET ORAL at 05:09

## 2024-06-20 RX ADMIN — Medication 500 MILLIGRAM(S): at 05:32

## 2024-06-20 RX ADMIN — PANTOPRAZOLE SODIUM 40 MILLIGRAM(S): 40 INJECTION, POWDER, FOR SOLUTION INTRAVENOUS at 18:02

## 2024-06-20 RX ADMIN — OLANZAPINE 2.5 MILLIGRAM(S): 2.5 TABLET, FILM COATED ORAL at 21:26

## 2024-06-20 RX ADMIN — Medication 300 MILLIGRAM(S): at 18:00

## 2024-06-20 RX ADMIN — Medication 1 APPLICATION(S): at 12:26

## 2024-06-20 RX ADMIN — VALPROIC ACID 250 MILLIGRAM(S): 250 SOLUTION ORAL at 18:00

## 2024-06-20 RX ADMIN — ENOXAPARIN SODIUM 40 MILLIGRAM(S): 100 INJECTION SUBCUTANEOUS at 18:03

## 2024-06-20 RX ADMIN — Medication 300 MILLIGRAM(S): at 12:34

## 2024-06-20 RX ADMIN — Medication 650 MILLIGRAM(S): at 05:09

## 2024-06-20 RX ADMIN — Medication 330 MILLIGRAM(S): at 17:59

## 2024-06-20 RX ADMIN — Medication 200 MILLIGRAM(S): at 12:24

## 2024-06-20 RX ADMIN — METRONIDAZOLE 500 MILLIGRAM(S): 500 TABLET ORAL at 13:55

## 2024-06-20 RX ADMIN — Medication 325 MILLIGRAM(S): at 12:26

## 2024-06-20 RX ADMIN — LEVETIRACETAM 750 MILLIGRAM(S): 100 INJECTION INTRAVENOUS at 05:09

## 2024-06-20 RX ADMIN — Medication 500 MILLIGRAM(S): at 17:59

## 2024-06-20 RX ADMIN — PANTOPRAZOLE SODIUM 40 MILLIGRAM(S): 40 INJECTION, POWDER, FOR SOLUTION INTRAVENOUS at 05:09

## 2024-06-20 RX ADMIN — METRONIDAZOLE 500 MILLIGRAM(S): 500 TABLET ORAL at 21:27

## 2024-06-20 RX ADMIN — Medication 500 MILLIGRAM(S): at 12:33

## 2024-06-20 RX ADMIN — Medication 200 MILLIGRAM(S): at 06:32

## 2024-06-20 NOTE — PROGRESS NOTE ADULT - SUBJECTIVE AND OBJECTIVE BOX
Azithromycin Pregnancy And Lactation Text: This medication is considered safe during pregnancy and is also secreted in breast milk. Doxycycline Counseling:  Patient counseled regarding possible photosensitivity and increased risk for sunburn.  Patient instructed to avoid sunlight, if possible.  When exposed to sunlight, patients should wear protective clothing, sunglasses, and sunscreen.  The patient was instructed to call the office immediately if the following severe adverse effects occur:  hearing changes, easy bruising/bleeding, severe headache, or vision changes.  The patient verbalized understanding of the proper use and possible adverse effects of doxycycline.  All of the patient's questions and concerns were addressed. High Dose Vitamin A Pregnancy And Lactation Text: High dose vitamin A therapy is contraindicated during pregnancy and breast feeding. Patient walking in room. Asking whether he can go home.   no issues reported per staff.     no examination performed  patient looks comfortable and walking in the room Tetracycline Counseling: Patient counseled regarding possible photosensitivity and increased risk for sunburn.  Patient instructed to avoid sunlight, if possible.  When exposed to sunlight, patients should wear protective clothing, sunglasses, and sunscreen.  The patient was instructed to call the office immediately if the following severe adverse effects occur:  hearing changes, easy bruising/bleeding, severe headache, or vision changes.  The patient verbalized understanding of the proper use and possible adverse effects of tetracycline.  All of the patient's questions and concerns were addressed. Patient understands to avoid pregnancy while on therapy due to potential birth defects. Minocycline Pregnancy And Lactation Text: This medication is Pregnancy Category D and not consider safe during pregnancy. It is also excreted in breast milk. Erythromycin Counseling:  I discussed with the patient the risks of erythromycin including but not limited to GI upset, allergic reaction, drug rash, diarrhea, increase in liver enzymes, and yeast infections. Aklief counseling:  Patient advised to apply a pea-sized amount only at bedtime and wait 30 minutes after washing their face before applying.  If too drying, patient may add a non-comedogenic moisturizer.  The most commonly reported side effects including irritation, redness, scaling, dryness, stinging, burning, itching, and increased risk of sunburn.  The patient verbalized understanding of the proper use and possible adverse effects of retinoids.  All of the patient's questions and concerns were addressed. Topical Sulfur Applications Counseling: Topical Sulfur Counseling: Patient counseled that this medication may cause skin irritation or allergic reactions.  In the event of skin irritation, the patient was advised to reduce the amount of the drug applied or use it less frequently.   The patient verbalized understanding of the proper use and possible adverse effects of topical sulfur application.  All of the patient's questions and concerns were addressed. Benzoyl Peroxide Pregnancy And Lactation Text: This medication is Pregnancy Category C. It is unknown if benzoyl peroxide is excreted in breast milk. Bactrim Pregnancy And Lactation Text: This medication is Pregnancy Category D and is known to cause fetal risk.  It is also excreted in breast milk. Winlevi Counseling:  I discussed with the patient the risks of topical clascoterone including but not limited to erythema, scaling, itching, and stinging. Patient voiced their understanding. Topical Retinoid Pregnancy And Lactation Text: This medication is Pregnancy Category C. It is unknown if this medication is excreted in breast milk. Include Pregnancy/Lactation Warning?: No Detail Level: Zone Winlevi Pregnancy And Lactation Text: This medication is considered safe during pregnancy and breastfeeding. Tazorac Counseling:  Patient advised that medication is irritating and drying.  Patient may need to apply sparingly and wash off after an hour before eventually leaving it on overnight.  The patient verbalized understanding of the proper use and possible adverse effects of tazorac.  All of the patient's questions and concerns were addressed. Isotretinoin Pregnancy And Lactation Text: This medication is Pregnancy Category X and is considered extremely dangerous during pregnancy. It is unknown if it is excreted in breast milk. Birth Control Pills Counseling: Birth Control Pill Counseling: I discussed with the patient the potential side effects of OCPs including but not limited to increased risk of stroke, heart attack, thrombophlebitis, deep venous thrombosis, hepatic adenomas, breast changes, GI upset, headaches, and depression.  The patient verbalized understanding of the proper use and possible adverse effects of OCPs. All of the patient's questions and concerns were addressed. Dapsone Counseling: I discussed with the patient the risks of dapsone including but not limited to hemolytic anemia, agranulocytosis, rashes, methemoglobinemia, kidney failure, peripheral neuropathy, headaches, GI upset, and liver toxicity.  Patients who start dapsone require monitoring including baseline LFTs and weekly CBCs for the first month, then every month thereafter.  The patient verbalized understanding of the proper use and possible adverse effects of dapsone.  All of the patient's questions and concerns were addressed. Sarecycline Counseling: Patient advised regarding possible photosensitivity and discoloration of the teeth, skin, lips, tongue and gums.  Patient instructed to avoid sunlight, if possible.  When exposed to sunlight, patients should wear protective clothing, sunglasses, and sunscreen.  The patient was instructed to call the office immediately if the following severe adverse effects occur:  hearing changes, easy bruising/bleeding, severe headache, or vision changes.  The patient verbalized understanding of the proper use and possible adverse effects of sarecycline.  All of the patient's questions and concerns were addressed. Erythromycin Pregnancy And Lactation Text: This medication is Pregnancy Category B and is considered safe during pregnancy. It is also excreted in breast milk. Aklief Pregnancy And Lactation Text: It is unknown if this medication is safe to use during pregnancy.  It is unknown if this medication is excreted in breast milk.  Breastfeeding women should use the topical cream on the smallest area of the skin for the shortest time needed while breastfeeding.  Do not apply to nipple and areola. Spironolactone Counseling: Patient advised regarding risks of diarrhea, abdominal pain, hyperkalemia, birth defects (for female patients), liver toxicity and renal toxicity. The patient may need blood work to monitor liver and kidney function and potassium levels while on therapy. The patient verbalized understanding of the proper use and possible adverse effects of spironolactone.  All of the patient's questions and concerns were addressed. Azelaic Acid Pregnancy And Lactation Text: This medication is considered safe during pregnancy and breast feeding. Topical Clindamycin Counseling: Patient counseled that this medication may cause skin irritation or allergic reactions.  In the event of skin irritation, the patient was advised to reduce the amount of the drug applied or use it less frequently.   The patient verbalized understanding of the proper use and possible adverse effects of clindamycin.  All of the patient's questions and concerns were addressed. Topical Clindamycin Pregnancy And Lactation Text: This medication is Pregnancy Category B and is considered safe during pregnancy. It is unknown if it is excreted in breast milk. Minocycline Counseling: Patient advised regarding possible photosensitivity and discoloration of the teeth, skin, lips, tongue and gums.  Patient instructed to avoid sunlight, if possible.  When exposed to sunlight, patients should wear protective clothing, sunglasses, and sunscreen.  The patient was instructed to call the office immediately if the following severe adverse effects occur:  hearing changes, easy bruising/bleeding, severe headache, or vision changes.  The patient verbalized understanding of the proper use and possible adverse effects of minocycline.  All of the patient's questions and concerns were addressed. Bactrim Counseling:  I discussed with the patient the risks of sulfa antibiotics including but not limited to GI upset, allergic reaction, drug rash, diarrhea, dizziness, photosensitivity, and yeast infections.  Rarely, more serious reactions can occur including but not limited to aplastic anemia, agranulocytosis, methemoglobinemia, blood dyscrasias, liver or kidney failure, lung infiltrates or desquamative/blistering drug rashes. Doxycycline Pregnancy And Lactation Text: This medication is Pregnancy Category D and not consider safe during pregnancy. It is also excreted in breast milk but is considered safe for shorter treatment courses. Benzoyl Peroxide Counseling: Patient counseled that medicine may cause skin irritation and bleach clothing.  In the event of skin irritation, the patient was advised to reduce the amount of the drug applied or use it less frequently.   The patient verbalized understanding of the proper use and possible adverse effects of benzoyl peroxide.  All of the patient's questions and concerns were addressed. Topical Retinoid counseling:  Patient advised to apply a pea-sized amount only at bedtime and wait 30 minutes after washing their face before applying.  If too drying, patient may add a non-comedogenic moisturizer. The patient verbalized understanding of the proper use and possible adverse effects of retinoids.  All of the patient's questions and concerns were addressed. Topical Sulfur Applications Pregnancy And Lactation Text: This medication is Pregnancy Category C and has an unknown safety profile during pregnancy. It is unknown if this topical medication is excreted in breast milk. Birth Control Pills Pregnancy And Lactation Text: This medication should be avoided if pregnant and for the first 30 days post-partum. High Dose Vitamin A Counseling: Side effects reviewed, pt to contact office should one occur. Spironolactone Pregnancy And Lactation Text: This medication can cause feminization of the male fetus and should be avoided during pregnancy. The active metabolite is also found in breast milk. Tazorac Pregnancy And Lactation Text: This medication is not safe during pregnancy. It is unknown if this medication is excreted in breast milk. Isotretinoin Counseling: Patient should get monthly blood tests, not donate blood, not drive at night if vision affected, not share medication, and not undergo elective surgery for 6 months after tx completed. Side effects reviewed, pt to contact office should one occur. Azelaic Acid Counseling: Patient counseled that medicine may cause skin irritation and to avoid applying near the eyes.  In the event of skin irritation, the patient was advised to reduce the amount of the drug applied or use it less frequently.   The patient verbalized understanding of the proper use and possible adverse effects of azelaic acid.  All of the patient's questions and concerns were addressed. Dapsone Pregnancy And Lactation Text: This medication is Pregnancy Category C and is not considered safe during pregnancy or breast feeding. Azithromycin Counseling:  I discussed with the patient the risks of azithromycin including but not limited to GI upset, allergic reaction, drug rash, diarrhea, and yeast infections.

## 2024-06-21 ENCOUNTER — TRANSCRIPTION ENCOUNTER (OUTPATIENT)
Age: 64
End: 2024-06-21

## 2024-06-21 VITALS
DIASTOLIC BLOOD PRESSURE: 79 MMHG | OXYGEN SATURATION: 99 % | SYSTOLIC BLOOD PRESSURE: 152 MMHG | TEMPERATURE: 98 F | HEART RATE: 98 BPM

## 2024-06-21 PROCEDURE — 99231 SBSQ HOSP IP/OBS SF/LOW 25: CPT

## 2024-06-21 PROCEDURE — 93010 ELECTROCARDIOGRAM REPORT: CPT

## 2024-06-21 RX ORDER — PANTOPRAZOLE SODIUM 40 MG/10ML
40 INJECTION, POWDER, FOR SOLUTION INTRAVENOUS
Qty: 1 | Refills: 0
Start: 2024-06-21 | End: 2024-06-21

## 2024-06-21 RX ORDER — BISMUTH SUBSALICYLATE 262MG/15ML
17.18 SUSPENSION, ORAL (FINAL DOSE FORM) ORAL
Qty: 68.72 | Refills: 0
Start: 2024-06-21 | End: 2024-06-21

## 2024-06-21 RX ORDER — TETRACYCLINE HCL 500 MG
500 CAPSULE ORAL
Qty: 2 | Refills: 0
Start: 2024-06-21 | End: 2024-06-21

## 2024-06-21 RX ADMIN — PANTOPRAZOLE SODIUM 40 MILLIGRAM(S): 40 INJECTION, POWDER, FOR SOLUTION INTRAVENOUS at 18:24

## 2024-06-21 RX ADMIN — LEVETIRACETAM 750 MILLIGRAM(S): 100 INJECTION INTRAVENOUS at 18:23

## 2024-06-21 RX ADMIN — VALPROIC ACID 375 MILLIGRAM(S): 250 SOLUTION ORAL at 18:23

## 2024-06-21 RX ADMIN — Medication 500 MILLIGRAM(S): at 18:21

## 2024-06-21 RX ADMIN — VALPROIC ACID 375 MILLIGRAM(S): 250 SOLUTION ORAL at 00:10

## 2024-06-21 RX ADMIN — Medication 200 MILLIGRAM(S): at 05:10

## 2024-06-21 RX ADMIN — Medication 330 MILLIGRAM(S): at 18:19

## 2024-06-21 RX ADMIN — Medication 500 MILLIGRAM(S): at 05:11

## 2024-06-21 RX ADMIN — Medication 650 MILLIGRAM(S): at 12:51

## 2024-06-21 RX ADMIN — Medication 300 MILLIGRAM(S): at 18:22

## 2024-06-21 RX ADMIN — Medication 3 MILLILITER(S): at 13:35

## 2024-06-21 RX ADMIN — VALPROIC ACID 375 MILLIGRAM(S): 250 SOLUTION ORAL at 12:52

## 2024-06-21 RX ADMIN — Medication 200 MILLIGRAM(S): at 00:11

## 2024-06-21 RX ADMIN — Medication 300 MILLIGRAM(S): at 00:11

## 2024-06-21 RX ADMIN — VALPROIC ACID 375 MILLIGRAM(S): 250 SOLUTION ORAL at 05:09

## 2024-06-21 RX ADMIN — Medication 3 MILLILITER(S): at 21:26

## 2024-06-21 RX ADMIN — IPRATROPIUM BROMIDE AND ALBUTEROL SULFATE 3 MILLILITER(S): .5; 3 SOLUTION RESPIRATORY (INHALATION) at 09:10

## 2024-06-21 RX ADMIN — Medication 500 MILLIGRAM(S): at 12:52

## 2024-06-21 RX ADMIN — Medication 200 MILLIGRAM(S): at 18:21

## 2024-06-21 RX ADMIN — LEVETIRACETAM 750 MILLIGRAM(S): 100 INJECTION INTRAVENOUS at 05:08

## 2024-06-21 RX ADMIN — PANTOPRAZOLE SODIUM 40 MILLIGRAM(S): 40 INJECTION, POWDER, FOR SOLUTION INTRAVENOUS at 05:08

## 2024-06-21 RX ADMIN — METRONIDAZOLE 500 MILLIGRAM(S): 500 TABLET ORAL at 18:20

## 2024-06-21 RX ADMIN — Medication 500 MILLIGRAM(S): at 00:13

## 2024-06-21 RX ADMIN — Medication 650 MILLIGRAM(S): at 18:23

## 2024-06-21 RX ADMIN — Medication 650 MILLIGRAM(S): at 05:08

## 2024-06-21 RX ADMIN — Medication 300 MILLIGRAM(S): at 12:51

## 2024-06-21 RX ADMIN — Medication 330 MILLIGRAM(S): at 08:55

## 2024-06-21 RX ADMIN — Medication 650 MILLIGRAM(S): at 00:12

## 2024-06-21 RX ADMIN — METRONIDAZOLE 500 MILLIGRAM(S): 500 TABLET ORAL at 05:08

## 2024-06-21 RX ADMIN — ENOXAPARIN SODIUM 40 MILLIGRAM(S): 100 INJECTION SUBCUTANEOUS at 18:24

## 2024-06-21 RX ADMIN — Medication 3 MILLILITER(S): at 09:10

## 2024-06-21 RX ADMIN — Medication 1 APPLICATION(S): at 12:53

## 2024-06-21 RX ADMIN — Medication 300 MILLIGRAM(S): at 05:10

## 2024-06-21 RX ADMIN — Medication 200 MILLIGRAM(S): at 12:50

## 2024-06-26 DIAGNOSIS — A41.9 SEPSIS, UNSPECIFIED ORGANISM: ICD-10-CM

## 2024-06-26 DIAGNOSIS — L89.619 PRESSURE ULCER OF RIGHT HEEL, UNSPECIFIED STAGE: ICD-10-CM

## 2024-06-26 DIAGNOSIS — D64.9 ANEMIA, UNSPECIFIED: ICD-10-CM

## 2024-06-26 DIAGNOSIS — K29.00 ACUTE GASTRITIS WITHOUT BLEEDING: ICD-10-CM

## 2024-06-26 DIAGNOSIS — R62.7 ADULT FAILURE TO THRIVE: ICD-10-CM

## 2024-06-26 DIAGNOSIS — R53.2 FUNCTIONAL QUADRIPLEGIA: ICD-10-CM

## 2024-06-26 DIAGNOSIS — F03.C0 UNSPECIFIED DEMENTIA, SEVERE, WITHOUT BEHAVIORAL DISTURBANCE, PSYCHOTIC DISTURBANCE, MOOD DISTURBANCE, AND ANXIETY: ICD-10-CM

## 2024-06-26 DIAGNOSIS — Z66 DO NOT RESUSCITATE: ICD-10-CM

## 2024-06-26 DIAGNOSIS — I10 ESSENTIAL (PRIMARY) HYPERTENSION: ICD-10-CM

## 2024-06-26 DIAGNOSIS — B96.81 HELICOBACTER PYLORI [H. PYLORI] AS THE CAUSE OF DISEASES CLASSIFIED ELSEWHERE: ICD-10-CM

## 2024-06-26 DIAGNOSIS — G40.909 EPILEPSY, UNSPECIFIED, NOT INTRACTABLE, WITHOUT STATUS EPILEPTICUS: ICD-10-CM

## 2024-06-26 DIAGNOSIS — F20.9 SCHIZOPHRENIA, UNSPECIFIED: ICD-10-CM

## 2024-06-26 DIAGNOSIS — G93.41 METABOLIC ENCEPHALOPATHY: ICD-10-CM

## 2024-06-26 DIAGNOSIS — E87.20 ACIDOSIS, UNSPECIFIED: ICD-10-CM

## 2024-06-26 DIAGNOSIS — Z16.30 RESISTANCE TO UNSPECIFIED ANTIMICROBIAL DRUGS: ICD-10-CM

## 2024-06-26 DIAGNOSIS — J69.0 PNEUMONITIS DUE TO INHALATION OF FOOD AND VOMIT: ICD-10-CM

## 2024-06-26 DIAGNOSIS — J96.01 ACUTE RESPIRATORY FAILURE WITH HYPOXIA: ICD-10-CM

## 2024-06-26 DIAGNOSIS — R65.20 SEVERE SEPSIS WITHOUT SEPTIC SHOCK: ICD-10-CM

## 2024-06-26 DIAGNOSIS — K31.7 POLYP OF STOMACH AND DUODENUM: ICD-10-CM

## 2024-07-15 PROBLEM — F03.90 UNSPECIFIED DEMENTIA, UNSPECIFIED SEVERITY, WITHOUT BEHAVIORAL DISTURBANCE, PSYCHOTIC DISTURBANCE, MOOD DISTURBANCE, AND ANXIETY: Chronic | Status: ACTIVE | Noted: 2024-05-21

## 2024-07-15 PROBLEM — I10 ESSENTIAL (PRIMARY) HYPERTENSION: Chronic | Status: ACTIVE | Noted: 2024-05-21

## 2024-07-15 PROBLEM — R56.9 UNSPECIFIED CONVULSIONS: Chronic | Status: ACTIVE | Noted: 2024-05-21

## 2024-08-02 ENCOUNTER — APPOINTMENT (OUTPATIENT)
Dept: UROLOGY | Facility: CLINIC | Age: 64
End: 2024-08-02

## 2024-08-16 ENCOUNTER — APPOINTMENT (OUTPATIENT)
Dept: GASTROENTEROLOGY | Facility: CLINIC | Age: 64
End: 2024-08-16

## 2024-10-18 NOTE — PROGRESS NOTE BEHAVIORAL HEALTH - NSBHFUPINTERVALHXFT_PSY_A_CORE
Inserted under fluoro. Pt was seen, evaluated, chart reviewed.  As per nursing staff and 1:1, no events overnight. Pt has not been agitated, has not required IM medications. Pt is also non compliant with any PO medications. On evaluation, pt reports he is "fine.' He is pleasant and cooperative. Offered no new complaints. Endorsed fair sleep and appetite. Denied A/V hallucinations. Denied suicidal/homicidal ideation, intent or plan. It seems that pt is mistrustful of staff and mistrustful of medications.

## 2024-11-21 NOTE — PROGRESS NOTE ADULT - SUBJECTIVE AND OBJECTIVE BOX
Chest pain/ tightness on and off, unsure of when it started and went away. Per patient might be gas. Complains of acid reflux, took Emily at home.    Pt refuses to be seen  refuses meds          MEDICATIONS  (STANDING):  AQUAPHOR (petrolatum Ointment) 1 Application(s) Topical daily  diVALproex  milliGRAM(s) Oral two times a day  enalapril 10 milliGRAM(s) Oral daily  levETIRAcetam  Solution 1000 milliGRAM(s) Oral two times a day  OLANZapine 5 milliGRAM(s) Oral daily    MEDICATIONS  (PRN):  cloNIDine 0.1 milliGRAM(s) Oral every 8 hours PRN htn  OLANZapine Injectable 5 milliGRAM(s) IntraMuscular once PRN agitation  polyethylene glycol 3350 17 Gram(s) Oral two times a day PRN Constipation

## 2024-12-16 NOTE — PROGRESS NOTE ADULT - PROVIDER SPECIALTY LIST ADULT
Hospitalist Duration Of Freeze Thaw-Cycle (Seconds): 8 Post-Care Instructions: I reviewed with the patient in detail post-care instructions. Patient is to wear sunprotection, and avoid picking at any of the treated lesions. Pt may apply Vaseline to crusted or scabbing areas. Number Of Freeze-Thaw Cycles: 2 freeze-thaw cycles Render Post-Care Instructions In Note?: no Consent: The patient's consent was obtained including but not limited to risks of crusting, scabbing, blistering, scarring, darker or lighter pigmentary change, recurrence, incomplete removal and infection. Show Aperture Variable?: Yes Detail Level: Detailed Application Tool (Optional): Liquid Nitrogen Sprayer

## 2025-02-12 NOTE — ED ADULT NURSE NOTE - DOES PATIENT HAVE ADVANCE DIRECTIVE
Patient presents ambulatory with  for port draw, hydration, and possible blood transfusion. VS and weight as charted. Pt denies any current issues, is feeling well, but does have continued back pain with movement since surgery.     Port accessed per protocol, specimen collected, and IV fluids started. Labs and orders reviewed. Writer spoke to Dr. Davenport regarding patient and patient is to have 1 unit PRBC preoperatively today and no hydration today.    Type and cross obtained and sent to lab, patient blood band applied. Blood transfusion consent obtained. Blood administration started at 85 ml/hr with second RN verification. Patient tolerated well with no adverse reactions for initial 15 minutes. Increased to 185 ml/hr for remainder of transfusion. Patient tolerated well with no adverse reactions noted. Line flushed. VS as charted throughout.     Port flushed, heparinized, and de-accessed per protocol. Patient ambulatory at discharge with calendar in hand.    No

## 2025-03-13 NOTE — PROGRESS NOTE ADULT - ASSESSMENT
61M PMHx seizure disorder, schizophrenia, HTN here with altered mental status.    #seizures  - Divalproex 500mg bid, gabapentin 100mg tid, Keppra 1000 bid  - on/off non-compliant with medications  - EEG noted epileptic activity    #Dementia with psychotic features, stable   - olanzapine 5 mg daily  - EKG QTc <449  -IM haldol for severe agitation/code greys   -No contraindication to discharge as per psych consult.  - cont support care    #tinea pedis   -Resolved s/p clotrimazole cream    #HTN   - norvasc 10mg + enalapril 10 mg    #DVT ppx -- cont  Code -- Full  Dispo --DC pending case mgmt/SW; pt is placement issue, citizenship, court ordered treatment against objection. guardian to be appointed hide

## 2025-03-19 NOTE — PROGRESS NOTE ADULT - PROVIDER SPECIALTY LIST ADULT
March 19, 2025      [facility] - General Surgery  [address] [place] [address] [place] [address]  Phone: [phone]  Fax: [phone]       Patient: Zachariah Aguilar   YOB: 1972  Date of Visit: 03/19/2025    To Whom It May Concern:    Ilya Aguilar  was at [facility] on 3/6/25. The patient may return to work on 4/21/25 with no restrictions. If you have any questions or concerns, or if I can be of further assistance, please do not hesitate to contact me.    Sincerely,    Filipe Cr M.D.      Neurology

## 2025-03-26 NOTE — ED PROVIDER NOTE - NSDCPRINTRESULTS_ED_ALL_ED
(V5) oriented
Patient requests all Lab, Cardiology, and Radiology Results on their Discharge Instructions